# Patient Record
Sex: FEMALE | Race: WHITE | NOT HISPANIC OR LATINO | Employment: STUDENT | URBAN - METROPOLITAN AREA
[De-identification: names, ages, dates, MRNs, and addresses within clinical notes are randomized per-mention and may not be internally consistent; named-entity substitution may affect disease eponyms.]

---

## 2018-01-15 ENCOUNTER — OFFICE VISIT CONVERTED (OUTPATIENT)
Dept: INTERNAL MEDICINE | Facility: CLINIC | Age: 43
End: 2018-01-15
Attending: INTERNAL MEDICINE

## 2018-01-19 ENCOUNTER — OFFICE VISIT CONVERTED (OUTPATIENT)
Dept: ORTHOPEDIC SURGERY | Facility: CLINIC | Age: 43
End: 2018-01-19
Attending: ORTHOPAEDIC SURGERY

## 2018-01-25 ENCOUNTER — OFFICE VISIT CONVERTED (OUTPATIENT)
Dept: INTERNAL MEDICINE | Facility: CLINIC | Age: 43
End: 2018-01-25
Attending: NURSE PRACTITIONER

## 2018-03-13 ENCOUNTER — OFFICE VISIT CONVERTED (OUTPATIENT)
Dept: INTERNAL MEDICINE | Facility: CLINIC | Age: 43
End: 2018-03-13
Attending: INTERNAL MEDICINE

## 2018-04-25 ENCOUNTER — OFFICE VISIT CONVERTED (OUTPATIENT)
Dept: INTERNAL MEDICINE | Facility: CLINIC | Age: 43
End: 2018-04-25
Attending: PHYSICIAN ASSISTANT

## 2018-06-19 ENCOUNTER — OFFICE VISIT CONVERTED (OUTPATIENT)
Dept: INTERNAL MEDICINE | Facility: CLINIC | Age: 43
End: 2018-06-19
Attending: PHYSICIAN ASSISTANT

## 2018-07-31 ENCOUNTER — OFFICE VISIT CONVERTED (OUTPATIENT)
Dept: INTERNAL MEDICINE | Facility: CLINIC | Age: 43
End: 2018-07-31
Attending: INTERNAL MEDICINE

## 2019-11-06 ENCOUNTER — OFFICE VISIT CONVERTED (OUTPATIENT)
Dept: INTERNAL MEDICINE | Facility: CLINIC | Age: 44
End: 2019-11-06
Attending: INTERNAL MEDICINE

## 2019-11-06 ENCOUNTER — HOSPITAL ENCOUNTER (OUTPATIENT)
Dept: OTHER | Facility: HOSPITAL | Age: 44
Discharge: HOME | End: 2019-11-06
Attending: INTERNAL MEDICINE

## 2019-11-06 ENCOUNTER — CONVERSION ENCOUNTER (OUTPATIENT)
Dept: INTERNAL MEDICINE | Facility: CLINIC | Age: 44
End: 2019-11-06

## 2019-11-08 ENCOUNTER — HOSPITAL ENCOUNTER (OUTPATIENT)
Dept: ULTRASOUND IMAGING | Facility: HOSPITAL | Age: 44
Discharge: HOME | End: 2019-11-08
Attending: INTERNAL MEDICINE

## 2019-11-08 LAB — BACTERIA UR CULT: NORMAL

## 2019-12-06 ENCOUNTER — CONVERSION ENCOUNTER (OUTPATIENT)
Dept: INTERNAL MEDICINE | Facility: CLINIC | Age: 44
End: 2019-12-06

## 2019-12-06 ENCOUNTER — OFFICE VISIT CONVERTED (OUTPATIENT)
Dept: INTERNAL MEDICINE | Facility: CLINIC | Age: 44
End: 2019-12-06
Attending: INTERNAL MEDICINE

## 2019-12-19 ENCOUNTER — HOSPITAL ENCOUNTER (OUTPATIENT)
Dept: URGENT CARE | Facility: CLINIC | Age: 44
Discharge: HOME | End: 2019-12-19

## 2020-01-07 ENCOUNTER — OFFICE VISIT CONVERTED (OUTPATIENT)
Dept: INTERNAL MEDICINE | Facility: CLINIC | Age: 45
End: 2020-01-07
Attending: INTERNAL MEDICINE

## 2020-01-07 ENCOUNTER — HOSPITAL ENCOUNTER (OUTPATIENT)
Dept: OTHER | Facility: HOSPITAL | Age: 45
Discharge: HOME | End: 2020-01-07
Attending: INTERNAL MEDICINE

## 2020-01-07 LAB
EST. AVERAGE GLUCOSE BLD GHB EST-MCNC: 114 MG/DL
HBA1C MFR BLD: 5.6 % (ref 3.5–5.7)

## 2020-01-08 LAB
ALBUMIN SERPL-MCNC: 3.9 G/DL (ref 3.5–5)
ALBUMIN/GLOB SERPL: 1.4 {RATIO} (ref 1.4–2.6)
ALP SERPL-CCNC: 87 U/L (ref 42–98)
ALT SERPL-CCNC: 10 U/L (ref 10–40)
ANION GAP SERPL CALC-SCNC: 16 MMOL/L (ref 8–19)
AST SERPL-CCNC: 18 U/L (ref 15–50)
BASOPHILS # BLD AUTO: 0.05 10*3/UL (ref 0–0.2)
BASOPHILS NFR BLD AUTO: 0.6 % (ref 0–3)
BILIRUB SERPL-MCNC: 0.3 MG/DL (ref 0.2–1.3)
BUN SERPL-MCNC: 14 MG/DL (ref 5–25)
BUN/CREAT SERPL: 17 {RATIO} (ref 6–20)
CALCIUM SERPL-MCNC: 9.1 MG/DL (ref 8.7–10.4)
CHLORIDE SERPL-SCNC: 99 MMOL/L (ref 99–111)
CHOLEST SERPL-MCNC: 147 MG/DL (ref 107–200)
CHOLEST/HDLC SERPL: 3.7 {RATIO} (ref 3–6)
CONV ABS IMM GRAN: 0.03 10*3/UL (ref 0–0.2)
CONV CO2: 24 MMOL/L (ref 22–32)
CONV IMMATURE GRAN: 0.3 % (ref 0–1.8)
CONV TOTAL PROTEIN: 6.7 G/DL (ref 6.3–8.2)
CREAT UR-MCNC: 0.83 MG/DL (ref 0.5–0.9)
DEPRECATED RDW RBC AUTO: 42.1 FL (ref 36.4–46.3)
EOSINOPHIL # BLD AUTO: 0.13 10*3/UL (ref 0–0.7)
EOSINOPHIL # BLD AUTO: 1.5 % (ref 0–7)
ERYTHROCYTE [DISTWIDTH] IN BLOOD BY AUTOMATED COUNT: 13 % (ref 11.7–14.4)
GFR SERPLBLD BASED ON 1.73 SQ M-ARVRAT: >60 ML/MIN/{1.73_M2}
GLOBULIN UR ELPH-MCNC: 2.8 G/DL (ref 2–3.5)
GLUCOSE SERPL-MCNC: 103 MG/DL (ref 65–99)
HCT VFR BLD AUTO: 41.9 % (ref 37–47)
HDLC SERPL-MCNC: 40 MG/DL (ref 40–60)
HGB BLD-MCNC: 13.9 G/DL (ref 12–16)
LDLC SERPL CALC-MCNC: 76 MG/DL (ref 70–100)
LYMPHOCYTES # BLD AUTO: 2.94 10*3/UL (ref 1–5)
LYMPHOCYTES NFR BLD AUTO: 32.9 % (ref 20–45)
MCH RBC QN AUTO: 29.3 PG (ref 27–31)
MCHC RBC AUTO-ENTMCNC: 33.2 G/DL (ref 33–37)
MCV RBC AUTO: 88.4 FL (ref 81–99)
MONOCYTES # BLD AUTO: 0.4 10*3/UL (ref 0.2–1.2)
MONOCYTES NFR BLD AUTO: 4.5 % (ref 3–10)
NEUTROPHILS # BLD AUTO: 5.39 10*3/UL (ref 2–8)
NEUTROPHILS NFR BLD AUTO: 60.2 % (ref 30–85)
NRBC CBCN: 0 % (ref 0–0.7)
OSMOLALITY SERPL CALC.SUM OF ELEC: 279 MOSM/KG (ref 273–304)
PLATELET # BLD AUTO: 269 10*3/UL (ref 130–400)
PMV BLD AUTO: 10.5 FL (ref 9.4–12.3)
POTASSIUM SERPL-SCNC: 4.6 MMOL/L (ref 3.5–5.3)
RBC # BLD AUTO: 4.74 10*6/UL (ref 4.2–5.4)
SODIUM SERPL-SCNC: 134 MMOL/L (ref 135–147)
TRIGL SERPL-MCNC: 156 MG/DL (ref 40–150)
TSH SERPL-ACNC: 3.08 M[IU]/L (ref 0.27–4.2)
VLDLC SERPL-MCNC: 31 MG/DL (ref 5–37)
WBC # BLD AUTO: 8.94 10*3/UL (ref 4.8–10.8)

## 2020-03-06 ENCOUNTER — OFFICE VISIT CONVERTED (OUTPATIENT)
Dept: INTERNAL MEDICINE | Facility: CLINIC | Age: 45
End: 2020-03-06
Attending: INTERNAL MEDICINE

## 2020-04-09 ENCOUNTER — OFFICE VISIT CONVERTED (OUTPATIENT)
Dept: ORTHOPEDIC SURGERY | Facility: CLINIC | Age: 45
End: 2020-04-09
Attending: ORTHOPAEDIC SURGERY

## 2020-05-07 ENCOUNTER — TELEMEDICINE CONVERTED (OUTPATIENT)
Dept: NEUROSURGERY | Facility: CLINIC | Age: 45
End: 2020-05-07
Attending: PHYSICIAN ASSISTANT

## 2020-05-28 LAB — SARS-COV-2 RNA SPEC QL NAA+PROBE: NOT DETECTED

## 2020-05-29 ENCOUNTER — HOSPITAL ENCOUNTER (OUTPATIENT)
Dept: PERIOP | Facility: HOSPITAL | Age: 45
Setting detail: HOSPITAL OUTPATIENT SURGERY
Discharge: HOME | End: 2020-05-29
Attending: OBSTETRICS & GYNECOLOGY

## 2020-05-29 LAB — HCG UR QL: NEGATIVE

## 2020-06-01 ENCOUNTER — HOSPITAL ENCOUNTER (OUTPATIENT)
Dept: GENERAL RADIOLOGY | Facility: HOSPITAL | Age: 45
Discharge: HOME | End: 2020-06-01
Attending: PHYSICIAN ASSISTANT

## 2020-06-08 ENCOUNTER — OFFICE VISIT CONVERTED (OUTPATIENT)
Dept: NEUROSURGERY | Facility: CLINIC | Age: 45
End: 2020-06-08
Attending: PHYSICIAN ASSISTANT

## 2020-08-05 ENCOUNTER — OFFICE VISIT CONVERTED (OUTPATIENT)
Dept: NEUROLOGY | Facility: CLINIC | Age: 45
End: 2020-08-05
Attending: PSYCHIATRY & NEUROLOGY

## 2020-08-17 ENCOUNTER — HOSPITAL ENCOUNTER (OUTPATIENT)
Dept: MAMMOGRAPHY | Facility: HOSPITAL | Age: 45
Discharge: HOME | End: 2020-08-17
Attending: INTERNAL MEDICINE

## 2020-08-18 ENCOUNTER — CONVERSION ENCOUNTER (OUTPATIENT)
Dept: OTHER | Facility: HOSPITAL | Age: 45
End: 2020-08-18

## 2020-08-18 ENCOUNTER — OFFICE VISIT CONVERTED (OUTPATIENT)
Dept: NEUROSURGERY | Facility: CLINIC | Age: 45
End: 2020-08-18
Attending: PHYSICIAN ASSISTANT

## 2020-08-25 ENCOUNTER — OFFICE VISIT CONVERTED (OUTPATIENT)
Dept: ORTHOPEDIC SURGERY | Facility: CLINIC | Age: 45
End: 2020-08-25
Attending: PHYSICIAN ASSISTANT

## 2020-08-28 ENCOUNTER — HOSPITAL ENCOUNTER (OUTPATIENT)
Dept: MAMMOGRAPHY | Facility: HOSPITAL | Age: 45
Discharge: HOME | End: 2020-08-28
Attending: INTERNAL MEDICINE

## 2020-09-03 ENCOUNTER — HOSPITAL ENCOUNTER (OUTPATIENT)
Dept: MRI IMAGING | Facility: HOSPITAL | Age: 45
Discharge: HOME | End: 2020-09-03
Attending: PHYSICIAN ASSISTANT

## 2020-09-11 ENCOUNTER — OFFICE VISIT CONVERTED (OUTPATIENT)
Dept: ORTHOPEDIC SURGERY | Facility: CLINIC | Age: 45
End: 2020-09-11
Attending: PHYSICIAN ASSISTANT

## 2020-09-14 ENCOUNTER — HOSPITAL ENCOUNTER (OUTPATIENT)
Dept: OTHER | Facility: HOSPITAL | Age: 45
Discharge: HOME | End: 2020-09-14
Attending: INTERNAL MEDICINE

## 2020-09-14 ENCOUNTER — CONVERSION ENCOUNTER (OUTPATIENT)
Dept: INTERNAL MEDICINE | Facility: CLINIC | Age: 45
End: 2020-09-14

## 2020-09-14 ENCOUNTER — OFFICE VISIT CONVERTED (OUTPATIENT)
Dept: INTERNAL MEDICINE | Facility: CLINIC | Age: 45
End: 2020-09-14
Attending: INTERNAL MEDICINE

## 2020-09-14 LAB
ALBUMIN SERPL-MCNC: 3.8 G/DL (ref 3.5–5)
ALBUMIN/GLOB SERPL: 1.3 {RATIO} (ref 1.4–2.6)
ALP SERPL-CCNC: 87 U/L (ref 42–98)
ALT SERPL-CCNC: 12 U/L (ref 10–40)
ANION GAP SERPL CALC-SCNC: 18 MMOL/L (ref 8–19)
AST SERPL-CCNC: 18 U/L (ref 15–50)
BASOPHILS # BLD AUTO: 0.04 10*3/UL (ref 0–0.2)
BASOPHILS NFR BLD AUTO: 0.4 % (ref 0–3)
BILIRUB SERPL-MCNC: 0.39 MG/DL (ref 0.2–1.3)
BUN SERPL-MCNC: 20 MG/DL (ref 5–25)
BUN/CREAT SERPL: 24 {RATIO} (ref 6–20)
CALCIUM SERPL-MCNC: 9.4 MG/DL (ref 8.7–10.4)
CHLORIDE SERPL-SCNC: 100 MMOL/L (ref 99–111)
CHOLEST SERPL-MCNC: 178 MG/DL (ref 107–200)
CHOLEST/HDLC SERPL: 2.9 {RATIO} (ref 3–6)
CONV ABS IMM GRAN: 0.01 10*3/UL (ref 0–0.2)
CONV CO2: 24 MMOL/L (ref 22–32)
CONV IMMATURE GRAN: 0.1 % (ref 0–1.8)
CONV TOTAL PROTEIN: 6.7 G/DL (ref 6.3–8.2)
CREAT UR-MCNC: 0.84 MG/DL (ref 0.5–0.9)
DEPRECATED RDW RBC AUTO: 46.6 FL (ref 36.4–46.3)
EOSINOPHIL # BLD AUTO: 0.13 10*3/UL (ref 0–0.7)
EOSINOPHIL # BLD AUTO: 1.4 % (ref 0–7)
ERYTHROCYTE [DISTWIDTH] IN BLOOD BY AUTOMATED COUNT: 13.5 % (ref 11.7–14.4)
EST. AVERAGE GLUCOSE BLD GHB EST-MCNC: 114 MG/DL
GFR SERPLBLD BASED ON 1.73 SQ M-ARVRAT: >60 ML/MIN/{1.73_M2}
GLOBULIN UR ELPH-MCNC: 2.9 G/DL (ref 2–3.5)
GLUCOSE SERPL-MCNC: 95 MG/DL (ref 65–99)
HBA1C MFR BLD: 5.6 % (ref 3.5–5.7)
HCT VFR BLD AUTO: 43 % (ref 37–47)
HDLC SERPL-MCNC: 62 MG/DL (ref 40–60)
HGB BLD-MCNC: 13.6 G/DL (ref 12–16)
LDLC SERPL CALC-MCNC: 86 MG/DL (ref 70–100)
LYMPHOCYTES # BLD AUTO: 3 10*3/UL (ref 1–5)
LYMPHOCYTES NFR BLD AUTO: 31.9 % (ref 20–45)
MCH RBC QN AUTO: 29.8 PG (ref 27–31)
MCHC RBC AUTO-ENTMCNC: 31.6 G/DL (ref 33–37)
MCV RBC AUTO: 94.3 FL (ref 81–99)
MONOCYTES # BLD AUTO: 0.44 10*3/UL (ref 0.2–1.2)
MONOCYTES NFR BLD AUTO: 4.7 % (ref 3–10)
NEUTROPHILS # BLD AUTO: 5.77 10*3/UL (ref 2–8)
NEUTROPHILS NFR BLD AUTO: 61.5 % (ref 30–85)
NRBC CBCN: 0 % (ref 0–0.7)
OSMOLALITY SERPL CALC.SUM OF ELEC: 286 MOSM/KG (ref 273–304)
PLATELET # BLD AUTO: 295 10*3/UL (ref 130–400)
PMV BLD AUTO: 11 FL (ref 9.4–12.3)
POTASSIUM SERPL-SCNC: 4.5 MMOL/L (ref 3.5–5.3)
RBC # BLD AUTO: 4.56 10*6/UL (ref 4.2–5.4)
SODIUM SERPL-SCNC: 137 MMOL/L (ref 135–147)
TRIGL SERPL-MCNC: 150 MG/DL (ref 40–150)
TSH SERPL-ACNC: 2.76 M[IU]/L (ref 0.27–4.2)
VLDLC SERPL-MCNC: 30 MG/DL (ref 5–37)
WBC # BLD AUTO: 9.39 10*3/UL (ref 4.8–10.8)

## 2020-09-25 ENCOUNTER — HOSPITAL ENCOUNTER (OUTPATIENT)
Dept: PREADMISSION TESTING | Facility: HOSPITAL | Age: 45
Discharge: HOME | End: 2020-09-25
Attending: ORTHOPAEDIC SURGERY

## 2020-09-27 LAB — SARS-COV-2 RNA SPEC QL NAA+PROBE: NOT DETECTED

## 2020-09-29 ENCOUNTER — OFFICE VISIT CONVERTED (OUTPATIENT)
Dept: INTERNAL MEDICINE | Facility: CLINIC | Age: 45
End: 2020-09-29
Attending: INTERNAL MEDICINE

## 2020-09-30 ENCOUNTER — HOSPITAL ENCOUNTER (OUTPATIENT)
Dept: PERIOP | Facility: HOSPITAL | Age: 45
Setting detail: HOSPITAL OUTPATIENT SURGERY
Discharge: HOME | End: 2020-09-30
Attending: ORTHOPAEDIC SURGERY

## 2020-09-30 LAB — HCG UR QL: NEGATIVE

## 2020-10-06 ENCOUNTER — HOSPITAL ENCOUNTER (OUTPATIENT)
Dept: PHYSICAL THERAPY | Facility: CLINIC | Age: 45
Setting detail: RECURRING SERIES
Discharge: HOME | End: 2020-11-30
Attending: ORTHOPAEDIC SURGERY

## 2020-10-15 ENCOUNTER — OFFICE VISIT CONVERTED (OUTPATIENT)
Dept: ORTHOPEDIC SURGERY | Facility: CLINIC | Age: 45
End: 2020-10-15
Attending: PHYSICIAN ASSISTANT

## 2020-10-20 ENCOUNTER — OFFICE VISIT CONVERTED (OUTPATIENT)
Dept: NEUROSURGERY | Facility: CLINIC | Age: 45
End: 2020-10-20
Attending: PHYSICIAN ASSISTANT

## 2020-11-17 ENCOUNTER — OFFICE VISIT CONVERTED (OUTPATIENT)
Dept: ORTHOPEDIC SURGERY | Facility: CLINIC | Age: 45
End: 2020-11-17
Attending: PHYSICIAN ASSISTANT

## 2020-12-17 ENCOUNTER — OFFICE VISIT CONVERTED (OUTPATIENT)
Dept: ORTHOPEDIC SURGERY | Facility: CLINIC | Age: 45
End: 2020-12-17
Attending: PHYSICIAN ASSISTANT

## 2020-12-28 ENCOUNTER — HOSPITAL ENCOUNTER (OUTPATIENT)
Dept: CARDIOLOGY | Facility: HOSPITAL | Age: 45
Discharge: HOME | End: 2020-12-28
Attending: NURSE PRACTITIONER

## 2021-01-05 ENCOUNTER — OFFICE VISIT CONVERTED (OUTPATIENT)
Dept: ORTHOPEDIC SURGERY | Facility: CLINIC | Age: 46
End: 2021-01-05
Attending: PHYSICIAN ASSISTANT

## 2021-01-05 ENCOUNTER — CONVERSION ENCOUNTER (OUTPATIENT)
Dept: ORTHOPEDIC SURGERY | Facility: CLINIC | Age: 46
End: 2021-01-05

## 2021-01-21 ENCOUNTER — OFFICE VISIT CONVERTED (OUTPATIENT)
Dept: ORTHOPEDIC SURGERY | Facility: CLINIC | Age: 46
End: 2021-01-21
Attending: PHYSICIAN ASSISTANT

## 2021-01-28 ENCOUNTER — CONVERSION ENCOUNTER (OUTPATIENT)
Dept: INTERNAL MEDICINE | Facility: CLINIC | Age: 46
End: 2021-01-28

## 2021-01-28 ENCOUNTER — OFFICE VISIT CONVERTED (OUTPATIENT)
Dept: INTERNAL MEDICINE | Facility: CLINIC | Age: 46
End: 2021-01-28
Attending: STUDENT IN AN ORGANIZED HEALTH CARE EDUCATION/TRAINING PROGRAM

## 2021-01-28 ENCOUNTER — HOSPITAL ENCOUNTER (OUTPATIENT)
Dept: OTHER | Facility: HOSPITAL | Age: 46
Discharge: HOME | End: 2021-01-28
Attending: STUDENT IN AN ORGANIZED HEALTH CARE EDUCATION/TRAINING PROGRAM

## 2021-01-31 LAB — SARS-COV-2 RNA SPEC QL NAA+PROBE: NOT DETECTED

## 2021-03-01 ENCOUNTER — HOSPITAL ENCOUNTER (OUTPATIENT)
Dept: MAMMOGRAPHY | Facility: HOSPITAL | Age: 46
Discharge: HOME | End: 2021-03-01
Attending: INTERNAL MEDICINE

## 2021-03-04 ENCOUNTER — OFFICE VISIT CONVERTED (OUTPATIENT)
Dept: ORTHOPEDIC SURGERY | Facility: CLINIC | Age: 46
End: 2021-03-04
Attending: PHYSICIAN ASSISTANT

## 2021-03-09 ENCOUNTER — OFFICE VISIT CONVERTED (OUTPATIENT)
Dept: INTERNAL MEDICINE | Facility: CLINIC | Age: 46
End: 2021-03-09
Attending: INTERNAL MEDICINE

## 2021-03-09 ENCOUNTER — HOSPITAL ENCOUNTER (OUTPATIENT)
Dept: INTERNAL MEDICINE | Facility: CLINIC | Age: 46
Discharge: HOME | End: 2021-03-09
Attending: INTERNAL MEDICINE

## 2021-03-09 LAB
25(OH)D3 SERPL-MCNC: 18.3 NG/ML (ref 30–100)
ALBUMIN SERPL-MCNC: 3.8 G/DL (ref 3.5–5)
ALBUMIN/GLOB SERPL: 1.4 {RATIO} (ref 1.4–2.6)
ALP SERPL-CCNC: 86 U/L (ref 42–98)
ALT SERPL-CCNC: 14 U/L (ref 10–40)
ANION GAP SERPL CALC-SCNC: 15 MMOL/L (ref 8–19)
AST SERPL-CCNC: 17 U/L (ref 15–50)
BASOPHILS # BLD AUTO: 0.05 10*3/UL (ref 0–0.2)
BASOPHILS NFR BLD AUTO: 0.6 % (ref 0–3)
BILIRUB SERPL-MCNC: 0.24 MG/DL (ref 0.2–1.3)
BUN SERPL-MCNC: 14 MG/DL (ref 5–25)
BUN/CREAT SERPL: 18 {RATIO} (ref 6–20)
CALCIUM SERPL-MCNC: 9.3 MG/DL (ref 8.7–10.4)
CHLORIDE SERPL-SCNC: 102 MMOL/L (ref 99–111)
CONV ABS IMM GRAN: 0.02 10*3/UL (ref 0–0.2)
CONV CO2: 26 MMOL/L (ref 22–32)
CONV IMMATURE GRAN: 0.2 % (ref 0–1.8)
CONV TOTAL PROTEIN: 6.6 G/DL (ref 6.3–8.2)
CREAT UR-MCNC: 0.77 MG/DL (ref 0.5–0.9)
DEPRECATED RDW RBC AUTO: 43.9 FL (ref 36.4–46.3)
EOSINOPHIL # BLD AUTO: 0.13 10*3/UL (ref 0–0.7)
EOSINOPHIL # BLD AUTO: 1.5 % (ref 0–7)
ERYTHROCYTE [DISTWIDTH] IN BLOOD BY AUTOMATED COUNT: 13.4 % (ref 11.7–14.4)
FERRITIN SERPL-MCNC: 53 NG/ML (ref 10–200)
FOLATE SERPL-MCNC: 5.6 NG/ML (ref 4.8–20)
GFR SERPLBLD BASED ON 1.73 SQ M-ARVRAT: >60 ML/MIN/{1.73_M2}
GLOBULIN UR ELPH-MCNC: 2.8 G/DL (ref 2–3.5)
GLUCOSE SERPL-MCNC: 106 MG/DL (ref 65–99)
HCT VFR BLD AUTO: 44.8 % (ref 37–47)
HGB BLD-MCNC: 14.3 G/DL (ref 12–16)
IRON SATN MFR SERPL: 18 % (ref 20–55)
IRON SERPL-MCNC: 63 UG/DL (ref 60–170)
LYMPHOCYTES # BLD AUTO: 2.59 10*3/UL (ref 1–5)
LYMPHOCYTES NFR BLD AUTO: 29.1 % (ref 20–45)
MCH RBC QN AUTO: 28.8 PG (ref 27–31)
MCHC RBC AUTO-ENTMCNC: 31.9 G/DL (ref 33–37)
MCV RBC AUTO: 90.3 FL (ref 81–99)
MONOCYTES # BLD AUTO: 0.45 10*3/UL (ref 0.2–1.2)
MONOCYTES NFR BLD AUTO: 5.1 % (ref 3–10)
NEUTROPHILS # BLD AUTO: 5.66 10*3/UL (ref 2–8)
NEUTROPHILS NFR BLD AUTO: 63.5 % (ref 30–85)
NRBC CBCN: 0 % (ref 0–0.7)
OSMOLALITY SERPL CALC.SUM OF ELEC: 289 MOSM/KG (ref 273–304)
PLATELET # BLD AUTO: 279 10*3/UL (ref 130–400)
PMV BLD AUTO: 10.7 FL (ref 9.4–12.3)
POTASSIUM SERPL-SCNC: 4.3 MMOL/L (ref 3.5–5.3)
RBC # BLD AUTO: 4.96 10*6/UL (ref 4.2–5.4)
SODIUM SERPL-SCNC: 139 MMOL/L (ref 135–147)
T4 FREE SERPL-MCNC: 1 NG/DL (ref 0.9–1.8)
TIBC SERPL-MCNC: 343 UG/DL (ref 245–450)
TRANSFERRIN SERPL-MCNC: 240 MG/DL (ref 250–380)
TSH SERPL-ACNC: 4.66 M[IU]/L (ref 0.27–4.2)
VIT B12 SERPL-MCNC: 552 PG/ML (ref 211–911)
WBC # BLD AUTO: 8.9 10*3/UL (ref 4.8–10.8)

## 2021-03-12 LAB
CMV IGG CSF IA-ACNC: >10 U/ML
CMV IGM SERPL IA-ACNC: <8 AU/ML
CONV EBV EARLY ANTIGEN: 131 U/ML (ref 0–10.9)
CONV EBV NUCLEAR ANTIGEN: >600 U/ML (ref 0–21.9)
CONV EPSTEIN BARR VIRAL CAPSID IGM: <10 U/ML (ref 0–43.9)
CONV EPSTEIN BARR VIRUS ANTIBODY TO VIRAL CAPSID IGG: 448 U/ML (ref 0–21.9)

## 2021-03-18 ENCOUNTER — HOSPITAL ENCOUNTER (OUTPATIENT)
Dept: MRI IMAGING | Facility: HOSPITAL | Age: 46
Discharge: HOME | End: 2021-03-18
Attending: PHYSICIAN ASSISTANT

## 2021-03-23 ENCOUNTER — OFFICE VISIT CONVERTED (OUTPATIENT)
Dept: ORTHOPEDIC SURGERY | Facility: CLINIC | Age: 46
End: 2021-03-23
Attending: ORTHOPAEDIC SURGERY

## 2021-03-30 ENCOUNTER — HOSPITAL ENCOUNTER (OUTPATIENT)
Dept: PREADMISSION TESTING | Facility: HOSPITAL | Age: 46
Discharge: HOME | End: 2021-03-30
Attending: ORTHOPAEDIC SURGERY

## 2021-03-30 LAB
ALBUMIN SERPL-MCNC: 3.6 G/DL (ref 3.5–5)
ALBUMIN/GLOB SERPL: 1.2 {RATIO} (ref 1.4–2.6)
ALP SERPL-CCNC: 86 U/L (ref 42–98)
ALT SERPL-CCNC: 14 U/L (ref 10–40)
ANION GAP SERPL CALC-SCNC: 12 MMOL/L (ref 8–19)
APTT BLD: 23.8 S (ref 22.2–34.2)
AST SERPL-CCNC: 18 U/L (ref 15–50)
BASOPHILS # BLD AUTO: 0.05 10*3/UL (ref 0–0.2)
BASOPHILS NFR BLD AUTO: 0.5 % (ref 0–3)
BILIRUB SERPL-MCNC: 0.2 MG/DL (ref 0.2–1.3)
BUN SERPL-MCNC: 15 MG/DL (ref 5–25)
BUN/CREAT SERPL: 17 {RATIO} (ref 6–20)
CALCIUM SERPL-MCNC: 9.7 MG/DL (ref 8.7–10.4)
CHLORIDE SERPL-SCNC: 102 MMOL/L (ref 99–111)
CONV ABS IMM GRAN: 0.02 10*3/UL (ref 0–0.2)
CONV CO2: 27 MMOL/L (ref 22–32)
CONV IMMATURE GRAN: 0.2 % (ref 0–1.8)
CONV TOTAL PROTEIN: 6.7 G/DL (ref 6.3–8.2)
CREAT UR-MCNC: 0.86 MG/DL (ref 0.5–0.9)
DEPRECATED RDW RBC AUTO: 44.3 FL (ref 36.4–46.3)
EOSINOPHIL # BLD AUTO: 0.21 10*3/UL (ref 0–0.7)
EOSINOPHIL # BLD AUTO: 2.3 % (ref 0–7)
ERYTHROCYTE [DISTWIDTH] IN BLOOD BY AUTOMATED COUNT: 13.5 % (ref 11.7–14.4)
EST. AVERAGE GLUCOSE BLD GHB EST-MCNC: 111 MG/DL
GFR SERPLBLD BASED ON 1.73 SQ M-ARVRAT: >60 ML/MIN/{1.73_M2}
GLOBULIN UR ELPH-MCNC: 3.1 G/DL (ref 2–3.5)
GLUCOSE SERPL-MCNC: 108 MG/DL (ref 65–99)
HBA1C MFR BLD: 5.5 % (ref 3.5–5.7)
HCT VFR BLD AUTO: 40.9 % (ref 37–47)
HGB BLD-MCNC: 13.5 G/DL (ref 12–16)
INR PPP: 0.86 (ref 2–3)
LYMPHOCYTES # BLD AUTO: 2.78 10*3/UL (ref 1–5)
LYMPHOCYTES NFR BLD AUTO: 30.1 % (ref 20–45)
MCH RBC QN AUTO: 29.7 PG (ref 27–31)
MCHC RBC AUTO-ENTMCNC: 33 G/DL (ref 33–37)
MCV RBC AUTO: 89.9 FL (ref 81–99)
MONOCYTES # BLD AUTO: 0.42 10*3/UL (ref 0.2–1.2)
MONOCYTES NFR BLD AUTO: 4.6 % (ref 3–10)
NEUTROPHILS # BLD AUTO: 5.75 10*3/UL (ref 2–8)
NEUTROPHILS NFR BLD AUTO: 62.3 % (ref 30–85)
NRBC CBCN: 0 % (ref 0–0.7)
OSMOLALITY SERPL CALC.SUM OF ELEC: 285 MOSM/KG (ref 273–304)
PLATELET # BLD AUTO: 257 10*3/UL (ref 130–400)
PMV BLD AUTO: 9.9 FL (ref 9.4–12.3)
POTASSIUM SERPL-SCNC: 4.1 MMOL/L (ref 3.5–5.3)
PROTHROMBIN TIME: 9.7 S (ref 9.4–12)
RBC # BLD AUTO: 4.55 10*6/UL (ref 4.2–5.4)
SODIUM SERPL-SCNC: 137 MMOL/L (ref 135–147)
WBC # BLD AUTO: 9.23 10*3/UL (ref 4.8–10.8)

## 2021-03-31 ENCOUNTER — HOSPITAL ENCOUNTER (OUTPATIENT)
Dept: PREADMISSION TESTING | Facility: HOSPITAL | Age: 46
Discharge: HOME | End: 2021-03-31
Attending: ORTHOPAEDIC SURGERY

## 2021-04-01 LAB — SARS-COV-2 RNA SPEC QL NAA+PROBE: NOT DETECTED

## 2021-04-05 ENCOUNTER — HOSPITAL ENCOUNTER (OUTPATIENT)
Dept: PERIOP | Facility: HOSPITAL | Age: 46
Setting detail: HOSPITAL OUTPATIENT SURGERY
Discharge: HOME | End: 2021-04-06
Attending: INTERNAL MEDICINE

## 2021-04-05 LAB
GLUCOSE BLD-MCNC: 113 MG/DL (ref 65–99)
GLUCOSE BLD-MCNC: 131 MG/DL (ref 65–99)
GLUCOSE BLD-MCNC: 184 MG/DL (ref 65–99)

## 2021-04-06 LAB
ANION GAP SERPL CALC-SCNC: 14 MMOL/L (ref 8–19)
BUN SERPL-MCNC: 16 MG/DL (ref 5–25)
BUN/CREAT SERPL: 20 {RATIO} (ref 6–20)
CALCIUM SERPL-MCNC: 8.6 MG/DL (ref 8.7–10.4)
CHLORIDE SERPL-SCNC: 102 MMOL/L (ref 99–111)
CONV CO2: 25 MMOL/L (ref 22–32)
CREAT UR-MCNC: 0.82 MG/DL (ref 0.5–0.9)
GFR SERPLBLD BASED ON 1.73 SQ M-ARVRAT: >60 ML/MIN/{1.73_M2}
GLUCOSE BLD-MCNC: 127 MG/DL (ref 65–99)
GLUCOSE BLD-MCNC: 94 MG/DL (ref 65–99)
GLUCOSE SERPL-MCNC: 118 MG/DL (ref 65–99)
HCT VFR BLD AUTO: 32.4 % (ref 37–47)
HGB BLD-MCNC: 10.7 G/DL (ref 12–16)
OSMOLALITY SERPL CALC.SUM OF ELEC: 286 MOSM/KG (ref 273–304)
POTASSIUM SERPL-SCNC: 4.2 MMOL/L (ref 3.5–5.3)
SODIUM SERPL-SCNC: 137 MMOL/L (ref 135–147)

## 2021-04-09 ENCOUNTER — OFFICE VISIT CONVERTED (OUTPATIENT)
Dept: INTERNAL MEDICINE | Facility: CLINIC | Age: 46
End: 2021-04-09
Attending: INTERNAL MEDICINE

## 2021-04-09 ENCOUNTER — CONVERSION ENCOUNTER (OUTPATIENT)
Dept: INTERNAL MEDICINE | Facility: CLINIC | Age: 46
End: 2021-04-09

## 2021-04-20 ENCOUNTER — OFFICE VISIT CONVERTED (OUTPATIENT)
Dept: ORTHOPEDIC SURGERY | Facility: CLINIC | Age: 46
End: 2021-04-20
Attending: PHYSICIAN ASSISTANT

## 2021-04-20 ENCOUNTER — CONVERSION ENCOUNTER (OUTPATIENT)
Dept: ORTHOPEDIC SURGERY | Facility: CLINIC | Age: 46
End: 2021-04-20

## 2021-05-10 NOTE — H&P
History and Physical      Patient Name: Em Montanez   Patient ID: [id number]   Sex: Female   Birthdate: Hermelinda 3, 1975    Primary Care Provider: Ricky Velasquez MD   Referring Provider: Ricky Velasquez MD    Visit Date: April 9, 2020    Provider: Kat Collins MD   Location: [facility]   Location Address: [address] [place] [address]  [id number]   Location Phone: [phone]          Chief Complaint  · Left Knee Pain      History Of Present Illness  Em Montanez is a 44 year old /White female who presents today to [facility].      Patient is here for left knee pain. Patient states increase of stiffness when sitting for prolonged period of times. Patient states limited range of motion. Patient denies recent injury or trauma to left knee. Patient states history of left knee arthroscopic surgery in [place] 5+ years ago.   Patient has a history of a right total knee replacement by a physician in [place] in February 2016.       Past Medical History  Allergic rhinitis; Anxiety; Arthritis; Asthma; Back pain; Deafness; Depression; Fatigue; Gall Stones; History of knee replacement, total, right; Insomnia, unspecified; Knee pain; Polycystic ovarian syndrome; Primary osteoarthritis of left knee; Vitamin D deficiency         Past Surgical History  Artificial Joints/Limbs; Excision of gall bladder; Gallbladder; Joint Surgery; Knee Replacement; Metal implants         Medication List  cyanocobalamin (vitamin B-12) 1,000 mcg/mL injection solution; cyclobenzaprine 10 mg oral tablet; metformin 500 mg oral tablet extended release 24 hr; Voltaren 1 % topical gel         Allergy List  tramadol         Family Medical History  Cancer, Unspecified; Family history of certain chronic disabling diseases; arthritis         Social History  .; Recreational Drug Use (Never); Tobacco (Current every day); Working         Review of Systems  · Constitutional  o Denies  o : fever, chills, weight  "loss  · Cardiovascular  o Denies  o : chest pain, shortness of breath  · Gastrointestinal  o Denies  o : liver disease, heartburn, nausea, blood in stools  · Genitourinary  o Denies  o : painful urination, blood in urine  · Integument  o Denies  o : rash, itching  · Neurologic  o Denies  o : headache, weakness, loss of consciousness  · Musculoskeletal  o Denies  o : painful, swollen joints  · Psychiatric  o Denies  o : drug/alcohol addiction, anxiety, depression      Vitals  Date Time BP Position Site L\R Cuff Size HR RR TEMP (F) WT  HT  BMI kg/m2 BSA m2 O2 Sat        04/09/2020 08:22 AM      86 - R   302lbs 0oz 5'  10\" 43.33 2.6 98 %          Physical Examination  · Constitutional  o Appearance  o : well developed, well-nourished, no obvious deformities present  · Head and Face  o Head  o :   § Inspection  § : normocephalic  o Face  o :   § Inspection  § : no facial lesions  · Eyes  o Conjunctivae  o : conjunctivae normal  o Sclerae  o : sclerae white  · Ears, Nose, Mouth and Throat  o Ears  o :   § External Ears  § : appearance within normal limits  § Hearing  § : intact  o Nose  o :   § External Nose  § : appearance normal  · Neck  o Inspection/Palpation  o : normal appearance  o Range of Motion  o : full range of motion  · Respiratory  o Respiratory Effort  o : breathing unlabored  o Inspection of Chest  o : normal appearance  o Auscultation of Lungs  o : no audible wheezing or rales  · Cardiovascular  o Heart  o : regular rate  · Gastrointestinal  o Abdominal Examination  o : soft and non-tender  · Skin and Subcutaneous Tissue  o General Inspection  o : intact, no rashes  · Psychiatric  o General  o : Alert and oriented x3  o Judgement and Insight  o : judgment and insight intact  o Mood and Affect  o : mood normal, affect appropriate  · In Office Procedures  o View  o : LAT/SUNRISE/STANDING   o Site  o : left, knee  o Indication  o : Left Knee Pain  o Study  o : X-rays ordered, taken in the office, and " reviewed today.  o Xray  o : mild to moderate osteoarthritis  o Comparative Data  o : No comparative data found  · Left Knee-Street  o Inspection  o : no limping gait, weight bearing, no swelling, no ecchymosis, no atrophy, neutral alignment  o Palpation  o : tenderness at medial joint line, tenderness at lateral joint line, no patellar tendon tenderness, no pain of MCL, no pain at LCL  o ROM  o : -10 extension, full flexion positive crepitus  o Strength  o : full extension, full flexion  o Special Tests  o : negative varus stress, negaitve valgus stress  o Neurovascular  o : Full sensation, Dorsal Pedal Pulse 2+, posteriror tibialis pulse 2+          Assessment  · Primary osteoarthritis of left knee     715.16/M17.12  · Left knee pain, unspecified chronicity     719.46/M25.562      Plan  · Orders  o Knee (Left) [facility] Preferred View (45152-YS) - 719.46/M25.562 - 04/09/2020  o Tobacco cessation counseling completed (1484F) - - 04/09/2020  · Medications  o Medications have been Reconciled  o Transition of Care or Provider Policy  · Instructions  o Reviewed the patient's Past Medical, Social, and Family history as well as the ROS at today's visit, no changes.  o Call or return if worsening symptoms.  o Exercise handout given.  o X-ray ordered, taken and reviewed at this visit.  o Follow Up PRN.  o This note was transcribed by Melissa Gilmore.   o Electronically Identified Patient Education Materials Provided Electronically     Prescribed Voltaren 75mg one po BID #60 with food  Discussed conservative treatment such as steroid injections, gel injections.             Electronically Signed by: BANG Siu-JAMES -Author on April 9, 2020 08:43:01 AM  Electronically Co-signed by: Kat Collins MD -Reviewer on April 9, 2020 01:09:53 PM

## 2021-05-10 NOTE — H&P
History and Physical      Patient Name: Em Montanez   Patient ID: [id number]   Sex: Female   Birthdate: Hermelinda 3, 1975    Primary Care Provider: Ricky Velasquez MD   Referring Provider: Ricky Velasquez MD    Visit Date: May 7, 2020    Provider: Vanessa Roque PA-C   Location: [facility]   Location Address: [address] [place] [address]  [id number]   Location Phone: [phone]          Chief Complaint  · Back pain      History Of Present Illness  Video Conferencing Visit  Em Montanez is a 44 year old /White female who is presenting for evaluation via video conferencing. Verbal consent obtained before beginning visit.   The following staff were present during this visit: Sarah Tijerina MA, Amy CUENCA   The patient is a 44 year old /White female, who presents on referral from Ricky Velasquez MD, for a neurosurgical evaluation of low back pain and paresthesias in both legs.   The pain developed gradually several months ago. It is 5/10 in severity has an aching and a dull quality and does not radiate. The pain has been waxing and waning in severity. The pain tends to be maximal at no specific time, but waxes and wanes in severity throughout the day. The patient states the pain is aggravated by prolonged sitting. It is alleviated by changing positions. The right lower extremity paresthesias are localized to the foot in a nonspecific and distribution. In the left leg the sensory symptoms involve the foot in a nonspecific distribution.   She denies bowel or bladder dysfunction. The patient has no prior history of neck or back surgery.   RECENT INTERVENTIONS:  She has not had any recent treatment for this problem, except as above.   INFORMATION REVIEWED:  The following information was reviewed: no studies available for review.      This was a zoom apt.       Past Medical History  Allergic rhinitis; Anxiety; Arthritis; Asthma; Back pain; Deafness; Depression; Fatigue;  Gall Stones; History of knee replacement, total, right; Insomnia, unspecified; Knee pain; Polycystic ovarian syndrome; Primary osteoarthritis of left knee; Vitamin D deficiency         Past Surgical History  Artificial Joints/Limbs; Excision of gall bladder; Gallbladder; Joint Surgery; Knee Replacement; Metal implants         Medication List  cyanocobalamin (vitamin B-12) 1,000 mcg/mL injection solution; cyclobenzaprine 10 mg oral tablet; diclofenac sodium 75 mg oral tablet,delayed release (DR/EC); metformin 500 mg oral tablet extended release 24 hr; Voltaren 1 % topical gel         Allergy List  tramadol         Family Medical History  Heart Disease; Cancer, Unspecified; Family history of certain chronic disabling diseases; arthritis; Family history of Arthritis         Social History  Alcohol Use (Current some day); lives with children; lives with spouse; .; Recreational Drug Use (Never); Tobacco (Current every day); Working         Immunizations  Name Date Admin   Hepatitis A    Influenza    Influenza          Review of Systems  · Constitutional  o Denies  o : chills, excessive sweating, fatigue, fever, sycope/passing out, weight gain, weight loss  · Eyes  o Denies  o : changes in vision, blurry vision, double vision  · HENT  o Denies  o : loss of hearing, ringing in the ears, ear aches, sore throat, nasal congestion, sinus pain, nose bleeds, seasonal allergies  · Cardiovascular  o Denies  o : blood clots, swollen legs, anemia, easy burising or bleeding, transfusions  · Respiratory  o Denies  o : shortness of breath, dry cough, productive cough, pneumonia, COPD  · Gastrointestinal  o Denies  o : difficulty swallowing, reflux  · Genitourinary  o Denies  o : incontinence  · Neurologic  o Denies  o : headache, seizure, stroke, tremor, loss of balance, falls, dizziness/vertigo, difficulty with sleep, numbness/tingling/paresthesia , difficulty with coordination, difficulty with dexterity,  weakness  · Musculoskeletal  o Admits  o : low back pain  o Denies  o : neck stiffness/pain, swollen lymph nodes, muscle aches, joint pain, weakness, spasms, sciatica, pain radiating in arm, pain radiating in leg  · Endocrine  o Denies  o : diabetes, thyroid disorder  · Psychiatric  o Denies  o : anxiety, depression      Physical Examination  · Constitutional  o Appearance  o : well-nourished, well developed, alert, in no acute distress  · Respiratory  o Respiratory Effort  o : breathing unlabored  · Cardiovascular  o Peripheral Vascular System  o :   § Extremities  § : no edema or cyanosis  · Musculoskeletal  o Spine  o :   § Inspection/Palpation  § : Pt points to her low left back as pain generator.   · Skin and Subcutaneous Tissue  o Extremities  o :   § Right Lower Extremity  § : no lesions or areas of discoloration  § Left Lower Extremity  § : no lesions or areas of discoloration  o Back  o : no lesions or areas of discoloration  · Neurologic  o Mental Status Examination  o :   § Orientation  § : alert and oriented to time, person, place and events  o Gait and Station  o :   § Gait Screening  § : normal gait, able to stand without difficulty  · Psychiatric  o Mood and Affect  o : mood normal, affect appropriate     Able to flex and extend lower back.           Assessment  · Lumbago/low back pain     724.3/M54.40  · Paresthesia     782.0/R20.2    Problems Reconciled  Plan  · Orders  o ACO-17: Screened for tobacco use AND received tobacco cessation intervention (4004F) - - 05/07/2020  o MRI lumbar spine w/o contrast (88728) - 782.0/R20.2, 724.3/M54.40 - 05/07/2020  · Medications  o cyclobenzaprine 10 mg oral tablet   SIG: take 1 tablet by oral route 2 times a day prn pain for 30 days   DISP: (60) tablets with 0 refills  Refilled on 05/07/2020     o Medications have been Reconciled  o Transition of Care or Provider Policy  · Instructions  o Tobacco Cessation Counseling: Encouraged to stop smoking.   o Encouraged  to follow-up with Primary Care Provider for preventative care.  o The ROS and the PFSH were reviewed at today's visit.  o I have discussed the risks and benefits of surgery versus physical therapy, epidural steroids, and other conservative forms of treatment.  o Given these options, the patient wishes to exhaust all forms of non-operative management.  o Handouts provided: Non-Surgical Treatments for Back Pain/Degenerative Disc Disease.  o We have discussed the benefits of core strengthening. Patient will work on improving the core muscle strength with low impact activities such as walking, swimming, Pilates, etc.   o Call or return to office if symptoms worsen or persist.  o Will order MRI of Lspine and return afterwards. Will refill muscle relaxer. Will also send lumbar exercises in the mail for patient to do.   o Electronically Identified Patient Education Materials Provided Electronically  · Disposition  o Call or Return if symptoms worsen or persist.            Electronically Signed by: Vanessa Roque PA-C -Author on May 7, 2020 08:51:50 AM

## 2021-05-10 NOTE — H&P
History and Physical      Patient Name: Em Montanez   Patient ID: [id number]   Sex: Female   Birthdate: Hermelinda 3, 1975    Primary Care Provider: Ricky Velasquez MD   Referring Provider: Vanessa Roque PA-C    Visit Date: August 5, 2020    Provider: Jaswinder Cutler MD   Location: [facility]   Location Address: [address] [place] [address]  [id number]   Location Phone: [phone]          Chief Complaint     BLE pain, numbness, and tingling       History Of Present Illness  Em Montanez is a 45 year old /White female who presents today to [facility] today referred from Vanessa Roque PA-C.      45-year-old woman evaluated for numbness in her soles of her feet for the last 6 months.  It is numbness and tingling mainly that big toe, second toe and middle toe as well as the soles of her feet.  It is on the end of her toes as well but not the top of her foot.  She has back pain but it is a separate problem.  It is on both sides but greater on the right side.  She is here today for nerve study.       Past Medical History  Allergic rhinitis; Anxiety; Arthritis; Asthma; Back pain; Deafness; Depression; Fatigue; Gall Stones; History of knee replacement, total, right; Insomnia, unspecified; Knee pain; Polycystic ovarian syndrome; Primary osteoarthritis of left knee; Vitamin D deficiency         Past Surgical History  Artificial Joints/Limbs; Excision of gall bladder; Gallbladder; Joint Surgery; Knee Replacement; Metal implants         Medication List  cyanocobalamin (vitamin B-12) 1,000 mcg/mL injection solution; cyclobenzaprine 10 mg oral tablet; diclofenac sodium 75 mg oral tablet,delayed release (DR/EC); metformin 500 mg oral tablet extended release 24 hr; Voltaren 1 % topical gel         Allergy List  tramadol       Allergies Reconciled  Family Medical History  Heart Disease; Cancer, Unspecified; Family history of certain chronic disabling diseases; arthritis; Family  "history of Arthritis         Social History  Alcohol Use (Current some day); lives with children; lives with spouse; .; Recreational Drug Use (Never); Tobacco (Current every day); Working         Immunizations  Name Date Admin   Hepatitis A    Influenza    Influenza          Review of Systems  · Constitutional  o Denies  o : chills, excessive sweating, fatigue, fever, sycope/passing out, weight gain, weight loss  · Eyes  o Denies  o : changes in vision, blurry vision, double vision  · HENT  o Denies  o : loss of hearing, ringing in the ears, ear aches, sore throat, nasal congestion, sinus pain, nose bleeds, seasonal allergies  · Cardiovascular  o Denies  o : blood clots, swollen legs, anemia, easy burising or bleeding, transfusions  · Respiratory  o Denies  o : shortness of breath, dry cough, productive cough, pneumonia, COPD  · Gastrointestinal  o Denies  o : difficulty swallowing, reflux  · Genitourinary  o Denies  o : incontinence  · Neurologic  o Admits  o : numbness/tingling/paresthesia   o Denies  o : headache, seizure, stroke, tremor, loss of balance, falls, dizziness/vertigo, difficulty with sleep, difficulty with coordination, difficulty with dexterity, weakness  · Musculoskeletal  o Admits  o : sciatica, low back pain  o Denies  o : neck stiffness/pain, swollen lymph nodes, muscle aches, joint pain, weakness, spasms, pain radiating in arm, pain radiating in leg  · Endocrine  o Denies  o : diabetes, thyroid disorder  · Psychiatric  o Denies  o : anxiety, depression      Vitals  Date Time BP Position Site L\R Cuff Size HR RR TEMP (F) WT  HT  BMI kg/m2 BSA m2 O2 Sat        08/05/2020 02:41 /72 Sitting    87 - R 18 98 291lbs 0oz 5'  10\" 41.75 2.55           Physical Examination     There is no weakness of her lower extremities individual muscle testing.  There is no atrophy of intrinsic foot muscles.  Sensory is decreased in distribution predominately in the medial plantar nerves bilaterally.  " Reflexes are decreased in the patellar's and absent in the ankles.  Station gait she is able to tiptoe, heel walk, álvaro without difficulty.  Tinel sign is positive in both tarsal tunnel.           Assessment  · Numbness and tingling       Anesthesia of skin     782.0/R20.0  Paresthesia of skin     782.0/R20.2  This study is abnormal and shows electrophysiologic evidence of findings suggestive of bilateral tarsal tunnel syndrome. She has clinical evidence of tarsal tunnel syndrome. I would recommend for her to see a podiatrist.    10 minutes was spent for the straightforward complexity encounter more than half the time was spent face-to-face with the patient for examination, counseling, recommendations    Problems Reconciled  Plan  · Orders  o Nerve conduction studies; 5-6 studies (72446) - 782.0/R20.0, 782.0/R20.2 - 08/05/2020  · Medications  o Medications have been Reconciled  o Transition of Care or Provider Policy  · Instructions  o Encouraged to follow-up with Primary Care Provider for preventative care.            Electronically Signed by: Jaswinder Cutler MD -Author on August 5, 2020 04:11:38 PM

## 2021-05-13 NOTE — PROGRESS NOTES
"   Progress Note      Patient Name: Em Montanez   Patient ID: [id number]   Sex: Female   Birthdate: Hermelinda 3, 1975    Primary Care Provider: Ricky Velasquez MD   Referring Provider: Vanessa Roque PA-C    Visit Date: September 29, 2020    Provider: Ricky Velasquez MD   Location: [facility]   Location Address: [address] [place] [address], [address] [place] [address]  [id number]   Location Phone: [phone]          Chief Complaint  · \"im following up from the ER for dizziness\"      History Of Present Illness  Em Montanez is a 45 year old /White female who presents for evaluation and treatment of:      pt went to ER for episode of dizziness. pt reports improved today compared to yesterday. pt reports no new medications, vitamins, supplements. pt reports taking unisom some nights. pt reports meclizine helped put her to sleep, but unsure if helped dizziness. pt reports occurred no matter what she was doing. pt did not notice worsening with movement. pt denies tinnitus. pt did feel ill last week. pt has been covid negative.       Past Medical History  Disease Name Date Onset Notes   Allergic rhinitis --  --    Anxiety --  --    Arthritis --  --    Asthma --  --    Back pain 11/21/2017 lumbar spine x-ray largely unremarkable. will Rx PT and monitor for improvement. if pain continues, will order MRI.   Deafness --  --    Depression --  pt doing well on Effexor. will continue. pt has been on Zoloft in the past and reports ineffectiveness. pt will f/u in approx. 2-3 months. pt has been on Zoloft in the past and reports ineffectiveness. will Rx Effexor at this time. pt aware of risks and benefits including black box warning including inc suicidality. pt to f/u in 2 weeks to discuss further.    Fatigue 11/21/2017 possibly related to depression. will check CBC, CMP, TSH, and vit D today   Gall Stones --  --    History of knee replacement, total, right 01/22/2018 --    Insomnia, unspecified 12/11/2017 " discussed risks and benefits of medications. also discussed sleep hygiene methods including avoiding sources of blue light, developing routine, sleeping in a dark room, and using bed for sleep only. pt has tried melatonin with intermittent effectiveness. pt without reported symptoms of sleep apnea at this time.    Knee pain 11/21/2017 previous R knee replacement 2/2 arthritis. pt would like to discuss options with orthopedics again.    Polycystic ovarian syndrome 11/21/2017 pt unable to tolerate metformin 2/2 GI side effects. will check HbA1c today. pt has difficulty with variable sugars including hypoglycemia with symptoms. low threshold to consult endocrine for optimal control.    Primary osteoarthritis of left knee 01/22/2018 --    Vitamin D deficiency 12/11/2017 currently on vit D supplements. will recheck vit D level in 3 months.          Past Surgical History  Procedure Name Date Notes   Artificial Joints/Limbs --  --    Excision of gall bladder --  --    Gallbladder --  --    Joint Surgery --  --    Knee Replacement --  --    Metal implants --  --          Medication List  Name Date Started Instructions   meclizine 25 mg oral tablet  take 1 tablet (25 mg) by oral route once daily   metformin 500 mg oral tablet extended release 24 hr 08/18/2020 TAKE ONE TABLET BY MOUTH DAILY WITH EVENING MEAL         Allergy List  Allergen Name Date Reaction Notes   tramadol --  --  --        Allergies Reconciled  Family Medical History  Disease Name Relative/Age Notes   Heart Disease Mother/   Mother   Cancer, Unspecified  --    Family history of certain chronic disabling diseases; arthritis Mother/   Mother   Family history of Arthritis Mother/   Mother         Social History  Finding Status Start/Stop Quantity Notes   Alcohol Use Current some day --/-- --  occasionally drinks, less than 1 drink per day, has been drinking for 21-30 years   lives with children --  --/-- --  --    lives with spouse --  --/-- --  --    .  "--  --/-- --  --    Recreational Drug Use Never --/-- --  no   Tobacco Current every day --/-- 1 PPD current every day smoker, 1 packs per day, smoked 21-30 years  current every day smoker, 1 packs per day, smoked 11-20 years   Working --  --/-- --  --          Immunizations  NameDate Admin Mfg Trade Name Lot Number Route Inj VIS Given VIS Publication   Hepatitis A05/07/2018 SKB HAVRIX-ADULT  IM LD 05/07/2018 07/20/2016   Comments: tolerated well   Iayjerdlb30/14/2020 PMC Fluzone Quadrivalent [id number] IM LD 09/14/2020    Comments: pt tolerated well   Iwokktyhb51/06/2019 PMC Fluzone Quadrivalent [id number] IM RD 11/06/2019    Comments: pt tolerated well   Twlkmuwlc93/21/2017 SKB Fluzone Quadrivalent [id number] IM LD 11/21/2017 08/19/2014   Comments: patient tolerated well, left office in stable condition   Tdap08/06/2020 SKB BOOSTRIX 374lb IM LD 08/06/2020    Comments: Patient tolerated well left office in stable condition. CH RN         Review of Systems  · Constitutional  o Denies  o : fever, fatigue, weight loss, weight gain  · HENT  o Admits  o : vertigo  o Denies  o : headaches  · Cardiovascular  o Denies  o : lower extremity edema, chest pressure, palpitations  · Respiratory  o Denies  o : shortness of breath, wheezing, cough, dyspnea on exertion  · Gastrointestinal  o Denies  o : nausea, vomiting, diarrhea, constipation, abdominal pain      Vitals  Date Time BP Position Site L\R Cuff Size HR RR TEMP (F) WT  HT  BMI kg/m2 BSA m2 O2 Sat HC       09/11/2020 09:36 AM      83 - R   298lbs 0oz 5'  10\" 42.76 2.58 98 %    09/14/2020 08:04 /80 Sitting    80 - R  97 292lbs 16oz 5'  10\" 42.04 2.56 96 %    09/29/2020 01:30 /74 Sitting    87 - R  98.2 290lbs 0oz 5'  10\" 41.61 2.55 99 %          Physical Examination  · Constitutional  o Appearance  o : no acute distress, well-nourished  · Head and Face  o Head  o :   § Inspection  § : atraumatic, normocephalic  · Eyes  o Eyes  o : extraocular movements intact, " no scleral icterus, no conjunctival injection  · Ears, Nose, Mouth and Throat  o Ears  o :   § External Ears  § : normal  § Otoscopic Examination  § : Bilateral TM effusion  o Nose  o :   § Intranasal Exam  § : nares patent  o Oral Cavity  o :   § Oral Mucosa  § : moist mucous membranes  · Respiratory  o Respiratory Effort  o : breathing comfortably, symmetric chest rise  o Auscultation of Lungs  o : clear to asculatation bilaterally, no wheezes, rales, or rhonchii  · Cardiovascular  o Heart  o :   § Auscultation of Heart  § : regular rate and rhythm, no murmurs, rubs, or gallops  o Peripheral Vascular System  o :   § Extremities  § : no edema  · Lymphatic  o Neck  o : no lymphadenopathy present  o Supraclavicular Nodes  o : no supraclavicular nodes  · Neurologic  o Mental Status Examination  o :   § Orientation  § : grossly oriented to person, place and time  o Gait and Station  o :   § Gait Screening  § : normal gait  · Psychiatric  o General  o : normal mood and affect          Assessment  · Dizziness     780.4/R42  ER labs reviewed and reassuring. improving at this time. exam reassuring. possible 2/2 labrynthitis given recent illness? call or RTC if returns or worsens. can use meclizine as needed.   · OME (otitis media with effusion)     381.4/H65.90  will Rx Zyrtec to use.    Problems Reconciled  Plan  · Orders  o ACO-39: Current medications updated and reviewed (1159F, ) - - 09/29/2020  o ACO-14: Influenza immunization administered or previously received () - - 09/29/2020  · Medications  o cetirizine 10 mg oral tablet   SIG: take 1 tablet (10 mg) by oral route once daily for 90 days   DISP: (90) tablets with 1 refills  Prescribed on 09/29/2020     o Medications have been Reconciled  o Transition of Care or Provider Policy  · Instructions  o Patient was educated/instructed on their diagnosis, treatment and medications prior to discharge from the clinic today.  · Disposition  o Call or Return if  symptoms worsen or persist.            Electronically Signed by: Ricky Velasquez MD -Author on September 29, 2020 02:06:16 PM

## 2021-05-13 NOTE — PROGRESS NOTES
Progress Note      Patient Name: Em Montanez   Patient ID: [id number]   Sex: Female   Birthdate: Hermelinda 3, 1975    Primary Care Provider: Ricky Velasquez MD   Referring Provider: Vanessa Roque PA-C    Visit Date: November 17, 2020    Provider: Rianna Chow PA-C   Location: [facility]   Location Address: [address] [place] [address]  [id number]   Location Phone: [phone]          Chief Complaint  · Follow up left knee arthroscopy      History Of Present Illness  Em Montanez is a 45 year old /White female who presents today to [facility].      \She is s/p left knee arthroscopic pMMT, chondroplasty of medial and lateral femoral condyles and patellofemoral compartment 9/30/20 by Dr. Collins. She states she continues to have dull ache. She had to d/c PT due to watching her granddaughter. She is concerned she may require left TKA , as she had right TKA in [place] 4 years ago.       Past Medical History  Allergic rhinitis; Anxiety; Arthritis; Asthma; Back pain; Deafness; Depression; Fatigue; Gall Stones; History of knee replacement, total, right; Insomnia, unspecified; Knee pain; Polycystic ovarian syndrome; Primary osteoarthritis of left knee; Vitamin D deficiency         Past Surgical History  Artificial Joints/Limbs; Excision of gall bladder; Gallbladder; Joint Surgery; Knee Replacement; Metal implants         Medication List  cetirizine 10 mg oral tablet; ibuprofen 600 mg oral tablet; meclizine 25 mg oral tablet; metformin 500 mg oral tablet extended release 24 hr         Allergy List  tramadol       Allergies Reconciled  Family Medical History  Heart Disease; Cancer, Unspecified; Family history of certain chronic disabling diseases; arthritis; Family history of Arthritis         Social History  Alcohol Use (Current some day); lives with children; lives with spouse; .; Recreational Drug Use (Never); Tobacco (Current every day); Working         Review of  "Systems  · Constitutional  o Denies  o : fever, chills, weight loss  · Cardiovascular  o Denies  o : chest pain, shortness of breath  · Gastrointestinal  o Denies  o : liver disease, heartburn, nausea, blood in stools  · Genitourinary  o Denies  o : painful urination, blood in urine  · Integument  o Denies  o : rash, itching  · Neurologic  o Denies  o : headache, weakness, loss of consciousness  · Musculoskeletal  o Admits  o : painful, swollen joints  · Psychiatric  o Denies  o : drug/alcohol addiction, anxiety, depression      Vitals  Date Time BP Position Site L\R Cuff Size HR RR TEMP (F) WT  HT  BMI kg/m2 BSA m2 O2 Sat FR L/min FiO2        11/17/2020 09:56 AM      85 - R   285lbs 0oz 5'  10\" 40.89 2.53 97 %            Physical Examination  · Constitutional  o Appearance  o : well developed, well-nourished, no obvious deformities present  · Head and Face  o Head  o :   § Inspection  § : normocephalic  o Face  o :   § Inspection  § : no facial lesions  · Eyes  o Conjunctivae  o : conjunctivae normal  o Sclerae  o : sclerae white  · Ears, Nose, Mouth and Throat  o Ears  o :   § External Ears  § : appearance within normal limits  § Hearing  § : intact  o Nose  o :   § External Nose  § : appearance normal  · Neck  o Inspection/Palpation  o : normal appearance  o Range of Motion  o : full range of motion  · Respiratory  o Respiratory Effort  o : breathing unlabored  o Inspection of Chest  o : normal appearance  o Auscultation of Lungs  o : no audible wheezing or rales  · Cardiovascular  o Heart  o : regular rate  · Gastrointestinal  o Abdominal Examination  o : soft and non-tender  · Skin and Subcutaneous Tissue  o General Inspection  o : intact, no rashes  · Psychiatric  o General  o : Alert and oriented x3  o Judgement and Insight  o : judgment and insight intact  o Mood and Affect  o : mood normal, affect appropriate  · Left Knee  o Inspection  o : Well approximated incisions. No signs of infection. Extension 0 " degrees. Flexion 120 degrees. Patella well tracking. Calf supple/nontender. Negative Pinky's.               Assessment  · Left Aftercare following surgery of the muskuloskeletal system     V54.81      Plan  · Medications  o Medications have been Reconciled  o Transition of Care or Provider Policy  · Instructions  o Reviewed the patient's Past Medical, Social, and Family history as well as the ROS at today's visit, no changes.  o Call or return if worsening symptoms.  o She declines anti-inflammatory rx or steroid injection. Follow up 4 weeks, obtain standing x-ray left knee.  o Electronically Identified Patient Education Materials Provided Electronically            Electronically Signed by: BANG Marley-C -Author on November 17, 2020 10:53:43 AM  Electronically Co-signed by: Kat Collins MD -Reviewer on November 17, 2020 03:05:33 PM

## 2021-05-13 NOTE — PROGRESS NOTES
Progress Note      Patient Name: Em Montanez   Patient ID: [id number]   Sex: Female   Birthdate: Hermelinda 3, 1975    Primary Care Provider: Ricky Velasquez MD   Referring Provider: Vanessa Roque PA-C    Visit Date: September 11, 2020    Provider: Melissa Gilmore PA-C   Location: [facility]   Location Address: [address] [place] [address]  [id number]   Location Phone: [phone]          Chief Complaint  · left knee pain      History Of Present Illness  Em Montanez is a 45 year old /White female who presents today to [facility].      Patient is following up for left knee pain. Patient states increase of stiffness when sitting for prolonged period of times. Patient states limited range of motion. Patient denies recent injury or trauma to left knee. Patient is taking Voltaren 75mg that is providing minimal relief. Patient states history of left knee arthroscopic surgery in [place] 5+ years ago.   Patient has a history of a right total knee replacement by a physician in [place] in February 2016.                 Past Medical History  Allergic rhinitis; Anxiety; Arthritis; Asthma; Back pain; Deafness; Depression; Fatigue; Gall Stones; History of knee replacement, total, right; Insomnia, unspecified; Knee pain; Polycystic ovarian syndrome; Primary osteoarthritis of left knee; Vitamin D deficiency         Past Surgical History  Artificial Joints/Limbs; Excision of gall bladder; Gallbladder; Joint Surgery; Knee Replacement; Metal implants         Medication List  cyanocobalamin (vitamin B-12) 1,000 mcg/mL injection solution; cyclobenzaprine 10 mg oral tablet; diclofenac sodium 75 mg oral tablet,delayed release (DR/EC); metformin 500 mg oral tablet extended release 24 hr; Voltaren 1 % topical gel         Allergy List  tramadol         Family Medical History  Heart Disease; Cancer, Unspecified; Family history of certain chronic disabling diseases; arthritis; Family history of Arthritis  "        Social History  Alcohol Use (Current some day); lives with children; lives with spouse; .; Recreational Drug Use (Never); Tobacco (Current every day); Working         Review of Systems  · Constitutional  o Denies  o : fever, chills, weight loss  · Cardiovascular  o Denies  o : chest pain, shortness of breath  · Gastrointestinal  o Denies  o : liver disease, heartburn, nausea, blood in stools  · Genitourinary  o Denies  o : painful urination, blood in urine  · Integument  o Denies  o : rash, itching  · Neurologic  o Denies  o : headache, weakness, loss of consciousness  · Musculoskeletal  o Denies  o : painful, swollen joints  · Psychiatric  o Denies  o : drug/alcohol addiction, anxiety, depression      Vitals  Date Time BP Position Site L\R Cuff Size HR RR TEMP (F) WT  HT  BMI kg/m2 BSA m2 O2 Sat        09/11/2020 09:36 AM      83 - R   298lbs 0oz 5'  10\" 42.76 2.58 98 %          Physical Examination  · Constitutional  o Appearance  o : well developed, well-nourished, no obvious deformities present  · Head and Face  o Head  o :   § Inspection  § : normocephalic  o Face  o :   § Inspection  § : no facial lesions  · Eyes  o Conjunctivae  o : conjunctivae normal  o Sclerae  o : sclerae white  · Ears, Nose, Mouth and Throat  o Ears  o :   § External Ears  § : appearance within normal limits  § Hearing  § : intact  o Nose  o :   § External Nose  § : appearance normal  · Neck  o Inspection/Palpation  o : normal appearance  o Range of Motion  o : full range of motion  · Respiratory  o Respiratory Effort  o : breathing unlabored  o Inspection of Chest  o : normal appearance  o Auscultation of Lungs  o : no audible wheezing or rales  · Cardiovascular  o Heart  o : regular rate  · Gastrointestinal  o Abdominal Examination  o : soft and non-tender  · Skin and Subcutaneous Tissue  o General Inspection  o : intact, no rashes  · Psychiatric  o General  o : Alert and oriented x3  o Judgement and Insight  o : " judgment and insight intact  o Mood and Affect  o : mood normal, affect appropriate  · Imaging  o Imaging  o : Left Knee MRI 9/3/20: Tear of lateral meniscus. Moderate severe chondromalacia   · Left Knee-Street  o Inspection  o : no limping gait, weight bearing, no swelling, no ecchymosis, no atrophy, neutral alignment  o Palpation  o : tenderness at medial joint line, tenderness at lateral joint line, no patellar tendon tenderness, no pain of MCL, no pain at LCL  o ROM  o : full extension, full flexion  o Strength  o : full extension, full flexion  o Special Tests  o : negative ballotable effusion , negtive fluid wave, negative patellar compression, negative patellar apprehenison, positive Queta's test, positive Apley's test, negative anterior drawer, negative posterior drawer, negative lachman's drawer , negative varus stress, negaitve valgus stress  o Neurovascular  o : Full sensation, Dorsal Pedal Pulse 2+, posteriror tibialis pulse 2+          Assessment  · Primary osteoarthritis of left knee     715.16/M17.12  · Lateral meniscus tear     836.1/S83.289A  · MMT (medial meniscus tear)     836.0/S83.249A  · Left knee pain, unspecified chronicity     719.46/M25.562      Plan  · Medications  o Medications have been Reconciled  o Transition of Care or Provider Policy  · Instructions  o Dr. Collins saw and examined the patient and agrees with plan.   o Reviewed the patient's Past Medical, Social, and Family history as well as the ROS at today's visit, no changes.  o Call or return if worsening symptoms.  o Discussed surgery.  o Risks/benefits discussed with patient including, but not limited to: infection, bleeding, neurovascular damage, re-rupture, aesthetic deformity, need for further surgery, and death.  o Surgery pamphlet given.  o Electronically Identified Patient Education Materials Provided Electronically            Electronically Signed by: Melissa Gilmore PA-C -Author on September 11, 2020 09:56:55  AM  Electronically Co-signed by: Kat Collins MD -Reviewer on September 11, 2020 05:22:23 PM

## 2021-05-13 NOTE — PROGRESS NOTES
Progress Note      Patient Name: Em Montanez   Patient ID: [id number]   Sex: Female   Birthdate: Hermelinda 3, 1975    Primary Care Provider: Ricky Velasquez MD   Referring Provider: Ricky Velasquez MD    Visit Date: June 8, 2020    Provider: Vanessa Roque PA-C   Location: [facility]   Location Address: [address] [place] [address]  [id number]   Location Phone: [phone]          Chief Complaint     Patient is here to discuss MRI results       History Of Present Illness  The patient is a 45 year old /White female who is in the office for followup appointment. MRI of Lspine showed DDD at several levels and facet arthritis at several levels. There is right foraminal narrowing at L3/4 and mild bilateral foraminal narrowing at L4/5. Pt continues to have numbness in toes and pad of foot. She has not had an EMG. She notes that the at-home exercises did not help.       Past Medical History  Allergic rhinitis; Anxiety; Arthritis; Asthma; Back pain; Deafness; Depression; Fatigue; Gall Stones; History of knee replacement, total, right; Insomnia, unspecified; Knee pain; Polycystic ovarian syndrome; Primary osteoarthritis of left knee; Vitamin D deficiency         Past Surgical History  Artificial Joints/Limbs; Excision of gall bladder; Gallbladder; Joint Surgery; Knee Replacement; Metal implants         Medication List  cyanocobalamin (vitamin B-12) 1,000 mcg/mL injection solution; cyclobenzaprine 10 mg oral tablet; diclofenac sodium 75 mg oral tablet,delayed release (DR/EC); metformin 500 mg oral tablet extended release 24 hr; Voltaren 1 % topical gel         Allergy List  tramadol         Family Medical History  Heart Disease; Cancer, Unspecified; Family history of certain chronic disabling diseases; arthritis; Family history of Arthritis         Social History  Alcohol Use (Current some day); lives with children; lives with spouse; .; Recreational Drug Use (Never); Tobacco  "(Current every day); Working         Immunizations  Name Date Admin   Hepatitis A    Influenza    Influenza          Review of Systems  · Constitutional  o Denies  o : chills, excessive sweating, fatigue, fever, sycope/passing out, weight gain, weight loss  · Eyes  o Denies  o : changes in vision, blurry vision, double vision  · HENT  o Denies  o : loss of hearing, ringing in the ears, ear aches, sore throat, nasal congestion, sinus pain, nose bleeds, seasonal allergies  · Cardiovascular  o Denies  o : blood clots, swollen legs, anemia, easy burising or bleeding, transfusions  · Respiratory  o Denies  o : shortness of breath, dry cough, productive cough, pneumonia, COPD  · Gastrointestinal  o Denies  o : difficulty swallowing, reflux  · Genitourinary  o Denies  o : incontinence  · Neurologic  o Denies  o : headache, seizure, stroke, tremor, loss of balance, falls, dizziness/vertigo, difficulty with sleep, numbness/tingling/paresthesia , difficulty with coordination, difficulty with dexterity, weakness  · Musculoskeletal  o Denies  o : neck stiffness/pain, swollen lymph nodes, muscle aches, joint pain, weakness, spasms, sciatica, pain radiating in arm, pain radiating in leg, low back pain  · Endocrine  o Denies  o : diabetes, thyroid disorder  · Psychiatric  o Denies  o : anxiety, depression      Vitals  Date Time BP Position Site L\R Cuff Size HR RR TEMP (F) WT  HT  BMI kg/m2 BSA m2 O2 Sat        06/08/2020 08:16 AM        98.3 295lbs 16oz 5'  10\" 42.47 2.58           Physical Examination  · Constitutional  o Appearance  o : well-nourished, well developed, alert, in no acute distress  · Respiratory  o Respiratory Effort  o : breathing unlabored  · Cardiovascular  o Peripheral Vascular System  o :   § Extremities  § : no cyanosis, clubbing or edema  · Neurologic  o Mental Status Examination  o :   § Orientation  § : grossly oriented to person, place and time  o Motor Examination  o :   § RLE Strength  § : strength " normal  § RLE Motor Function  § : tone normal, no atrophy, no abnormal movements noted  § LLE Strength  § : strength normal  § LLE Motor Function  § : tone normal, no atrophy, no abnormal movements noted  o Reflexes  o :   § RLE  § : 2/4 knee and ankle reflex  § LLE  § : 2/4 knee and ankle reflex  o Sensation  o :   § Light Touch  § : sensation intact to light touch in extremities  o Gait and Station  o :   § Gait Screening  § : gait within normal limits  · Psychiatric  o Mood and Affect  o : mood normal, affect appropriate          Assessment  · Paresthesia     782.0/R20.2  · Lumbago     724.2/M54.5  · Sciatica     724.3/M54.30      Plan  · Orders  o EMG/NCV of Lower Extremities Bilaterally (40420) - 782.0/R20.2, 724.3/M54.30 - 06/08/2020  o PHYSICAL THERAPY CONSULTATION (PHYTH) - 782.0/R20.2, 724.2/M54.5, 724.3/M54.30 - 06/08/2020  · Medications  o Medications have been Reconciled  o Transition of Care or Provider Policy  · Instructions  o The ROS and the PFSH were reviewed at today's visit.  o Call or return to office if symptoms worsen or persist.   o Will order EMG of legs due to numbness in feet. Will also order [id number] and return afterwards.   o Electronically Identified Patient Education Materials Provided Electronically  · Disposition  o Call or Return if symptoms worsen or persist.            Electronically Signed by: Vanessa Roque PA-C -Author on June 8, 2020 08:46:59 AM

## 2021-05-13 NOTE — PROGRESS NOTES
Progress Note      Patient Name: Em Montanez   Patient ID: [id number]   Sex: Female   Birthdate: Hermelinda 3, 1975    Primary Care Provider: Ricky Velasquez MD   Referring Provider: Vanessa Roque PA-C    Visit Date: October 20, 2020    Provider: Vanessa Roque PA-C   Location: [facility]   Location Address: [address] [place] [address]  [id number]   Location Phone: [phone]          Chief Complaint     patient here for follow up after LESI. patient states she is doing well, injection seems to be helping       History Of Present Illness     Pt notes that the LESIs have been helping her pain. She notes that she only got one injection.  She plans to get a second injection next week.       Past Medical History  Allergic rhinitis; Anxiety; Arthritis; Asthma; Back pain; Deafness; Depression; Fatigue; Gall Stones; History of knee replacement, total, right; Insomnia, unspecified; Knee pain; Polycystic ovarian syndrome; Primary osteoarthritis of left knee; Vitamin D deficiency         Past Surgical History  Artificial Joints/Limbs; Excision of gall bladder; Gallbladder; Joint Surgery; Knee Replacement; Metal implants         Medication List  cetirizine 10 mg oral tablet; ibuprofen 600 mg oral tablet; meclizine 25 mg oral tablet; metformin 500 mg oral tablet extended release 24 hr         Allergy List  tramadol         Family Medical History  Heart Disease; Cancer, Unspecified; Family history of certain chronic disabling diseases; arthritis; Family history of Arthritis         Social History  Alcohol Use (Current some day); lives with children; lives with spouse; .; Recreational Drug Use (Never); Tobacco (Current every day); Working         Immunizations  Name Date Admin   Hepatitis A 05/07/2018   Influenza 09/14/2020   Influenza 11/06/2019   Influenza 11/21/2017   Tdap 08/06/2020         Review of Systems  · Constitutional  o Denies  o : fever, headache,  "chills  · Eyes  o Denies  o : eye pain, double vision, blurred vision  · HENT  o Denies  o : sinus problems, sore throat, ear infection  · Cardiovascular  o Denies  o : chest pain, high blood pressure, varicosities  · Respiratory  o Denies  o : shortness of breath, wheezing, frequent cough  · Gastrointestinal  o Denies  o : nausea, vomiting, heartburn, indigestion, abdominal pain  · Genitourinary  o Denies  o : urgency, frequency, urinary retention, painful urination  · Integument  o Denies  o : rash, itching, boils  · Neurologic  o Denies  o : tingling or numbness, tremors, dizzy spells  · Musculoskeletal  o Admits  o : back pain  o Denies  o : joint pain, neck pain  · Endocrine  o Denies  o : cold intolerance, heat intolerance, tired, excessive thirst, sluggish  · Psychiatric  o Admits  o : feels satisfied with life  o Denies  o : severe depression, concerns with hurting themselves  · Heme-Lymph  o Denies  o : swollen glands, blood clotting problems  · Allergic-Immunologic  o Denies  o : sinus allergy symptoms, hay fever      Vitals  Date Time BP Position Site L\R Cuff Size HR RR TEMP (F) WT  HT  BMI kg/m2 BSA m2 O2 Sat FR L/min FiO2 HC       10/20/2020 10:31 AM        96.6 288lbs 7oz 5'  10\" 41.39 2.54             Physical Examination     Tenderness of Lspine in midline. Normal strength and reflexes in legs. Normal sensation in legs.           Assessment  · Paresthesia     782.0/R20.2  · Lumbago     724.2/M54.5  · Sciatica     724.3/M54.30      Plan  · Medications  o Medications have been Reconciled  o Transition of Care or Provider Policy  · Instructions  o Encouraged to follow-up with Primary Care Provider for preventative care.  o Pt notes that she will call us as needed. The LESIs are helping with her pain.   o Electronically Identified Patient Education Materials Provided Electronically  · Disposition  o Call or Return if symptoms worsen or persist.            Electronically Signed by: Vanessa Roque PA-C " -Author on October 20, 2020 11:32:23 AM

## 2021-05-13 NOTE — PROGRESS NOTES
Progress Note      Patient Name: Em Montanez   Patient ID: [id number]   Sex: Female   Birthdate: Hermelinda 3, 1975    Primary Care Provider: Ricky Velasquez MD   Referring Provider: Vanessa Roque PA-C    Visit Date: August 18, 2020    Provider: Vanessa Roque PA-C   Location: [facility]   Location Address: [address] [place] [address]  [id number]   Location Phone: [phone]          Chief Complaint     Patient is here to discuss EMG/NCV results       History Of Present Illness     EMG showed tarsal tunnel syndrome. Neurology suggests podiatry followup. Pt notes that she is not working and that her back pain and foot symptoms have improved some. She does continue to have low back pain.       Past Medical History  Allergic rhinitis; Anxiety; Arthritis; Asthma; Back pain; Deafness; Depression; Fatigue; Gall Stones; History of knee replacement, total, right; Insomnia, unspecified; Knee pain; Polycystic ovarian syndrome; Primary osteoarthritis of left knee; Vitamin D deficiency         Past Surgical History  Artificial Joints/Limbs; Excision of gall bladder; Gallbladder; Joint Surgery; Knee Replacement; Metal implants         Medication List  cyanocobalamin (vitamin B-12) 1,000 mcg/mL injection solution; cyclobenzaprine 10 mg oral tablet; diclofenac sodium 75 mg oral tablet,delayed release (DR/EC); metformin 500 mg oral tablet extended release 24 hr; Voltaren 1 % topical gel         Allergy List  tramadol       Allergies Reconciled  Family Medical History  Heart Disease; Cancer, Unspecified; Family history of certain chronic disabling diseases; arthritis; Family history of Arthritis         Social History  Alcohol Use (Current some day); lives with children; lives with spouse; .; Recreational Drug Use (Never); Tobacco (Current every day); Working         Immunizations  Name Date Admin   Hepatitis A    Influenza    Influenza    Tdap          Review of  "Systems  · Constitutional  o Denies  o : chills, excessive sweating, fatigue, fever, sycope/passing out, weight gain, weight loss  · Eyes  o Denies  o : changes in vision, blurry vision, double vision  · HENT  o Denies  o : loss of hearing, ringing in the ears, ear aches, sore throat, nasal congestion, sinus pain, nose bleeds, seasonal allergies  · Cardiovascular  o Denies  o : blood clots, swollen legs, anemia, easy burising or bleeding, transfusions  · Respiratory  o Denies  o : shortness of breath, dry cough, productive cough, pneumonia, COPD  · Gastrointestinal  o Denies  o : difficulty swallowing, reflux  · Genitourinary  o Denies  o : incontinence  · Neurologic  o Denies  o : headache, seizure, stroke, tremor, loss of balance, falls, dizziness/vertigo, difficulty with sleep, numbness/tingling/paresthesia , difficulty with coordination, difficulty with dexterity, weakness  · Musculoskeletal  o Denies  o : neck stiffness/pain, swollen lymph nodes, muscle aches, joint pain, weakness, spasms, sciatica, pain radiating in arm, pain radiating in leg, low back pain  · Endocrine  o Denies  o : diabetes, thyroid disorder  · Psychiatric  o Denies  o : anxiety, depression      Vitals  Date Time BP Position Site L\R Cuff Size HR RR TEMP (F) WT  HT  BMI kg/m2 BSA m2 O2 Sat        08/18/2020 08:59 AM        97.1 290lbs 0oz 5'  10\" 41.61 2.55           Physical Examination  · Constitutional  o Appearance  o : well-nourished, well developed, alert, in no acute distress  · Respiratory  o Respiratory Effort  o : breathing unlabored  · Cardiovascular  o Peripheral Vascular System  o :   § Extremities  § : no cyanosis, clubbing or edema  · Neurologic  o Mental Status Examination  o :   § Orientation  § : grossly oriented to person, place and time  o Motor Examination  o :   § RLE Strength  § : strength normal  § RLE Motor Function  § : tone normal, no atrophy, no abnormal movements noted  § LLE Strength  § : strength " normal  § LLE Motor Function  § : tone normal, no atrophy, no abnormal movements noted  o Reflexes  o :   § RLE  § : 2/4 knee and ankle reflex  § LLE  § : 2/4 knee and ankle reflex  o Sensation  o :   § Light Touch  § : sensation intact to light touch in extremities  o Gait and Station  o :   § Gait Screening  § : gait within normal limits  · Psychiatric  o Mood and Affect  o : mood normal, affect appropriate     Tenderness at L2 level.           Assessment  · Paresthesia     782.0/R20.2  · Lumbago     724.2/M54.5  · Sciatica     724.3/M54.30      Plan  · Medications  o Medications have been Reconciled  o Transition of Care or Provider Policy  · Instructions  o Will order at-home exercises for patient. Will also order LESI at L2 level and will return in 10 weeks. Will send for podiatry referral.   o Electronically Identified Patient Education Materials Provided Electronically  · Disposition  o Call or Return if symptoms worsen or persist.            Electronically Signed by: Vanessa Roque PA-C -Author on August 18, 2020 09:43:13 AM

## 2021-05-13 NOTE — PROGRESS NOTES
"   Progress Note      Patient Name: Em Montanez   Patient ID: [id number]   Sex: Female   Birthdate: Hermelinda 3, 1975    Primary Care Provider: Ricky Velasquez MD   Referring Provider: Vanessa Roque PA-C    Visit Date: September 14, 2020    Provider: Ricky Velasquez MD   Location: [facility]   Location Address: [address] [place] [address], [address]  [place], [place]  [id number]   Location Phone: [phone]          Chief Complaint  · \" I need Vit b-12 shot today \"  · annual exam      History Of Present Illness  Em Montanez is a 45 year old /White female who presents for evaluation and treatment of:      pt has tarsal tunnel syndrome. pt has had steroid injections to feet with some relief.   OA- pt has arthroscopic surgery in the next few weeks  lumbago - pt has epidural injections planned within 1 month.   impaired fasting glucose- pt had lost some weight since last appointment. pt has been eating more vegetables and drinking more water. pt has been unable to exercise. pt has been taking metformin.   colon Ca- no FamHx of colon Ca and no melena and hematochezia  pap- done 5/2020  flu - pt wants       Past Medical History  Disease Name Date Onset Notes   Allergic rhinitis --  --    Anxiety --  --    Arthritis --  --    Asthma --  --    Back pain 11/21/2017 lumbar spine x-ray largely unremarkable. will Rx PT and monitor for improvement. if pain continues, will order MRI.   Deafness --  --    Depression --  pt doing well on Effexor. will continue. pt has been on Zoloft in the past and reports ineffectiveness. pt will f/u in approx. 2-3 months. pt has been on Zoloft in the past and reports ineffectiveness. will Rx Effexor at this time. pt aware of risks and benefits including black box warning including inc suicidality. pt to f/u in 2 weeks to discuss further.    Fatigue 11/21/2017 possibly related to depression. will check CBC, CMP, TSH, and vit D today   Gall Stones --  --    History of knee " replacement, total, right 01/22/2018 --    Insomnia, unspecified 12/11/2017 discussed risks and benefits of medications. also discussed sleep hygiene methods including avoiding sources of blue light, developing routine, sleeping in a dark room, and using bed for sleep only. pt has tried melatonin with intermittent effectiveness. pt without reported symptoms of sleep apnea at this time.    Knee pain 11/21/2017 previous R knee replacement 2/2 arthritis. pt would like to discuss options with orthopedics again.    Polycystic ovarian syndrome 11/21/2017 pt unable to tolerate metformin 2/2 GI side effects. will check HbA1c today. pt has difficulty with variable sugars including hypoglycemia with symptoms. low threshold to consult endocrine for optimal control.    Primary osteoarthritis of left knee 01/22/2018 --    Vitamin D deficiency 12/11/2017 currently on vit D supplements. will recheck vit D level in 3 months.          Past Surgical History  Procedure Name Date Notes   Artificial Joints/Limbs --  --    Excision of gall bladder --  --    Gallbladder --  --    Joint Surgery --  --    Knee Replacement --  --    Metal implants --  --          Medication List  Name Date Started Instructions   cyanocobalamin (vitamin B-12) 1,000 mcg/mL injection solution  inject 1 milliliter (1,000 mcg) by subcutaneous route twice a month   diclofenac sodium 75 mg oral tablet,delayed release (DR/EC) 04/09/2020 take 1 tablet (75 mg) by oral route 2 times per day   metformin 500 mg oral tablet extended release 24 hr 08/18/2020 TAKE ONE TABLET BY MOUTH DAILY WITH EVENING MEAL   Voltaren 1 % topical gel 03/06/2020 apply 2 grams to the affected area(s) by topical route 4 times per day         Allergy List  Allergen Name Date Reaction Notes   tramadol --  --  --        Allergies Reconciled  Family Medical History  Disease Name Relative/Age Notes   Heart Disease Mother/   Mother   Cancer, Unspecified  --    Family history of certain chronic  "disabling diseases; arthritis Mother/   Mother   Family history of Arthritis Mother/   Mother         Social History  Finding Status Start/Stop Quantity Notes   Alcohol Use Current some day --/-- --  occasionally drinks, less than 1 drink per day, has been drinking for 21-30 years   lives with children --  --/-- --  --    lives with spouse --  --/-- --  --    . --  --/-- --  --    Recreational Drug Use Never --/-- --  no   Tobacco Current every day --/-- 1 PPD current every day smoker, 1 packs per day, smoked 21-30 years  current every day smoker, 1 packs per day, smoked 11-20 years   Working --  --/-- --  --          Immunizations  NameDate Admin Mfg Trade Name Lot Number Route Inj VIS Given VIS Publication   Hepatitis A05/07/2018 [facility] HAVRIX-ADULT  IM LD 05/07/2018 07/20/2016   Comments: tolerated well   Flkgmgrdh46/14/2020 [facility] Fluzone Quadrivalent [id number] IM LD 09/14/2020    Comments: pt tolerated well   Beodofnqe19/06/2019 [facility] Fluzone Quadrivalent [id number] IM RD 11/06/2019    Comments: pt tolerated well   Dhuskuhaa47/21/2017 [facility] Fluzone Quadrivalent [id number] IM LD 11/21/2017 08/19/2014   Comments: patient tolerated well, left office in stable condition   Tdap08/06/2020 [facility] BOOSTRIX 374lb IM LD 08/06/2020    Comments: Patient tolerated well left office in stable condition. CH RN         Review of Systems  · Constitutional  o Denies  o : fever, fatigue, weight loss, weight gain  · Cardiovascular  o Denies  o : lower extremity edema, chest pressure, palpitations  · Respiratory  o Denies  o : shortness of breath, wheezing, frequent cough, dyspnea on exertion  · Gastrointestinal  o Denies  o : nausea, vomiting, diarrhea, constipation, abdominal pain      Vitals  Date Time BP Position Site L\R Cuff Size HR RR TEMP (F) WT  HT  BMI kg/m2 BSA m2 O2 Sat        08/18/2020 08:59 AM        97.1 290lbs 0oz 5'  10\" 41.61 2.55     08/25/2020 09:09 AM         290lbs 0oz 5'  10\" 41.61 2.55     09/11/2020 09:36 AM    " "  83 - R   298lbs 0oz 5'  10\" 42.76 2.58 98 %    09/14/2020 08:04 /80 Sitting    80 - R  97 292lbs 16oz 5'  10\" 42.04 2.56 96 %          Physical Examination  · Constitutional  o Appearance  o : no acute distress, well-nourished  · Head and Face  o Head  o :   § Inspection  § : atraumatic, normocephalic  · Eyes  o Eyes  o : extraocular movements intact, no scleral icterus, no conjunctival injection  · Ears, Nose, Mouth and Throat  o Ears  o :   § External Ears  § : normal  o Nose  o :   § Intranasal Exam  § : nares patent  o Oral Cavity  o :   § Oral Mucosa  § : moist mucous membranes  · Respiratory  o Respiratory Effort  o : breathing comfortably, symmetric chest rise  o Auscultation of Lungs  o : clear to asculatation bilaterally, no wheezes, rales, or rhonchii  · Cardiovascular  o Heart  o :   § Auscultation of Heart  § : regular rate and rhythm, no murmurs, rubs, or gallops  o Peripheral Vascular System  o :   § Extremities  § : no edema  · Neurologic  o Mental Status Examination  o :   § Orientation  § : grossly oriented to person, place and time  o Gait and Station  o :   § Gait Screening  § : normal gait  · Psychiatric  o General  o : normal mood and affect              Assessment  · Primary osteoarthritis of left knee     715.16/M17.12  f/u with ortho for scope.   · Polycystic ovarian syndrome     256.4/E28.2  cont metformin.   · Need for influenza vaccination     V04.81/Z23  · Screening for colon cancer     V76.51/Z12.11  · Annual physical exam     V70.0/Z00.00  · Impaired fasting glucose     790.21/R73.01  check HgbA1c.   · Lumbago     724.2/M54.5  cont f/u with pain management for injections  · Tarsal tunnel syndrome     355.5/G57.50  f/u with podiatry for steroid injections  · B12 deficiency     266.2/E53.8    Problems Reconciled  Plan  · Orders  o Fluzone Quadrivalent Vaccine, age 6 months + (19065) - V04.81/Z23 - 09/14/2020   Vaccine - Influenza; Dose: 0.5; Site: Left Deltoid; Route: " Intramuscular; Date: 09/14/2020 08:54:00; Exp: 06/30/2021; Lot: [id number]; Mfg: sanofi pasteur; TradeName: Fluzone Quadrivalent; Administered By: Sarah Krishnan MA; Comment: pt tolerated well  o Cologuard (29434) - V76.51/Z12.11 - 09/14/2020  o Hgb A1c [facility] ([id number]) - 790.21/R73.01 - 09/14/2020  o Physical, Primary Care Panel (CBC, CMP, Lipid, TSH) [facility] ([id number], 78397, 14364, 28979) - 256.4/E28.2, 790.21/R73.01, V70.0/Z00.00 - 09/14/2020  o IM/SQ - Injection Fee [facility] ([id number]) - - 09/14/2020  o ACO-39: Current medications updated and reviewed () - - 09/14/2020  o ACO-14: Influenza immunization administered or previously received () - - 09/14/2020  o ACO-20: Screening Mammography documented and reviewed () - - 09/14/2020  o Vitamin B12 Injection () - 266.2/E53.8 - 09/14/2020   Injection - Vitamin B12; Dose: 1000 mcg; Site: Right Deltoid; Route: intramuscular; Date: 09/14/2020 08:53:47; Exp: 10/01/2021; Lot: [id number]; Mfg: NIKKIE PHARMACEUTI; TradeName: cyanocobalamin (vitamin B-12); Location: [facility]; Administered By: Sarah Krishnan MA; Comment: pt tolerated well  · Medications  o Medications have been Reconciled  o Transition of Care or Provider Policy  · Instructions  o Reviewed health maintenance flowsheet and updated information. Orders were placed and/or patient's response was documented.  o Patient was educated/instructed on their diagnosis, treatment and medications prior to discharge from the clinic today.  · Disposition  o f/u in 1 year  o labs done in clinic            Electronically Signed by: Ricky Velasquez MD -Author on September 14, 2020 09:06:25 AM

## 2021-05-13 NOTE — PROGRESS NOTES
Progress Note      Patient Name: Em Montanez   Patient ID: [id number]   Sex: Female   Birthdate: Hermelinda 3, 1975    Primary Care Provider: Ricky Velasquez MD   Referring Provider: Vanessa Roque PA-C    Visit Date: October 15, 2020    Provider: Rianna Chow PA-C   Location: [facility]   Location Address: [address] [place] [address]  [id number]   Location Phone: [phone]          Chief Complaint  · left knee pain      History Of Present Illness  Em Montanez is a 45 year old /White female who presents today to [facility].      She is s/p left knee arthroscopic pMMT, chondroplasty of medial and lateral femoral condyles and patellofemoral compartment 9/30/20 by Dr. Collins. Pain is well controlled. She denies numbness/tingling/fever/chills.       Past Medical History  Allergic rhinitis; Anxiety; Arthritis; Asthma; Back pain; Deafness; Depression; Fatigue; Gall Stones; History of knee replacement, total, right; Insomnia, unspecified; Knee pain; Polycystic ovarian syndrome; Primary osteoarthritis of left knee; Vitamin D deficiency         Past Surgical History  Artificial Joints/Limbs; Excision of gall bladder; Gallbladder; Joint Surgery; Knee Replacement; Metal implants         Medication List  cetirizine 10 mg oral tablet; ibuprofen 600 mg oral tablet; meclizine 25 mg oral tablet; metformin 500 mg oral tablet extended release 24 hr         Allergy List  tramadol       Allergies Reconciled  Family Medical History  Heart Disease; Cancer, Unspecified; Family history of certain chronic disabling diseases; arthritis; Family history of Arthritis         Social History  Alcohol Use (Current some day); lives with children; lives with spouse; .; Recreational Drug Use (Never); Tobacco (Current every day); Working         Review of Systems  · Constitutional  o Denies  o : fever, chills, weight loss  · Cardiovascular  o Denies  o : chest pain, shortness of  "breath  · Gastrointestinal  o Denies  o : liver disease, heartburn, nausea, blood in stools  · Genitourinary  o Denies  o : painful urination, blood in urine  · Integument  o Denies  o : rash, itching  · Neurologic  o Denies  o : headache, weakness, loss of consciousness  · Musculoskeletal  o Admits  o : painful, swollen joints  · Psychiatric  o Denies  o : drug/alcohol addiction, anxiety, depression      Vitals  Date Time BP Position Site L\R Cuff Size HR RR TEMP (F) WT  HT  BMI kg/m2 BSA m2 O2 Sat FR L/min FiO2 HC       10/15/2020 10:56 AM      76 - R   284lbs 0oz 5'  10\" 40.75 2.52 99 %            Physical Examination  · Constitutional  o Appearance  o : well developed, well-nourished, no obvious deformities present  · Head and Face  o Head  o :   § Inspection  § : normocephalic  o Face  o :   § Inspection  § : no facial lesions  · Eyes  o Conjunctivae  o : conjunctivae normal  o Sclerae  o : sclerae white  · Ears, Nose, Mouth and Throat  o Ears  o :   § External Ears  § : appearance within normal limits  § Hearing  § : intact  o Nose  o :   § External Nose  § : appearance normal  · Neck  o Inspection/Palpation  o : normal appearance  o Range of Motion  o : full range of motion  · Respiratory  o Respiratory Effort  o : breathing unlabored  o Inspection of Chest  o : normal appearance  o Auscultation of Lungs  o : no audible wheezing or rales  · Cardiovascular  o Heart  o : regular rate  · Gastrointestinal  o Abdominal Examination  o : soft and non-tender  · Skin and Subcutaneous Tissue  o General Inspection  o : intact, no rashes  · Psychiatric  o General  o : Alert and oriented x3  o Judgement and Insight  o : judgment and insight intact  o Mood and Affect  o : mood normal, affect appropriate  · Left Knee  o Inspection  o : Well approximated incisions. No signs of infection. Extension 0 degrees. Flexion 120degrees. Patella well tracking. Calf supple/nontender. Negative Pinky's.           Assessment  · Aftercare " following surgery of the muskuloskeletal system     V54.81  · Left knee pain, unspecified chronicity     719.46/M25.562      Plan  · Medications  o Medications have been Reconciled  o Transition of Care or Provider Policy  · Instructions  o Reviewed the patient's Past Medical, Social, and Family history as well as the ROS at today's visit, no changes.  o Call or return if worsening symptoms.  o Continue PT. Follow Up in 4 weeks.   o Electronically Identified Patient Education Materials Provided Electronically            Electronically Signed by: BANG Marley-JAMES -Author on October 15, 2020 11:11:35 AM  Electronically Co-signed by: Kat Collins MD -Reviewer on October 16, 2020 08:04:31 AM

## 2021-05-13 NOTE — PROGRESS NOTES
Progress Note      Patient Name: Em Montanez   Patient ID: [id number]   Sex: Female   Birthdate: Hermelinda 3, 1975    Primary Care Provider: Ricky Velasquez MD   Referring Provider: Vanessa Roque PA-C    Visit Date: August 25, 2020    Provider: Melissa Gilmore PA-C   Location: [facility]   Location Address: [address] [place] [address]  [id number]   Location Phone: [phone]          Chief Complaint  · Left Knee Pain      History Of Present Illness  Em Montanez is a 45 year old /White female who presents today to [facility].      Patient is following up for left knee pain. Patient states increase of stiffness when sitting for prolonged period of times. Patient states limited range of motion. Patient denies recent injury or trauma to left knee. Patient is taking Voltaren 75mg that is providing minimal relief. Patient states history of left knee arthroscopic surgery in [place] 5+ years ago.   Patient has a history of a right total knee replacement by a physician in [place] in February 2016.            Past Medical History  Allergic rhinitis; Anxiety; Arthritis; Asthma; Back pain; Deafness; Depression; Fatigue; Gall Stones; History of knee replacement, total, right; Insomnia, unspecified; Knee pain; Polycystic ovarian syndrome; Primary osteoarthritis of left knee; Vitamin D deficiency         Past Surgical History  Artificial Joints/Limbs; Excision of gall bladder; Gallbladder; Joint Surgery; Knee Replacement; Metal implants         Medication List  cyanocobalamin (vitamin B-12) 1,000 mcg/mL injection solution; cyclobenzaprine 10 mg oral tablet; diclofenac sodium 75 mg oral tablet,delayed release (DR/EC); metformin 500 mg oral tablet extended release 24 hr; Voltaren 1 % topical gel         Allergy List  tramadol         Family Medical History  Heart Disease; Cancer, Unspecified; Family history of certain chronic disabling diseases; arthritis; Family history of Arthritis  "        Social History  Alcohol Use (Current some day); lives with children; lives with spouse; .; Recreational Drug Use (Never); Tobacco (Current every day); Working         Review of Systems  · Constitutional  o Denies  o : fever, chills, weight loss  · Cardiovascular  o Denies  o : chest pain, shortness of breath  · Gastrointestinal  o Denies  o : liver disease, heartburn, nausea, blood in stools  · Genitourinary  o Denies  o : painful urination, blood in urine  · Integument  o Denies  o : rash, itching  · Neurologic  o Denies  o : headache, weakness, loss of consciousness  · Musculoskeletal  o Denies  o : painful, swollen joints  · Psychiatric  o Denies  o : drug/alcohol addiction, anxiety, depression      Vitals  Date Time BP Position Site L\R Cuff Size HR RR TEMP (F) WT  HT  BMI kg/m2 BSA m2 O2 Sat        08/25/2020 09:09 AM         290lbs 0oz 5'  10\" 41.61 2.55           Physical Examination  · Constitutional  o Appearance  o : well developed, well-nourished, no obvious deformities present  · Head and Face  o Head  o :   § Inspection  § : normocephalic  o Face  o :   § Inspection  § : no facial lesions  · Eyes  o Conjunctivae  o : conjunctivae normal  o Sclerae  o : sclerae white  · Ears, Nose, Mouth and Throat  o Ears  o :   § External Ears  § : appearance within normal limits  § Hearing  § : intact  o Nose  o :   § External Nose  § : appearance normal  · Neck  o Inspection/Palpation  o : normal appearance  o Range of Motion  o : full range of motion  · Respiratory  o Respiratory Effort  o : breathing unlabored  o Inspection of Chest  o : normal appearance  o Auscultation of Lungs  o : no audible wheezing or rales  · Cardiovascular  o Heart  o : regular rate  · Gastrointestinal  o Abdominal Examination  o : soft and non-tender  · Skin and Subcutaneous Tissue  o General Inspection  o : intact, no rashes  · Psychiatric  o General  o : Alert and oriented x3  o Judgement and Insight  o : judgment and " insight intact  o Mood and Affect  o : mood normal, affect appropriate  · Left Knee-Street  o Inspection  o : limping gait, weight bearing, no swelling, no ecchymosis, no atrophy, neutral alignment  o Palpation  o : tenderness at medial joint line, tenderness at lateral joint line, no patellar tendon tenderness, no pain of MCL, no pain at LCL  o ROM  o : full extension, full flexion  o Strength  o : full extension, full flexion  o Special Tests  o : negative ballotable effusion , negtive fluid wave, negative patellar compression, negative patellar apprehenison, positive Queta's test, positive Apley's test, negative anterior drawer, negative posterior drawer, negative lachman's drawer , negative varus stress, negaitve valgus stress  o Neurovascular  o : Full sensation, Dorsal Pedal Pulse 2+, posteriror tibialis pulse 2+          Assessment  · MMT (medial meniscus tear)     836.0/S83.249A  · Left knee pain, unspecified chronicity     719.46/M25.562      Plan  · Medications  o Medications have been Reconciled  o Transition of Care or Provider Policy  · Instructions  o Reviewed the patient's Past Medical, Social, and Family history as well as the ROS at today's visit, no changes.  o Call or return if worsening symptoms.  o Exercise handout given.  o Follow up after MRI.  o Electronically Identified Patient Education Materials Provided Electronically            Electronically Signed by: Melissa Gilmore PA-C -Author on August 25, 2020 09:17:39 AM

## 2021-05-14 VITALS — OXYGEN SATURATION: 96 % | WEIGHT: 293 LBS | BODY MASS INDEX: 41.95 KG/M2 | HEIGHT: 70 IN | HEART RATE: 90 BPM

## 2021-05-14 VITALS — TEMPERATURE: 97.1 F | BODY MASS INDEX: 41.52 KG/M2 | HEIGHT: 70 IN | WEIGHT: 290 LBS

## 2021-05-14 VITALS — HEIGHT: 70 IN | TEMPERATURE: 96.6 F | WEIGHT: 288.43 LBS | BODY MASS INDEX: 41.29 KG/M2

## 2021-05-14 VITALS — BODY MASS INDEX: 41.52 KG/M2 | WEIGHT: 290 LBS | HEIGHT: 70 IN

## 2021-05-14 VITALS
WEIGHT: 290 LBS | TEMPERATURE: 98.2 F | SYSTOLIC BLOOD PRESSURE: 118 MMHG | OXYGEN SATURATION: 99 % | HEART RATE: 87 BPM | HEIGHT: 70 IN | DIASTOLIC BLOOD PRESSURE: 74 MMHG | BODY MASS INDEX: 41.52 KG/M2

## 2021-05-14 VITALS
SYSTOLIC BLOOD PRESSURE: 126 MMHG | WEIGHT: 293 LBS | OXYGEN SATURATION: 98 % | HEART RATE: 83 BPM | HEIGHT: 70 IN | BODY MASS INDEX: 41.95 KG/M2 | TEMPERATURE: 97.6 F | DIASTOLIC BLOOD PRESSURE: 80 MMHG

## 2021-05-14 VITALS
SYSTOLIC BLOOD PRESSURE: 132 MMHG | DIASTOLIC BLOOD PRESSURE: 80 MMHG | WEIGHT: 293 LBS | BODY MASS INDEX: 41.95 KG/M2 | HEIGHT: 70 IN | OXYGEN SATURATION: 96 % | HEART RATE: 80 BPM | TEMPERATURE: 97 F

## 2021-05-14 VITALS — HEIGHT: 70 IN | BODY MASS INDEX: 41.95 KG/M2 | OXYGEN SATURATION: 98 % | WEIGHT: 293 LBS | HEART RATE: 70 BPM

## 2021-05-14 VITALS — HEART RATE: 85 BPM | OXYGEN SATURATION: 97 % | BODY MASS INDEX: 40.8 KG/M2 | HEIGHT: 70 IN | WEIGHT: 285 LBS

## 2021-05-14 VITALS
WEIGHT: 293 LBS | RESPIRATION RATE: 12 BRPM | DIASTOLIC BLOOD PRESSURE: 85 MMHG | TEMPERATURE: 98 F | HEART RATE: 72 BPM | OXYGEN SATURATION: 96 % | BODY MASS INDEX: 41.95 KG/M2 | HEIGHT: 70 IN | SYSTOLIC BLOOD PRESSURE: 127 MMHG

## 2021-05-14 VITALS — WEIGHT: 293 LBS | BODY MASS INDEX: 41.95 KG/M2 | HEART RATE: 77 BPM | HEIGHT: 70 IN | OXYGEN SATURATION: 96 %

## 2021-05-14 VITALS
HEART RATE: 107 BPM | WEIGHT: 204 LBS | SYSTOLIC BLOOD PRESSURE: 134 MMHG | DIASTOLIC BLOOD PRESSURE: 82 MMHG | TEMPERATURE: 97.2 F | OXYGEN SATURATION: 94 % | HEIGHT: 70 IN | BODY MASS INDEX: 29.2 KG/M2

## 2021-05-14 VITALS — WEIGHT: 284 LBS | HEART RATE: 76 BPM | HEIGHT: 70 IN | BODY MASS INDEX: 40.66 KG/M2 | OXYGEN SATURATION: 99 %

## 2021-05-14 VITALS — HEART RATE: 83 BPM | BODY MASS INDEX: 41.95 KG/M2 | HEIGHT: 70 IN | OXYGEN SATURATION: 98 % | WEIGHT: 293 LBS

## 2021-05-14 VITALS — OXYGEN SATURATION: 98 % | BODY MASS INDEX: 41.95 KG/M2 | HEART RATE: 80 BPM | HEIGHT: 70 IN | WEIGHT: 293 LBS

## 2021-05-14 VITALS — BODY MASS INDEX: 41.95 KG/M2 | WEIGHT: 293 LBS | HEIGHT: 70 IN | OXYGEN SATURATION: 99 % | HEART RATE: 83 BPM

## 2021-05-14 VITALS — BODY MASS INDEX: 40.94 KG/M2 | HEART RATE: 75 BPM | OXYGEN SATURATION: 99 % | HEIGHT: 70 IN | WEIGHT: 286 LBS

## 2021-05-14 NOTE — PROGRESS NOTES
"   Progress Note      Patient Name: Em Montanze   Patient ID: [id number]   Sex: Female   Birthdate: Hermelinda 3, 1975    Primary Care Provider: Ricky Velasquez MD   Referring Provider: Ricky Velasquez MD    Visit Date: March 9, 2021    Provider: Ricky Velasquez MD   Location: [facility]   Location Address: [address] [place] [address]; [address] [place] [address]  [id number]   Location Phone: [phone]          Chief Complaint  · ER follow up - pelvic pain      History Of Present Illness  Em Montanez is a 45 year old /White female who presents for evaluation and treatment of:      pt reports having episode of pelvic pain. this is 2nd episode of stabing pain. unknown etiology. thought to be kidney stone vs. ovarian cyst.   knee pain- pt cont to f/u with orthopedics. pt has MRI of knee pending. pt reports voltaren gel does not help much.   pt reports feeling more fatigued recently. pt reports sleeping a lot more recently. pt reports having difficulty sleeping despite use of melatonin. pt reports waking every couple hours at nighttime.   pt had f/u with podiatry who recommended gabapentin and lyrica. pt reports having side effects with gabapentin.   lumbago - pt is getting injections in her back without much relief. pt has not yet had RFA. pt cont to f/u with [facility] intermittently.   PCOS- pt no longer on metformin due to recall.  colon Ca screening due  pt would like vitamin b12 injection       Vitals  Date Time BP Position Site L\R Cuff Size HR RR TEMP (F) WT  HT  BMI kg/m2 BSA m2 O2 Sat FR L/min FiO2 HC       01/28/2021 10:02 /80 Sitting    83 - R  97.6 295lbs 16oz 5'  10.5\" 41.87 2.58 98 %  21%    03/04/2021 11:05 AM      77 - R   304lbs 2oz 5'  10\" 43.64 2.61 96 %      03/09/2021 08:33 /85 Sitting    72 - R 12 98 299lbs 0oz 5'  10\" 42.9 2.59 96 %  21%          Physical Examination  · Constitutional  o Appearance  o : no acute distress, well-nourished  · Head " and Face  o Head  o :   § Inspection  § : atraumatic, normocephalic  · Eyes  o Eyes  o : extraocular movements intact, no scleral icterus, no conjunctival injection  · Ears, Nose, Mouth and Throat  o Ears  o :   § External Ears  § : normal  o Nose  o :   § Intranasal Exam  § : nares patent  o Oral Cavity  o :   § Oral Mucosa  § : moist mucous membranes  · Respiratory  o Respiratory Effort  o : breathing comfortably, symmetric chest rise  o Auscultation of Lungs  o : clear to asculatation bilaterally, no wheezes, rales, or rhonchii  · Cardiovascular  o Heart  o :   § Auscultation of Heart  § : regular rate and rhythm, no murmurs, rubs, or gallops  o Peripheral Vascular System  o :   § Extremities  § : no edema  · Neurologic  o Mental Status Examination  o :   § Orientation  § : grossly oriented to person, place and time  o Gait and Station  o :   § Gait Screening  § : normal gait  · Psychiatric  o General  o : normal mood and affect          Assessment  · Arthritis     V13.4/V13.4  cont tylneol and NSAIDs as needed. knee MRI pending  · Insomnia, unspecified     780.52/G47.00  cont melatonin as needed. will refer for sleep study.   · Polycystic ovarian syndrome     256.4/E28.2  restart metformin ER.   · Vitamin D deficiency     268.9/E55.9  check level.   · Screening for colon cancer     V76.51/Z12.11  · Fatigue     780.79/R53.83  labs as ordered. may be related to sleep disorder as well.   · Lumbago     724.2/M54.5  cont to f/u with pain mgmt. pt also considering surgical correction. will start Cymbalta 30mg to help with pain control. f/u in 1 month for repeat eval.   · Vitamin B12 deficiency     266.2/E53.8  IM injection given while in clinic.     Problems Reconciled  Plan  · Orders  o Sleep Study (Basic Sleep Apnea) [facility] (23330) - 780.52/G47.00 - 03/09/2021  o Vitamin D Level (38939) - 268.9/E55.9 - 03/09/2021  o COLONOSCOPY REFERRAL (COLON) - V76.51/Z12.11 - 03/09/2021  o CBC with Auto Diff [facility] (20648) -  780.79/R53.83 - 03/09/2021  o CMP [facility] (09595) - 780.79/R53.83 - 03/09/2021  o Iron panel (iron, TIBC, transferrin saturation) (50993, 26963, 42199) - 780.79/R53.83 - 03/09/2021  o EBV antibody panel (54419, 44139, 99881) - 780.79/R53.83 - 03/09/2021  o CMV ab panel (IgG, IgM) (08239) - 780.79/R53.83 - 03/09/2021  o Thyroid Profile (11806, 07733, THYII) - 780.79/R53.83 - 03/09/2021  o B12 Folate levels (B12FO) - 780.79/R53.83 - 03/09/2021  o ACO-14: Influenza immunization administered or previously received [facility] () - - 03/09/2021  o ACO-20: Screening Mammography documented and reviewed [facility] () - - 03/09/2021  o ACO-39: Current medications updated and reviewed (1159F, ) - - 03/09/2021  o Ferritin ser/plas (06886) - 780.79/R53.83 - 03/09/2021  · Medications  o duloxetine 30 mg oral capsule,delayed release(DR/EC)   SIG: take 1 capsule (30 mg) by oral route once daily for 60 days   DISP: (60) Capsule with 1 refills  Prescribed on 03/09/2021     o cyanocobalamin (vitamin B-12) 1,000 mcg/mL injection solution   SIG: inject 0.1 milliliter (100 mcg) by subcutaneous route once a month for 90 days   DISP: (1) Milliliter with 3 refills  Prescribed on 03/09/2021     o metformin 500 mg oral tablet extended release 24 hr   SIG: TAKE ONE TABLET BY MOUTH DAILY WITH EVENING MEAL for 90 days   DISP: (90) Tablet with 3 refills  Refilled on 03/09/2021     o Medications have been Reconciled  o Transition of Care or Provider Policy  · Instructions  o Patient was educated/instructed on their diagnosis, treatment and medications prior to discharge from the clinic today.  · Disposition  o f/u in 1 month  o labs done in clinic  o Care Transition  o MARITA Sent            Electronically Signed by: Ricky Velasquez MD -Author on March 12, 2021 07:56:27 AM

## 2021-05-14 NOTE — PROGRESS NOTES
Progress Note      Patient Name: Em Montanez   Patient ID: [id number]   Sex: Female   Birthdate: Hermelinda 3, 1975    Primary Care Provider: Ricky Velasquez MD   Referring Provider: Vanessa Roque PA-C    Visit Date: March 23, 2021    Provider: Kat Collins MD   Location: [facility]   Location Address: [address] [place] [address]  [id number]   Location Phone: [phone]          Chief Complaint  · Left Knee Pain - MRI Results      History Of Present Illness  Em Montanez is a 45 year old /White female who presents today to [facility].      Patient presents to\[facility]\"" for an evaluation of left knee pain. Patient presents today with MRI results of her left knee. Patient had a left knee arthroscopy done about 6 months ago and has failed to improve from that. She was given a steroid injection and Synvisc injection that gave her no relief of pain. She does have advanced left knee arthritis but the pains feel nothing like arthritis. She states sharp shooting pains especially with twisting and turning. She states pain on the medial aspect of her knee. She denies any trauma or injury to her left knee. She states she likes to be at work but she is unable to be there due to knee pain. She states simple tasks as a short grocery shopping trip causes her significant pain to the point she is unable to walk. She states pain with prolonged standing, walking and walking up and down steps. Patient has a history of a right total knee replacement done in [place].       Past Medical History  Allergic rhinitis; Anxiety; Arthritis; Asthma; Back pain; Deafness; Depression; Fatigue; Gall Stones; History of knee replacement, total, right; Insomnia, unspecified; Knee pain; Polycystic ovarian syndrome; Primary osteoarthritis of left knee; Vitamin D deficiency         Past Surgical History  Artificial Joints/Limbs; Excision of gall bladder; Gallbladder; Joint Surgery; Knee Replacement; Metal implants  "        Medication List  cyanocobalamin (vitamin B-12) 1,000 mcg/mL injection solution; duloxetine 30 mg oral capsule,delayed release(DR/EC); metformin 500 mg oral tablet extended release 24 hr; Syringe 3cc/25Gx1\" 3 mL 25 gauge x 1\" miscellaneous syringe; trazodone 50 mg oral tablet; Vitamin D2 1,250 mcg (50,000 unit) oral capsule         Allergy List  tramadol       Allergies Reconciled  Family Medical History  Heart Disease; Cancer, Unspecified; Family history of certain chronic disabling diseases; arthritis; Family history of Arthritis         Social History  Alcohol Use (Current some day); lives with children; lives with spouse; .; Recreational Drug Use (Never); Tobacco (Current every day); Working         Immunizations  Name Date Admin   Hepatitis A 05/07/2018   Influenza 09/14/2020   Influenza 11/06/2019   Influenza 11/21/2017   Tdap 08/06/2020         Review of Systems  · Constitutional  o Denies  o : fever, chills, weight loss  · Cardiovascular  o Denies  o : chest pain, shortness of breath  · Gastrointestinal  o Denies  o : liver disease, heartburn, nausea, blood in stools  · Genitourinary  o Denies  o : painful urination, blood in urine  · Integument  o Denies  o : rash, itching  · Neurologic  o Denies  o : headache, weakness, loss of consciousness  · Musculoskeletal  o Denies  o : painful, swollen joints  · Psychiatric  o Denies  o : drug/alcohol addiction, anxiety, depression      Vitals  Date Time BP Position Site L\R Cuff Size HR RR TEMP (F) WT  HT  BMI kg/m2 BSA m2 O2 Sat FR L/min FiO2        03/23/2021 09:48 AM      80 - R   305lbs 0oz 5'  10\" 43.76 2.61 98 %            Physical Examination  · Constitutional  o Appearance  o : well developed, well-nourished, no obvious deformities present  · Head and Face  o Head  o :   § Inspection  § : normocephalic  o Face  o :   § Inspection  § : no facial lesions  · Eyes  o Conjunctivae  o : conjunctivae normal  o Sclerae  o : sclerae white  · Ears, " Nose, Mouth and Throat  o Ears  o :   § External Ears  § : appearance within normal limits  § Hearing  § : intact  o Nose  o :   § External Nose  § : appearance normal  · Neck  o Inspection/Palpation  o : normal appearance  o Range of Motion  o : full range of motion  · Respiratory  o Respiratory Effort  o : breathing unlabored  o Inspection of Chest  o : normal appearance  o Auscultation of Lungs  o : no audible wheezing or rales  · Cardiovascular  o Heart  o : regular rate  · Gastrointestinal  o Abdominal Examination  o : soft and non-tender  · Skin and Subcutaneous Tissue  o General Inspection  o : intact, no rashes  · Psychiatric  o General  o : Alert and oriented x3  o Judgement and Insight  o : judgment and insight intact  o Mood and Affect  o : mood normal, affect appropriate  · Left Knee  o Inspection  o : Well healed portal incisions. Tender joint lines. Extension 0 degrees. Flexion 120 degrees. Patella well tracking. Calf supple/nontender. Negative Pinky's. No swelling, skin discoloration or atrophy. Calf supple, non-tender. Positive pulses. Sensation grossly intact. Neurovascular intact. Stable to valgus/varus stress. Good strength in quadriceps, hamstrings, dorsiflexors, and plantar flexors. Full weight bearing. Limping gait.   · In Office Procedures  o View  o : LAT/SUNRISE/STANDING   o Site  o : left, knee  o Indication  o : Left knee pain   o Study  o : X-rays ordered, taken in the office, and reviewed today.  o Xray  o : No evidence of fracture or dislocation. Advanced left knee osteoarthritis.   · Imaging  o Imaging  o : 3/18/21 LEFT KNEE MRI: 1. Suspected postoperative changes from partial lateral meniscectomy with severe complex degenerative type tear of the meniscal body and anterior horn, 6 mm lateral extrusion of the meniscal body, and a 4 mm anterior body meniscal flap flipped superiorly and laterally into the lateral femoral gutter. 2. 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 3. Moderate to advanced chondromalacia in the patellofemoral compartment with full-thickness chondral defects and chondral fissuring at the trochlea. 4. Attenuation and heterogeneous signal of the medial meniscus posterior horn inner free margin, which could reflect degenerative fraying or postoperative changes from partial meniscectomy. 5. Moderate to large joint effusion with diffuse synovial thickening and/or intra-articular debris.           Assessment  · Primary osteoarthritis of left knee     715.16/M17.12  · Left knee pain, unspecified chronicity     719.46/M25.562      Plan  · Orders  o Knee (Left) [facility] ([id number]) - 719.46/M25.562 - 03/23/2021  · Medications  o Medications have been Reconciled  o Transition of Care or Provider Policy  · Instructions  o Dr. Collins saw and examined the patient and agrees with plan.   o X-rays reviewed by Dr. Collins.  o Reviewed the patient's Past Medical, Social, and Family history as well as the ROS at today's visit, no changes.  o Call or return if worsening symptoms.  o Discussed surgery.  o Risks/benefits discussed with patient including, but not limited to: infection, bleeding, neurovascular damage, malunion, nonunion, aesthetic deformity, need for further surgery, and death.  o Discussed with patient the implant type being used during surgery and patient understands and desires to proceed.  o Surgery pamphlet given.  o Follow Up Post-Op.  o The above service was scribed by Alexa Day on my behalf and I attest to the accuracy of the note. candice   o Discussed treatment plans with the patient. She states she has a history of a right total knee replacement done in [place] about 5 years ago. She states she had advanced arthritis in her right knee back then. She states injections, medication and therapy gave her  no relief which is why she proceeded with a right total knee. We discussed operative vs non-operative measures of the left knee with the patient. We discussed another scope vs a knee replacement. She wishes to proceed a left total knee arthroplasty.   o Electronically Identified Patient Education Materials Provided Electronically            Electronically Signed by: Alexa Day-, Other -Author on March 23, 2021 10:19:18 AM  Electronically Co-signed by: Kat Collins MD -Reviewer on March 23, 2021 05:06:32 PM

## 2021-05-14 NOTE — PROGRESS NOTES
Progress Note      Patient Name: Em Montanez   Patient ID: [id number]   Sex: Female   Birthdate: Hermelinda 3, 1975    Primary Care Provider: Ricky Velasquez MD   Referring Provider: Vanessa Roque PA-C    Visit Date: January 28, 2021    Provider: Gail Lewis MD   Location: [facility]   Location Address: [address] [place] [address], [address]  [place], [place]  [id number]   Location Phone: [phone]          Chief Complaint  · diarrhea  · headache  · fatigue      History Of Present Illness  Em Montanez is a 45 year old /White female who presents for evaluation and treatment of:      Yesterday, felt really down and had diarrhea most of the day. Also endorses cold chills and HA which is intermittent. Shares that HA are atypical for her. She is also feeling exhausted, was ready for a nap after getting up and taking a shower today. No cough. She has not checked her temperature at home and has not felt feverish. Denies sore throat, nasal congestion or rhinorrhea. Denies any sick contact.     5-6 episode of Diarrhea yesterday with some blood but she does have a hx of hemorrhoid. She's had breakfast already today and has not yet had any diarrhea. Reports some co-workers with Covid earlier last month, but she was not around them. Shares that a friend in [place] was feeling the same way she is feeling and had negative covid test via nasal swab x4, but blood test return positive.              Past Medical History  Disease Name Date Onset Notes   Allergic rhinitis --  --    Anxiety --  --    Arthritis --  --    Asthma --  --    Back pain 11/21/2017 lumbar spine x-ray largely unremarkable. will Rx PT and monitor for improvement. if pain continues, will order MRI.   Deafness --  --    Depression --  pt doing well on Effexor. will continue. pt has been on Zoloft in the past and reports ineffectiveness. pt will f/u in approx. 2-3 months. pt has been on Zoloft in the past and reports ineffectiveness. will Rx  Effexor at this time. pt aware of risks and benefits including black box warning including inc suicidality. pt to f/u in 2 weeks to discuss further.    Fatigue 11/21/2017 possibly related to depression. will check CBC, CMP, TSH, and vit D today   Gall Stones --  --    History of knee replacement, total, right 01/22/2018 --    Insomnia, unspecified 12/11/2017 discussed risks and benefits of medications. also discussed sleep hygiene methods including avoiding sources of blue light, developing routine, sleeping in a dark room, and using bed for sleep only. pt has tried melatonin with intermittent effectiveness. pt without reported symptoms of sleep apnea at this time.    Knee pain 11/21/2017 previous R knee replacement 2/2 arthritis. pt would like to discuss options with orthopedics again.    Polycystic ovarian syndrome 11/21/2017 pt unable to tolerate metformin 2/2 GI side effects. will check HbA1c today. pt has difficulty with variable sugars including hypoglycemia with symptoms. low threshold to consult endocrine for optimal control.    Primary osteoarthritis of left knee 01/22/2018 --    Vitamin D deficiency 12/11/2017 currently on vit D supplements. will recheck vit D level in 3 months.          Past Surgical History  Procedure Name Date Notes   Artificial Joints/Limbs --  --    Excision of gall bladder --  --    Gallbladder --  --    Joint Surgery --  --    Knee Replacement --  --    Metal implants --  --          Medication List  Name Date Started Instructions   cetirizine 10 mg oral tablet 09/29/2020 take 1 tablet (10 mg) by oral route once daily for 90 days   gabapentin 300 mg oral capsule  take 1 capsule (300 mg) by oral route 3 times per day   ibuprofen 600 mg oral tablet 10/08/2020 take 1 tablet by oral route 3 times a day   meclizine 25 mg oral tablet  take 1 tablet (25 mg) by oral route once daily   metformin 500 mg oral tablet extended release 24 hr 08/18/2020 TAKE ONE TABLET BY MOUTH DAILY WITH EVENING  MEAL         Allergy List  Allergen Name Date Reaction Notes   tramadol --  --  --        Allergies Reconciled  Family Medical History  Disease Name Relative/Age Notes   Heart Disease Mother/   Mother   Cancer, Unspecified  --    Family history of certain chronic disabling diseases; arthritis Mother/   Mother   Family history of Arthritis Mother/   Mother         Social History  Finding Status Start/Stop Quantity Notes   Alcohol Use Current some day --/-- --  occasionally drinks, less than 1 drink per day, has been drinking for 21-30 years   lives with children --  --/-- --  --    lives with spouse --  --/-- --  --    . --  --/-- --  --    Recreational Drug Use Never --/-- --  no   Tobacco Current every day --/-- 1 PPD current every day smoker, 1 packs per day, smoked 21-30 years  current every day smoker, 1 packs per day, smoked 11-20 years   Working --  --/-- --  --          Immunizations  NameDate Admin Mfg Trade Name Lot Number Route Inj VIS Given VIS Publication   Hepatitis A05/07/2018 SKB HAVRIX-ADULT  IM LD 05/07/2018 07/20/2016   Comments: tolerated well   Blxujrrnh31/14/2020 PMC Fluzone Quadrivalent [id number] IM LD 09/14/2020    Comments: pt tolerated well   Tdap08/06/2020 SKB BOOSTRIX 374lb IM LD 08/06/2020    Comments: Patient tolerated well left office in stable condition. CH RN         Review of Systems  · Constitutional  o Admits  o : fatigue   o Denies  o : fever weight loss, weight gain  · Cardiovascular  o Denies  o : lower extremity edema, claudication, chest pressure, palpitations  · Respiratory  o Admits  o : SOA after her shower today.   o Denies  o : wheezing, frequent cough, hemoptysis, dyspnea on exertion  · Gastrointestinal  o Admits  o : diarrhea and nausea. LLQ abd pain improving   o Denies  o : vomiting, constipation,  · Integument  o Denies  o : rash, skin dryness  · Neurologic  o Denies  o : muscular weakness, incoordination  · Musculoskeletal  o Denies  o : joint pain,  "joint swelling  · Psychiatric  o Denies  o : anxiety, depression      Vitals  Date Time BP Position Site L\R Cuff Size HR RR TEMP (F) WT  HT  BMI kg/m2 BSA m2 O2 Sat FR L/min FiO2 HC       01/05/2021 01:06 PM      90 - R   295lbs 16oz 5'  10.5\" 41.87 2.58 96 %      01/21/2021 10:27 AM      70 - R   295lbs 16oz 5'  10.5\" 41.87 2.58 98 %      01/28/2021 10:02 /80 Sitting    83 - R  97.6 295lbs 16oz 5'  10.5\" 41.87 2.58 98 %  21%          Physical Examination  · Constitutional  o Appearance  o : no acute distress, well-nourished  · Head and Face  o Head  o :   § Inspection  § : atraumatic, normocephalic  · Ears, Nose, Mouth and Throat  o Ears  o :   § External Ears  § : normal  o Nose  o :   § Intranasal Exam  § : nares patent  o Oral Cavity  o :   § Oral Mucosa  § : moist mucous membranes  o Throat  o :   § Oropharynx  § : Limited view with pt with mallempati score of 3, no lesion or signs of inflammation noted.   · Respiratory  o Respiratory Effort  o : breathing comfortably, symmetric chest rise  o Auscultation of Lungs  o : clear to asculatation bilaterally, no wheezes, rales, or rhonchii  · Cardiovascular  o Heart  o :   § Auscultation of Heart  § : regular rate and rhythm, no murmurs, rubs, or gallops  o Peripheral Vascular System  o :   § Extremities  § : no edema  · Lymphatic  o Neck  o : no lymphadenopathy present  · Skin and Subcutaneous Tissue  o General Inspection  o : no rash present over exposed skin   · Neurologic  o Mental Status Examination  o :   § Orientation  § : grossly oriented to person, place and time  o Cranial Nerves  o : crainial nerves 2-12 grossly intact          Assessment  · Diarrhea     787.91/R19.7  Acute as of the past 24 hours however already improving without any intervention. Continue with conservative care. Encourage increasing intake of fluid and for patient to slowly restart her normal diet.  · Malaise and fatigue       Other malaise     780.79/R53.81  Other " fatigue     780.79/R53.83  Acute onset of general malaise, fatigue and diarrhea. Symptoms are improving however will evaluate for Covid. Patient encouraged to isolate from family as she awaits the results of her Covid test.  · Medication management     V58.69/Z79.899  Patient with question regarding recent recall out for Metformin. Reviewed current updates on Metformin recall with findings that Metformin 750 mg extended release tablets remain on recall as of 1/25/2021. Patient is currently on the 500mg tablet which ideally should be safe however no new updates were found on the consulted website (FDA) regarding any change in the recall of that dose that was made in November 2020. I did share the phone number from the website and encourage patient to call to request update regarding safety for continued use of the medication.     Problems Reconciled  Plan  · Orders  o ACO-39: Current medications updated and reviewed (, 1159F) - 780.79/R53.81, 780.79/R53.83, 787.91/R19.7 - 01/28/2021  o [facility] NCOV2 (send-out) (17781) - 780.79/R53.81, 780.79/R53.83, 787.91/R19.7 - 01/28/2021  · Medications  o Medications have been Reconciled  o Transition of Care or Provider Policy  · Instructions  o BRAT diet, Avoid dairy products.  o Patient was educated/instructed on their diagnosis, treatment and medications prior to discharge from the clinic today.  o Call the office with any concerns or questions.            Electronically Signed by: Gail Lewis MD -Author on January 28, 2021 03:34:45 PM

## 2021-05-14 NOTE — PROGRESS NOTES
Progress Note      Patient Name: Em Montanez   Patient ID: [id number]   Sex: Female   Birthdate: Hermelinda 3, 1975    Primary Care Provider: Ricky Velasquez MD   Referring Provider: Vanessa Roque PA-C    Visit Date: January 5, 2021    Provider: Rianna Chow PA-C   Location: [facility]   Location Address: [address] [place] [address]  [id number]   Location Phone: [phone]          Chief Complaint  · Follow up left knee pain      History Of Present Illness  Em Montanez is a 45 year old /White female who presents today to [facility].      She is here for follow up for left knee pain. She is s/p left knee arthroscopy in 2015 in [place] and then most recently 9/30/20 by Dr. Collins. She states persistent pain. Most recent x-rays, weight bearing demonstrate joint space narrowing. She has had injection in her left knee without relief. She is s/p right TKA 2015.       Past Medical History  Allergic rhinitis; Anxiety; Arthritis; Asthma; Back pain; Deafness; Depression; Fatigue; Gall Stones; History of knee replacement, total, right; Insomnia, unspecified; Knee pain; Polycystic ovarian syndrome; Primary osteoarthritis of left knee; Vitamin D deficiency         Past Surgical History  Artificial Joints/Limbs; Excision of gall bladder; Gallbladder; Joint Surgery; Knee Replacement; Metal implants         Medication List  cetirizine 10 mg oral tablet; ibuprofen 600 mg oral tablet; meclizine 25 mg oral tablet; metformin 500 mg oral tablet extended release 24 hr         Allergy List  tramadol         Family Medical History  Heart Disease; Cancer, Unspecified; Family history of certain chronic disabling diseases; arthritis; Family history of Arthritis         Social History  Alcohol Use (Current some day); lives with children; lives with spouse; .; Recreational Drug Use (Never); Tobacco (Current every day); Working         Review of Systems  · Constitutional  o Denies  o : fever,  "chills, weight loss  · Cardiovascular  o Denies  o : chest pain, shortness of breath  · Gastrointestinal  o Denies  o : liver disease, heartburn, nausea, blood in stools  · Genitourinary  o Denies  o : painful urination, blood in urine  · Integument  o Denies  o : rash, itching  · Neurologic  o Denies  o : headache, weakness, loss of consciousness  · Musculoskeletal  o Admits  o : painful, swollen joints  · Psychiatric  o Denies  o : drug/alcohol addiction, anxiety, depression      Vitals  Date Time BP Position Site L\R Cuff Size HR RR TEMP (F) WT  HT  BMI kg/m2 BSA m2 O2 Sat FR L/min FiO2 HC       01/05/2021 01:06 PM      90 - R   295lbs 16oz 5'  10.5\" 41.87 2.58 96 %            Physical Examination  · Constitutional  o Appearance  o : well developed, well-nourished, no obvious deformities present  · Head and Face  o Head  o :   § Inspection  § : normocephalic  o Face  o :   § Inspection  § : no facial lesions  · Eyes  o Conjunctivae  o : conjunctivae normal  o Sclerae  o : sclerae white  · Ears, Nose, Mouth and Throat  o Ears  o :   § External Ears  § : appearance within normal limits  § Hearing  § : intact  o Nose  o :   § External Nose  § : appearance normal  · Neck  o Inspection/Palpation  o : normal appearance  o Range of Motion  o : full range of motion  · Respiratory  o Respiratory Effort  o : breathing unlabored  o Inspection of Chest  o : normal appearance  o Auscultation of Lungs  o : no audible wheezing or rales  · Cardiovascular  o Heart  o : regular rate  · Gastrointestinal  o Abdominal Examination  o : soft and non-tender  · Skin and Subcutaneous Tissue  o General Inspection  o : intact, no rashes  · Psychiatric  o General  o : Alert and oriented x3  o Judgement and Insight  o : judgment and insight intact  o Mood and Affect  o : mood normal, affect appropriate  · Left Knee  o Inspection  o : Well approximated incisions. No signs of infection. Tender joint lines. Extension 0 degrees. Flexion 120 " degrees. Patella well tracking. Calf supple/nontender. Negative Pinky's.               Assessment  · Primary osteoarthritis of left knee     715.16/M17.12  · Pain: Knee     719.46/M25.569      Plan  · Instructions  o Dr. Collins saw and examined the patient and agrees with plan.   o X-rays reviewed by Dr. Collins.  o Reviewed the patient's Past Medical, Social, and Family history as well as the ROS at today's visit, no changes.  o Call or return if worsening symptoms.  o Patient will try viscosupplementation. Follow up for injection.             Electronically Signed by: Rianna Chow PA-C -Author on January 5, 2021 01:59:41 PM

## 2021-05-14 NOTE — PROGRESS NOTES
Progress Note      Patient Name: Em Montanez   Patient ID: [id number]   Sex: Female   Birthdate: Hermelinda 3, 1975    Primary Care Provider: Ricky Velasquez MD   Referring Provider: Vanessa Roque PA-C    Visit Date: January 21, 2021    Provider: Rianna Chow PA-C   Location: [facility]   Location Address: [address] [place] [address]  [id number]   Location Phone: [phone]          Chief Complaint  · Follow up left knee pain      History Of Present Illness  Em Montanez is a 45 year old /White female who presents today to [facility].      She is here for left knee synvisc injection.       Past Medical History  Allergic rhinitis; Anxiety; Arthritis; Asthma; Back pain; Deafness; Depression; Fatigue; Gall Stones; History of knee replacement, total, right; Insomnia, unspecified; Knee pain; Polycystic ovarian syndrome; Primary osteoarthritis of left knee; Vitamin D deficiency         Past Surgical History  Artificial Joints/Limbs; Excision of gall bladder; Gallbladder; Joint Surgery; Knee Replacement; Metal implants         Medication List  cetirizine 10 mg oral tablet; ibuprofen 600 mg oral tablet; meclizine 25 mg oral tablet; metformin 500 mg oral tablet extended release 24 hr         Allergy List  tramadol       Allergies Reconciled  Family Medical History  Heart Disease; Cancer, Unspecified; Family history of certain chronic disabling diseases; arthritis; Family history of Arthritis         Social History  Alcohol Use (Current some day); lives with children; lives with spouse; .; Recreational Drug Use (Never); Tobacco (Current every day); Working         Review of Systems  · Constitutional  o Denies  o : fever, chills, weight loss  · Cardiovascular  o Denies  o : chest pain, shortness of breath  · Gastrointestinal  o Denies  o : liver disease, heartburn, nausea, blood in stools  · Genitourinary  o Denies  o : painful urination, blood in  "urine  · Integument  o Denies  o : rash, itching  · Neurologic  o Denies  o : headache, weakness, loss of consciousness  · Musculoskeletal  o Admits  o : painful, swollen joints  · Psychiatric  o Denies  o : drug/alcohol addiction, anxiety, depression      Vitals  Date Time BP Position Site L\R Cuff Size HR RR TEMP (F) WT  HT  BMI kg/m2 BSA m2 O2 Sat FR L/min FiO2        01/21/2021 10:27 AM      70 - R   295lbs 16oz 5'  10.5\" 41.87 2.58 98 %            Physical Examination  · Constitutional  o Appearance  o : well developed, well-nourished, no obvious deformities present  · Head and Face  o Head  o :   § Inspection  § : normocephalic  o Face  o :   § Inspection  § : no facial lesions  · Eyes  o Conjunctivae  o : conjunctivae normal  o Sclerae  o : sclerae white  · Ears, Nose, Mouth and Throat  o Ears  o :   § External Ears  § : appearance within normal limits  § Hearing  § : intact  o Nose  o :   § External Nose  § : appearance normal  · Neck  o Inspection/Palpation  o : normal appearance  o Range of Motion  o : full range of motion  · Respiratory  o Respiratory Effort  o : breathing unlabored  o Inspection of Chest  o : normal appearance  o Auscultation of Lungs  o : no audible wheezing or rales  · Cardiovascular  o Heart  o : regular rate  · Gastrointestinal  o Abdominal Examination  o : soft and non-tender  · Skin and Subcutaneous Tissue  o General Inspection  o : intact, no rashes  · Psychiatric  o General  o : Alert and oriented x3  o Judgement and Insight  o : judgment and insight intact  o Mood and Affect  o : mood normal, affect appropriate  · Left Knee  o Inspection  o : Well approximated incisions. No signs of infection. Tender joint lines. Extension 0 degrees. Flexion 120 degrees. Patella well tracking. Calf supple/nontender. Negative Pinky's.   · Injection Note/Aspiration Note  o Site  o : left knee   o Procedure  o : Procedure: After educating the patient, patient gave consent for procedure. After " using Chloraprep, the joint space was injected. The patient tolerated the procedure well.   o Medication  o : Synvisc One 48 mg           Assessment  · Primary osteoarthritis of left knee     715.16/M17.12      Plan  · Orders  o Hyaluronan or derivative, Synvisc or Synvisc-One, for intra-stanislav () - 715.16/M17.12 - 01/21/2021   Lot [id number] exp 03 31 2023 [employer] Rianna CUENCA  o Knee Intra-articular Injection without US Guidance [facility] ([id number]) - 715.16/M17.12 - 01/21/2021   Rianna CUENCA  · Medications  o Medications have been Reconciled  o Transition of Care or Provider Policy  · Instructions  o Reviewed the patient's Past Medical, Social, and Family history as well as the ROS at today's visit, no changes.  o Call or return if worsening symptoms.  o Follow up in 6 weeks.  o Electronically Identified Patient Education Materials Provided Electronically            Electronically Signed by: BANG Marley-C -Author on January 21, 2021 12:03:01 PM  Electronically Co-signed by: Kat Collins MD -Reviewer on January 21, 2021 11:09:38 PM

## 2021-05-14 NOTE — PROGRESS NOTES
"   Progress Note      Patient Name: Em Montanez   Patient ID: [id number]   Sex: Female   Birthdate: Hermelinda 3, 1975    Primary Care Provider: Ricky Velasquez MD   Referring Provider: Vanessa Roque PA-C    Visit Date: April 20, 2021    Provider: Elizabeth Singh PA-C   Location: [facility]   Location Address: [address] [place] [address]  [id number]   Location Phone: [phone]          Chief Complaint  · left knee pain      History Of Present Illness  Em Montanez is a 45 year old /White female who presents today to [facility]. Patient presents today for her 2 week follow up of left TKA performed on 04-05-21 by Dr. Collins. She reports that her discomfort is easing up. She reports that PT is going well. She is pleased with her progress. Staples were removed today.       Past Medical History  Allergic rhinitis; Anxiety; Arthritis; Asthma; Back pain; Deafness; Depression; Fatigue; Gall Stones; History of knee replacement, total, right; Insomnia, unspecified; Knee pain; Polycystic ovarian syndrome; Primary osteoarthritis of left knee; Vitamin D deficiency         Past Surgical History  Artificial Joints/Limbs; Excision of gall bladder; Gallbladder; Joint Surgery; Knee Replacement; Metal implants         Medication List  cyanocobalamin (vitamin B-12) 1,000 mcg/mL injection solution; duloxetine 60 mg oral capsule,delayed release(DR/EC); Eliquis 5 mg oral tablet; metformin 500 mg oral tablet extended release 24 hr; Norco 7.5-325 mg oral tablet; Senna-S 8.6-50 mg oral tablet; Syringe 3cc/25Gx1\" 3 mL 25 gauge x 1\" miscellaneous syringe; trazodone 50 mg oral tablet; Vitamin D2 1,250 mcg (50,000 unit) oral capsule; Zofran 4 mg oral tablet         Allergy List  tramadol       Allergies Reconciled  Family Medical History  Heart Disease; Cancer, Unspecified; Family history of certain chronic disabling diseases; arthritis; Family history of Arthritis         Social History  Alcohol Use " "(Current some day); lives with children; lives with spouse; .; Recreational Drug Use (Never); Tobacco (Current every day); Working         Immunizations  Name Date Admin   Hepatitis A 05/07/2018   Influenza 09/14/2020   Influenza 11/06/2019   Influenza 11/21/2017   Tdap 08/06/2020         Review of Systems  · Constitutional  o Denies  o : fever, chills, weight loss  · Cardiovascular  o Denies  o : chest pain, shortness of breath  · Gastrointestinal  o Denies  o : liver disease, heartburn, nausea, blood in stools  · Genitourinary  o Denies  o : painful urination, blood in urine  · Integument  o Denies  o : rash, itching  · Neurologic  o Denies  o : headache, weakness, loss of consciousness  · Musculoskeletal  o Denies  o : painful, swollen joints  · Psychiatric  o Denies  o : drug/alcohol addiction, anxiety, depression      Vitals  Date Time BP Position Site L\R Cuff Size HR RR TEMP (F) WT  HT  BMI kg/m2 BSA m2 O2 Sat FR L/min FiO2 HC       04/20/2021 11:00 AM      83 - R   294lbs 16oz 5'  10\" 42.33 2.57 99 %            Physical Examination  · Constitutional  o Appearance  o : well developed, well-nourished, no obvious deformities present  · Head and Face  o Head  o :   § Inspection  § : normocephalic  o Face  o :   § Inspection  § : no facial lesions  · Eyes  o Conjunctivae  o : conjunctivae normal  o Sclerae  o : sclerae white  · Ears, Nose, Mouth and Throat  o Ears  o :   § External Ears  § : appearance within normal limits  § Hearing  § : intact  o Nose  o :   § External Nose  § : appearance normal  · Neck  o Inspection/Palpation  o : normal appearance  o Range of Motion  o : full range of motion  · Respiratory  o Respiratory Effort  o : breathing unlabored  o Inspection of Chest  o : normal appearance  o Auscultation of Lungs  o : no audible wheezing or rales  · Cardiovascular  o Heart  o : regular rate  · Gastrointestinal  o Abdominal Examination  o : soft and non-tender  · Skin and Subcutaneous " Tissue  o General Inspection  o : intact, no rashes  · Psychiatric  o General  o : Alert and oriented x3  o Judgement and Insight  o : judgment and insight intact  o Mood and Affect  o : mood normal, affect appropriate  · Left Knee  o Inspection  o : Well healed incision. Normal gait. No swelling, atrophy, deformity, heat or redness over the joint or bony prominences. Stables to varus/valgus stress.Full extension, full flexion. N/v intact. Sensation is intact. Muscle strength in the quadriceps and hamstrings is 5/5. Posterior tibialis pulse is 2+.   · In Office Procedures  o View  o : AP/LATERAL/SUNRISE   o Site  o : left, knee  o Indication  o : Left knee pain   o Study  o : X-rays ordered, taken in the office, and reviewed today.  o Xray  o : well aligned left total knee replacement with good healing, no signs of loosening          Assessment  · Aftercare;following joint replacement left total knee replacement     V54.81/Z47.1  · Left knee pain, unspecified chronicity     719.46/M25.562      Plan  · Orders  o Knee (Left) [facility] ([id number]) - 719.46/M25.562 - 04/20/2021  · Medications  o Medications have been Reconciled  o Transition of Care or Provider Policy  · Instructions  o Reviewed the patient's Past Medical, Social, and Family history as well as the ROS at today's visit, no changes.  o Call or return if worsening symptoms.  o X-ray ordered, taken and reviewed at this visit.  o Follow Up in 4 weeks.             Electronically Signed by: BANG Hays-JAMES -Author on April 20, 2021 12:57:48 PM  Electronically Co-signed by: Kat Collins MD -Reviewer on April 20, 2021 05:54:10 PM

## 2021-05-14 NOTE — PROGRESS NOTES
"   Progress Note      Patient Name: Em Montanez   Patient ID: [id number]   Sex: Female   Birthdate: Hermelinda 3, 1975    Primary Care Provider: Ricky Velasquez MD   Referring Provider: Vanessa Roque PA-C    Visit Date: April 9, 2021    Provider: Ricky Velasquez MD   Location: [facility]   Location Address: [address] [place] [address]; [address]  [id number]   Location Phone: [phone]          Chief Complaint  · Follow up office visit within 7 calendar days of discharge from inpatient status (high complexity).  · \"F/U left knee replacement\"      History Of Present Illness  FOLLOW UP OFFICE VISIT WITHIN 7 CALENDAR DAYS OF INPATIENT STATUS (SEVERE COMPLEXITY)  Em Montanez presents to office for follow up post discharge from inpatient status within 7 calendar days. Patient was contacted within 2 business days via phone conversation. Documentation of that phone contact is present in the patient's electronic chart. Patient was admitted to an inpatient faciliity on 04/05/2021 and discharged on 04/06/2021 due to: knee replacement   Admitting MD: Dr. Collins   Her discharge summary has been reviewed and placed in the patient's electronic chart.   Patient's problem list is: Anxiety, Arthritis, Deafness, Depression, History of knee replacement, total, right, Insomnia, unspecified, Polycystic ovarian syndrome, Primary osteoarthritis of left knee, and Vitamin D deficiency   Patient's outpatient medication list has been reconciled with the medication list from the discharge summary and has been reviewed with the patient. Current medication list is: cyanocobalamin (vitamin B-12) 1,000 mcg/mL injection solution, duloxetine 30 mg oral capsule,delayed release(DR/EC), Eliquis 5 mg oral tablet, metformin 500 mg oral tablet extended release 24 hr, Norco 7.5-325 mg oral tablet, Syringe 3cc/25Gx1\" 3 mL 25 gauge x 1\" miscellaneous syringe, trazodone 50 mg oral tablet, and Vitamin D2 " 1,250 mcg (50,000 unit) oral capsule   Em Montanez is a 45 year old /White female who presents for evaluation and treatment of:      knee- pt is enrolled in PT. pt does report inc pain since being home. pt has been doing stretches. pt is using Norco as needed for pain. pt is on Eliquis for 10 days and then ASA for 3 weeks. pt would like updated antiemetic.   constipation- pt has been using Colace, but not senna that she got while inpatient. pt reports no BM for 4 days  neuropathy- pt Rx'd elavail prior to surgical correction.       Past Medical History  Disease Name Date Onset Notes   Allergic rhinitis --  --    Anxiety --  --    Arthritis --  --    Asthma --  --    Back pain 11/21/2017 lumbar spine x-ray largely unremarkable. will Rx PT and monitor for improvement. if pain continues, will order MRI.   Deafness --  --    Depression --  pt doing well on Effexor. will continue. pt has been on Zoloft in the past and reports ineffectiveness. pt will f/u in approx. 2-3 months. pt has been on Zoloft in the past and reports ineffectiveness. will Rx Effexor at this time. pt aware of risks and benefits including black box warning including inc suicidality. pt to f/u in 2 weeks to discuss further.    Fatigue 11/21/2017 possibly related to depression. will check CBC, CMP, TSH, and vit D today   Gall Stones --  --    History of knee replacement, total, right 01/22/2018 --    Insomnia, unspecified 12/11/2017 discussed risks and benefits of medications. also discussed sleep hygiene methods including avoiding sources of blue light, developing routine, sleeping in a dark room, and using bed for sleep only. pt has tried melatonin with intermittent effectiveness. pt without reported symptoms of sleep apnea at this time.    Knee pain 11/21/2017 previous R knee replacement 2/2 arthritis. pt would like to discuss options with orthopedics again.    Polycystic ovarian syndrome 11/21/2017 pt unable to tolerate metformin 2/2 GI  "side effects. will check HbA1c today. pt has difficulty with variable sugars including hypoglycemia with symptoms. low threshold to consult endocrine for optimal control.    Primary osteoarthritis of left knee 01/22/2018 --    Vitamin D deficiency 12/11/2017 currently on vit D supplements. will recheck vit D level in 3 months.          Past Surgical History  Procedure Name Date Notes   Artificial Joints/Limbs --  --    Excision of gall bladder --  --    Gallbladder --  --    Joint Surgery --  --    Knee Replacement --  --    Metal implants --  --          Medication List  Name Date Started Instructions   cyanocobalamin (vitamin B-12) 1,000 mcg/mL injection solution 03/09/2021 inject 0.1 milliliter (100 mcg) by subcutaneous route once a month for 90 days   duloxetine 60 mg oral capsule,delayed release(/EC) 04/09/2021 take 1 capsule (60 mg) by oral route once daily for 90 days   Eliquis 5 mg oral tablet  take 1 tablet (5 mg) by oral route 2 times per day   metformin 500 mg oral tablet extended release 24 hr 03/09/2021 TAKE ONE TABLET BY MOUTH DAILY WITH EVENING MEAL for 90 days   Norco 7.5-325 mg oral tablet 04/08/2021 take 1-2 tablets by oral route every 4 to 6 hours   Syringe 3cc/25Gx1\" 3 mL 25 gauge x 1\" miscellaneous syringe 03/11/2021 use as directed once a month to administer b12 injection   trazodone 50 mg oral tablet 03/12/2021 take 1 tablet (50 mg) by oral route once daily at bedtime for 30 days   Vitamin D2 1,250 mcg (50,000 unit) oral capsule 03/09/2021 take 1 capsule by oral route once weekly         Allergy List  Allergen Name Date Reaction Notes   tramadol --  --  --        Allergies Reconciled  Family Medical History  Disease Name Relative/Age Notes   Heart Disease Mother/   Mother   Cancer, Unspecified  --    Family history of certain chronic disabling diseases; arthritis Mother/   Mother   Family history of Arthritis Mother/   Mother         Social History  Finding Status Start/Stop Quantity Notes " "  Alcohol Use Current some day --/-- --  occasionally drinks, less than 1 drink per day, has been drinking for 21-30 years   lives with children --  --/-- --  --    lives with spouse --  --/-- --  --    . --  --/-- --  --    Recreational Drug Use Never --/-- --  no   Tobacco Current every day --/-- 1 PPD current every day smoker, 1 packs per day, smoked 21-30 years  current every day smoker, 1 packs per day, smoked 11-20 years   Working --  --/-- --  --          Immunizations  NameDate Admin Mfg Trade Name Lot Number Route Inj VIS Given VIS Publication   Hepatitis A05/07/2018 [employer] HAVRIX-ADULT  IM LD 05/07/2018 07/20/2016   Comments: tolerated well   Jzsrznddz88/14/2020 [facility] [employer] [id number] IM LD 09/14/2020    Comments: pt tolerated well   Tdap08/06/2020 [employer] BOOSTRIX 374lb IM LD 08/06/2020    Comments: Patient tolerated well left office in stable condition. CH RN         Review of Systems  · Constitutional  o Denies  o : fever, weight gain, weight loss, fatigue  · Cardiovascular  o Denies  o : palpitation, chest pain, lower extremity edema  · Respiratory  o Denies  o : shortness of breath, wheezing, dry cough, productive cough  · Gastrointestinal  o Admits  o : nausea  o Denies  o : vomiting, diarrhea, constipation, abdominal pain  · Neurologic  o Denies  o : weakness, dizziness, H/A      Vitals  Date Time BP Position Site L\R Cuff Size HR RR TEMP (F) WT  HT  BMI kg/m2 BSA m2 O2 Sat FR L/min FiO2 HC       03/09/2021 08:33 /85 Sitting    72 - R 12 98 299lbs 0oz 5'  10\" 42.9 2.59 96 %  21%    03/23/2021 09:48 AM      80 - R   305lbs 0oz 5'  10\" 43.76 2.61 98 %      04/09/2021 09:17 /82 Sitting    107 - R  97.2 203lbs 16oz 5'  10\" 29.27 2.14 94 %  21%          Physical Examination  · Constitutional  o Appearance  o : no acute distress, well-nourished  · Head and Face  o Head  o :   § Inspection  § : atraumatic, normocephalic  · Eyes  o Eyes  o : extraocular movements intact, no " scleral icterus, no conjunctival injection  · Ears, Nose, Mouth and Throat  o Ears  o :   § External Ears  § : normal  o Nose  o :   § Intranasal Exam  § : nares patent  o Oral Cavity  o :   § Oral Mucosa  § : moist mucous membranes  · Respiratory  o Respiratory Effort  o : breathing comfortably, symmetric chest rise  o Auscultation of Lungs  o : clear to asculatation bilaterally, no wheezes, rales, or rhonchii  · Cardiovascular  o Heart  o :   § Auscultation of Heart  § : regular rate and rhythm, no murmurs, rubs, or gallops  o Peripheral Vascular System  o :   § Extremities  § : no edema  · Neurologic  o Mental Status Examination  o :   § Orientation  § : grossly oriented to person, place and time  · Psychiatric  o General  o : normal mood and affect          Assessment  · Primary osteoarthritis of left knee     715.16/M17.12  s/p surgical correction. pt with dressing changed in clinic today. cont PT as well as [place] as needed for pain.   · Neuropathy     355.9/G62.9  inc Cymbalta to 60mg to help with pain. hold off on Elavil as Rx'd by podiatry  · Nausea     787.02/R11.0  Rx Zofran to use as needed.   · Constipation     564.00/K59.00  will Rx senna to combat constipation from opioid use.     Problems Reconciled  Plan  · Orders  o Discharge medications reconciled with the current medication list (1111F) - - 04/09/2021  o ACO-39: Current medications updated and reviewed (1159F, ) - - 04/09/2021  · Medications  o Senna-S 8.6-50 mg oral tablet   SIG: take 2 tablets by oral route 2 times a day prn constipation   DISP: (60) Tablet with 1 refills  Prescribed on 04/09/2021     o Zofran 4 mg oral tablet   SIG: take 1 tablet by oral route every 6 hours prn nausea   DISP: (30) Tablet with 0 refills  Prescribed on 04/09/2021     o duloxetine 60 mg oral capsule,delayed release(DR/EC)   SIG: take 1 capsule (60 mg) by oral route once daily for 90 days   DISP: (90) Capsule with 1 refills  Adjusted on 04/09/2021      o Medications have been Reconciled  o Transition of Care or Provider Policy  · Instructions  o Patient discharge summary has been reviewed and placed in patient's electronic medical record.  o Patient received a phone call from my office within 2 business days of discharge from inpatient status, and documented within the patient's chart.  o Also patient was seen (face to face) for follow up evaluation within 7 calendar days of discharge from inpatient status for a high complexity issue.  o Patient was educated on their diagnosis, treatment and any medication changes while being evaluated today.  o Patient was educated/instructed on their diagnosis, treatment and medications prior to discharge from the clinic today.  · Disposition  o Call or Return if symptoms worsen or persist.  o f/u in 3 months            Electronically Signed by: Ricky Velasquez MD -Author on April 9, 2021 03:39:41 PM

## 2021-05-14 NOTE — PROGRESS NOTES
Progress Note      Patient Name: Em Montanez   Patient ID: [id number]   Sex: Female   Birthdate: Hermelinda 3, 1975    Primary Care Provider: Ricky Velasquez MD   Referring Provider: Vanessa Roque PA-C    Visit Date: December 17, 2020    Provider: Rianna Chow PA-C   Location: [facility]   Location Address: [address] [place] [address]  [id number]   Location Phone: [phone]          Chief Complaint  · Follow up left knee arthroscopy      History Of Present Illness  Em Montanez is a 45 year old /White female who presents today to [facility].      She is here for left knee follow up. She states some improvement. She is s/p left knee arthroscopy 9/30/20 by Dr. Collins.       Past Medical History  Allergic rhinitis; Anxiety; Arthritis; Asthma; Back pain; Deafness; Depression; Fatigue; Gall Stones; History of knee replacement, total, right; Insomnia, unspecified; Knee pain; Polycystic ovarian syndrome; Primary osteoarthritis of left knee; Vitamin D deficiency         Past Surgical History  Artificial Joints/Limbs; Excision of gall bladder; Gallbladder; Joint Surgery; Knee Replacement; Metal implants         Medication List  cetirizine 10 mg oral tablet; ibuprofen 600 mg oral tablet; meclizine 25 mg oral tablet; metformin 500 mg oral tablet extended release 24 hr         Allergy List  tramadol       Allergies Reconciled  Family Medical History  Heart Disease; Cancer, Unspecified; Family history of certain chronic disabling diseases; arthritis; Family history of Arthritis         Social History  Alcohol Use (Current some day); lives with children; lives with spouse; .; Recreational Drug Use (Never); Tobacco (Current every day); Working         Review of Systems  · Constitutional  o Denies  o : fever, chills, weight loss  · Cardiovascular  o Denies  o : chest pain, shortness of breath  · Gastrointestinal  o Denies  o : liver disease, heartburn, nausea, blood in  "stools  · Genitourinary  o Denies  o : painful urination, blood in urine  · Integument  o Denies  o : rash, itching  · Neurologic  o Denies  o : headache, weakness, loss of consciousness  · Musculoskeletal  o Admits  o : painful, swollen joints  · Psychiatric  o Denies  o : drug/alcohol addiction, anxiety, depression      Vitals  Date Time BP Position Site L\R Cuff Size HR RR TEMP (F) WT  HT  BMI kg/m2 BSA m2 O2 Sat FR L/min FiO2        12/17/2020 09:28 AM      75 - R   286lbs 0oz 5'  10\" 41.04 2.53 99 %            Physical Examination  · Constitutional  o Appearance  o : well developed, well-nourished, no obvious deformities present  · Head and Face  o Head  o :   § Inspection  § : normocephalic  o Face  o :   § Inspection  § : no facial lesions  · Eyes  o Conjunctivae  o : conjunctivae normal  o Sclerae  o : sclerae white  · Ears, Nose, Mouth and Throat  o Ears  o :   § External Ears  § : appearance within normal limits  § Hearing  § : intact  o Nose  o :   § External Nose  § : appearance normal  · Neck  o Inspection/Palpation  o : normal appearance  o Range of Motion  o : full range of motion  · Respiratory  o Respiratory Effort  o : breathing unlabored  o Inspection of Chest  o : normal appearance  o Auscultation of Lungs  o : no audible wheezing or rales  · Cardiovascular  o Heart  o : regular rate  · Gastrointestinal  o Abdominal Examination  o : soft and non-tender  · Skin and Subcutaneous Tissue  o General Inspection  o : intact, no rashes  · Psychiatric  o General  o : Alert and oriented x3  o Judgement and Insight  o : judgment and insight intact  o Mood and Affect  o : mood normal, affect appropriate  · Left Knee  o Inspection  o : Well approximated incisions. No signs of infection. Extension 0 degrees. Flexion 120 degrees. Patella well tracking. Calf supple/nontender. Negative Pinky's.   · In Office Procedures  o View  o : LAT/SUNRISE/STANDING   o Site  o : left, knee  o Indication  o : Left knee pain "   o Study  o : X-rays ordered, taken in the office, and reviewed today.  o Xray  o : Joint space narrowing and subchondral sclerosis present  o Comparative Data  o : Comparative Data found and reviewed today           Assessment  · Left Aftercare following surgery of the muskuloskeletal system     V54.81  · Pain: Knee     719.46/M25.569      Plan  · Orders  o Knee (Left) [facility] Preferred View ([id number]) - 719.46/M25.569 - 12/17/2020  · Medications  o Medications have been Reconciled  o Transition of Care or Provider Policy  · Instructions  o Reviewed the patient's Past Medical, Social, and Family history as well as the ROS at today's visit, no changes.  o Call or return if worsening symptoms.  o Follow up PRN. We discussed treatment options with injections and PT.  o Electronically Identified Patient Education Materials Provided Electronically            Electronically Signed by: BANG Marley-JAMES -Author on December 17, 2020 11:30:36 AM  Electronically Co-signed by: Kat Collins MD -Reviewer on December 18, 2020 03:15:01 PM

## 2021-05-14 NOTE — PROGRESS NOTES
Progress Note      Patient Name: Em Montanez   Patient ID: [id number]   Sex: Female   Birthdate: Hermelinda 3, 1975    Primary Care Provider: Ricky Velasquez MD   Referring Provider: Vanessa Roque PA-C    Visit Date: March 4, 2021    Provider: Rianna Chow PA-C   Location: [facility]   Location Address: [address] [place] [address]  [id number]   Location Phone: [phone]          Chief Complaint  · left knee pain      History Of Present Illness  Em Montanez is a 45 year old /White female who presents today to [facility].      She is following up for left knee pain. She failed to improved after knee arthroscopy. She was then given steroid injection and synvisc injection, with no improvement in her pain. She denies recurrent injury.       Past Medical History  Allergic rhinitis; Anxiety; Arthritis; Asthma; Back pain; Deafness; Depression; Fatigue; Gall Stones; History of knee replacement, total, right; Insomnia, unspecified; Knee pain; Polycystic ovarian syndrome; Primary osteoarthritis of left knee; Vitamin D deficiency         Past Surgical History  Artificial Joints/Limbs; Excision of gall bladder; Gallbladder; Joint Surgery; Knee Replacement; Metal implants         Medication List  cetirizine 10 mg oral tablet; gabapentin 300 mg oral capsule; ibuprofen 600 mg oral tablet; meclizine 25 mg oral tablet; metformin 500 mg oral tablet extended release 24 hr         Allergy List  tramadol       Allergies Reconciled  Family Medical History  Heart Disease; Cancer, Unspecified; Family history of certain chronic disabling diseases; arthritis; Family history of Arthritis         Social History  Alcohol Use (Current some day); lives with children; lives with spouse; .; Recreational Drug Use (Never); Tobacco (Current every day); Working         Review of Systems  · Constitutional  o Denies  o : fever, chills, weight loss  · Cardiovascular  o Denies  o : chest pain, shortness of  "breath  · Gastrointestinal  o Denies  o : liver disease, heartburn, nausea, blood in stools  · Genitourinary  o Denies  o : painful urination, blood in urine  · Integument  o Denies  o : rash, itching  · Neurologic  o Denies  o : headache, weakness, loss of consciousness  · Musculoskeletal  o Admits  o : painful, swollen joints  · Psychiatric  o Denies  o : drug/alcohol addiction, anxiety, depression      Vitals  Date Time BP Position Site L\R Cuff Size HR RR TEMP (F) WT  HT  BMI kg/m2 BSA m2 O2 Sat FR L/min FiO2 HC       03/04/2021 11:05 AM      77 - R   304lbs 2oz 5'  10\" 43.64 2.61 96 %            Physical Examination  · Constitutional  o Appearance  o : well developed, well-nourished, no obvious deformities present  · Head and Face  o Head  o :   § Inspection  § : normocephalic  o Face  o :   § Inspection  § : no facial lesions  · Eyes  o Conjunctivae  o : conjunctivae normal  o Sclerae  o : sclerae white  · Ears, Nose, Mouth and Throat  o Ears  o :   § External Ears  § : appearance within normal limits  § Hearing  § : intact  o Nose  o :   § External Nose  § : appearance normal  · Neck  o Inspection/Palpation  o : normal appearance  o Range of Motion  o : full range of motion  · Respiratory  o Respiratory Effort  o : breathing unlabored  o Inspection of Chest  o : normal appearance  o Auscultation of Lungs  o : no audible wheezing or rales  · Cardiovascular  o Heart  o : regular rate  · Gastrointestinal  o Abdominal Examination  o : soft and non-tender  · Skin and Subcutaneous Tissue  o General Inspection  o : intact, no rashes  · Psychiatric  o General  o : Alert and oriented x3  o Judgement and Insight  o : judgment and insight intact  o Mood and Affect  o : mood normal, affect appropriate  · Left Knee  o Inspection  o : Well healed portal incisions.Tender joint lines. Extension 0 degrees. Flexion 120 degrees. Patella well tracking. Calf supple/nontender. Negative Pinky's.           Assessment  · Primary " osteoarthritis of left knee     715.16/M17.12  · Pain: Knee     719.46/M25.569      Plan  · Instructions  o X-rays reviewed by Dr. Collins.  o Reviewed the patient's Past Medical, Social, and Family history as well as the ROS at today's visit, no changes.  o Call or return if worsening symptoms.  o Follow up after MRI.            Electronically Signed by: BANG Marley-JAMES -Author on March 4, 2021 11:38:56 AM  Electronically Co-signed by: Kat Collins MD -Reviewer on March 4, 2021 09:02:00 PM

## 2021-05-15 VITALS
TEMPERATURE: 97 F | WEIGHT: 293 LBS | BODY MASS INDEX: 41.95 KG/M2 | SYSTOLIC BLOOD PRESSURE: 126 MMHG | HEART RATE: 80 BPM | OXYGEN SATURATION: 97 % | DIASTOLIC BLOOD PRESSURE: 80 MMHG | HEIGHT: 70 IN

## 2021-05-15 VITALS
WEIGHT: 293 LBS | HEART RATE: 81 BPM | TEMPERATURE: 97.2 F | SYSTOLIC BLOOD PRESSURE: 122 MMHG | BODY MASS INDEX: 41.95 KG/M2 | RESPIRATION RATE: 16 BRPM | DIASTOLIC BLOOD PRESSURE: 90 MMHG | HEIGHT: 70 IN | OXYGEN SATURATION: 97 %

## 2021-05-15 VITALS — HEART RATE: 86 BPM | BODY MASS INDEX: 41.95 KG/M2 | OXYGEN SATURATION: 98 % | WEIGHT: 293 LBS | HEIGHT: 70 IN

## 2021-05-15 VITALS
WEIGHT: 291 LBS | HEIGHT: 70 IN | BODY MASS INDEX: 41.66 KG/M2 | SYSTOLIC BLOOD PRESSURE: 122 MMHG | DIASTOLIC BLOOD PRESSURE: 72 MMHG | RESPIRATION RATE: 18 BRPM | TEMPERATURE: 98 F | HEART RATE: 87 BPM

## 2021-05-15 VITALS
DIASTOLIC BLOOD PRESSURE: 76 MMHG | BODY MASS INDEX: 41.95 KG/M2 | SYSTOLIC BLOOD PRESSURE: 118 MMHG | HEIGHT: 70 IN | WEIGHT: 293 LBS | OXYGEN SATURATION: 99 % | TEMPERATURE: 97.6 F | HEART RATE: 84 BPM

## 2021-05-15 VITALS
BODY MASS INDEX: 41.95 KG/M2 | DIASTOLIC BLOOD PRESSURE: 80 MMHG | TEMPERATURE: 97.6 F | HEART RATE: 80 BPM | OXYGEN SATURATION: 97 % | HEIGHT: 70 IN | WEIGHT: 293 LBS | SYSTOLIC BLOOD PRESSURE: 128 MMHG

## 2021-05-15 VITALS — WEIGHT: 293 LBS | HEIGHT: 70 IN | TEMPERATURE: 98.3 F | BODY MASS INDEX: 41.95 KG/M2

## 2021-05-16 VITALS
OXYGEN SATURATION: 96 % | HEART RATE: 78 BPM | BODY MASS INDEX: 41.95 KG/M2 | WEIGHT: 293 LBS | DIASTOLIC BLOOD PRESSURE: 78 MMHG | TEMPERATURE: 96.2 F | HEIGHT: 70 IN | SYSTOLIC BLOOD PRESSURE: 122 MMHG

## 2021-05-16 VITALS
WEIGHT: 293 LBS | OXYGEN SATURATION: 96 % | DIASTOLIC BLOOD PRESSURE: 84 MMHG | HEIGHT: 70 IN | BODY MASS INDEX: 41.95 KG/M2 | SYSTOLIC BLOOD PRESSURE: 132 MMHG | HEART RATE: 82 BPM | TEMPERATURE: 97.3 F

## 2021-05-16 VITALS
HEART RATE: 84 BPM | DIASTOLIC BLOOD PRESSURE: 81 MMHG | OXYGEN SATURATION: 98 % | TEMPERATURE: 98 F | RESPIRATION RATE: 18 BRPM | HEIGHT: 70 IN | WEIGHT: 293 LBS | BODY MASS INDEX: 41.95 KG/M2 | SYSTOLIC BLOOD PRESSURE: 126 MMHG

## 2021-05-16 VITALS
WEIGHT: 293 LBS | BODY MASS INDEX: 41.95 KG/M2 | HEART RATE: 84 BPM | DIASTOLIC BLOOD PRESSURE: 80 MMHG | HEIGHT: 70 IN | OXYGEN SATURATION: 99 % | RESPIRATION RATE: 14 BRPM | SYSTOLIC BLOOD PRESSURE: 130 MMHG | TEMPERATURE: 97.3 F

## 2021-05-16 VITALS — OXYGEN SATURATION: 98 % | BODY MASS INDEX: 41.95 KG/M2 | WEIGHT: 293 LBS | HEART RATE: 75 BPM | HEIGHT: 70 IN

## 2021-05-16 VITALS
RESPIRATION RATE: 12 BRPM | DIASTOLIC BLOOD PRESSURE: 76 MMHG | SYSTOLIC BLOOD PRESSURE: 118 MMHG | HEIGHT: 70 IN | HEART RATE: 83 BPM | BODY MASS INDEX: 41.95 KG/M2 | TEMPERATURE: 98.2 F | OXYGEN SATURATION: 97 % | WEIGHT: 293 LBS

## 2021-05-16 VITALS
SYSTOLIC BLOOD PRESSURE: 118 MMHG | RESPIRATION RATE: 12 BRPM | HEART RATE: 80 BPM | TEMPERATURE: 98 F | OXYGEN SATURATION: 96 % | DIASTOLIC BLOOD PRESSURE: 78 MMHG

## 2021-05-21 ENCOUNTER — OFFICE VISIT CONVERTED (OUTPATIENT)
Dept: ORTHOPEDIC SURGERY | Facility: CLINIC | Age: 46
End: 2021-05-21
Attending: PHYSICIAN ASSISTANT

## 2021-05-21 ENCOUNTER — CONVERSION ENCOUNTER (OUTPATIENT)
Dept: ORTHOPEDIC SURGERY | Facility: CLINIC | Age: 46
End: 2021-05-21

## 2021-05-22 ENCOUNTER — TRANSCRIBE ORDERS (OUTPATIENT)
Dept: PHYSICAL THERAPY | Facility: CLINIC | Age: 46
End: 2021-05-22

## 2021-05-22 DIAGNOSIS — Z96.652 STATUS POST TOTAL LEFT KNEE REPLACEMENT: Primary | ICD-10-CM

## 2021-05-28 ENCOUNTER — OFFICE VISIT CONVERTED (OUTPATIENT)
Dept: INTERNAL MEDICINE | Facility: CLINIC | Age: 46
End: 2021-05-28
Attending: INTERNAL MEDICINE

## 2021-06-03 ENCOUNTER — HOSPITAL ENCOUNTER (OUTPATIENT)
Dept: PHYSICAL THERAPY | Facility: CLINIC | Age: 46
Setting detail: RECURRING SERIES
Discharge: HOME | End: 2021-06-04
Attending: ORTHOPAEDIC SURGERY

## 2021-06-05 NOTE — PROGRESS NOTES
"   Progress Note      Patient Name: Em Montanez   Patient ID: [id number]   Sex: Female   Birthdate: Hermelinda 3, 1975    Primary Care Provider: Ricky Velasquez MD   Referring Provider: Vanessa Roque PA-C    Visit Date: May 21, 2021    Provider: Elizabeth Singh PA-C   Location: [facility]   Location Address: [address] [place] [address]  [id number]   Location Phone: [phone]          Chief Complaint  · Follow up left knee pain      History Of Present Illness  Em Montanez is a 45 year old /White female who presents today to [facility]. Patient presents today s/p left total knee arthroplasty on 04/05/21 by Dr. Collins. Patient states she is doing well and making progress in PT. She is pleased with her outcome.       Past Medical History  Allergic rhinitis; Anxiety; Arthritis; Asthma; Back pain; Deafness; Depression; Fatigue; Gall Stones; History of knee replacement, total, right; Insomnia, unspecified; Knee pain; Polycystic ovarian syndrome; Primary osteoarthritis of left knee; Vitamin D deficiency         Past Surgical History  Artificial Joints/Limbs; Excision of gall bladder; Gallbladder; Joint Surgery; Knee Replacement; Metal implants         Medication List  cyanocobalamin (vitamin B-12) 1,000 mcg/mL injection solution; duloxetine 60 mg oral capsule,delayed release(DR/EC); Eliquis 5 mg oral tablet; metformin 500 mg oral tablet extended release 24 hr; Norco 7.5-325 mg oral tablet; Senna-S 8.6-50 mg oral tablet; Syringe 3cc/25Gx1\" 3 mL 25 gauge x 1\" miscellaneous syringe; trazodone 50 mg oral tablet; Vitamin D2 1,250 mcg (50,000 unit) oral capsule; Zofran 4 mg oral tablet         Allergy List  tramadol       Allergies Reconciled  Family Medical History  Heart Disease; Cancer, Unspecified; Family history of certain chronic disabling diseases; arthritis; Family history of Arthritis         Social History  Alcohol Use (Current some day); lives with children; lives with spouse; " ".; Recreational Drug Use (Never); Tobacco (Current every day); Working         Immunizations  Name Date Admin   Hepatitis A 05/07/2018   Influenza 09/14/2020   Influenza 11/06/2019   Influenza 11/21/2017   Tdap 08/06/2020         Review of Systems  · Constitutional  o Denies  o : fever, chills, weight loss  · Cardiovascular  o Denies  o : chest pain, shortness of breath  · Gastrointestinal  o Denies  o : liver disease, heartburn, nausea, blood in stools  · Genitourinary  o Denies  o : painful urination, blood in urine  · Integument  o Denies  o : rash, itching  · Neurologic  o Denies  o : headache, weakness, loss of consciousness  · Musculoskeletal  o Denies  o : painful, swollen joints  · Psychiatric  o Denies  o : drug/alcohol addiction, anxiety, depression      Vitals  Date Time BP Position Site L\R Cuff Size HR RR TEMP (F) WT  HT  BMI kg/m2 BSA m2 O2 Sat FR L/min FiO2        05/21/2021 11:07 AM      78 - R   293lbs 16oz 5'  10\" 42.18 2.57 98 %            Physical Examination  · Constitutional  o Appearance  o : well developed, well-nourished, no obvious deformities present  · Head and Face  o Head  o :   § Inspection  § : normocephalic  o Face  o :   § Inspection  § : no facial lesions  · Eyes  o Conjunctivae  o : conjunctivae normal  o Sclerae  o : sclerae white  · Ears, Nose, Mouth and Throat  o Ears  o :   § External Ears  § : appearance within normal limits  § Hearing  § : intact  o Nose  o :   § External Nose  § : appearance normal  · Neck  o Inspection/Palpation  o : normal appearance  o Range of Motion  o : full range of motion  · Respiratory  o Respiratory Effort  o : breathing unlabored  o Inspection of Chest  o : normal appearance  o Auscultation of Lungs  o : no audible wheezing or rales  · Cardiovascular  o Heart  o : regular rate  · Skin and Subcutaneous Tissue  o General Inspection  o : intact, no rashes  · Psychiatric  o General  o : Alert and oriented x3  o Judgement and Insight  o : " judgment and insight intact  o Mood and Affect  o : mood normal, affect appropriate  · Left Knee  o Inspection  o : Well healed scar. No atrophy, swelling or discoloration. Stable to varus/valgus strength. AROM is +1 to 107. Normal gait. Good strength of quadriceps and hamstrings. Neurovascular intact. Sensation is intact. Calf supple, nontender.           Assessment  · Aftercare;following left total knee arthroplasty     V54.81/Z47.1  · Pain: Knee     719.46/M25.569      Plan  · Instructions  o Reviewed the patient's Past Medical, Social, and Family history as well as the ROS at today's visit, no changes.  o Call or return if worsening symptoms.  o Follow up in 6 weeks.  o X-rays to be obtained at next visit.             Electronically Signed by: BANG Hays-JAMES -Author on May 21, 2021 12:01:26 PM  Electronically Co-signed by: Kat Collins MD -Reviewer on May 26, 2021 08:04:26 PM

## 2021-06-05 NOTE — PROGRESS NOTES
"   Progress Note      Patient Name: Em Montanez   Patient ID: [id number]   Sex: Female   Birthdate: Hermelinda 3, 1975    Primary Care Provider: Ricky Velasquez MD   Referring Provider: Vanessa Roque PA-C    Visit Date: May 28, 2021    Provider: Ricky Velasquez MD   Location: [facility]   Location Address: [address] [place] [address]; [address] [place] [address]  [id number]   Location Phone: [phone]          Chief Complaint  · lower right side pain      History Of Present Illness  Em Montanez is a 45 year old /White female who presents for evaluation and treatment of:      pt went to Er last night for abdominal pain. pt had labs with abdominal CT and pelvic US. labs only showed elevated alk phos and ovarian cyst. pt reports improved pain this AM. pt reports pain is intermittent, but last night was quite worse compared to others. pt also considered constipation after coming off post-op opioid pain medication. pt does not thinks ab pain is related to medication side effect.   insomnia- pt reports sleeping much better on trazadone.       Allergy List    Allergies Reconciled  Vitals  Date Time BP Position Site L\R Cuff Size HR RR TEMP (F) WT  HT  BMI kg/m2 BSA m2 O2 Sat FR L/min FiO2 HC       03/23/2021 09:48 AM      80 - R   305lbs 0oz 5'  10\" 43.76 2.61 98 %      04/09/2021 09:17 /82 Sitting    107 - R  97.2 203lbs 16oz 5'  10\" 29.27 2.14 94 %  21%    04/20/2021 11:00 AM      83 - R   294lbs 16oz 5'  10\" 42.33 2.57 99 %      05/21/2021 11:07 AM      78 - R   293lbs 16oz 5'  10\" 42.18 2.57 98 %      05/28/2021 10:43 AM      85 - R  96.2 294lbs 6oz 5'  10\" 42.24 2.57 95 %  21%          Physical Examination  · Constitutional  o Appearance  o : no acute distress, well-nourished  · Head and Face  o Head  o :   § Inspection  § : atraumatic, normocephalic  · Eyes  o Eyes  o : extraocular movements intact, no scleral icterus, no conjunctival injection  · Ears, Nose, Mouth " and Throat  o Ears  o :   § External Ears  § : normal  o Nose  o :   § Intranasal Exam  § : nares patent  o Oral Cavity  o :   § Oral Mucosa  § : moist mucous membranes  · Respiratory  o Respiratory Effort  o : breathing comfortably, symmetric chest rise  · Cardiovascular  o Heart  o :   § Auscultation of Heart  § : regular rate   o Peripheral Vascular System  o :   § Extremities  § : no edema  · Neurologic  o Mental Status Examination  o :   § Orientation  § : grossly oriented to person, place and time  o Gait and Station  o :   § Gait Screening  § : normal gait  · Psychiatric  o General  o : normal mood and affect          Assessment  · Screening for colon cancer     V76.51/Z12.11  · Abdominal pain     789.00/R10.9  labs and CT Ab/pelvis reviewed. pain possibly related to ovarian cyst? pt to f/u with GI for Cscope as well as gyn. call or RTC with any concerns.     Problems Reconciled  Plan  · Orders  o COLONOSCOPY REFERRAL (COLON) - V76.51/Z12.11 - 05/28/2021  o ACO-39: Current medications updated and reviewed (, 1159F) - - 05/28/2021  · Medications  o Medications have been Reconciled  o Transition of Care or Provider Policy  · Instructions  o Instructed to seek medical attention urgently for new or worsening symptoms.  o Patient was educated/instructed on their diagnosis, treatment and medications prior to discharge from the clinic today.  · Disposition  o Care Transition  o MARITA Sent            Electronically Signed by: Ricky Velasquez MD -Author on May 28, 2021 04:42:08 PM

## 2021-06-08 DIAGNOSIS — F32.A DEPRESSION, UNSPECIFIED DEPRESSION TYPE: ICD-10-CM

## 2021-06-08 DIAGNOSIS — F41.9 ANXIETY: Primary | ICD-10-CM

## 2021-06-08 RX ORDER — TRAZODONE HYDROCHLORIDE 50 MG/1
TABLET ORAL
COMMUNITY
Start: 2021-05-10 | End: 2021-06-08 | Stop reason: SDUPTHER

## 2021-06-08 RX ORDER — TRAZODONE HYDROCHLORIDE 50 MG/1
50 TABLET ORAL NIGHTLY
Qty: 30 TABLET | Refills: 0 | Status: SHIPPED | OUTPATIENT
Start: 2021-06-08 | End: 2021-07-22 | Stop reason: SDUPTHER

## 2021-06-17 DIAGNOSIS — Z12.11 SPECIAL SCREENING FOR MALIGNANT NEOPLASMS, COLON: Primary | ICD-10-CM

## 2021-07-02 ENCOUNTER — OFFICE VISIT (OUTPATIENT)
Dept: ORTHOPEDIC SURGERY | Facility: CLINIC | Age: 46
End: 2021-07-02

## 2021-07-02 VITALS — HEIGHT: 70 IN | BODY MASS INDEX: 41.95 KG/M2 | OXYGEN SATURATION: 96 % | WEIGHT: 293 LBS | HEART RATE: 88 BPM

## 2021-07-02 DIAGNOSIS — M25.562 LEFT KNEE PAIN, UNSPECIFIED CHRONICITY: Primary | ICD-10-CM

## 2021-07-02 DIAGNOSIS — Z96.652 AFTERCARE FOLLOWING LEFT KNEE JOINT REPLACEMENT SURGERY: ICD-10-CM

## 2021-07-02 DIAGNOSIS — Z47.1 AFTERCARE FOLLOWING LEFT KNEE JOINT REPLACEMENT SURGERY: ICD-10-CM

## 2021-07-02 PROCEDURE — 99024 POSTOP FOLLOW-UP VISIT: CPT | Performed by: PHYSICIAN ASSISTANT

## 2021-07-02 RX ORDER — DULOXETIN HYDROCHLORIDE 60 MG/1
CAPSULE, DELAYED RELEASE ORAL
COMMUNITY
Start: 2021-04-09 | End: 2021-08-12

## 2021-07-02 RX ORDER — ERGOCALCIFEROL 1.25 MG/1
CAPSULE ORAL
COMMUNITY
Start: 2021-03-09 | End: 2021-08-12 | Stop reason: SDUPTHER

## 2021-07-02 RX ORDER — CYANOCOBALAMIN 1000 UG/ML
INJECTION, SOLUTION INTRAMUSCULAR; SUBCUTANEOUS
COMMUNITY
End: 2021-08-12 | Stop reason: SDUPTHER

## 2021-07-02 RX ORDER — METFORMIN HYDROCHLORIDE 500 MG/1
500 TABLET, EXTENDED RELEASE ORAL
COMMUNITY
End: 2022-05-16

## 2021-07-02 NOTE — PROGRESS NOTES
"Chief Complaint  Pain of the Left Knee    Subjective          Em Montanez presents to [facility] for s/p left total knee arthroplasty by Dr. Collins on 04-05-21. Patient states she is doing well and was released by PT due to her progress. She states she recently had a fall several weeks ago prior to ending therapy when she was mowing her lawn. She states she fell onto her knees, but felt she was fine. She states therapy went well after the injury. She denies pain, swelling, fever or chills. 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     Objective   Vital Signs:   Pulse 88   Ht 177.8 cm (70\")   Wt 135 kg (298 lb)   SpO2 96%   BMI 42.76 kg/m²       Physical Exam  Constitutional:       Appearance: Normal appearance. He is well-developed and normal weight.   HENT:      Head: Normocephalic.      Right Ear: Hearing and external ear normal.      Left Ear: Hearing and external ear normal.      Nose: Nose normal.   Eyes:      Conjunctiva/sclera: Conjunctivae normal.   Cardiovascular:      Rate and Rhythm: Normal rate.   Pulmonary:      Effort: Pulmonary effort is normal.      Breath sounds: No wheezing or rales.   Abdominal:      Palpations: Abdomen is soft.      Tenderness: There is no abdominal tenderness.   Musculoskeletal:      Cervical back: Normal range of motion.   Skin:     Findings: No rash.   Neurological:      Mental Status: He is alert and oriented to person, place, and time.   Psychiatric:         Mood and Affect: Mood and affect normal.         Judgment: Judgment normal.     Ortho Exam  Left knee: Well-healed scar.  No tenderness, atrophy or swelling.  No skin discoloration.  Full active range of motion of the knee.  Stable varus valgus stress.  Gait is normal.  Good muscle tone of the quadriceps and hamstrings muscles.  Normal plantar and dorsiflexion.  Calf is soft and nontender.  Sensation is intact.  Neurovascular intact.    Result Review :        "     Imaging Results (Most Recent)     Procedure Component Value Units Date/Time    XR Knee 3 View Left [[id number]] Resulted: 07/02/21 1136     Updated: 07/02/21 1137    Narrative:      X-Ray Report:  Study: X-rays ordered, taken in the office, and reviewed today  Site: left knee Xray  Indication: left knee pain   View: AP, Lateral and Sunrise view(s)  Findings: Good alignment of left total knee arthroplasty. No signs of   hardware failure.   Prior studies available for comparison: yes                   Assessment and Plan    Problem List Items Addressed This Visit        Musculoskeletal and Injuries    Left knee pain - Primary    Relevant Orders    XR Knee 3 View Left (Completed)    Aftercare following left knee joint replacement surgery          Follow Up   Return in about 3 months (around 10/2/2021).  Patient Instructions   · Discussed with patient care of scar, use of Vit. E and cocoa-butter.   · Stretching and strengthening exercises at home   · Falls precautions  · Follow up in 3 month(s)      Patient was given instructions and counseling regarding her condition or for health maintenance advice. Please see specific information pulled into the AVS if appropriate.

## 2021-07-02 NOTE — PATIENT INSTRUCTIONS
· Discussed with patient care of scar, use of Vit. E and cocoa-butter.   · Stretching and strengthening exercises at home   · Falls precautions  · Follow up in 3 month(s)

## 2021-07-15 VITALS
WEIGHT: 293 LBS | BODY MASS INDEX: 41.95 KG/M2 | OXYGEN SATURATION: 95 % | HEART RATE: 85 BPM | HEIGHT: 70 IN | TEMPERATURE: 96.2 F

## 2021-07-15 VITALS — WEIGHT: 293 LBS | BODY MASS INDEX: 41.95 KG/M2 | HEIGHT: 70 IN | HEART RATE: 78 BPM | OXYGEN SATURATION: 98 %

## 2021-07-22 DIAGNOSIS — F32.A DEPRESSION, UNSPECIFIED DEPRESSION TYPE: ICD-10-CM

## 2021-07-22 DIAGNOSIS — F41.9 ANXIETY: ICD-10-CM

## 2021-07-22 RX ORDER — TRAZODONE HYDROCHLORIDE 50 MG/1
50 TABLET ORAL NIGHTLY
Qty: 90 TABLET | Refills: 1 | Status: SHIPPED | OUTPATIENT
Start: 2021-07-22 | End: 2022-02-14

## 2021-07-22 NOTE — TELEPHONE ENCOUNTER
Caller: Em Montanez    Relationship: Self    Best call back number: [phone]  Medication needed:   Requested Prescriptions     Pending Prescriptions Disp Refills   • traZODone (DESYREL) 50 MG tablet 30 tablet 0     Sig: Take 1 tablet by mouth Every Night.       When do you need the refill by: ASAP    Does the patient have less than a 3 day supply:  [x] Yes  [] No    What is the patient's preferred pharmacy: [facility] [facility] [address] - [phone] [facility] [phone] FX

## 2021-07-29 RX ORDER — VALACYCLOVIR HYDROCHLORIDE 1 G/1
1000 TABLET, FILM COATED ORAL DAILY
Qty: 10 TABLET | Refills: 2 | Status: SHIPPED | OUTPATIENT
Start: 2021-07-29 | End: 2022-10-03 | Stop reason: SDUPTHER

## 2021-08-12 ENCOUNTER — OFFICE VISIT (OUTPATIENT)
Dept: INTERNAL MEDICINE | Facility: CLINIC | Age: 46
End: 2021-08-12

## 2021-08-12 VITALS
BODY MASS INDEX: 40.38 KG/M2 | TEMPERATURE: 96.3 F | WEIGHT: 288.4 LBS | DIASTOLIC BLOOD PRESSURE: 89 MMHG | HEART RATE: 84 BPM | OXYGEN SATURATION: 98 % | SYSTOLIC BLOOD PRESSURE: 138 MMHG | HEIGHT: 71 IN

## 2021-08-12 DIAGNOSIS — E55.9 VITAMIN D DEFICIENCY: ICD-10-CM

## 2021-08-12 DIAGNOSIS — R73.09 ELEVATED GLUCOSE: ICD-10-CM

## 2021-08-12 DIAGNOSIS — E28.2 POLYCYSTIC OVARIES: ICD-10-CM

## 2021-08-12 DIAGNOSIS — J30.9 ALLERGIC RHINITIS, UNSPECIFIED SEASONALITY, UNSPECIFIED TRIGGER: Primary | ICD-10-CM

## 2021-08-12 PROBLEM — M19.90 ARTHRITIS: Status: ACTIVE | Noted: 2021-08-12

## 2021-08-12 PROBLEM — J45.909 ASTHMA: Status: ACTIVE | Noted: 2021-08-12

## 2021-08-12 PROBLEM — G47.00 INSOMNIA, UNSPECIFIED: Status: ACTIVE | Noted: 2017-12-11

## 2021-08-12 LAB
ALBUMIN SERPL-MCNC: 4.3 G/DL (ref 3.5–5.2)
ALBUMIN/GLOB SERPL: 1.3 G/DL
ALP SERPL-CCNC: 92 U/L (ref 39–117)
ALT SERPL W P-5'-P-CCNC: 20 U/L (ref 1–33)
ANION GAP SERPL CALCULATED.3IONS-SCNC: 9.4 MMOL/L (ref 5–15)
AST SERPL-CCNC: 26 U/L (ref 1–32)
BASOPHILS # BLD AUTO: 0.07 10*3/MM3 (ref 0–0.2)
BASOPHILS NFR BLD AUTO: 0.7 % (ref 0–1.5)
BILIRUB SERPL-MCNC: 0.2 MG/DL (ref 0–1.2)
BUN SERPL-MCNC: 11 MG/DL (ref 6–20)
BUN/CREAT SERPL: 12 (ref 7–25)
CALCIUM SPEC-SCNC: 9.5 MG/DL (ref 8.6–10.5)
CHLORIDE SERPL-SCNC: 101 MMOL/L (ref 98–107)
CHOLEST SERPL-MCNC: 194 MG/DL (ref 0–200)
CO2 SERPL-SCNC: 27.6 MMOL/L (ref 22–29)
CREAT SERPL-MCNC: 0.92 MG/DL (ref 0.57–1)
DEPRECATED RDW RBC AUTO: 44.2 FL (ref 37–54)
EOSINOPHIL # BLD AUTO: 0.13 10*3/MM3 (ref 0–0.4)
EOSINOPHIL NFR BLD AUTO: 1.3 % (ref 0.3–6.2)
ERYTHROCYTE [DISTWIDTH] IN BLOOD BY AUTOMATED COUNT: 13.9 % (ref 12.3–15.4)
GFR SERPL CREATININE-BSD FRML MDRD: 65 ML/MIN/1.73
GLOBULIN UR ELPH-MCNC: 3.2 GM/DL
GLUCOSE SERPL-MCNC: 91 MG/DL (ref 65–99)
HCT VFR BLD AUTO: 45.5 % (ref 34–46.6)
HDLC SERPL-MCNC: 56 MG/DL (ref 40–60)
HGB BLD-MCNC: 15.4 G/DL (ref 12–15.9)
IMM GRANULOCYTES # BLD AUTO: 0.03 10*3/MM3 (ref 0–0.05)
IMM GRANULOCYTES NFR BLD AUTO: 0.3 % (ref 0–0.5)
LDLC SERPL CALC-MCNC: 118 MG/DL (ref 0–100)
LDLC/HDLC SERPL: 2.07 {RATIO}
LYMPHOCYTES # BLD AUTO: 3.02 10*3/MM3 (ref 0.7–3.1)
LYMPHOCYTES NFR BLD AUTO: 29.6 % (ref 19.6–45.3)
MCH RBC QN AUTO: 29.7 PG (ref 26.6–33)
MCHC RBC AUTO-ENTMCNC: 33.8 G/DL (ref 31.5–35.7)
MCV RBC AUTO: 87.8 FL (ref 79–97)
MONOCYTES # BLD AUTO: 0.41 10*3/MM3 (ref 0.1–0.9)
MONOCYTES NFR BLD AUTO: 4 % (ref 5–12)
NEUTROPHILS NFR BLD AUTO: 6.54 10*3/MM3 (ref 1.7–7)
NEUTROPHILS NFR BLD AUTO: 64.1 % (ref 42.7–76)
NRBC BLD AUTO-RTO: 0 /100 WBC (ref 0–0.2)
PLATELET # BLD AUTO: 298 10*3/MM3 (ref 140–450)
PMV BLD AUTO: 10.8 FL (ref 6–12)
POTASSIUM SERPL-SCNC: 4.7 MMOL/L (ref 3.5–5.2)
PROT SERPL-MCNC: 7.5 G/DL (ref 6–8.5)
RBC # BLD AUTO: 5.18 10*6/MM3 (ref 3.77–5.28)
SODIUM SERPL-SCNC: 138 MMOL/L (ref 136–145)
TRIGL SERPL-MCNC: 110 MG/DL (ref 0–150)
VLDLC SERPL-MCNC: 20 MG/DL (ref 5–40)
WBC # BLD AUTO: 10.2 10*3/MM3 (ref 3.4–10.8)

## 2021-08-12 PROCEDURE — 85025 COMPLETE CBC W/AUTO DIFF WBC: CPT | Performed by: INTERNAL MEDICINE

## 2021-08-12 PROCEDURE — 83036 HEMOGLOBIN GLYCOSYLATED A1C: CPT | Performed by: INTERNAL MEDICINE

## 2021-08-12 PROCEDURE — 82652 VIT D 1 25-DIHYDROXY: CPT | Performed by: INTERNAL MEDICINE

## 2021-08-12 PROCEDURE — 99214 OFFICE O/P EST MOD 30 MIN: CPT | Performed by: INTERNAL MEDICINE

## 2021-08-12 PROCEDURE — 84443 ASSAY THYROID STIM HORMONE: CPT | Performed by: INTERNAL MEDICINE

## 2021-08-12 PROCEDURE — 80061 LIPID PANEL: CPT | Performed by: INTERNAL MEDICINE

## 2021-08-12 PROCEDURE — 80053 COMPREHEN METABOLIC PANEL: CPT | Performed by: INTERNAL MEDICINE

## 2021-08-12 RX ORDER — ERGOCALCIFEROL 1.25 MG/1
50000 CAPSULE ORAL WEEKLY
Qty: 13 CAPSULE | Refills: 3 | Status: SHIPPED | OUTPATIENT
Start: 2021-08-12 | End: 2021-12-07

## 2021-08-12 RX ORDER — FLUTICASONE PROPIONATE 50 MCG
2 SPRAY, SUSPENSION (ML) NASAL DAILY
Qty: 16 G | Refills: 3 | Status: SHIPPED | OUTPATIENT
Start: 2021-08-12 | End: 2021-12-07

## 2021-08-12 RX ORDER — CYANOCOBALAMIN 1000 UG/ML
1000 INJECTION, SOLUTION INTRAMUSCULAR; SUBCUTANEOUS
Qty: 3 ML | Refills: 3 | Status: SHIPPED | OUTPATIENT
Start: 2021-08-12 | End: 2021-12-07

## 2021-08-12 RX ORDER — CETIRIZINE HYDROCHLORIDE 10 MG/1
10 TABLET ORAL DAILY
Qty: 90 TABLET | Refills: 3 | Status: SHIPPED | OUTPATIENT
Start: 2021-08-12 | End: 2023-04-04

## 2021-08-12 NOTE — PROGRESS NOTES
"Chief Complaint  Hypoglycemia (will go from low sugar levels and spike/ dizziness)    Subjective          Em Montanez presents to [facility]  History of Present Illness  Pt reports feeling lightheaded at times. Pt thinks may be related to inner ear issue.   Pt has been checking BG with labile glucose. Pt report range from . Pt has noticed these BG swings over the last 2 months. Pt has lost 10 lbs. Pt reports eating lighter meals and drinking water.      Objective   Vital Signs:   /89 (BP Location: Right arm, Patient Position: Sitting, Cuff Size: Large Adult)   Pulse 84   Temp 96.3 °F (35.7 °C) (Temporal)   Ht 179.1 cm (70.5\")   Wt 131 kg (288 lb 6.4 oz)   SpO2 98%   BMI 40.80 kg/m²     Physical Exam   Appearance: No acute distress, well-nourished  Head: normocephalic, atraumatic  Eyes: extraocular movements intact, no scleral icterus, no conjunctival injection  Ears, Nose, and Throat: external ears normal, TMs with effusion aaron. nares patent, moist mucous membranes  Cardiovascular: regular rate and rhythm. no murmurs, rales, or rhonchi. no edema  Respiratory: breathing comfortably, symmetric chest rise, clear to auscultation bilaterally. No wheezes, rales, or rhonchi.  Neuro: alert and oriented to time, place, and person. Normal gait  Psych: normal mood and affect     Result Review :   The following data was reviewed by: Ricky Velasquez Jr, MD on 08/12/2021:  Common labs    Common Labsle 4/6/21 4/6/21 5/27/21 5/27/21 8/12/21 8/12/21 8/12/21 8/12/21    0410 0410 1442 1442 1414 1414 1414 1414   Glucose       91    Glucose  118 (A)  103 (A)       BUN  16  12   11    Creatinine  0.82  0.75   0.92    eGFR Non African Am       65    Sodium  137  136   138    Potassium  4.2  4.1   4.7    Chloride  102  100   101    Calcium  8.6 (A)  9.6   9.5    Albumin    3.9   4.30    Total Bilirubin    0.30   0.2    Alkaline Phosphatase    108 (A)   92  "   AST (SGOT)    21   26    ALT (SGPT)    13   20    WBC   10.11  10.20      Hemoglobin 10.7 (A)  14.2  15.4      Hematocrit 32.4 (A)  41.4  45.5      Platelets   327  298      Total Cholesterol      194     Triglycerides      110     HDL Cholesterol      56     LDL Cholesterol       118 (A)     Hemoglobin A1C        5.40   (A) Abnormal value       Comments are available for some flowsheets but are not being displayed.           Assessment and Plan    Diagnoses and all orders for this visit:    1. Allergic rhinitis, unspecified seasonality, unspecified trigger (Primary)  Comments:  possibly contributing to lightheadedness with inner ear effusion.   Orders:  -     cetirizine (zyrTEC) 10 MG tablet; Take 1 tablet by mouth Daily.  Dispense: 90 tablet; Refill: 3  -     fluticasone (Flonase) 50 MCG/ACT nasal spray; 2 sprays into the nostril(s) as directed by provider Daily.  Dispense: 16 g; Refill: 3    2. Polycystic ovaries  -     Lipid Panel  -     Comprehensive Metabolic Panel  -     CBC & Differential  -     TSH    3. Vitamin D deficiency  -     Vitamin D 1,25 Dihydroxy  -     ergocalciferol (ERGOCALCIFEROL) 1.25 MG (46236 UT) capsule; Take 1 capsule by mouth 1 (One) Time Per Week.  Dispense: 13 capsule; Refill: 3    4. Elevated glucose  -     Hemoglobin A1c  -     TSH    Other orders  -     cyanocobalamin 1000 MCG/ML injection; Inject 1 mL into the appropriate muscle as directed by prescriber Every 30 (Thirty) Days.  Dispense: 3 mL; Refill: 3        Follow Up   No follow-ups on file.  Patient was given instructions and counseling regarding her condition or for health maintenance advice. Please see specific information pulled into the AVS if appropriate.

## 2021-08-13 ENCOUNTER — TELEPHONE (OUTPATIENT)
Dept: INTERNAL MEDICINE | Facility: CLINIC | Age: 46
End: 2021-08-13

## 2021-08-13 LAB
HBA1C MFR BLD: 5.4 % (ref 4.8–5.6)
TSH SERPL DL<=0.05 MIU/L-ACNC: 1.53 UIU/ML (ref 0.27–4.2)

## 2021-08-13 NOTE — TELEPHONE ENCOUNTER
----- Message from Ricky Velasquez Jr., MD sent at 8/13/2021  9:56 AM EDT -----  LDL is elevated - this has been associated with increased risk of cardiovascular disease including heart attacks and strokes. Would recommend low cholesterol diet to reduce.     Labs otherwise reassuring

## 2021-08-16 LAB — 1,25(OH)2D SERPL-MCNC: 22.9 PG/ML (ref 19.9–79.3)

## 2021-08-25 RX ORDER — CYCLOBENZAPRINE HCL 10 MG
10 TABLET ORAL 3 TIMES DAILY PRN
Qty: 90 TABLET | Refills: 0 | Status: SHIPPED | OUTPATIENT
Start: 2021-08-25 | End: 2022-08-03

## 2021-09-07 ENCOUNTER — PATIENT MESSAGE (OUTPATIENT)
Dept: INTERNAL MEDICINE | Facility: CLINIC | Age: 46
End: 2021-09-07

## 2021-09-07 DIAGNOSIS — Z12.31 ENCOUNTER FOR SCREENING MAMMOGRAM FOR MALIGNANT NEOPLASM OF BREAST: Primary | ICD-10-CM

## 2021-09-07 DIAGNOSIS — Z12.11 SCREENING FOR COLON CANCER: ICD-10-CM

## 2021-09-08 NOTE — TELEPHONE ENCOUNTER
From: Em Montanez  To: Ricky Velasquez Jr, MD  Sent: 9/7/2021 10:59 AM EDT  Subject: Referral Request    I need a referral for my 6mth mammogram also could he please refer me to a other dr for my colonoscopy     Thank you

## 2021-09-09 ENCOUNTER — TRANSCRIBE ORDERS (OUTPATIENT)
Dept: PHYSICAL THERAPY | Facility: CLINIC | Age: 46
End: 2021-09-09

## 2021-09-09 DIAGNOSIS — M51.36 DEGENERATION OF LUMBAR INTERVERTEBRAL DISC: Primary | ICD-10-CM

## 2021-09-28 ENCOUNTER — TELEPHONE (OUTPATIENT)
Dept: INTERNAL MEDICINE | Facility: CLINIC | Age: 46
End: 2021-09-28

## 2021-09-28 NOTE — TELEPHONE ENCOUNTER
PT VERIFIED      CONTACTED PT PER PROVIDER'S INSTRUCTIONS     COLOGUARD HELP LINE   [facility] at [phone]    PT STATES COLOGUARD IS NOT COVERED BY HER INSURANCE    Referral to Teodora Cantu MD for Screening for colon cancer (2021)    PT STATES SHE WAS REFERRED ELSEWHERE FOR A COLONOSCOPY    PT STATES SHE REQUESTED A DIFFERENT COLOGUARD DOCTOR AFTER HEARING BAD THINGS ABOUT DR CANTU FROM A FRIEND

## 2021-09-29 ENCOUNTER — OFFICE VISIT (OUTPATIENT)
Dept: OBSTETRICS AND GYNECOLOGY | Facility: CLINIC | Age: 46
End: 2021-09-29

## 2021-09-29 VITALS
SYSTOLIC BLOOD PRESSURE: 138 MMHG | HEIGHT: 70 IN | DIASTOLIC BLOOD PRESSURE: 82 MMHG | BODY MASS INDEX: 41.23 KG/M2 | HEART RATE: 73 BPM | WEIGHT: 288 LBS

## 2021-09-29 DIAGNOSIS — Z00.00 ENCOUNTER FOR ANNUAL WELLNESS VISIT (AWV) IN MEDICARE PATIENT: Primary | ICD-10-CM

## 2021-09-29 PROCEDURE — 99386 PREV VISIT NEW AGE 40-64: CPT | Performed by: STUDENT IN AN ORGANIZED HEALTH CARE EDUCATION/TRAINING PROGRAM

## 2021-09-29 PROCEDURE — 3008F BODY MASS INDEX DOCD: CPT | Performed by: STUDENT IN AN ORGANIZED HEALTH CARE EDUCATION/TRAINING PROGRAM

## 2021-09-29 PROCEDURE — 88175 CYTOPATH C/V AUTO FLUID REDO: CPT | Performed by: STUDENT IN AN ORGANIZED HEALTH CARE EDUCATION/TRAINING PROGRAM

## 2021-09-29 PROCEDURE — 87491 CHLMYD TRACH DNA AMP PROBE: CPT | Performed by: STUDENT IN AN ORGANIZED HEALTH CARE EDUCATION/TRAINING PROGRAM

## 2021-09-29 PROCEDURE — 87591 N.GONORRHOEAE DNA AMP PROB: CPT | Performed by: STUDENT IN AN ORGANIZED HEALTH CARE EDUCATION/TRAINING PROGRAM

## 2021-09-29 PROCEDURE — 2014F MENTAL STATUS ASSESS: CPT | Performed by: STUDENT IN AN ORGANIZED HEALTH CARE EDUCATION/TRAINING PROGRAM

## 2021-09-29 NOTE — PROGRESS NOTES
Well Woman Visit    Chief Complaint   Patient presents with   • annual           HPI  Em Montanez is a 46 y.o. female, who presents for annual well woman exam.  Patient without any current complaints today.  She does report occasionally having right-sided abdominal tenderness.  This pain she describes is sharp in nature and typically only last 1 day.  She reports that she does get relief with pain medication.  She reports it is not a narcotic pain medication but does not recall the name of it.  It is not Tylenol or ibuprofen.  She is amenorrheic after having ablation a year ago.  She is sexually active but denies any concerns for vaginal bleeding or vaginal discomfort.  She does have a history of an abnormal mammogram, and does have follow-up scheduled for November to follow an indeterminate lesion on the left.  She does not desire to have sexually transmitted disease testing today.  She continues to smoke, and reports that life stressors are currently keeping her from being able to quit.  She has been exercising and reports her weight down from 344 pounds as a high.  She does take Metformin for PCOS and reports labile blood sugars which are managed by Dr. Velasquez.  She does have a history of abnormal Pap smears, her last one being in 2019 which did show ASCUS HPV positive.  As her biggest concern today is to have follow-up testing for this.  She does report breast tenderness around the times of when her expected menses would be.  No postmenopausal symptoms.      Additional OB/GYN History   Current contraception: contraceptive methods: Tubal ligation  Last Pap :   Last Completed Pap Smear     This patient has no relevant Health Maintenance data.        Result: Abnormal  History of abnormal Pap smear: yes - 2019 ASCUS HPV positive  Last MMG :   Last Completed Mammogram     This patient has no relevant Health Maintenance data.         Last Colonoscopy :   Last Completed Colonoscopy          COLORECTAL CANCER  "SCREENING  Next due on 8/12/2024 08/12/2021  SmartData: WORKFLOW - QUALITY MEASUREMENT - EXCLUSION REASONS - COLORECTAL CANCER SCREENING NOT PERFORMED FOR MEDICAL REASONS                AllergiesPrednisone and Tramadol   Family history of uterine, colon, breast, or ovarian cancer: no  Tobacco Usage?: Yes Em Montanez  reports that she has been smoking. She has a 30.00 pack-year smoking history. She has never used smokeless tobacco.. I have educated her on the risk of diseases from using tobacco products such as cancer, COPD and heart disease.     I advised her to quit and she is not willing to quit.    I spent 4 minutes counseling the patient.        OB History    No obstetric history on file.         The additional following portions of the patient's history were reviewed and updated as appropriate: allergies, current medications, past family history, past medical history, past social history, past surgical history and problem list.    Review of Systems   Constitutional: Negative for activity change, appetite change, fatigue and fever.   Respiratory: Negative for cough and shortness of breath.    Cardiovascular: Negative for chest pain.   Gastrointestinal: Negative for abdominal distention and abdominal pain.   Genitourinary: Positive for urinary incontinence. Negative for difficulty urinating, dysuria, pelvic pain and pelvic pressure.        Loss of urine with laugh cough or sneeze nonbothersome to patient       I have reviewed and agree with the HPI, ROS, and historical information as entered above. Chito Coko MD    Objective   /82   Pulse 73   Ht 177.8 cm (70\")   Wt 131 kg (288 lb)   BMI 41.32 kg/m²     Physical Exam  Vitals and nursing note reviewed. Exam conducted with a chaperone present.   Constitutional:       General: She is not in acute distress.     Appearance: Normal appearance. She is obese. She is not toxic-appearing.   HENT:      Head: Normocephalic and atraumatic.   Eyes:     "  Extraocular Movements: Extraocular movements intact.      Conjunctiva/sclera: Conjunctivae normal.   Cardiovascular:      Rate and Rhythm: Normal rate and regular rhythm.      Pulses: Normal pulses.   Pulmonary:      Effort: Pulmonary effort is normal.      Breath sounds: Normal breath sounds. No wheezing.   Abdominal:      General: There is no distension.      Palpations: Abdomen is soft.      Tenderness: There is no abdominal tenderness.   Genitourinary:     General: Normal vulva.      Exam position: Lithotomy position.      Pubic Area: No rash.       Labia:         Right: No rash or tenderness.         Left: No rash or tenderness.       Urethra: No urethral pain or urethral lesion.      Vagina: Normal. No tenderness or lesions.      Cervix: No cervical motion tenderness, discharge, friability or lesion.      Uterus: Normal. Not tender.       Adnexa: Right adnexa normal and left adnexa normal.        Right: No mass or tenderness.          Left: No mass or tenderness.     Musculoskeletal:      Cervical back: Normal range of motion and neck supple.   Skin:     General: Skin is warm and dry.   Neurological:      Mental Status: She is alert and oriented to person, place, and time.   Psychiatric:         Mood and Affect: Mood normal.         Behavior: Behavior normal.         Thought Content: Thought content normal.            Assessment and Plan  Ms. Em Montanez is a 46-year-old presenting for annual evaluation.  No changes in medical history over the past year.  Socially she is going through a separation, and this is keeping her anxious and she reports is her need for smoking.  She does have a history of abnormal mammogram and she is following up with her primary care physician with this.  Pap smear abnormalities in the past, most recently in 2019 ASCUS HPV positive.  Cervical examination appears normal today and Pap smear was collected.  She is not due for any other preventative measures besides already discussed  above.    WWE    Diagnoses and all orders for this visit:    1. Encounter for annual wellness visit (AWV) in Medicare patient (Primary)  -     Pap IG, Ct-Ng, Rfx HPV ASCU        Counseling:  • Weight loss/Nutrition/Wt bearing exercise/Kegels/Core    Preventative:  • MMG  • Follow up PCP/Specialist PMHx and Labs   •   She understands the importance of having the above performed in a timely fashion.  The risks of not performing them include, but are not limited to, advanced cancer stages, bone loss from osteoporosis and/or subsequent increase in morbidity and/or mortality.  She is encouraged to review her results online and/or contact or office if she has questions.     Follow Up:  1 year or as needed      Chito Cook MD  09/29/2021

## 2021-10-04 ENCOUNTER — TELEPHONE (OUTPATIENT)
Dept: PHYSICAL THERAPY | Facility: CLINIC | Age: 46
End: 2021-10-04

## 2021-10-04 LAB
C TRACH RRNA CVX QL NAA+PROBE: NEGATIVE
CONV .: NORMAL
CYTOLOGIST CVX/VAG CYTO: NORMAL
CYTOLOGY CVX/VAG DOC CYTO: NORMAL
CYTOLOGY CVX/VAG DOC THIN PREP: NORMAL
DX ICD CODE: NORMAL
HIV 1 & 2 AB SER-IMP: NORMAL
N GONORRHOEA RRNA CVX QL NAA+PROBE: NEGATIVE
OTHER STN SPEC: NORMAL
STAT OF ADQ CVX/VAG CYTO-IMP: NORMAL

## 2021-10-05 ENCOUNTER — OFFICE VISIT (OUTPATIENT)
Dept: ORTHOPEDIC SURGERY | Facility: CLINIC | Age: 46
End: 2021-10-05

## 2021-10-05 VITALS — BODY MASS INDEX: 41.8 KG/M2 | HEIGHT: 70 IN | OXYGEN SATURATION: 97 % | HEART RATE: 87 BPM | WEIGHT: 292 LBS

## 2021-10-05 DIAGNOSIS — Z96.652 AFTERCARE FOLLOWING LEFT KNEE JOINT REPLACEMENT SURGERY: ICD-10-CM

## 2021-10-05 DIAGNOSIS — M25.562 LEFT KNEE PAIN, UNSPECIFIED CHRONICITY: Primary | ICD-10-CM

## 2021-10-05 DIAGNOSIS — Z47.1 AFTERCARE FOLLOWING LEFT KNEE JOINT REPLACEMENT SURGERY: ICD-10-CM

## 2021-10-05 PROCEDURE — 99212 OFFICE O/P EST SF 10 MIN: CPT | Performed by: PHYSICIAN ASSISTANT

## 2021-10-05 NOTE — PATIENT INSTRUCTIONS
Advised on falls precautions  Continue with lifelong antibiotic prophylaxis with dental procedures following total joint replacement.  Follow up in 6 months

## 2021-10-05 NOTE — PROGRESS NOTES
"Chief Complaint  Pain of the Left Knee    Subjective          Em Montanez presents to [facility] for s/p left TKA performed by Dr. Collins on 04/05/21. Patient states her knee is doing really well. She is currently doing PT for lumbar spine. She states she has had no issues from her knee. She is please with her outcome following left total knee arthroplasty.     Objective   Allergies   Allergen Reactions   • Prednisone Mental Status Change   • Tramadol GI Intolerance       Vital Signs:   Pulse 87   Ht 177.8 cm (70\")   Wt 132 kg (292 lb)   SpO2 97%   BMI 41.90 kg/m²       Physical Exam  Constitutional:       Appearance: Normal appearance. Patient is well-developed and normal weight.   HENT:      Head: Normocephalic.      Right Ear: Hearing and external ear normal.      Left Ear: Hearing and external ear normal.      Nose: Nose normal.   Eyes:      Conjunctiva/sclera: Conjunctivae normal.   Cardiovascular:      Rate and Rhythm: Normal rate.   Pulmonary:      Effort: Pulmonary effort is normal.      Breath sounds: No wheezing or rales.   Abdominal:      Palpations: Abdomen is soft.      Tenderness: There is no abdominal tenderness.   Musculoskeletal:      Cervical back: Normal range of motion.   Skin:     Findings: No rash.   Neurological:      Mental Status: Patient is alert and oriented to person, place, and time.   Psychiatric:         Mood and Affect: Mood and affect normal.         Judgment: Judgment normal.     Ortho Exam  Left knee: Well-healed scar.  No tenderness, swelling, atrophy or discoloration.  Full weightbearing, normal gait, good heel strike.  Sensation intact, neurovascular intact, posterior tibialis pulse 2+, dorsalis pedis pulse 2+.  AROM is 0 to 130 degrees of flexion.  Stable to varus and valgus stress.  Good strength of the quadriceps and hamstrings muscles.  Patella well tracking.  Good muscle tone of the ankle flexors.  Full plantar/dorsiflexion.  Result " Review :   The following data was reviewed by: BANG Hays on 10/05/2021:         Imaging Results (Most Recent)     Procedure Component Value Units Date/Time    XR Knee 3 View Left [[id number]] Resulted: 10/05/21 1103     Updated: 10/05/21 1104    Narrative:      X-Ray Report:  Study: X-rays ordered, taken in the office, and reviewed today  Site: left knee  Xray  Indication: left knee pain   View: AP, Lateral and Sunrise view(s)  Findings: Good alignment of left total knee arthroplasty. No signs of   hardware failure.   Prior studies available for comparison: yes                  Assessment and Plan    Problem List Items Addressed This Visit        Musculoskeletal and Injuries    Left knee pain - Primary    Relevant Orders    XR Knee 3 View Left (Completed)    Aftercare following left knee joint replacement surgery          Follow Up   Return in about 6 months (around 4/5/2022).  Patient Instructions   Advised on falls precautions  Continue with lifelong antibiotic prophylaxis with dental procedures following total joint replacement.  Follow up in 6 months    Patient was given instructions and counseling regarding her condition or for health maintenance advice. Please see specific information pulled into the AVS if appropriate.

## 2021-10-06 PROBLEM — Z12.4 ENCOUNTER FOR PAPANICOLAOU SMEAR OF CERVIX: Status: ACTIVE | Noted: 2021-10-06

## 2021-10-27 ENCOUNTER — TREATMENT (OUTPATIENT)
Dept: PHYSICAL THERAPY | Facility: CLINIC | Age: 46
End: 2021-10-27

## 2021-10-27 DIAGNOSIS — M54.50 LOW BACK PAIN, UNSPECIFIED BACK PAIN LATERALITY, UNSPECIFIED CHRONICITY, UNSPECIFIED WHETHER SCIATICA PRESENT: Primary | ICD-10-CM

## 2021-10-27 PROCEDURE — 97162 PT EVAL MOD COMPLEX 30 MIN: CPT | Performed by: PHYSICAL THERAPIST

## 2021-10-27 NOTE — PROGRESS NOTES
Physical Therapy Initial Evaluation and Plan of Care    Patient: Em Montanez   : 1975  Diagnosis/ICD-10 Code:  Low back pain, unspecified back pain laterality, unspecified chronicity, unspecified whether sciatica present [M54.50]  Referring practitioner: MIKE Vaz  Date of Initial Visit: 10/27/2021  Today's Date: 10/27/2021  Patient seen for 1 sessions           Subjective Questionnaire: Oswestry: = 8%      Subjective Evaluation    History of Present Illness  Mechanism of injury: Pt presents with low back pain that has been ongoing for years, she states initially was supposed to have therapy for the back this summer. She now has received injections in her back and this has helped so far. Pain is left sided lower back, denies numbness/tingling in her legs. 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     Pain  Current pain ratin  At best pain ratin  At worst pain rating: 10  Quality: needle-like, tight, discomfort and dull ache  Relieving factors: change in position  Aggravating factors: lifting, standing and squatting    Social Support  Lives in: multiple-level home    Diagnostic Tests  X-ray: abnormal    Treatments  Previous treatment: injection treatment  Patient Goals  Patient goals for therapy: decreased pain and increased strength           PMH: anxiety, depression, OA, asthma, Allergic rhinitis; Back pain; Deafness; Fatigue; Gall stones; Insomnia; History of knee replacement, total, right; Knee pain; Polycystic ovarian syndrome; Vitamin D deficiency        Objective          Palpation   Left   Muscle spasm in the erector spinae.   Tenderness of the erector spinae.     Active Range of Motion     Lumbar   Left lateral flexion: WFL  Right lateral flexion: WFL  Left rotation: WFL  Right rotation: WFL    Additional Active Range of Motion Details  Flexion to mid shin  Extension limited  25%        Strength/Myotome Testing     Left Hip   Planes of Motion   Flexion: 4  Extension: 4  Abduction: 4-  Adduction: 4+  External rotation: 4-    Right Hip   Planes of Motion   Flexion: 4  Extension: 4  Abduction: 4-  Adduction: 4+  External rotation: 4-    Tests     Lumbar   Negative repeated extension and repeated flexion.       See Exercise, Manual, and Modality Logs for complete treatment.     Assessment & Plan     Assessment  Impairments: abnormal muscle firing, abnormal or restricted ROM, activity intolerance, impaired physical strength and pain with function  Assessment details: The patient presents to physical therapy with complaints of low back pain. The patient presents with associated left lower extremity weakness, lumbar stiffness, and functional deficits (OSWESTRY). The patient would benefit from skilled PT intervention to address the above mentioned functional limitations.     Prognosis: good  Functional Limitations: carrying objects, lifting, walking, uncomfortable because of pain, sitting and standing  Goals  Plan Goals: LOW BACK PROBLEMS:    1. The patient complains of low back pain.  LTG 1: 12 weeks:  The patient will report a pain rating of 3/10 or better at worst in order to improve  tolerance to activities of daily living and improve sleep quality.  STATUS:  New  STG 1a: 6 weeks:  The patient will report a pain rating of 5/10 or better at its worst.  STATUS:  New  TREATMENT:  Therapeutic exercises, manual therapy, aquatic therapy, home exercise   instruction, and modalities as needed for pain to include:  electrical stimulation, moist heat, ice,   ultrasound, and diathermy.      2. The patient demonstrates weakness of the B hip.  LTG 2: 12 weeks:  The patient will demonstrate 5 /5 strength for B hip flexion, abduction,  and extension in order to improve hip stability.  STATUS:  New  STG 2a: 6 weeks:  The patient will demonstrate 4+ /5 strength for B hip flexion, abduction,  and  extension.  STATUS:  New  TREATMENT: Therapeutic exercises, manual therapy, aquatic therapy, home exercise instruction,  and modalities as needed for pain to include:  electrical stimulation, moist heat, ice, ultrasound, and   diathermy.        4. Mobility: Walking/Moving Around Functional Limitation    LTG 4: 12 weeks:  The patient will demonstrate <8 % limitation by achieving a score of <4 on the YAIR.  STATUS:  New  TREATMENT:  Manual therapy, therapeutic exercise, home exercise instruction, and modalities as needed to include: moist heat, electrical stimulation, and ultrasound.    Plan  Therapy options: will be seen for skilled physical therapy services  Planned modality interventions: TENS, cryotherapy, thermotherapy (hydrocollator packs), traction and dry needling  Planned therapy interventions: manual therapy, stretching, strengthening, therapeutic activities, neuromuscular re-education, home exercise program, joint mobilization, functional ROM exercises, soft tissue mobilization, spinal/joint mobilization, flexibility and gait training  Frequency: 3x week  Duration in weeks: 12  Treatment plan discussed with: patient        Visit Diagnoses:    ICD-10-CM ICD-9-CM   1. Low back pain, unspecified back pain laterality, unspecified chronicity, unspecified whether sciatica present  M54.50 724.2       History # of Personal Factors and/or Comorbidities: HIGH (3+)  Examination of Body System(s): # of elements: MODERATE (3)  Clinical Presentation: STABLE   Clinical Decision Making: MODERATE      Timed:         Manual Therapy:    0     mins  77398;     Therapeutic Exercise:    5     mins  99914;     Neuromuscular Yanna:    0    mins  10532;    Therapeutic Activity:     0     mins  16682;     Gait Trainin     mins  01212;     Ultrasound:     0     mins  58903;    Ionto                               0    mins   82337  Self Care                       0     mins   47772  Canalith Repos    0     mins  51782      Un-Timed:  Electrical Stimulation:    0     mins  03101 ( );  Dry Needling     0     mins self-pay  Traction     0     mins 59779  Low Eval     0     Mins  76255  Mod Eval     30     Mins  64220  High Eval                       0     Mins  31334  Re-Eval                           0    mins  04477    Timed Treatment:   5   mins   Total Treatment:     30   mins    PT SIGNATURE: Isaias Pacheco PT     Electronically signed 10/27/2021    [place] License: PT - [id number]     Initial Certification  Certification Period: 10/27/2021 thru 1/24/2022  I certify that the therapy services are furnished while this patient is under my care.  The services outlined above are required by this patient, and will be reviewed every 90 days.     PHYSICIAN: Vanessa Roque PA-C      DATE:     Please sign and return via fax to [phone]. Thank you, [facility].

## 2021-11-03 ENCOUNTER — TREATMENT (OUTPATIENT)
Dept: PHYSICAL THERAPY | Facility: CLINIC | Age: 46
End: 2021-11-03

## 2021-11-03 DIAGNOSIS — M54.50 LOW BACK PAIN, UNSPECIFIED BACK PAIN LATERALITY, UNSPECIFIED CHRONICITY, UNSPECIFIED WHETHER SCIATICA PRESENT: Primary | ICD-10-CM

## 2021-11-03 PROCEDURE — 97110 THERAPEUTIC EXERCISES: CPT | Performed by: PHYSICAL THERAPIST

## 2021-11-03 PROCEDURE — 97530 THERAPEUTIC ACTIVITIES: CPT | Performed by: PHYSICAL THERAPIST

## 2021-11-03 NOTE — PROGRESS NOTES
Physical Therapy Daily Progress Note    Patient: Em Montanez   : 1975  Diagnosis/ICD-10 Code:  Low back pain, unspecified back pain laterality, unspecified chronicity, unspecified whether sciatica present [M54.50]  Referring practitioner: MIKE Vaz  Date of Initial Visit: Type: THERAPY  Noted: 10/27/2021  Today's Date: 11/3/2021  Patient seen for 2 sessions           Subjective Evaluation    History of Present Illness    Subjective comment: Pt reporting she was sore from starting the exercises, but feels she is not any worse.Pain  Current pain ratin           Objective   See Exercise, Manual, and Modality Logs for complete treatment.       Assessment & Plan     Assessment  Assessment details: Pt tolerated today's session well progressed hip and core strengthening this visit. Pt would benefit from continued skilled therapy to address deficits. Progress per plan of care.                  Manual Therapy:    0     mins  81878;  Therapeutic Exercise:    18     mins  47148;     Neuromuscular Yanna:    0    mins  90885;    Therapeutic Activity:     8     mins  19791;     Gait Trainin     mins  22491;     Ultrasound:     0     mins  17806;    Electrical Stimulation:    0     mins  12646 ( );  Dry Needling     0     mins self-pay;  Aquatic Therapy    0     mins  02750;  Mechanical Traction    0     mins  84930  Moist Heat     0     mins  No charge    Timed Treatment:   26   mins   Total Treatment:     26   mins    Isaias Pacheco PT  Physical Therapist    Electronically signed 11/3/2021    [place] License: PT - [id number]

## 2021-11-16 ENCOUNTER — TREATMENT (OUTPATIENT)
Dept: PHYSICAL THERAPY | Facility: CLINIC | Age: 46
End: 2021-11-16

## 2021-11-16 DIAGNOSIS — M54.50 LOW BACK PAIN, UNSPECIFIED BACK PAIN LATERALITY, UNSPECIFIED CHRONICITY, UNSPECIFIED WHETHER SCIATICA PRESENT: Primary | ICD-10-CM

## 2021-11-16 PROCEDURE — 97140 MANUAL THERAPY 1/> REGIONS: CPT | Performed by: PHYSICAL THERAPIST

## 2021-11-16 PROCEDURE — 97110 THERAPEUTIC EXERCISES: CPT | Performed by: PHYSICAL THERAPIST

## 2021-11-16 NOTE — PROGRESS NOTES
Physical Therapy Daily Progress Note        Patient: Em Montanez   : 1975  Diagnosis/ICD-10 Code:  Low back pain, unspecified back pain laterality, unspecified chronicity, unspecified whether sciatica present [M54.50]  Referring practitioner: MIKE Vaz  Date of Initial Visit: Type: THERAPY  Noted: 10/27/2021  Today's Date: 2021  Patient seen for 3 sessions             Subjective   Em Montanez reports: still having increased pain in back, especially on the L side.     Objective   No complaint of increased pain or discomfort.     See Exercise, Manual, and Modality Logs for complete treatment.       Assessment/Plan  Em still experiencing increased low back  pain, especially on the L side. Pt tolerated exercise well, no complaints of increased pain and discomfort. Pt would benefit from skilled PT to address Range of Motion  and Strength deficits, pain management and any concerns with ADLs.     Progress per Plan of Care           Timed:  Manual Therapy:    10     mins  26757;  Therapeutic Exercise:    20     mins  30636;     Neuromuscular Yanna:        mins  26233;    Therapeutic Activity:          mins  34581;     Gait Training:           mins  04175;    Aquatic Therapy:          mins  24235;       Untimed:  Electrical Stimulation:         mins  66371 ( );  Mechanical Traction:         mins  21423;       Timed Treatment:   30   mins   Total Treatment:     30   mins      Electronically signed:   Laureen Pacheco PTA  Physical Therapist Assistant  [place] [facility] License #: [id number]

## 2021-11-18 ENCOUNTER — TELEPHONE (OUTPATIENT)
Dept: ORTHOPEDICS | Facility: OTHER | Age: 46
End: 2021-11-18

## 2021-11-22 DIAGNOSIS — R92.8 ABNORMAL MAMMOGRAM: Primary | ICD-10-CM

## 2021-11-23 ENCOUNTER — TREATMENT (OUTPATIENT)
Dept: PHYSICAL THERAPY | Facility: CLINIC | Age: 46
End: 2021-11-23

## 2021-11-23 ENCOUNTER — FLU SHOT (OUTPATIENT)
Dept: INTERNAL MEDICINE | Facility: CLINIC | Age: 46
End: 2021-11-23

## 2021-11-23 DIAGNOSIS — M54.50 LOW BACK PAIN, UNSPECIFIED BACK PAIN LATERALITY, UNSPECIFIED CHRONICITY, UNSPECIFIED WHETHER SCIATICA PRESENT: Primary | ICD-10-CM

## 2021-11-23 DIAGNOSIS — Z23 NEED FOR INFLUENZA VACCINATION: Primary | ICD-10-CM

## 2021-11-23 PROCEDURE — 90471 IMMUNIZATION ADMIN: CPT | Performed by: INTERNAL MEDICINE

## 2021-11-23 PROCEDURE — 97110 THERAPEUTIC EXERCISES: CPT | Performed by: PHYSICAL THERAPIST

## 2021-11-23 PROCEDURE — 97140 MANUAL THERAPY 1/> REGIONS: CPT | Performed by: PHYSICAL THERAPIST

## 2021-11-23 PROCEDURE — 90686 IIV4 VACC NO PRSV 0.5 ML IM: CPT | Performed by: INTERNAL MEDICINE

## 2021-11-23 NOTE — PROGRESS NOTES
Physical Therapy Daily Progress Note        Patient: Em Montanez   : 1975  Diagnosis/ICD-10 Code:  Low back pain, unspecified back pain laterality, unspecified chronicity, unspecified whether sciatica present [M54.50]  Referring practitioner: MIKE Vaz  Date of Initial Visit: Type: THERAPY  Noted: 10/27/2021  Today's Date: 2021  Patient seen for 4 sessions             Subjective   Em Montanez reports: always being a little more sore/painful following PT sessions.     Objective   No complaints of increased pain or discomfort.     See Exercise, Manual, and Modality Logs for complete treatment.       Assessment/Plan  Em still experiencing increased low back  pain, especially following PT sessions. Pt tolerated exercises well, no complaints of increased pain or discomfort. Pt would benefit from skilled PT to address Range of Motion  and Strength deficits, pain management and any concerns with ADLs.     Progress per Plan of Care           Timed:  Manual Therapy:    10     mins  08452;  Therapeutic Exercise:    20     mins  29435;     Neuromuscular Yanna:        mins  20836;    Therapeutic Activity:          mins  54656;     Gait Training:           mins  61496;    Aquatic Therapy:          mins  44957;       Untimed:  Electrical Stimulation:         mins  79289 ( );  Mechanical Traction:         mins  94674;       Timed Treatment:   30   mins   Total Treatment:     30   mins      Electronically signed:   Laureen Pacheco PTA  Physical Therapist Assistant  [facility] License #: [id number]

## 2021-12-01 DIAGNOSIS — Z12.31 ENCOUNTER FOR SCREENING MAMMOGRAM FOR MALIGNANT NEOPLASM OF BREAST: Primary | ICD-10-CM

## 2021-12-02 ENCOUNTER — TREATMENT (OUTPATIENT)
Dept: PHYSICAL THERAPY | Facility: CLINIC | Age: 46
End: 2021-12-02

## 2021-12-02 DIAGNOSIS — M54.50 LOW BACK PAIN, UNSPECIFIED BACK PAIN LATERALITY, UNSPECIFIED CHRONICITY, UNSPECIFIED WHETHER SCIATICA PRESENT: Primary | ICD-10-CM

## 2021-12-02 PROCEDURE — PTNOCHG PR CUSTOM PT NO CHARGE VISIT: Performed by: PHYSICAL THERAPIST

## 2021-12-02 NOTE — PROGRESS NOTES
Discharge Summary  Discharge Summary from Physical Therapy Report          Goals: Partially Met , HEP established      1. The patient complains of low back pain.  LTG 1: 12 weeks:  The patient will report a pain rating of 3/10 or better at worst in order to improve  tolerance to activities of daily living and improve sleep quality.  STATUS:  Not met  STG 1a: 6 weeks:  The patient will report a pain rating of 5/10 or better at its worst.  STATUS:  Not met  TREATMENT:  Therapeutic exercises, manual therapy, aquatic therapy, home exercise   instruction, and modalities as needed for pain to include:  electrical stimulation, moist heat, ice,   ultrasound, and diathermy.      2. The patient demonstrates weakness of the B hip.  LTG 2: 12 weeks:  The patient will demonstrate 5 /5 strength for B hip flexion, abduction,  and extension in order to improve hip stability.  STATUS:  Not met  STG 2a: 6 weeks:  The patient will demonstrate 4+ /5 strength for B hip flexion, abduction,  and extension.  STATUS: Not met  TREATMENT: Therapeutic exercises, manual therapy, aquatic therapy, home exercise instruction,  and modalities as needed for pain to include:  electrical stimulation, moist heat, ice, ultrasound, and   diathermy.        4. Mobility: Walking/Moving Around Functional Limitation                   LTG 4: 12 weeks:  The patient will demonstrate <8 % limitation by achieving a score of <4 on the YAIR.  STATUS:  Not met  TREATMENT:  Manual therapy, therapeutic exercise, home exercise instruction, and modalities as needed to include: moist heat, electrical stimulation, and ultrasound.    Discharge Plan: Continue with current home exercise program as instructed    Comments Pt does not feel like she is making progress in therapy. She is wanting to be discharged, she is planning to follow up with her physician for further medical care.    Date of Discharge 12/2/21        Isaias Pacheco PT  Physical Therapist    Electronically  signed 12/2/2021    [place] License: PT - [id number]

## 2021-12-07 ENCOUNTER — APPOINTMENT (OUTPATIENT)
Dept: CT IMAGING | Facility: HOSPITAL | Age: 46
End: 2021-12-07

## 2021-12-07 ENCOUNTER — HOSPITAL ENCOUNTER (EMERGENCY)
Facility: HOSPITAL | Age: 46
Discharge: HOME | End: 2021-12-07
Attending: EMERGENCY MEDICINE | Admitting: EMERGENCY MEDICINE

## 2021-12-07 VITALS
WEIGHT: 284.17 LBS | RESPIRATION RATE: 16 BRPM | HEART RATE: 85 BPM | SYSTOLIC BLOOD PRESSURE: 145 MMHG | OXYGEN SATURATION: 97 % | HEIGHT: 70 IN | TEMPERATURE: 98.3 F | BODY MASS INDEX: 40.68 KG/M2 | DIASTOLIC BLOOD PRESSURE: 105 MMHG

## 2021-12-07 DIAGNOSIS — N30.90 CYSTITIS: ICD-10-CM

## 2021-12-07 DIAGNOSIS — R10.32 LEFT LOWER QUADRANT ABDOMINAL PAIN: Primary | ICD-10-CM

## 2021-12-07 LAB
ALBUMIN SERPL-MCNC: 4 G/DL (ref 3.5–5.2)
ALBUMIN/GLOB SERPL: 1.4 G/DL
ALP SERPL-CCNC: 88 U/L (ref 39–117)
ALT SERPL W P-5'-P-CCNC: 12 U/L (ref 1–33)
ANION GAP SERPL CALCULATED.3IONS-SCNC: 8.4 MMOL/L (ref 5–15)
AST SERPL-CCNC: 16 U/L (ref 1–32)
BACTERIA UR QL AUTO: ABNORMAL /HPF
BASOPHILS # BLD AUTO: 0.06 10*3/MM3 (ref 0–0.2)
BASOPHILS NFR BLD AUTO: 0.5 % (ref 0–1.5)
BILIRUB SERPL-MCNC: 0.2 MG/DL (ref 0–1.2)
BILIRUB UR QL STRIP: NEGATIVE
BUN SERPL-MCNC: 11 MG/DL (ref 6–20)
BUN/CREAT SERPL: 12.4 (ref 7–25)
CALCIUM SPEC-SCNC: 9.5 MG/DL (ref 8.6–10.5)
CHLORIDE SERPL-SCNC: 104 MMOL/L (ref 98–107)
CLARITY UR: CLEAR
CO2 SERPL-SCNC: 26.6 MMOL/L (ref 22–29)
COLOR UR: YELLOW
CREAT SERPL-MCNC: 0.89 MG/DL (ref 0.57–1)
DEPRECATED RDW RBC AUTO: 43.1 FL (ref 37–54)
EOSINOPHIL # BLD AUTO: 0.13 10*3/MM3 (ref 0–0.4)
EOSINOPHIL NFR BLD AUTO: 1.2 % (ref 0.3–6.2)
ERYTHROCYTE [DISTWIDTH] IN BLOOD BY AUTOMATED COUNT: 13.3 % (ref 12.3–15.4)
GFR SERPL CREATININE-BSD FRML MDRD: 68 ML/MIN/1.73
GLOBULIN UR ELPH-MCNC: 2.8 GM/DL
GLUCOSE SERPL-MCNC: 103 MG/DL (ref 65–99)
GLUCOSE UR STRIP-MCNC: NEGATIVE MG/DL
HCG INTACT+B SERPL-ACNC: <0.5 MIU/ML
HCT VFR BLD AUTO: 41.8 % (ref 34–46.6)
HGB BLD-MCNC: 14.3 G/DL (ref 12–15.9)
HGB UR QL STRIP.AUTO: ABNORMAL
HOLD SPECIMEN: NORMAL
HOLD SPECIMEN: NORMAL
HYALINE CASTS UR QL AUTO: ABNORMAL /LPF
IMM GRANULOCYTES # BLD AUTO: 0.03 10*3/MM3 (ref 0–0.05)
IMM GRANULOCYTES NFR BLD AUTO: 0.3 % (ref 0–0.5)
KETONES UR QL STRIP: NEGATIVE
LEUKOCYTE ESTERASE UR QL STRIP.AUTO: NEGATIVE
LIPASE SERPL-CCNC: 21 U/L (ref 13–60)
LYMPHOCYTES # BLD AUTO: 2.97 10*3/MM3 (ref 0.7–3.1)
LYMPHOCYTES NFR BLD AUTO: 27 % (ref 19.6–45.3)
MCH RBC QN AUTO: 30.5 PG (ref 26.6–33)
MCHC RBC AUTO-ENTMCNC: 34.2 G/DL (ref 31.5–35.7)
MCV RBC AUTO: 89.1 FL (ref 79–97)
MONOCYTES # BLD AUTO: 0.47 10*3/MM3 (ref 0.1–0.9)
MONOCYTES NFR BLD AUTO: 4.3 % (ref 5–12)
NEUTROPHILS NFR BLD AUTO: 66.7 % (ref 42.7–76)
NEUTROPHILS NFR BLD AUTO: 7.35 10*3/MM3 (ref 1.7–7)
NITRITE UR QL STRIP: NEGATIVE
NRBC BLD AUTO-RTO: 0 /100 WBC (ref 0–0.2)
PH UR STRIP.AUTO: 5.5 [PH] (ref 5–8)
PLATELET # BLD AUTO: 259 10*3/MM3 (ref 140–450)
PMV BLD AUTO: 9.7 FL (ref 6–12)
POTASSIUM SERPL-SCNC: 3.9 MMOL/L (ref 3.5–5.2)
PROT SERPL-MCNC: 6.8 G/DL (ref 6–8.5)
PROT UR QL STRIP: NEGATIVE
RBC # BLD AUTO: 4.69 10*6/MM3 (ref 3.77–5.28)
RBC # UR STRIP: ABNORMAL /HPF
REF LAB TEST METHOD: ABNORMAL
SODIUM SERPL-SCNC: 139 MMOL/L (ref 136–145)
SP GR UR STRIP: 1.02 (ref 1–1.03)
SQUAMOUS #/AREA URNS HPF: ABNORMAL /HPF
UROBILINOGEN UR QL STRIP: ABNORMAL
WBC # UR STRIP: ABNORMAL /HPF
WBC NRBC COR # BLD: 11.01 10*3/MM3 (ref 3.4–10.8)
WHOLE BLOOD HOLD SPECIMEN: NORMAL
WHOLE BLOOD HOLD SPECIMEN: NORMAL

## 2021-12-07 PROCEDURE — 25010000002 KETOROLAC TROMETHAMINE PER 15 MG: Performed by: NURSE PRACTITIONER

## 2021-12-07 PROCEDURE — 83690 ASSAY OF LIPASE: CPT

## 2021-12-07 PROCEDURE — 36415 COLL VENOUS BLD VENIPUNCTURE: CPT

## 2021-12-07 PROCEDURE — 0 IOPAMIDOL PER 1 ML: Performed by: EMERGENCY MEDICINE

## 2021-12-07 PROCEDURE — 80053 COMPREHEN METABOLIC PANEL: CPT

## 2021-12-07 PROCEDURE — 81001 URINALYSIS AUTO W/SCOPE: CPT

## 2021-12-07 PROCEDURE — 84702 CHORIONIC GONADOTROPIN TEST: CPT

## 2021-12-07 PROCEDURE — 85025 COMPLETE CBC W/AUTO DIFF WBC: CPT

## 2021-12-07 PROCEDURE — 99283 EMERGENCY DEPT VISIT LOW MDM: CPT

## 2021-12-07 PROCEDURE — 25010000002 MORPHINE PER 10 MG: Performed by: EMERGENCY MEDICINE

## 2021-12-07 PROCEDURE — 74177 CT ABD & PELVIS W/CONTRAST: CPT

## 2021-12-07 PROCEDURE — 96375 TX/PRO/DX INJ NEW DRUG ADDON: CPT

## 2021-12-07 PROCEDURE — 96374 THER/PROPH/DIAG INJ IV PUSH: CPT

## 2021-12-07 RX ORDER — KETOROLAC TROMETHAMINE 15 MG/ML
15 INJECTION, SOLUTION INTRAMUSCULAR; INTRAVENOUS ONCE
Status: COMPLETED | OUTPATIENT
Start: 2021-12-07 | End: 2021-12-07

## 2021-12-07 RX ORDER — NITROFURANTOIN 25; 75 MG/1; MG/1
100 CAPSULE ORAL 2 TIMES DAILY
Qty: 14 CAPSULE | Refills: 0 | Status: SHIPPED | OUTPATIENT
Start: 2021-12-07 | End: 2022-01-12

## 2021-12-07 RX ORDER — SODIUM CHLORIDE 0.9 % (FLUSH) 0.9 %
10 SYRINGE (ML) INJECTION AS NEEDED
Status: DISCONTINUED | OUTPATIENT
Start: 2021-12-07 | End: 2021-12-08 | Stop reason: HOSPADM

## 2021-12-07 RX ORDER — DICYCLOMINE HCL 20 MG
20 TABLET ORAL EVERY 6 HOURS
Qty: 20 TABLET | Refills: 0 | Status: SHIPPED | OUTPATIENT
Start: 2021-12-07 | End: 2022-01-12

## 2021-12-07 RX ADMIN — MORPHINE SULFATE 4 MG: 4 INJECTION, SOLUTION INTRAMUSCULAR; INTRAVENOUS at 23:00

## 2021-12-07 RX ADMIN — KETOROLAC TROMETHAMINE 15 MG: 15 INJECTION, SOLUTION INTRAMUSCULAR; INTRAVENOUS at 21:35

## 2021-12-07 RX ADMIN — SODIUM CHLORIDE 1000 ML: 9 INJECTION, SOLUTION INTRAVENOUS at 21:36

## 2021-12-07 RX ADMIN — IOPAMIDOL 100 ML: 755 INJECTION, SOLUTION INTRAVENOUS at 22:15

## 2021-12-08 NOTE — ED PROVIDER NOTES
Subjective     History provided by:  Patient  Abdominal Pain  Pain location:  R flank and RLQ  Pain quality: pressure and sharp    Pain radiates to:  Does not radiate  Pain severity:  Severe  Onset quality:  Sudden  Duration:  1 day  Timing:  Constant  Progression:  Unchanged  Chronicity:  New  Context: not alcohol use, not awakening from sleep, not diet changes, not eating, not laxative use, not medication withdrawal, not previous surgeries, not recent illness, not recent sexual activity, not recent travel, not retching, not sick contacts, not suspicious food intake and not trauma    Relieved by:  Nothing  Worsened by:  Movement and palpation  Ineffective treatments:  None tried  Associated symptoms: no anorexia, no belching, no chest pain, no chills, no constipation, no cough, no diarrhea, no dysuria, no fatigue, no fever, no flatus, no hematemesis, no hematochezia, no hematuria, no melena, no nausea, no shortness of breath, no sore throat, no vaginal bleeding, no vaginal discharge and no vomiting        Review of Systems   Constitutional: Negative for chills, fatigue and fever.   HENT: Negative for congestion, ear pain and sore throat.    Eyes: Negative for pain.   Respiratory: Negative for cough, chest tightness and shortness of breath.    Cardiovascular: Negative for chest pain.   Gastrointestinal: Positive for abdominal pain. Negative for anorexia, constipation, diarrhea, flatus, hematemesis, hematochezia, melena, nausea and vomiting.   Genitourinary: Negative for dysuria, flank pain, hematuria, vaginal bleeding and vaginal discharge.   Musculoskeletal: Negative for joint swelling.   Skin: Negative for pallor.   Neurological: Negative for seizures and headaches.   All other systems reviewed and are negative.      Past Medical History:   Diagnosis Date   • Allergic rhinitis    • Anxiety    • Asthma    • Back pain 11/21/2017    LUMBAR SPINE X RAY LARGELY UNREMARKABLE. WILL RX PT AND MONITOR FOR IMPROVEMENT. IF  PAIN CONTINUES, WILL ORDER MRI   • Deafness    • Depression     PT DPING WELL ON EFFECOR. WILL CONTINUE. NM HAS BEEN ON XOLOFT IN THE PAST AND REPORTS INEFFECTIVENESS. PT WILL F/U IN APPROX 2-3 MONTHS. PT HAS BEEN ON ZOLOFT IN THE PAST AND REPORTS INEFFECTIVENESS. WILL RX EFFECOR AT THIS TIE. PT AWARE OF RISKS AND BENEFITS INCLUDING BLACK BOX WARNING INCLUDING INC SUICDALITY. PT TO F/U IN 2 WEEKS TO DISCUSS FURTHER   • Fatigue 11/21/2017    POSSIBLY RELATED TO DEPRESSION. WILL CHECK CBC, CMP, TSH, AND VIT D TODAY.   • Gall stones    • History of knee replacement, total, right 01/22/2018   • Insomnia 12/11/2017    DISCUSSED RISKS AND BENEFITS OF MEDICATIONS. ALSO DISCUSSED SLEEP HYGIENE METHODS INCLUDING AVOIDING SOURCES OF BLUE LIGHT, DEVELOPING ROUTINE, SLEEPING IN A DARK ROOM, AND USING BED FOR SLEEP ONLY. PT HAS TIRED MELATONIN WITH INTERMITTENT EFFECTIVENESS. PT WITHOUT REPORTED SYMPTOMS OF SLEEP APNEA AT THIS TIME.    • Knee pain 11/21/2017    PREVIOUS R KNEE REPLACEMENT 2/2 ARTHRITIS. PT WOULD LIKE TO DISCUSS OPTIONS WITH ORTHOPEDICS AGAIN   • Polycystic ovarian syndrome 11/21/2017    PT UNABLE TO TOLERATE METFORMIN 2/2 GI SIDE EFFECTS. WILL CHECK HBA1C TODAY. PT HAS DIFFICULTY WITH VARIABLE SUGARS INCLUDING HYPOGLYCEMIA WITH SYMPTOMS. LOW THRESHOLD TO CONSULT  ENDOCRINE FOR OPTIMAL CONTROL.    • Primary osteoarthritis of left knee 01/22/2018   • Vitamin D deficiency 12/11/2017    CURRENTLY ON VIT D SUPPLEMENTS. WILL RECHECK VIT D LEVEL IN 3 MONTHS.        Allergies   Allergen Reactions   • Prednisone Mental Status Change   • Tramadol GI Intolerance       Past Surgical History:   Procedure Laterality Date   • CHOLECYSTECTOMY     • GALLBLADDER SURGERY      EXCISION    • JOINT REPLACEMENT     • OTHER SURGICAL HISTORY      ARTIFICAL JOINTS/LIMBS    • OTHER SURGICAL HISTORY      JOINT SURGERY   • OTHER SURGICAL HISTORY      METAL IMPLANTS    • TOTAL KNEE ARTHROPLASTY         Family History   Problem Relation Age  of Onset   • Heart disease Mother    • Arthritis Mother    • Cancer Other        Social History     Socioeconomic History   • Marital status: Legally    Tobacco Use   • Smoking status: Current Every Day Smoker     Packs/day: 1.00     Years: 30.00     Pack years: 30.00   • Smokeless tobacco: Never Used   Vaping Use   • Vaping Use: Never used   Substance and Sexual Activity   • Alcohol use: Yes     Comment: CURRENT SOME DAY    • Drug use: Never           Objective   Physical Exam  Vitals and nursing note reviewed.   Constitutional:       General: She is not in acute distress.     Appearance: Normal appearance. She is not toxic-appearing.   HENT:      Head: Normocephalic and atraumatic.      Mouth/Throat:      Mouth: Mucous membranes are moist.   Eyes:      General: No scleral icterus.  Cardiovascular:      Rate and Rhythm: Normal rate and regular rhythm.      Pulses: Normal pulses.      Heart sounds: Normal heart sounds.   Pulmonary:      Effort: Pulmonary effort is normal. No respiratory distress.      Breath sounds: Normal breath sounds.   Abdominal:      General: Abdomen is flat.      Palpations: Abdomen is soft.      Tenderness: There is abdominal tenderness.   Musculoskeletal:         General: Normal range of motion.      Cervical back: Normal range of motion and neck supple.   Skin:     General: Skin is warm and dry.   Neurological:      Mental Status: She is alert and oriented to person, place, and time. Mental status is at baseline.         Procedures           ED Course                                                 MDM  Number of Diagnoses or Management Options  Cystitis: new and requires workup  Left lower quadrant abdominal pain: new and requires workup  Diagnosis management comments: The patient is resting comfortably and feels better, is alert and in no distress. Repeat examination is unremarkable and benign; in particular, there's no discomfort at McBurney's point and there is no pulsatile  mass. The history, exam, diagnostic testing, and current condition does not suggest acute appendicitis, bowel obstruction, acute cholecystitis, bowel perforation, major gastrointestinal bleeding, severe diverticulitis, abdominal aortic aneurysm, mesenteric ischemia, volvulus, sepsis, or other significant pathology that warrants further testing, continued ED treatment, admission, for surgical evaluation at this point. The vital signs have been stable. The patient does not have uncontrollable pain, intractable vomiting, or other significant symptoms. The patient's condition is stable and appropriate for discharge from the emergency department.       Amount and/or Complexity of Data Reviewed  Clinical lab tests: reviewed and ordered  Tests in the radiology section of CPT®: reviewed and ordered    Risk of Complications, Morbidity, and/or Mortality  Presenting problems: low  Diagnostic procedures: low  Management options: low    Patient Progress  Patient progress: stable      Final diagnoses:   Left lower quadrant abdominal pain   Cystitis       ED Disposition  ED Disposition     ED Disposition Condition Comment    Discharge Stable           Ricky Velasquez Jr., MD  [address] [facility]  [address]  [facility] [id number] [phone]    In 3 days  As needed         Medication List      New Prescriptions    dicyclomine 20 MG tablet  Commonly known as: BENTYL  Take 1 tablet by mouth Every 6 (Six) Hours.     nitrofurantoin (macrocrystal-monohydrate) 100 MG capsule  Commonly known as: MACROBID  Take 1 capsule by mouth 2 (Two) Times a Day.           Where to Get Your Medications      These medications were sent to [facility] [address] [place] [address] - [address] AT [facility] [address] - [phone] [facility] [phone] [place] [address]    Phone: [phone]   · dicyclomine 20 MG tablet  · nitrofurantoin (macrocrystal-monohydrate) 100 MG capsule          Javan Brady,  APRN  12/08/21 0026

## 2021-12-09 ENCOUNTER — OFFICE VISIT (OUTPATIENT)
Dept: INTERNAL MEDICINE | Facility: CLINIC | Age: 46
End: 2021-12-09

## 2021-12-09 VITALS
HEIGHT: 70 IN | TEMPERATURE: 97.8 F | DIASTOLIC BLOOD PRESSURE: 85 MMHG | WEIGHT: 289.25 LBS | HEART RATE: 81 BPM | SYSTOLIC BLOOD PRESSURE: 127 MMHG | BODY MASS INDEX: 41.41 KG/M2 | OXYGEN SATURATION: 98 %

## 2021-12-09 DIAGNOSIS — N30.01 ACUTE CYSTITIS WITH HEMATURIA: ICD-10-CM

## 2021-12-09 DIAGNOSIS — R10.2 SUPRAPUBIC PAIN, ACUTE: Primary | ICD-10-CM

## 2021-12-09 LAB
BILIRUB BLD-MCNC: NEGATIVE MG/DL
CLARITY, POC: CLEAR
COLOR UR: YELLOW
EXPIRATION DATE: ABNORMAL
GLUCOSE UR STRIP-MCNC: NEGATIVE MG/DL
KETONES UR QL: NEGATIVE
LEUKOCYTE EST, POC: NEGATIVE
Lab: ABNORMAL
NITRITE UR-MCNC: NEGATIVE MG/ML
PH UR: 6 [PH] (ref 5–8)
PROT UR STRIP-MCNC: NEGATIVE MG/DL
RBC # UR STRIP: ABNORMAL /UL
SP GR UR: 1.02 (ref 1–1.03)
UROBILINOGEN UR QL: NORMAL

## 2021-12-09 PROCEDURE — 87086 URINE CULTURE/COLONY COUNT: CPT | Performed by: NURSE PRACTITIONER

## 2021-12-09 PROCEDURE — 99214 OFFICE O/P EST MOD 30 MIN: CPT | Performed by: NURSE PRACTITIONER

## 2021-12-09 PROCEDURE — 81001 URINALYSIS AUTO W/SCOPE: CPT | Performed by: NURSE PRACTITIONER

## 2021-12-09 PROCEDURE — 96372 THER/PROPH/DIAG INJ SC/IM: CPT | Performed by: NURSE PRACTITIONER

## 2021-12-09 RX ORDER — KETOROLAC TROMETHAMINE 30 MG/ML
60 INJECTION, SOLUTION INTRAMUSCULAR; INTRAVENOUS ONCE
Status: COMPLETED | OUTPATIENT
Start: 2021-12-09 | End: 2021-12-09

## 2021-12-09 RX ADMIN — KETOROLAC TROMETHAMINE 60 MG: 30 INJECTION, SOLUTION INTRAMUSCULAR; INTRAVENOUS at 14:24

## 2021-12-09 NOTE — PROGRESS NOTES
"Chief Complaint  Cystitis (follow up, having bad abdominal cramps currently)    Subjective          Em Montanez presents to [facility]  Abdominal Cramping  This is a new problem. Episode onset: 3 days - was seen in ED and dx with cystitis; placed on bentyl and macrobid  The onset quality is gradual. The problem occurs intermittently. The most recent episode lasted 3 days. The problem has been unchanged. The pain is located in the suprapubic region. The abdominal pain radiates to the suprapubic region. Pertinent negatives include no anorexia, arthralgias, belching, constipation, diarrhea, dysuria, fever, flatus, headaches, hematochezia, hematuria, melena, myalgias, nausea, vomiting or weight loss. Nothing aggravates the pain. She has tried nothing for the symptoms.       Current Outpatient Medications   Medication Instructions   • cetirizine (ZYRTEC) 10 mg, Oral, Daily   • cyclobenzaprine (FLEXERIL) 10 mg, Oral, 3 Times Daily PRN   • diclofenac (VOLTAREN) 50 mg, Oral, 3 Times Daily   • dicyclomine (BENTYL) 20 mg, Oral, Every 6 Hours   • metFORMIN ER (GLUCOPHAGE-XR) 500 MG 24 hr tablet metformin  mg tablet,extended release 24 hr   • nitrofurantoin (macrocrystal-monohydrate) (MACROBID) 100 mg, Oral, 2 Times Daily   • traZODone (DESYREL) 50 mg, Oral, Nightly   • valACYclovir (VALTREX) 1,000 mg, Oral, Daily       The following portions of the patient's history were reviewed and updated as appropriate: allergies, current medications, past family history, past medical history, past social history, past surgical history, and problem list.    Objective   Vital Signs:   /85   Pulse 81   Temp 97.8 °F (36.6 °C) (Temporal)   Ht 177.8 cm (70\")   Wt 131 kg (289 lb 4 oz)   SpO2 98%   BMI 41.50 kg/m²     Wt Readings from Last 3 Encounters:   12/09/21 131 kg (289 lb 4 oz)   12/07/21 129 kg (284 lb 2.8 oz)   12/07/21 132 kg (292 lb)     BP Readings from Last 3 " Encounters:   12/09/21 127/85   12/07/21 (!) 145/105   12/07/21 (!) 158/106     Physical Exam  Vitals and nursing note reviewed.   Constitutional:       General: She is not in acute distress.     Appearance: Normal appearance. She is not ill-appearing.   Eyes:      Extraocular Movements: Extraocular movements intact.      Conjunctiva/sclera: Conjunctivae normal.   Cardiovascular:      Rate and Rhythm: Normal rate and regular rhythm.      Pulses: Normal pulses.      Heart sounds: Normal heart sounds.   Pulmonary:      Effort: Pulmonary effort is normal.      Breath sounds: Normal breath sounds.   Abdominal:      General: Bowel sounds are normal.      Palpations: Abdomen is soft.      Tenderness: There is abdominal tenderness (suprapubic pain).   Skin:     General: Skin is warm and dry.      Capillary Refill: Capillary refill takes less than 2 seconds.   Neurological:      General: No focal deficit present.      Mental Status: She is alert and oriented to person, place, and time. Mental status is at baseline.   Psychiatric:         Mood and Affect: Mood normal.         Behavior: Behavior normal.         Thought Content: Thought content normal.         Judgment: Judgment normal.            Result Review :   The following data was reviewed by: JOSEMANUEL Delgado on 12/09/2021:  Common labs    Common Labsle 5/27/21 5/27/21 8/12/21 8/12/21 8/12/21 8/12/21 12/7/21 12/7/21    1442 1442 1414 1414 1414 1414 1748 1748   Glucose  103 (A)   91   103 (A)   BUN  12   11   11   Creatinine  0.75   0.92   0.89   eGFR Non  Am     65   68   Sodium  136   138   139   Potassium  4.1   4.7   3.9   Chloride  100   101   104   Calcium  9.6   9.5   9.5   Albumin  3.9   4.30   4.00   Total Bilirubin  0.30   0.2   0.2   Alkaline Phosphatase  108 (A)   92   88   AST (SGOT)  21   26   16   ALT (SGPT)  13   20   12   WBC 10.11  10.20    11.01 (A)    Hemoglobin 14.2  15.4    14.3    Hematocrit 41.4  45.5    41.8    Platelets 327  298     259    Total Cholesterol    194       Triglycerides    110       HDL Cholesterol    56       LDL Cholesterol     118 (A)       Hemoglobin A1C      5.40     (A) Abnormal value       Comments are available for some flowsheets but are not being displayed.             Data reviewed: ED visit notes    CT-scan of the abdomen from ED     Lab Results   Component Value Date    COVID19 NOT DETECTED 03/31/2021    INR 0.86 (L) 03/30/2021    BILIRUBINUR Negative 12/09/2021     .    Procedures     Administrations This Visit     ketorolac (TORADOL) injection 60 mg     Admin Date  12/09/2021 Action  Given Dose  60 mg Route  Intramuscular Administered By  Cecelia Burk                   Assessment and Plan    Diagnoses and all orders for this visit:    1. Suprapubic pain, acute (Primary)  -     US Non-ob Transvaginal; Future  -     ketorolac (TORADOL) injection 60 mg  -     diclofenac (VOLTAREN) 50 MG EC tablet; Take 1 tablet by mouth 3 (Three) Times a Day.  Dispense: 40 tablet; Refill: 0    2. Acute cystitis with hematuria  -     POC Urinalysis Dipstick, Automated  -     Urine Culture - Urine, Urine, Clean Catch  -     diclofenac (VOLTAREN) 50 MG EC tablet; Take 1 tablet by mouth 3 (Three) Times a Day.  Dispense: 40 tablet; Refill: 0  -     Urinalysis With Microscopic - Urine, Clean Catch        There are no discontinued medications.   Follow Up   Return if symptoms worsen or fail to improve.  Patient was given instructions and counseling regarding her condition or for health maintenance advice. Please see specific information pulled into the AVS if appropriate.

## 2021-12-10 LAB
BACTERIA UR QL AUTO: ABNORMAL /HPF
BILIRUB UR QL STRIP: NEGATIVE
CLARITY UR: CLEAR
COLOR UR: YELLOW
GLUCOSE UR STRIP-MCNC: NEGATIVE MG/DL
HGB UR QL STRIP.AUTO: ABNORMAL
HYALINE CASTS UR QL AUTO: ABNORMAL /LPF
KETONES UR QL STRIP: NEGATIVE
LEUKOCYTE ESTERASE UR QL STRIP.AUTO: NEGATIVE
NITRITE UR QL STRIP: NEGATIVE
PH UR STRIP.AUTO: 6 [PH] (ref 5–8)
PROT UR QL STRIP: NEGATIVE
RBC # UR STRIP: ABNORMAL /HPF
REF LAB TEST METHOD: ABNORMAL
SP GR UR STRIP: 1.01 (ref 1–1.03)
SQUAMOUS #/AREA URNS HPF: ABNORMAL /HPF
UROBILINOGEN UR QL STRIP: ABNORMAL
WBC # UR STRIP: ABNORMAL /HPF

## 2021-12-12 LAB
BACTERIA UR CULT: NO GROWTH
BACTERIA UR CULT: NORMAL

## 2021-12-14 ENCOUNTER — TELEPHONE (OUTPATIENT)
Dept: INTERNAL MEDICINE | Facility: CLINIC | Age: 46
End: 2021-12-14

## 2021-12-14 NOTE — TELEPHONE ENCOUNTER
----- Message from JOSEMANUEL Delgado sent at 12/14/2021  7:46 AM EST -----  Urine culture negative.

## 2021-12-16 ENCOUNTER — CLINICAL SUPPORT (OUTPATIENT)
Dept: GASTROENTEROLOGY | Facility: CLINIC | Age: 46
End: 2021-12-16

## 2021-12-16 ENCOUNTER — PREP FOR SURGERY (OUTPATIENT)
Dept: OTHER | Facility: HOSPITAL | Age: 46
End: 2021-12-16

## 2021-12-16 DIAGNOSIS — Z12.11 COLON CANCER SCREENING: Primary | ICD-10-CM

## 2021-12-16 NOTE — PROGRESS NOTES
Em Leahy Lisseth     REASON FOR CALL-COLONOSCOPY, COLON SCREENING, FIRST TIME    SENT IN [facility]  DOS-02/16/2022  Past Medical History:   Diagnosis Date   • Allergic rhinitis    • Anxiety    • Asthma    • Back pain 11/21/2017    LUMBAR SPINE X RAY LARGELY UNREMARKABLE. WILL RX PT AND MONITOR FOR IMPROVEMENT. IF PAIN CONTINUES, WILL ORDER MRI   • Deafness    • Depression     PT DPING WELL ON EFFECOR. WILL CONTINUE. [place] HAS BEEN ON XOLOFT IN THE PAST AND REPORTS INEFFECTIVENESS. PT WILL F/U IN APPROX 2-3 MONTHS. PT HAS BEEN ON ZOLOFT IN THE PAST AND REPORTS INEFFECTIVENESS. WILL RX EFFECOR AT THIS TIE. PT AWARE OF RISKS AND BENEFITS INCLUDING BLACK BOX WARNING INCLUDING INC SUICDALITY. PT TO F/U IN 2 WEEKS TO DISCUSS FURTHER   • Fatigue 11/21/2017    POSSIBLY RELATED TO DEPRESSION. WILL CHECK CBC, CMP, TSH, AND VIT D TODAY.   • Gall stones    • History of knee replacement, total, right 01/22/2018   • Insomnia 12/11/2017    DISCUSSED RISKS AND BENEFITS OF MEDICATIONS. ALSO DISCUSSED SLEEP HYGIENE METHODS INCLUDING AVOIDING SOURCES OF BLUE LIGHT, DEVELOPING ROUTINE, SLEEPING IN A DARK ROOM, AND USING BED FOR SLEEP ONLY. PT HAS TIRED MELATONIN WITH INTERMITTENT EFFECTIVENESS. PT WITHOUT REPORTED SYMPTOMS OF SLEEP APNEA AT THIS TIME.    • Knee pain 11/21/2017    PREVIOUS R KNEE REPLACEMENT 2/2 ARTHRITIS. PT WOULD LIKE TO DISCUSS OPTIONS WITH ORTHOPEDICS AGAIN   • Polycystic ovarian syndrome 11/21/2017    PT UNABLE TO TOLERATE METFORMIN 2/2 GI SIDE EFFECTS. WILL CHECK HBA1C TODAY. PT HAS DIFFICULTY WITH VARIABLE SUGARS INCLUDING HYPOGLYCEMIA WITH SYMPTOMS. LOW THRESHOLD TO CONSULT  ENDOCRINE FOR OPTIMAL CONTROL.    • Primary osteoarthritis of left knee 01/22/2018   • Vitamin D deficiency 12/11/2017    CURRENTLY ON VIT D SUPPLEMENTS. WILL RECHECK VIT D LEVEL IN 3 MONTHS.      Allergies   Allergen Reactions   • Prednisone Mental Status Change   • Tramadol GI Intolerance     Past Surgical History:   Procedure  Laterality Date   • CHOLECYSTECTOMY     • GALLBLADDER SURGERY      EXCISION    • JOINT REPLACEMENT     • OTHER SURGICAL HISTORY      ARTIFICAL JOINTS/LIMBS    • OTHER SURGICAL HISTORY      JOINT SURGERY   • OTHER SURGICAL HISTORY      METAL IMPLANTS    • TOTAL KNEE ARTHROPLASTY       Social History     Socioeconomic History   • Marital status: Legally    Tobacco Use   • Smoking status: Current Every Day Smoker     Packs/day: 1.00     Years: 30.00     Pack years: 30.00   • Smokeless tobacco: Never Used   Vaping Use   • Vaping Use: Never used   Substance and Sexual Activity   • Alcohol use: Yes     Comment: CURRENT SOME DAY    • Drug use: Never   • Sexual activity: Defer     Family History   Problem Relation Age of Onset   • Heart disease Mother    • Arthritis Mother    • Cancer Other        Current Outpatient Medications:   •  cetirizine (zyrTEC) 10 MG tablet, Take 1 tablet by mouth Daily., Disp: 90 tablet, Rfl: 3  •  cyclobenzaprine (FLEXERIL) 10 MG tablet, Take 1 tablet by mouth 3 (Three) Times a Day As Needed for Muscle Spasms., Disp: 90 tablet, Rfl: 0  •  diclofenac (VOLTAREN) 50 MG EC tablet, Take 1 tablet by mouth 3 (Three) Times a Day., Disp: 40 tablet, Rfl: 0  •  metFORMIN ER (GLUCOPHAGE-XR) 500 MG 24 hr tablet, metformin  mg tablet,extended release 24 hr, Disp: , Rfl:   •  traZODone (DESYREL) 50 MG tablet, Take 1 tablet by mouth Every Night., Disp: 90 tablet, Rfl: 1  •  valACYclovir (Valtrex) 1000 MG tablet, Take 1 tablet by mouth Daily., Disp: 10 tablet, Rfl: 2  •  dicyclomine (BENTYL) 20 MG tablet, Take 1 tablet by mouth Every 6 (Six) Hours., Disp: 20 tablet, Rfl: 0  •  nitrofurantoin, macrocrystal-monohydrate, (MACROBID) 100 MG capsule, Take 1 capsule by mouth 2 (Two) Times a Day., Disp: 14 capsule, Rfl: 0

## 2021-12-17 ENCOUNTER — HOSPITAL ENCOUNTER (OUTPATIENT)
Dept: ULTRASOUND IMAGING | Facility: HOSPITAL | Age: 46
Discharge: HOME | End: 2021-12-17
Admitting: NURSE PRACTITIONER

## 2021-12-17 DIAGNOSIS — R10.2 SUPRAPUBIC PAIN, ACUTE: ICD-10-CM

## 2021-12-17 PROCEDURE — 76830 TRANSVAGINAL US NON-OB: CPT

## 2021-12-22 ENCOUNTER — TELEPHONE (OUTPATIENT)
Dept: INTERNAL MEDICINE | Facility: CLINIC | Age: 46
End: 2021-12-22

## 2021-12-22 NOTE — TELEPHONE ENCOUNTER
PT VERIFIED     CONTACTED PT PER PROVIDER'S INSTRUCTIONS    PT(PATIENT) STATES SHE HAS AN APPT IN  WITH [facility]

## 2021-12-22 NOTE — TELEPHONE ENCOUNTER
----- Message from JOSEMANUEL Delgado sent at 12/21/2021  4:46 PM EST -----  Cyst noted to left ovary. Since patient was having such significant pain it may be worth following up with GYN. Please let me know if patient needs referral.

## 2022-01-05 ENCOUNTER — HOSPITAL ENCOUNTER (OUTPATIENT)
Dept: MAMMOGRAPHY | Facility: HOSPITAL | Age: 47
Discharge: HOME | End: 2022-01-05

## 2022-01-05 ENCOUNTER — HOSPITAL ENCOUNTER (OUTPATIENT)
Dept: ULTRASOUND IMAGING | Facility: HOSPITAL | Age: 47
Discharge: HOME | End: 2022-01-05

## 2022-01-05 DIAGNOSIS — R92.8 ABNORMAL MAMMOGRAM: ICD-10-CM

## 2022-01-05 DIAGNOSIS — Z12.31 ENCOUNTER FOR SCREENING MAMMOGRAM FOR MALIGNANT NEOPLASM OF BREAST: ICD-10-CM

## 2022-01-05 PROCEDURE — G0279 TOMOSYNTHESIS, MAMMO: HCPCS

## 2022-01-05 PROCEDURE — 77066 DX MAMMO INCL CAD BI: CPT

## 2022-01-11 ENCOUNTER — OFFICE VISIT (OUTPATIENT)
Dept: ORTHOPEDIC SURGERY | Facility: CLINIC | Age: 47
End: 2022-01-11

## 2022-01-11 VITALS — BODY MASS INDEX: 41.37 KG/M2 | HEART RATE: 88 BPM | HEIGHT: 70 IN | WEIGHT: 289 LBS | OXYGEN SATURATION: 97 %

## 2022-01-11 DIAGNOSIS — M25.562 LEFT KNEE PAIN, UNSPECIFIED CHRONICITY: ICD-10-CM

## 2022-01-11 DIAGNOSIS — M79.671 RIGHT FOOT PAIN: Primary | ICD-10-CM

## 2022-01-11 DIAGNOSIS — M76.61 ACHILLES TENDINITIS OF RIGHT LOWER EXTREMITY: ICD-10-CM

## 2022-01-11 DIAGNOSIS — Z47.1 AFTERCARE FOLLOWING LEFT KNEE JOINT REPLACEMENT SURGERY: ICD-10-CM

## 2022-01-11 DIAGNOSIS — Z96.652 AFTERCARE FOLLOWING LEFT KNEE JOINT REPLACEMENT SURGERY: ICD-10-CM

## 2022-01-11 PROCEDURE — 99213 OFFICE O/P EST LOW 20 MIN: CPT | Performed by: ORTHOPAEDIC SURGERY

## 2022-01-11 RX ORDER — NABUMETONE 750 MG/1
750 TABLET, FILM COATED ORAL 2 TIMES DAILY
Qty: 60 TABLET | Refills: 1 | Status: SHIPPED | OUTPATIENT
Start: 2022-01-11 | End: 2022-01-12

## 2022-01-11 NOTE — PROGRESS NOTES
"Chief Complaint  Follow-up and Pain of the Left Knee and Initial Evaluation and Pain of the Right Foot     Subjective      Em Montanez presents to [facility] for a follow-up of left knee and right foot. Patient is s/p left TKA performed by Dr. Collins on 04/05/21. Patient complaints of increased numbness in the left knee. She reports numbness on the lateral knee. She also states her knee feels like it wants to lock up on her. She hasn't had any recent injury to the knee. She denies fevers and chills.   She also complains of right foot pain. She states pain about the heel and achilles. She noticed swelling about this area as well. She states pain with standing up and walking.     Allergies   Allergen Reactions   • Prednisone Mental Status Change   • Tramadol GI Intolerance        Social History     Socioeconomic History   • Marital status: Legally    Tobacco Use   • Smoking status: Current Every Day Smoker     Packs/day: 1.00     Years: 30.00     Pack years: 30.00   • Smokeless tobacco: Never Used   Vaping Use   • Vaping Use: Never used   Substance and Sexual Activity   • Alcohol use: Yes     Comment: CURRENT SOME DAY    • Drug use: Never   • Sexual activity: Defer        Review of Systems     Objective   Vital Signs:   Pulse 88   Ht 177.8 cm (70\")   Wt 131 kg (289 lb)   SpO2 97%   BMI 41.47 kg/m²       Physical Exam  Constitutional:       Appearance: Normal appearance. Patient is well-developed and normal weight.   HENT:      Head: Normocephalic.      Right Ear: Hearing and external ear normal.      Left Ear: Hearing and external ear normal.      Nose: Nose normal.   Eyes:      Conjunctiva/sclera: Conjunctivae normal.   Cardiovascular:      Rate and Rhythm: Normal rate.   Pulmonary:      Effort: Pulmonary effort is normal.      Breath sounds: No wheezing or rales.   Abdominal:      Palpations: Abdomen is soft.      Tenderness: There is no abdominal tenderness. "   Musculoskeletal:      Cervical back: Normal range of motion.   Skin:     Findings: No rash.   Neurological:      Mental Status: Patient is alert and oriented to person, place, and time.   Psychiatric:         Mood and Affect: Mood and affect normal.         Judgment: Judgment normal.       Ortho Exam      LEFT KNEE: Scars well healed. No swelling, skin discoloration or atrophy. Calf supple, non-tender, no signs of DVT. Dorsal Pedal Pulse 2+, posterior tibialis pulse 2+. Full extension. Flexion to 120 degrees. Limping gait. Stable to varus/valgus stress. Well tracking patella.     RIGHT FOOT: Tenderness about the heel and achilles. Achilles intact. Calf supple, non-tender, no signs of DVT. Dorsal Pedal Pulse 2+, posterior tibialis pulse 2+. Good strength to hamstrings, quadriceps, dorsiflexors and plantar flexors. Sensation grossly intact. Neurovascular intact.  Skin intact. No swelling, skin discoloration or atrophy. Pain with inversion and eversion.       Procedures      Imaging Results (Most Recent)     Procedure Component Value Units Date/Time    XR Foot 2 View Right [[id number]] Resulted: 01/11/22 1141     Updated: 01/11/22 1142    Narrative:      X-Ray Report:  Right foot X-Ray  Indication: Evaluation of right foot pain   AP and Lateral view(s)  Findings: There is no acute osseous abnormalities.   Prior studies available for comparison: no        XR Knee 3 View Left [[id number]] Resulted: 01/11/22 1141     Updated: 01/11/22 1141    Narrative:      X-Ray Report:  Left knee(s) X-Ray  Indication: Evaluation of left knee pain   AP, Lateral and Sunrise view(s)  Findings: Intact left total knee arthroplasty with no evidence of wearing   or loosening.   Prior studies available for comparison: yes                 Result Review :     X-Ray Report:  Left knee(s) X-Ray  Indication: Evaluation of left knee pain   AP, Lateral and Sunrise view(s)  Findings: Intact left total knee arthroplasty with no evidence of wearing or  loosening.   Prior studies available for comparison: yes     X-Ray Report:  Right foot X-Ray  Indication: Evaluation of right foot pain   AP and Lateral view(s)  Findings: There is no acute osseous abnormalities.   Prior studies available for comparison: no       Assessment and Plan     DX: Right Achilles tendinitis   Aftercare following left total knee arthroplasty     Discussed treatment plans such as therapy to strengthen quadriceps of left leg and stretching out the the ankle. She agrees with therapy, a prescription provided.     Call or return if worsening symptoms.    Follow Up     4-6 weeks.       Patient was given instructions and counseling regarding her condition or for health maintenance advice. Please see specific information pulled into the AVS if appropriate.     Scribed for Kat Collins MD by Alexa Day.  01/11/22   09:15 EST        I have personally performed the services described in this document as scribed by the above individual and it is both accurate and complete. Kat Collins MD 01/11/22

## 2022-01-12 ENCOUNTER — OFFICE VISIT (OUTPATIENT)
Dept: OBSTETRICS AND GYNECOLOGY | Facility: CLINIC | Age: 47
End: 2022-01-12

## 2022-01-12 VITALS
DIASTOLIC BLOOD PRESSURE: 80 MMHG | WEIGHT: 285 LBS | HEART RATE: 92 BPM | SYSTOLIC BLOOD PRESSURE: 117 MMHG | BODY MASS INDEX: 39.9 KG/M2 | HEIGHT: 71 IN

## 2022-01-12 DIAGNOSIS — R10.2 PELVIC PAIN: ICD-10-CM

## 2022-01-12 DIAGNOSIS — R31.21 ASYMPTOMATIC MICROSCOPIC HEMATURIA: Primary | ICD-10-CM

## 2022-01-12 PROCEDURE — 99213 OFFICE O/P EST LOW 20 MIN: CPT | Performed by: STUDENT IN AN ORGANIZED HEALTH CARE EDUCATION/TRAINING PROGRAM

## 2022-01-12 NOTE — PROGRESS NOTES
GYN Problem/Follow Up Visit    Chief Complaint   Patient presents with   • Follow-up     ovarian cysts on left side/ pain radiates to the right side           HPI  Em Montanez is a 46 y.o. female, , who presents for abdominal pain that has been going on for the last several months. She reports that it comes and goes. It lasts a couple of days and it is a sharp achy pain. She reports that it feels like period cramps x a million. She is not having any periods secondary to an ablation. She says it sometimes comes every 6-8 weeks apart.  She reports trying to laying down, heat and Tylenol. She denies any fevers, chills, nausea or vomiting.   She denies any constipation or diarrhea.  She is smoking approximately a pack per day. Essure coils in placed 14 years ago.  This is becoming very bothersome to her as she is found herself going to the ED multiple times for what turns out to be nonspecific pain.    Additional OB/GYN History   No LMP recorded (lmp unknown). Patient has had an ablation.  Current contraception: contraceptive methods: Essure coils  Desires to: continue contraception  Allergies : Prednisone and Tramadol     The additional following portions of the patient's history were reviewed and updated as appropriate: allergies, current medications, past family history, past medical history, past social history, past surgical history and problem list.    Review of Systems   Constitutional: Negative for fatigue and fever.   Respiratory: Negative for cough and shortness of breath.    Cardiovascular: Negative for chest pain.   Gastrointestinal: Positive for abdominal pain. Negative for abdominal distention.   Genitourinary: Positive for pelvic pain. Negative for difficulty urinating, dysuria, hematuria, urinary incontinence, vaginal bleeding, vaginal discharge and vaginal pain.       I have reviewed and agree with the HPI, ROS, and historical information as entered above. Chito Cook MD    Objective  "  /80   Pulse 92   Ht 179.1 cm (70.5\")   Wt 129 kg (285 lb)   LMP  (LMP Unknown)   Breastfeeding No   BMI 40.32 kg/m²     Physical Exam  Vitals and nursing note reviewed.   Constitutional:       General: She is not in acute distress.     Appearance: Normal appearance. She is not toxic-appearing.   HENT:      Head: Normocephalic and atraumatic.   Eyes:      Extraocular Movements: Extraocular movements intact.      Conjunctiva/sclera: Conjunctivae normal.   Cardiovascular:      Pulses: Normal pulses.   Pulmonary:      Effort: Pulmonary effort is normal.   Abdominal:      General: There is no distension.      Palpations: Abdomen is soft.      Tenderness: There is no abdominal tenderness.   Genitourinary:     General: Normal vulva.      Comments: Bimanual examination with no adnexal masses appreciated, uterus retroverted not enlarged.  Unable to elicit discomfort that she describes.  Musculoskeletal:      Cervical back: Normal range of motion.   Skin:     General: Skin is warm and dry.   Neurological:      Mental Status: She is alert and oriented to person, place, and time.   Psychiatric:         Mood and Affect: Mood normal.         Behavior: Behavior normal.         Thought Content: Thought content normal.            Assessment and Plan  Em Montanez is a 46 y.o.  who presents for evaluation of a cute onset of pelvic pain over the last several months.  Review of imaging findings as well as transvaginal ultrasound with small left ovarian cyst 2.4 centimeters in size with no signs of abnormalities.  Review of patient's labs with blood in her urine.  Discussed with patient the potential differential diagnosis including pelvic origin including PAT syndrome, pain related to Essure coils, possible rupture of cyst.  Also discussed nongonococcal origins including potential of kidney stone, given that there is blood in her urine as well as potential for bladder cancer given her history of smoking.  " Discussed with patient surgical options versus conservative options of heat, Tylenol, and ibuprofen.  We will have patient perform a urinalysis to see if there is still blood in her urine.  If this is the case we will refer to urology as this may be urological concern more so than pelvic.  Patient to return to office in 6 to 8 weeks and if pain is not resolving we will reconvene about possible hysterectomy for chronic pelvic pain.  She understands that surgery may not help her discomfort may make it worse, as well as the risk of bleeding, infection, risk of injury surrounding structures, blood clots, and potential death.     Diagnoses and all orders for this visit:    1. Asymptomatic microscopic hematuria (Primary)  -     Urinalysis With Microscopic - Urine, Clean Catch; Future    2. Pelvic pain        Counseling:  • Weight loss/Nutrition/Wt bearing exercise/Kegels/Core  • Smoking cessation encouraged      She understands the importance of having the above orders performed in a timely fashion.  The risks of not performing them include, but are not limited to, cancer and/or subsequent increase in morbidity and/or mortality.  She is encouraged to review her results online and/or contact or office if she has questions.     Follow Up:  Return in about 6 weeks (around 2/23/2022) for Next scheduled follow up.      Chito Cook MD  01/12/2022

## 2022-01-14 ENCOUNTER — TREATMENT (OUTPATIENT)
Dept: PHYSICAL THERAPY | Facility: CLINIC | Age: 47
End: 2022-01-14

## 2022-01-14 DIAGNOSIS — R29.898 WEAKNESS OF BOTH HIPS: ICD-10-CM

## 2022-01-14 DIAGNOSIS — R29.898 ANKLE WEAKNESS: ICD-10-CM

## 2022-01-14 DIAGNOSIS — R26.89 IMBALANCE: ICD-10-CM

## 2022-01-14 DIAGNOSIS — R26.9 GAIT DISTURBANCE: ICD-10-CM

## 2022-01-14 DIAGNOSIS — M76.60 ACHILLES TENDON PAIN: Primary | ICD-10-CM

## 2022-01-14 PROCEDURE — 97161 PT EVAL LOW COMPLEX 20 MIN: CPT | Performed by: PHYSICAL THERAPIST

## 2022-01-14 NOTE — PROGRESS NOTES
"Physical Therapy Initial Evaluation and Plan of Care    Patient: Em Montanez   : 1975  Diagnosis/ICD-10 Code:  Achilles tendon pain [M76.60]  Referring practitioner: Kat Collins MD  Date of Initial Visit: 2022  Today's Date: 2022  Patient seen for 1 sessions           Subjective Questionnaire: LEFS: 58/80      Subjective Evaluation    History of Present Illness  Mechanism of injury: Pt had a L TKA in April of last year and started having increased L knee pain about two weeks ago.  She states walking and stairs cause an uncomfortable sensation.  She denies pain in her knee.  Pt had x-rays of knee at ortho which looked good and he wanted to her to come to PT to strengthen her leg a little more.      Pt reports her R achilles started bothering her over the last couple of weeks.  She reports increased pain with walking and standing.  She got new tennis shoes but that hasn't changed her pain.  She reports it is tender to the touch.      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    Medical history: L TKA (2021),    Pain  Current pain ratin  At best pain rating: 3  At worst pain rating: 10  Location: R achilles; pt denies knee pain, just \"uncomfortable\"  Quality: sharp and dull ache  Aggravating factors: movement, stairs, standing, ambulation and squatting    Diagnostic Tests  X-ray: normal (L knee)    Treatments  Previous treatment: physical therapy  Patient Goals  Patient goals for therapy: decreased pain, improved balance, increased strength and independence with ADLs/IADLs             Objective          Static Posture     Ankle/Foot   Ankle/Foot (Right): Calcaneovarus.     Palpation     Additional Palpation Details  Tender at L distal medial hamstrings and distal IT band    Tenderness     Right Ankle/Foot   Tenderness in the Achilles insertion, plantar fascia and proximal Achilles.     Active Range of Motion     Right Knee   Flexion: 115 degrees   Extension: 0 " degrees     Additional Active Range of Motion Details  R ankle ROM is equal to L ankle ROM and both are WNL    Passive Range of Motion   Left Ankle/Foot  Normal passive range of motion    Right Ankle/Foot  Normal passive range of motion    Additional Passive Range of Motion Details  Pain with passive ankle dorsiflexion on R    Patellar Mobility     Right Knee Patellar tendons within functional limits include the medial, lateral, superior and inferior.     Joint Play     Right Ankle/Foot  Joints within functional limits are the talocrural joint.     Strength/Myotome Testing     Left Hip   Planes of Motion   Flexion: 4+  Extension: 4  Abduction: 4+    Right Hip   Planes of Motion   Flexion: 4+  Extension: 4  Abduction: 4+    Left Knee   Flexion: 5  Extension: 5    Right Knee   Flexion: 5  Extension: 5    Left Ankle/Foot   Dorsiflexion: 5  Plantar flexion: 5  Inversion: 5  Eversion: 5    Right Ankle/Foot   Dorsiflexion: 5  Plantar flexion: 3- (unable to perform single leg heel raise)  Inversion: 5  Eversion: 5    Additional Strength Details  R single leg balance: 4 seconds  L single leg balance: 20 seconds    Tests     Right Ankle/Foot   Negative for calcaneal squeeze.     Ambulation     Observational Gait   Gait: antalgic   Walking speed and stride length within functional limits.      General Comments     Ankle/Foot Comments   Mild swelling surrounding R achilles       See Exercise, Manual, and Modality Logs for complete treatment.       Assessment & Plan     Assessment  Impairments: abnormal gait, activity intolerance, impaired balance, impaired physical strength, lacks appropriate home exercise program and pain with function  Functional Limitations: walking, uncomfortable because of pain, standing and unable to perform repetitive tasks  Assessment details: Pt presents with limitations, noted below, that impede her ability to walk and stand for long periods of time.  The patient presents with a diagnosis of L knee  pain and R achilles tendon pain and has B hip weakness, R calf weakness, imbalance, and R achilles pain and will benefit from therapeutic exercises, manual therapy, and modalities to improve tolerance to functional activities. The skills of a therapist will be required to safely and effectively implement the following treatment plan to restore maximal level of function.     Prognosis: good    Goals  Plan Goals:  1. Mobility: Walking/Moving Around Functional Limitation     LTG 1: 12 weeks:  The patient will demonstrate 1-19% limitation by achieving a score of 75 on the Lower Extremity Functional Scale.   STATUS:  New   STG 1a: 6 weeks:  The patient will demonstrate 1-19% limitation by achieving a score of 65 on the Lower Extremity Functional Scale.     STATUS:  New      2. The patient has limited strength of the R ankle.   LTG 2: 12 weeks:  In order to provide greater joint stability of the R ankle the patient will demonstrate improved strength of the R ankle as follows:  4/5 plantar flexion (tested by single leg heel raise, 10-24 reps).   STATUS:  New   STG 2a: 6 weeks:  In order to provide greater joint stability of the R ankle the patient will demonstrate improved strength of the R ankle as follows:  3/5 plantar flexion (tested by single leg heel raise, 1-9 reps).   STATUS:  New     3. The patient has imbalance.  LTG 3: 12 weeks: The patient will perform single leg balance on R LE for 20 seconds in order to help improve ankle stability and decrease pain.  STATUS: New  STG 3a: 6 weeks: The patient will perform single leg balance on R LE for 15 seconds in order to help improve ankle stability and decrease pain.  STATUS: New     4. The patient complains of R ankle pain.   LTG 4: 12 weeks:  The patient will report a pain rating of 2/10 or better in order to improve tolerance to activities of daily living and improve sleep quality.   STATUS:  New   STG 4a: 6 weeks:  The patient will report a pain rating of 3/10 or  better.   STATUS:  New         TREATMENT: Manual therapy, therapeutic exercise, home exercise instruction, and modalities as needed to include:  moist heat, electrical stimulation, ultrasound, and ice.     Plan  Therapy options: will be seen for skilled therapy services  Planned modality interventions: cryotherapy, dry needling, electrical stimulation/Bahamian stimulation, ultrasound and hydrotherapy  Planned therapy interventions: flexibility, functional ROM exercises, home exercise program, joint mobilization, manual therapy, neuromuscular re-education, soft tissue mobilization, spinal/joint mobilization, strengthening, stretching, balance/weight-bearing training, gait training and therapeutic activities  Frequency: 3x week  Duration in weeks: 12  Treatment plan discussed with: patient        History # of Personal Factors and/or Comorbidities: LOW (0)  Examination of Body System(s): # of elements: LOW (1-2)  Clinical Presentation: STABLE   Clinical Decision Making: LOW       Timed:         Manual Therapy:         mins  14502;     Therapeutic Exercise:         mins  69622;     Neuromuscular Yanna:        mins  90314;    Therapeutic Activity:          mins  54570;     Gait Training:           mins  62289;     Ultrasound:          mins  65768;    Ionto                                   mins   87146  Self Care                            mins   69934  Canalith Repos         mins 09100      Un-Timed:  Electrical Stimulation:         mins  87869 ( );  Dry Needling          mins self-pay  Traction          mins 48603  Low Eval     35     Mins  86437  Mod Eval          Mins  42487  High Eval                            Mins  36920  Re-Eval                               mins  21772        Timed Treatment:   0   mins   Total Treatment:     35    mins    PT SIGNATURE: Electronically signed by SHAILESH Rowe License: [id number]      Initial Certification  Certification Period: 1/14/2022 thru 4/13/2022  I certify that  the therapy services are furnished while this patient is under my care.  The services outlined above are required by this patient, and will be reviewed every 90 days.     PHYSICIAN: Kat Collins MD      DATE:     Please sign and return via fax to [phone].Thank you, [facility].

## 2022-01-25 ENCOUNTER — TELEPHONE (OUTPATIENT)
Dept: PHYSICAL THERAPY | Facility: CLINIC | Age: 47
End: 2022-01-25

## 2022-01-25 NOTE — TELEPHONE ENCOUNTER
Therapist called patient in regards to no show to appt.  Patient states forgot appt.  Therapist gave patient time/date of next scheduled appt. Patient verbalize that she would attend.

## 2022-01-31 ENCOUNTER — TELEPHONE (OUTPATIENT)
Dept: PHYSICAL THERAPY | Facility: CLINIC | Age: 47
End: 2022-01-31

## 2022-02-02 ENCOUNTER — TELEPHONE (OUTPATIENT)
Dept: PHYSICAL THERAPY | Facility: CLINIC | Age: 47
End: 2022-02-02

## 2022-02-11 ENCOUNTER — LAB (OUTPATIENT)
Dept: LAB | Facility: HOSPITAL | Age: 47
End: 2022-02-11

## 2022-02-11 DIAGNOSIS — R31.21 ASYMPTOMATIC MICROSCOPIC HEMATURIA: ICD-10-CM

## 2022-02-11 LAB
BACTERIA UR QL AUTO: NORMAL /HPF
BILIRUB UR QL STRIP: NEGATIVE
CLARITY UR: CLEAR
COLOR UR: YELLOW
GLUCOSE UR STRIP-MCNC: NEGATIVE MG/DL
HGB UR QL STRIP.AUTO: ABNORMAL
HYALINE CASTS UR QL AUTO: NORMAL /LPF
KETONES UR QL STRIP: NEGATIVE
LEUKOCYTE ESTERASE UR QL STRIP.AUTO: NEGATIVE
NITRITE UR QL STRIP: NEGATIVE
PH UR STRIP.AUTO: 6.5 [PH] (ref 5–8)
PROT UR QL STRIP: NEGATIVE
RBC # UR STRIP: NORMAL /HPF
REF LAB TEST METHOD: NORMAL
SP GR UR STRIP: <1.005 (ref 1–1.03)
SQUAMOUS #/AREA URNS HPF: NORMAL /HPF
UROBILINOGEN UR QL STRIP: ABNORMAL
WBC # UR STRIP: NORMAL /HPF

## 2022-02-11 PROCEDURE — 81001 URINALYSIS AUTO W/SCOPE: CPT

## 2022-02-14 ENCOUNTER — TREATMENT (OUTPATIENT)
Dept: PHYSICAL THERAPY | Facility: CLINIC | Age: 47
End: 2022-02-14

## 2022-02-14 DIAGNOSIS — R29.898 ANKLE WEAKNESS: ICD-10-CM

## 2022-02-14 DIAGNOSIS — F41.9 ANXIETY: ICD-10-CM

## 2022-02-14 DIAGNOSIS — F32.A DEPRESSION, UNSPECIFIED DEPRESSION TYPE: ICD-10-CM

## 2022-02-14 DIAGNOSIS — R26.9 GAIT DISTURBANCE: Primary | ICD-10-CM

## 2022-02-14 DIAGNOSIS — R29.898 WEAKNESS OF BOTH HIPS: ICD-10-CM

## 2022-02-14 DIAGNOSIS — M76.60 ACHILLES TENDON PAIN: ICD-10-CM

## 2022-02-14 DIAGNOSIS — R26.89 IMBALANCE: ICD-10-CM

## 2022-02-14 PROCEDURE — 97110 THERAPEUTIC EXERCISES: CPT | Performed by: PHYSICAL THERAPIST

## 2022-02-14 RX ORDER — TRAZODONE HYDROCHLORIDE 50 MG/1
TABLET ORAL
Qty: 90 TABLET | Refills: 1 | Status: SHIPPED | OUTPATIENT
Start: 2022-02-14 | End: 2022-09-29

## 2022-02-14 NOTE — PROGRESS NOTES
Discharge Summary      Patient: Em Montanez   : 1975  Diagnosis/ICD-10 Code:  Gait disturbance [R26.9]  Referring practitioner: Kat Collins MD  Date of Initial Visit: Type: THERAPY  Noted: 2022  Today's Date: 2022  Patient seen for 2 sessions      Subjective:   Em Montanez reports: the anti inflammatory that she is taking seems to be helping a lot.  She states she can pretty much walk without a limp.  She reports she hasn't been able to come to therapy because her son had Covid.  Pt reports L knee is not bothering her anymore.  Pt reports the R ankle is only bothering her a little bit.  She notices more pain when she first gets up and it usually gets better as she keeps going.   She states she is feeling better and would like to discharge from therapy.     Subjective Questionnaire: LEFS: 72/80  Clinical Progress: improved  Home Program Compliance: No  Treatment has included: none besides initial evaluation; son had COVID    Subjective     Objective   Strength/Myotome Testing      Left Hip   Planes of Motion   Flexion: 5  Extension: 5  Abduction: 4+     Right Hip   Planes of Motion   Flexion: 5  Extension: 5  Abduction: 4+     Left Knee   Flexion: 5  Extension: 5     Right Knee   Flexion: 5  Extension: 5     Left Ankle/Foot   Dorsiflexion: 5  Plantar flexion: 5  Inversion: 5  Eversion: 5     Right Ankle/Foot   Dorsiflexion: 5  Plantar flexion: 3- (unable to perform single leg heel raise)  Inversion: 5  Eversion: 5    Additional Strength Details  R single leg balance: 8 seconds  L single leg balance: 20 seconds    Assessment/Plan  Pt has not had any treatment sessions since her initial evaluation due to her son having Covid.  Pt has had an improvement in her achilles tendon pain since evaluation.  She continues to have weakness in her ankle and imbalance on the R side.  She would benefit from continuing with PT but would like to discharge.  She was provided with an updated HEP and will  discharge from PT.  Goals have not been met.    Goals:  1. Mobility: Walking/Moving Around Functional Limitation                      LTG 1: 12 weeks:  The patient will demonstrate 1-19% limitation by achieving a score of 75 on the Lower Extremity Functional Scale.   STATUS:  not met  STG 1a: 6 weeks:  The patient will demonstrate 1-19% limitation by achieving a score of 65 on the Lower Extremity Functional Scale.     STATUS:  met     2. The patient has limited strength of the R ankle.   LTG 2: 12 weeks:  In order to provide greater joint stability of the R ankle the patient will demonstrate improved strength of the R ankle as follows:  4/5 plantar flexion (tested by single leg heel raise, 10-24 reps).   STATUS:  not met  STG 2a: 6 weeks:  In order to provide greater joint stability of the R ankle the patient will demonstrate improved strength of the R ankle as follows:  3/5 plantar flexion (tested by single leg heel raise, 1-9 reps).   STATUS:  met    3. The patient has imbalance.  LTG 3: 12 weeks: The patient will perform single leg balance on R LE for 20 seconds in order to help improve ankle stability and decrease pain.  STATUS: New  STG 3a: 6 weeks: The patient will perform single leg balance on R LE for 15 seconds in order to help improve ankle stability and decrease pain.  STATUS: New     4. The patient complains of R ankle pain.   LTG 4: 12 weeks:  The patient will report a pain rating of 2/10 or better in order to improve tolerance to activities of daily living and improve sleep quality.   STATUS:  nto met  STG 4a: 6 weeks:  The patient will report a pain rating of 3/10 or better.   STATUS:  met      See Exercise, Manual, and Modality Logs for complete treatment.       PT Signature: Electronically signed by SHAILESH Rowe License: [id number]      Based upon review of the patient's progress and continued therapy plan, it is my medical opinion that Em Montanez should continue physical therapy treatment at  [facility]  [address] [place] [address] [phone].      Timed:         Manual Therapy:         mins  33278;     Therapeutic Exercise:    15     mins  38789;     Neuromuscular Yanna:        mins  84046;    Therapeutic Activity:          mins  20699;     Gait Training:           mins  23569;     Ultrasound:          mins  26798;    Ionto                                   mins   12108  Self Care                            mins   64472  Aquatic                               mins 37647      Un-Timed:  Electrical Stimulation:         mins  40788 ( );  Dry Needling          mins self-pay  Traction          mins 64314  Low Eval          Mins  87176  Mod Eval          Mins  39299  High Eval                            Mins  84539  Re-Eval                               mins  03367      Timed Treatment:   15   mins   Total Treatment:     15   mins      I certify that the therapy services are furnished while this patient is under my care.  The services outlined above are required by this patient, and will be reviewed every 90 days.

## 2022-02-15 NOTE — PRE-PROCEDURE INSTRUCTIONS
PT INSTRUCTED ON CLEAR LIQ DIET AND PO SPLIT PREP LAST BM SHOULD BE CLEAR  PT CAN TAKE  zyrtec  WITH A SIP OF WATER IN THE AM OF THE PROCEDURE ARRIVAL TIME IS 1130 am ON  Wed. 2/16/22

## 2022-02-16 ENCOUNTER — ANESTHESIA (OUTPATIENT)
Dept: GASTROENTEROLOGY | Facility: HOSPITAL | Age: 47
End: 2022-02-16

## 2022-02-16 ENCOUNTER — HOSPITAL ENCOUNTER (OUTPATIENT)
Facility: HOSPITAL | Age: 47
Setting detail: HOSPITAL OUTPATIENT SURGERY
Discharge: HOME | End: 2022-02-16
Attending: INTERNAL MEDICINE | Admitting: INTERNAL MEDICINE

## 2022-02-16 ENCOUNTER — ANESTHESIA EVENT (OUTPATIENT)
Dept: GASTROENTEROLOGY | Facility: HOSPITAL | Age: 47
End: 2022-02-16

## 2022-02-16 VITALS
SYSTOLIC BLOOD PRESSURE: 127 MMHG | OXYGEN SATURATION: 100 % | TEMPERATURE: 97.2 F | RESPIRATION RATE: 16 BRPM | HEIGHT: 70 IN | DIASTOLIC BLOOD PRESSURE: 76 MMHG | WEIGHT: 279.1 LBS | BODY MASS INDEX: 39.96 KG/M2 | HEART RATE: 71 BPM

## 2022-02-16 PROCEDURE — 25010000002 PROPOFOL 10 MG/ML EMULSION: Performed by: NURSE ANESTHETIST, CERTIFIED REGISTERED

## 2022-02-16 PROCEDURE — 45378 DIAGNOSTIC COLONOSCOPY: CPT | Performed by: INTERNAL MEDICINE

## 2022-02-16 RX ORDER — LIDOCAINE HYDROCHLORIDE 20 MG/ML
INJECTION, SOLUTION INFILTRATION; PERINEURAL AS NEEDED
Status: DISCONTINUED | OUTPATIENT
Start: 2022-02-16 | End: 2022-02-16 | Stop reason: SURG

## 2022-02-16 RX ORDER — SODIUM CHLORIDE, SODIUM LACTATE, POTASSIUM CHLORIDE, CALCIUM CHLORIDE 600; 310; 30; 20 MG/100ML; MG/100ML; MG/100ML; MG/100ML
30 INJECTION, SOLUTION INTRAVENOUS CONTINUOUS
Status: DISCONTINUED | OUTPATIENT
Start: 2022-02-16 | End: 2022-02-16 | Stop reason: HOSPADM

## 2022-02-16 RX ORDER — PROPOFOL 10 MG/ML
VIAL (ML) INTRAVENOUS AS NEEDED
Status: DISCONTINUED | OUTPATIENT
Start: 2022-02-16 | End: 2022-02-16 | Stop reason: SURG

## 2022-02-16 RX ADMIN — SODIUM CHLORIDE, POTASSIUM CHLORIDE, SODIUM LACTATE AND CALCIUM CHLORIDE: 600; 310; 30; 20 INJECTION, SOLUTION INTRAVENOUS at 13:06

## 2022-02-16 RX ADMIN — LIDOCAINE HYDROCHLORIDE 100 MG: 20 INJECTION, SOLUTION INFILTRATION; PERINEURAL at 13:21

## 2022-02-16 RX ADMIN — PROPOFOL 100 MG: 10 INJECTION, EMULSION INTRAVENOUS at 13:21

## 2022-02-16 RX ADMIN — PROPOFOL 150 MCG/KG/MIN: 10 INJECTION, EMULSION INTRAVENOUS at 13:23

## 2022-02-16 NOTE — ANESTHESIA PREPROCEDURE EVALUATION
Anesthesia Evaluation     Patient summary reviewed and Nursing notes reviewed   no history of anesthetic complications:  NPO Solid Status: > 8 hours  NPO Liquid Status: > 2 hours           Airway   Mallampati: III  TM distance: >3 FB  Neck ROM: full  No difficulty expected  Dental      Pulmonary - normal exam    breath sounds clear to auscultation  (+) asthma,  Cardiovascular - negative cardio ROS and normal exam  Exercise tolerance: good (4-7 METS)    Rhythm: regular  Rate: normal        Neuro/Psych  (+) psychiatric history,    GI/Hepatic/Renal/Endo - negative ROS     Musculoskeletal     (+) back pain,   Abdominal    Substance History - negative use     OB/GYN negative ob/gyn ROS         Other   arthritis,                    Anesthesia Plan    ASA 3     general   (Total IV Anesthesia    Patient understands anesthesia not responsible for dental damage.  )  intravenous induction     Anesthetic plan, all risks, benefits, and alternatives have been provided, discussed and informed consent has been obtained with: patient.    Plan discussed with CRNA.        CODE STATUS:

## 2022-02-16 NOTE — H&P
ScreeningPre Procedure History & Physical    Chief Complaint:   Screening     Subjective     HPI:   Screening     Past Medical History:   Past Medical History:   Diagnosis Date   • Allergic rhinitis    • Anxiety    • Asthma    • Back pain 11/21/2017    LUMBAR SPINE X RAY LARGELY UNREMARKABLE. WILL RX PT AND MONITOR FOR IMPROVEMENT. IF PAIN CONTINUES, WILL ORDER MRI   • Deafness    • Depression     PT DPING WELL ON EFFECOR. WILL CONTINUE. [place] HAS BEEN ON XOLOFT IN THE PAST AND REPORTS INEFFECTIVENESS. PT WILL F/U IN APPROX 2-3 MONTHS. PT HAS BEEN ON ZOLOFT IN THE PAST AND REPORTS INEFFECTIVENESS. WILL RX EFFECOR AT THIS TIE. PT AWARE OF RISKS AND BENEFITS INCLUDING BLACK BOX WARNING INCLUDING INC SUICDALITY. PT TO F/U IN 2 WEEKS TO DISCUSS FURTHER   • Fatigue 11/21/2017    POSSIBLY RELATED TO DEPRESSION. WILL CHECK CBC, CMP, TSH, AND VIT D TODAY.   • Gall stones    • History of knee replacement, total, right 01/22/2018   • Insomnia 12/11/2017    DISCUSSED RISKS AND BENEFITS OF MEDICATIONS. ALSO DISCUSSED SLEEP HYGIENE METHODS INCLUDING AVOIDING SOURCES OF BLUE LIGHT, DEVELOPING ROUTINE, SLEEPING IN A DARK ROOM, AND USING BED FOR SLEEP ONLY. PT HAS TIRED MELATONIN WITH INTERMITTENT EFFECTIVENESS. PT WITHOUT REPORTED SYMPTOMS OF SLEEP APNEA AT THIS TIME.    • Knee pain 11/21/2017    PREVIOUS R KNEE REPLACEMENT 2/2 ARTHRITIS. PT WOULD LIKE TO DISCUSS OPTIONS WITH ORTHOPEDICS AGAIN   • Polycystic ovarian syndrome 11/21/2017    PT UNABLE TO TOLERATE METFORMIN 2/2 GI SIDE EFFECTS. WILL CHECK HBA1C TODAY. PT HAS DIFFICULTY WITH VARIABLE SUGARS INCLUDING HYPOGLYCEMIA WITH SYMPTOMS. LOW THRESHOLD TO CONSULT  ENDOCRINE FOR OPTIMAL CONTROL.    • Primary osteoarthritis of left knee 01/22/2018   • Vitamin D deficiency 12/11/2017    CURRENTLY ON VIT D SUPPLEMENTS. WILL RECHECK VIT D LEVEL IN 3 MONTHS.        Past Surgical History:  Past Surgical History:   Procedure Laterality Date   • CHOLECYSTECTOMY     • ENDOMETRIAL ABLATION      • ESSURE TUBAL LIGATION     • GALLBLADDER SURGERY      EXCISION    • JOINT REPLACEMENT      bilateral knee replacement   • LEEP     • OTHER SURGICAL HISTORY      ARTIFICAL JOINTS/LIMBS    • OTHER SURGICAL HISTORY      JOINT SURGERY   • OTHER SURGICAL HISTORY      METAL IMPLANTS  knees   • TONSILLECTOMY     • TOTAL KNEE ARTHROPLASTY         Family History:  Family History   Problem Relation Age of Onset   • Heart disease Mother    • Arthritis Mother    • Cancer Other    • Malig Hyperthermia Neg Hx        Social History:   reports that she has been smoking. She has a 30.00 pack-year smoking history. She has never used smokeless tobacco. She reports current alcohol use. She reports that she does not use drugs.    Medications:   Medications Prior to Admission   Medication Sig Dispense Refill Last Dose   • cetirizine (zyrTEC) 10 MG tablet Take 1 tablet by mouth Daily. 90 tablet 3 2/15/2022   • cyclobenzaprine (FLEXERIL) 10 MG tablet Take 1 tablet by mouth 3 (Three) Times a Day As Needed for Muscle Spasms. (Patient taking differently: Take 10 mg by mouth Every Night.) 90 tablet 0 2/15/2022   • metFORMIN ER (GLUCOPHAGE-XR) 500 MG 24 hr tablet Take 500 mg by mouth Daily With Breakfast.   2/15/2022   • polyethylene glycol (GoLYTELY) 236 g solution Starting at noon on day prior to procedure, drink 8 ounces every 30 minutes until all gone or stools are clear. May add flavor packet. (Patient taking differently: Take 4 L by mouth Every 12 (Twelve) Hours.) 4000 mL 0 2/16/2022 at 0800   • traZODone (DESYREL) 50 MG tablet TAKE ONE TABLET BY MOUTH ONCE NIGHTLY (Patient taking differently: Take 50 mg by mouth Every Night.) 90 tablet 1 2/15/2022   • valACYclovir (Valtrex) 1000 MG tablet Take 1 tablet by mouth Daily. (Patient taking differently: Take 1,000 mg by mouth As Needed.) 10 tablet 2 2/15/2022       Allergies:  Prednisone and Tramadol        Objective     Blood pressure 132/83, pulse 76, temperature 96.6 °F (35.9 °C),  "temperature source Temporal, resp. rate 18, height 177.2 cm (69.76\"), weight 127 kg (279 lb 1.6 oz), SpO2 95 %, not currently breastfeeding.    Physical Exam   Constitutional: Pt is oriented to person, place, and time and well-developed, well-nourished, and in no distress.   Mouth/Throat: Oropharynx is clear and moist.   Neck: Normal range of motion.   Cardiovascular: Normal rate, regular rhythm and normal heart sounds.    Pulmonary/Chest: Effort normal and breath sounds normal.   Abdominal: Soft. Nontender  Skin: Skin is warm and dry.   Psychiatric: Mood, memory, affect and judgment normal.     Assessment/Plan     Diagnosis:  Screening colonoscopy       Anticipated Surgical Procedure:  colonoscopy    The risks, benefits, and alternatives of this procedure have been discussed with the patient or the responsible party- the patient understands and agrees to proceed.            "

## 2022-02-16 NOTE — ANESTHESIA POSTPROCEDURE EVALUATION
Patient: Em Montanez    Procedure Summary     Date: 02/16/22 Room / Location: [facility] ENDOSCOPY 2 / [facility] END[facility]    Anesthesia Start: 1321 Anesthesia Stop: 1345    Procedure: COLONOSCOPY (N/A ) Diagnosis:       Colon cancer screening      (Colon cancer screening [Z12.11])    Surgeons: Jb Gonzalez MD Provider: Domingo Milligan MD    Anesthesia Type: general ASA Status: 3          Anesthesia Type: general    Vitals  Vitals Value Taken Time   /76 02/16/22 1405   Temp 36.2 °C (97.2 °F) 02/16/22 1405   Pulse 71 02/16/22 1407   Resp 16 02/16/22 1405   SpO2 100 % 02/16/22 1407   Vitals shown include unvalidated device data.        Post Anesthesia Care and Evaluation    Patient location during evaluation: bedside  Patient participation: complete - patient participated  Level of consciousness: awake  Pain management: adequate  Airway patency: patent  Anesthetic complications: No anesthetic complications  PONV Status: none  Cardiovascular status: acceptable  Respiratory status: acceptable  Hydration status: acceptable    Comments: An Anesthesiologist personally participated in the most demanding procedures (including induction and emergence if applicable) in the anesthesia plan, monitored the course of anesthesia administration at frequent intervals and remained physically present and available for immediate diagnosis and treatment of emergencies.

## 2022-02-23 ENCOUNTER — OFFICE VISIT (OUTPATIENT)
Dept: OBSTETRICS AND GYNECOLOGY | Facility: CLINIC | Age: 47
End: 2022-02-23

## 2022-02-23 ENCOUNTER — PREP FOR SURGERY (OUTPATIENT)
Dept: OTHER | Facility: HOSPITAL | Age: 47
End: 2022-02-23

## 2022-02-23 VITALS
WEIGHT: 285 LBS | DIASTOLIC BLOOD PRESSURE: 76 MMHG | HEART RATE: 100 BPM | HEIGHT: 70 IN | BODY MASS INDEX: 40.8 KG/M2 | SYSTOLIC BLOOD PRESSURE: 107 MMHG

## 2022-02-23 DIAGNOSIS — R10.2 CHRONIC PELVIC PAIN IN FEMALE: ICD-10-CM

## 2022-02-23 DIAGNOSIS — R10.2 PELVIC PAIN: Primary | ICD-10-CM

## 2022-02-23 DIAGNOSIS — G89.29 CHRONIC PELVIC PAIN IN FEMALE: ICD-10-CM

## 2022-02-23 PROCEDURE — 99214 OFFICE O/P EST MOD 30 MIN: CPT | Performed by: STUDENT IN AN ORGANIZED HEALTH CARE EDUCATION/TRAINING PROGRAM

## 2022-02-23 RX ORDER — SODIUM CHLORIDE 0.9 % (FLUSH) 0.9 %
10 SYRINGE (ML) INJECTION AS NEEDED
Status: CANCELLED | OUTPATIENT
Start: 2022-02-23

## 2022-02-23 RX ORDER — CEFAZOLIN SODIUM 2 G/100ML
2 INJECTION, SOLUTION INTRAVENOUS ONCE
Status: CANCELLED | OUTPATIENT
Start: 2022-02-23 | End: 2022-02-23

## 2022-02-23 RX ORDER — SODIUM CHLORIDE 0.9 % (FLUSH) 0.9 %
3 SYRINGE (ML) INJECTION EVERY 12 HOURS SCHEDULED
Status: CANCELLED | OUTPATIENT
Start: 2022-02-23

## 2022-02-23 RX ORDER — CYANOCOBALAMIN 1000 UG/ML
1000 INJECTION, SOLUTION INTRAMUSCULAR; SUBCUTANEOUS
COMMUNITY
Start: 2022-01-30 | End: 2022-11-11 | Stop reason: SDUPTHER

## 2022-02-23 RX ORDER — LIDOCAINE 50 MG/G
1 PATCH TOPICAL DAILY PRN
COMMUNITY

## 2022-02-23 RX ORDER — NABUMETONE 750 MG/1
750 TABLET, FILM COATED ORAL DAILY
COMMUNITY
End: 2022-04-06 | Stop reason: HOSPADM

## 2022-03-10 ENCOUNTER — HOSPITAL ENCOUNTER (EMERGENCY)
Facility: HOSPITAL | Age: 47
Discharge: HOME | End: 2022-03-10
Attending: EMERGENCY MEDICINE | Admitting: EMERGENCY MEDICINE

## 2022-03-10 ENCOUNTER — APPOINTMENT (OUTPATIENT)
Dept: CT IMAGING | Facility: HOSPITAL | Age: 47
End: 2022-03-10

## 2022-03-10 VITALS
BODY MASS INDEX: 39.75 KG/M2 | HEART RATE: 68 BPM | OXYGEN SATURATION: 94 % | TEMPERATURE: 97.7 F | DIASTOLIC BLOOD PRESSURE: 65 MMHG | HEIGHT: 71 IN | RESPIRATION RATE: 20 BRPM | WEIGHT: 283.95 LBS | SYSTOLIC BLOOD PRESSURE: 136 MMHG

## 2022-03-10 DIAGNOSIS — R11.0 NAUSEA: ICD-10-CM

## 2022-03-10 DIAGNOSIS — R10.30 LOWER ABDOMINAL PAIN: Primary | ICD-10-CM

## 2022-03-10 LAB
ALBUMIN SERPL-MCNC: 4.3 G/DL (ref 3.5–5.2)
ALBUMIN/GLOB SERPL: 1.5 G/DL
ALP SERPL-CCNC: 89 U/L (ref 39–117)
ALT SERPL W P-5'-P-CCNC: 17 U/L (ref 1–33)
ANION GAP SERPL CALCULATED.3IONS-SCNC: 10.4 MMOL/L (ref 5–15)
AST SERPL-CCNC: 21 U/L (ref 1–32)
BACTERIA UR QL AUTO: ABNORMAL /HPF
BASOPHILS # BLD AUTO: 0.04 10*3/MM3 (ref 0–0.2)
BASOPHILS NFR BLD AUTO: 0.4 % (ref 0–1.5)
BILIRUB SERPL-MCNC: 0.3 MG/DL (ref 0–1.2)
BILIRUB UR QL STRIP: NEGATIVE
BUN SERPL-MCNC: 15 MG/DL (ref 6–20)
BUN/CREAT SERPL: 16.5 (ref 7–25)
CALCIUM SPEC-SCNC: 9.7 MG/DL (ref 8.6–10.5)
CHLORIDE SERPL-SCNC: 101 MMOL/L (ref 98–107)
CLARITY UR: CLEAR
CO2 SERPL-SCNC: 27.6 MMOL/L (ref 22–29)
COLOR UR: YELLOW
CREAT SERPL-MCNC: 0.91 MG/DL (ref 0.57–1)
DEPRECATED RDW RBC AUTO: 46.3 FL (ref 37–54)
EGFRCR SERPLBLD CKD-EPI 2021: 78 ML/MIN/1.73
EOSINOPHIL # BLD AUTO: 0.12 10*3/MM3 (ref 0–0.4)
EOSINOPHIL NFR BLD AUTO: 1.1 % (ref 0.3–6.2)
ERYTHROCYTE [DISTWIDTH] IN BLOOD BY AUTOMATED COUNT: 13.6 % (ref 12.3–15.4)
GLOBULIN UR ELPH-MCNC: 2.9 GM/DL
GLUCOSE SERPL-MCNC: 142 MG/DL (ref 65–99)
GLUCOSE UR STRIP-MCNC: NEGATIVE MG/DL
HCG INTACT+B SERPL-ACNC: <0.5 MIU/ML
HCT VFR BLD AUTO: 44.3 % (ref 34–46.6)
HGB BLD-MCNC: 15 G/DL (ref 12–15.9)
HGB UR QL STRIP.AUTO: ABNORMAL
HOLD SPECIMEN: NORMAL
HOLD SPECIMEN: NORMAL
HYALINE CASTS UR QL AUTO: ABNORMAL /LPF
IMM GRANULOCYTES # BLD AUTO: 0.03 10*3/MM3 (ref 0–0.05)
IMM GRANULOCYTES NFR BLD AUTO: 0.3 % (ref 0–0.5)
KETONES UR QL STRIP: NEGATIVE
LEUKOCYTE ESTERASE UR QL STRIP.AUTO: NEGATIVE
LIPASE SERPL-CCNC: 25 U/L (ref 13–60)
LYMPHOCYTES # BLD AUTO: 2.83 10*3/MM3 (ref 0.7–3.1)
LYMPHOCYTES NFR BLD AUTO: 27 % (ref 19.6–45.3)
MCH RBC QN AUTO: 31.2 PG (ref 26.6–33)
MCHC RBC AUTO-ENTMCNC: 33.9 G/DL (ref 31.5–35.7)
MCV RBC AUTO: 92.1 FL (ref 79–97)
MONOCYTES # BLD AUTO: 0.34 10*3/MM3 (ref 0.1–0.9)
MONOCYTES NFR BLD AUTO: 3.2 % (ref 5–12)
NEUTROPHILS NFR BLD AUTO: 68 % (ref 42.7–76)
NEUTROPHILS NFR BLD AUTO: 7.12 10*3/MM3 (ref 1.7–7)
NITRITE UR QL STRIP: NEGATIVE
NRBC BLD AUTO-RTO: 0 /100 WBC (ref 0–0.2)
PH UR STRIP.AUTO: <=5 [PH] (ref 5–8)
PLATELET # BLD AUTO: 248 10*3/MM3 (ref 140–450)
PMV BLD AUTO: 9.4 FL (ref 6–12)
POTASSIUM SERPL-SCNC: 3.9 MMOL/L (ref 3.5–5.2)
PROT SERPL-MCNC: 7.2 G/DL (ref 6–8.5)
PROT UR QL STRIP: NEGATIVE
RBC # BLD AUTO: 4.81 10*6/MM3 (ref 3.77–5.28)
RBC # UR STRIP: ABNORMAL /HPF
REF LAB TEST METHOD: ABNORMAL
SODIUM SERPL-SCNC: 139 MMOL/L (ref 136–145)
SP GR UR STRIP: 1.01 (ref 1–1.03)
SQUAMOUS #/AREA URNS HPF: ABNORMAL /HPF
UROBILINOGEN UR QL STRIP: ABNORMAL
WBC # UR STRIP: ABNORMAL /HPF
WBC NRBC COR # BLD: 10.48 10*3/MM3 (ref 3.4–10.8)
WHOLE BLOOD HOLD SPECIMEN: NORMAL
WHOLE BLOOD HOLD SPECIMEN: NORMAL

## 2022-03-10 PROCEDURE — 99283 EMERGENCY DEPT VISIT LOW MDM: CPT

## 2022-03-10 PROCEDURE — 83690 ASSAY OF LIPASE: CPT | Performed by: EMERGENCY MEDICINE

## 2022-03-10 PROCEDURE — 84702 CHORIONIC GONADOTROPIN TEST: CPT | Performed by: EMERGENCY MEDICINE

## 2022-03-10 PROCEDURE — 85025 COMPLETE CBC W/AUTO DIFF WBC: CPT | Performed by: EMERGENCY MEDICINE

## 2022-03-10 PROCEDURE — 96374 THER/PROPH/DIAG INJ IV PUSH: CPT

## 2022-03-10 PROCEDURE — 74177 CT ABD & PELVIS W/CONTRAST: CPT

## 2022-03-10 PROCEDURE — 96375 TX/PRO/DX INJ NEW DRUG ADDON: CPT

## 2022-03-10 PROCEDURE — 25010000002 HYDROMORPHONE 1 MG/ML SOLUTION: Performed by: EMERGENCY MEDICINE

## 2022-03-10 PROCEDURE — 80053 COMPREHEN METABOLIC PANEL: CPT | Performed by: EMERGENCY MEDICINE

## 2022-03-10 PROCEDURE — 25010000002 ONDANSETRON PER 1 MG: Performed by: NURSE PRACTITIONER

## 2022-03-10 PROCEDURE — 0 IOPAMIDOL PER 1 ML: Performed by: EMERGENCY MEDICINE

## 2022-03-10 PROCEDURE — 81001 URINALYSIS AUTO W/SCOPE: CPT | Performed by: EMERGENCY MEDICINE

## 2022-03-10 PROCEDURE — 25010000002 MORPHINE PER 10 MG: Performed by: EMERGENCY MEDICINE

## 2022-03-10 RX ORDER — MULTIPLE VITAMINS W/ MINERALS TAB 9MG-400MCG
1 TAB ORAL DAILY
COMMUNITY

## 2022-03-10 RX ORDER — ONDANSETRON 2 MG/ML
4 INJECTION INTRAMUSCULAR; INTRAVENOUS ONCE
Status: COMPLETED | OUTPATIENT
Start: 2022-03-10 | End: 2022-03-10

## 2022-03-10 RX ORDER — HYDROCODONE BITARTRATE AND ACETAMINOPHEN 7.5; 325 MG/1; MG/1
1 TABLET ORAL EVERY 6 HOURS PRN
Qty: 10 TABLET | Refills: 0 | Status: SHIPPED | OUTPATIENT
Start: 2022-03-10 | End: 2022-04-06 | Stop reason: HOSPADM

## 2022-03-10 RX ORDER — ONDANSETRON 4 MG/1
4 TABLET, ORALLY DISINTEGRATING ORAL EVERY 8 HOURS PRN
Qty: 15 TABLET | Refills: 0 | Status: SHIPPED | OUTPATIENT
Start: 2022-03-10 | End: 2022-08-03

## 2022-03-10 RX ORDER — SODIUM CHLORIDE 0.9 % (FLUSH) 0.9 %
10 SYRINGE (ML) INJECTION AS NEEDED
Status: DISCONTINUED | OUTPATIENT
Start: 2022-03-10 | End: 2022-03-10 | Stop reason: HOSPADM

## 2022-03-10 RX ADMIN — MORPHINE SULFATE 4 MG: 4 INJECTION, SOLUTION INTRAMUSCULAR; INTRAVENOUS at 12:37

## 2022-03-10 RX ADMIN — HYDROMORPHONE HYDROCHLORIDE 0.5 MG: 1 INJECTION, SOLUTION INTRAMUSCULAR; INTRAVENOUS; SUBCUTANEOUS at 13:50

## 2022-03-10 RX ADMIN — IOPAMIDOL 100 ML: 755 INJECTION, SOLUTION INTRAVENOUS at 13:12

## 2022-03-10 RX ADMIN — ONDANSETRON 4 MG: 2 INJECTION INTRAMUSCULAR; INTRAVENOUS at 12:37

## 2022-03-10 NOTE — DISCHARGE INSTRUCTIONS
Follow-up with your PCP, Dr. Velasquez for reevaluation recheck of blood pressure.    Follow-up with your OB/GYN as scheduled Dr. Ortiz for your hysterectomy.    Take Lortab as directed.  This is a narcotic pain reliever.  Do not share this medication with friends or family.    Narcotics can be addictive.  Do not take Tylenol while taking this medication.    Return the emergency department immediately for fever, vomiting, abdominal swelling, pain with urination, black or tarry stools, new change or worsening symptoms.

## 2022-03-10 NOTE — ED PROVIDER NOTES
Emergency Department Encounter    Room number: 39/39  Date seen: 3/10/2022  PCP: Ricky Velasquez Jr., MD      History provided by:  Patient   used: No          HPI:  Chief complaint: RLQ abdominal pain    Context: Em Montanez is a 46 y.o. female with a history of polycystic ovarian disease (left sided), asthma, cholecystectomy who presents to the ED with right lower quadrant abdominal pain today.  Patient states she has ongoing relationship with OB/GYN is positive weight hysterectomy on her March 30.  Patient has been having pain but this is more severe.  Patient admits to nausea.  Patient denies fever, cough, chest pain, shortness breath, dysuria, hematuria, constipation, diarrhea, abdominal swelling, vaginal discharge or odor, concerns for STDs, or other complaint.  Patient has nothing to eat or drink for the past hour.  Patient smokes pack a day.  Patient is occasional social drinker.      Location: RLQ abdominal pain  Quality: sharp  Severity: moderate  Radiation: denies  Duration: constant  Onset: worsening today  Modifying factors: Nausea        Old records reviewed:  Has several notes from her OB/GYN and February as well as gastroenterology Dr. Gonzalez time she has ongoing relationship    Triage Vitals:  ED Triage Vitals [03/10/22 1206]   Temp Heart Rate Resp BP SpO2   97.7 °F (36.5 °C) 86 22 146/91 100 %      Temp src Heart Rate Source Patient Position BP Location FiO2 (%)   Oral Monitor Sitting Right arm --         Review of Systems   Constitutional: Negative for chills and fever.   HENT: Negative for rhinorrhea and sore throat.    Respiratory: Negative for cough and shortness of breath.    Cardiovascular: Negative for chest pain and palpitations.   Gastrointestinal: Positive for nausea. Negative for abdominal distention, diarrhea and vomiting.   Genitourinary: Negative for dysuria and flank pain.   Musculoskeletal: Negative for back pain and myalgias.   Skin: Negative for  rash and wound.   Neurological: Negative for dizziness and headaches.   Psychiatric/Behavioral: Negative for sleep disturbance and suicidal ideas.         Physical Exam  Constitutional:       Appearance: Normal appearance. She is obese.      Comments: Appears uncomfortable   HENT:      Head: Normocephalic and atraumatic.      Nose: Nose normal.   Eyes:      Extraocular Movements: Extraocular movements intact.      Pupils: Pupils are equal, round, and reactive to light.   Cardiovascular:      Rate and Rhythm: Normal rate and regular rhythm.   Pulmonary:      Effort: Pulmonary effort is normal.      Breath sounds: Normal breath sounds.   Abdominal:      General: Bowel sounds are normal.      Palpations: Abdomen is soft.      Tenderness: There is abdominal tenderness (RLQ). There is no rebound.      Comments: Negative psoas, negative obturator   Musculoskeletal:         General: Normal range of motion.      Cervical back: Normal range of motion.   Skin:     General: Skin is warm.   Neurological:      General: No focal deficit present.      Mental Status: She is alert and oriented to person, place, and time.   Psychiatric:         Mood and Affect: Mood normal.         Behavior: Behavior normal.         Thought Content: Thought content normal.         Judgment: Judgment normal.             Allergies:  Prednisone and Tramadol  Patient's allergies reviewed    Past Medical History:  Past Medical History:   Diagnosis Date   • Allergic rhinitis    • Anxiety    • Asthma    • Back pain 11/21/2017    LUMBAR SPINE X RAY LARGELY UNREMARKABLE. WILL RX PT AND MONITOR FOR IMPROVEMENT. IF PAIN CONTINUES, WILL ORDER MRI   • Deafness    • Depression     PT DPING WELL ON EFFECOR. WILL CONTINUE. [place] HAS BEEN ON XOLOFT IN THE PAST AND REPORTS INEFFECTIVENESS. PT WILL F/U IN APPROX 2-3 MONTHS. PT HAS BEEN ON ZOLOFT IN THE PAST AND REPORTS INEFFECTIVENESS. WILL RX EFFECOR AT THIS TIE. PT AWARE OF RISKS AND BENEFITS INCLUDING BLACK BOX WARNING  INCLUDING INC SUICDALITY. PT TO F/U IN 2 WEEKS TO DISCUSS FURTHER   • Fatigue 11/21/2017    POSSIBLY RELATED TO DEPRESSION. WILL CHECK CBC, CMP, TSH, AND VIT D TODAY.   • Gall stones    • History of knee replacement, total, right 01/22/2018   • Insomnia 12/11/2017    DISCUSSED RISKS AND BENEFITS OF MEDICATIONS. ALSO DISCUSSED SLEEP HYGIENE METHODS INCLUDING AVOIDING SOURCES OF BLUE LIGHT, DEVELOPING ROUTINE, SLEEPING IN A DARK ROOM, AND USING BED FOR SLEEP ONLY. PT HAS TIRED MELATONIN WITH INTERMITTENT EFFECTIVENESS. PT WITHOUT REPORTED SYMPTOMS OF SLEEP APNEA AT THIS TIME.    • Knee pain 11/21/2017    PREVIOUS R KNEE REPLACEMENT 2/2 ARTHRITIS. PT WOULD LIKE TO DISCUSS OPTIONS WITH ORTHOPEDICS AGAIN   • Ovarian cyst    • Polycystic ovarian syndrome 11/21/2017    PT UNABLE TO TOLERATE METFORMIN 2/2 GI SIDE EFFECTS. WILL CHECK HBA1C TODAY. PT HAS DIFFICULTY WITH VARIABLE SUGARS INCLUDING HYPOGLYCEMIA WITH SYMPTOMS. LOW THRESHOLD TO CONSULT  ENDOCRINE FOR OPTIMAL CONTROL.    • Primary osteoarthritis of left knee 01/22/2018   • Vitamin D deficiency 12/11/2017    CURRENTLY ON VIT D SUPPLEMENTS. WILL RECHECK VIT D LEVEL IN 3 MONTHS.          Past Surgical History:  Past Surgical History:   Procedure Laterality Date   • CHOLECYSTECTOMY     • COLONOSCOPY N/A 2/16/2022    Procedure: COLONOSCOPY;  Surgeon: Jb Gonzalez MD;  Location: [facility] ENDOSCOPY;  Service: Gastroenterology;  Laterality: N/A;  hemmorroids   • ENDOMETRIAL ABLATION     • ESSURE TUBAL LIGATION     • GALLBLADDER SURGERY      EXCISION    • JOINT REPLACEMENT      bilateral knee replacement   • LEEP     • OTHER SURGICAL HISTORY      ARTIFICAL JOINTS/LIMBS    • OTHER SURGICAL HISTORY      JOINT SURGERY   • OTHER SURGICAL HISTORY      METAL IMPLANTS  knees   • TONSILLECTOMY     • TOTAL KNEE ARTHROPLASTY         Procedures    Labs Reviewed   COMPREHENSIVE METABOLIC PANEL - Abnormal; Notable for the following components:       Result Value    Glucose 142  (*)     All other components within normal limits    Narrative:     GFR Normal >60  Chronic Kidney Disease <60  Kidney Failure <15     URINALYSIS W/ MICROSCOPIC IF INDICATED (NO CULTURE) - Abnormal; Notable for the following components:    Blood, UA Small (1+) (*)     All other components within normal limits   CBC WITH AUTO DIFFERENTIAL - Abnormal; Notable for the following components:    Monocyte % 3.2 (*)     Neutrophils, Absolute 7.12 (*)     All other components within normal limits   URINALYSIS, MICROSCOPIC ONLY - Abnormal; Notable for the following components:    RBC, UA 3-5 (*)     WBC, UA 0-2 (*)     Bacteria, UA 1+ (*)     All other components within normal limits   LIPASE - Normal   RAINBOW DRAW    Narrative:     The following orders were created for panel order [place] Draw.  Procedure                               Abnormality         Status                     ---------                               -----------         ------                     Green Top (Gel)[[id number]]                                  Final result               Lavender Top[[id number]]                                     Final result               Gold Top - SST[[id number]]                                   Final result               Light Blue Top[[id number]]                                   Final result                 Please view results for these tests on the individual orders.   HCG, QUANTITATIVE, PREGNANCY    Narrative:     HCG Ranges by Gestational Age    Females - non-pregnant premenopausal   </= 1mIU/mL HCG  Females - postmenopausal               </= 7mIU/mL HCG    3 Weeks       5.4   -      72 mIU/mL  4 Weeks      10.2   -     708 mIU/mL  5 Weeks       217   -   8,245 mIU/mL  6 Weeks       152   -  32,177 mIU/mL  7 Weeks     4,059   - 153,767 mIU/mL  8 Weeks    31,366   - 149,094 mIU/mL  9 Weeks    59,109   - 135,901 mIU/mL  10 Weeks   44,186   - 170,409 mIU/mL  12 Weeks   27,107   - 201,615 mIU/mL  14 Weeks   24,302   -   93,646 mIU/mL  15 Weeks   12,540   -  69,747 mIU/mL  16 Weeks    8,904   -  55,332 mIU/mL  17 Weeks    8,240   -  51,793 mIU/mL  18 Weeks    9,649   -  55,271 mIU/mL    Results may be falsely decreased if patient taking Biotin.     CBC AND DIFFERENTIAL    Narrative:     The following orders were created for panel order CBC & Differential.  Procedure                               Abnormality         Status                     ---------                               -----------         ------                     CBC Auto Differential[[id number]]        Abnormal            Final result                 Please view results for these tests on the individual orders.   GREEN TOP   LAVENDER TOP   GOLD TOP - SST   LIGHT BLUE TOP       CT Abdomen Pelvis With Contrast    Result Date: 3/10/2022  Narrative: PROCEDURE: CT ABDOMEN PELVIS W CONTRAST  COMPARISON: [facility], CT, CT ABDOMEN PELVIS W CONTRAST, 12/07/2021, 22:03.  INDICATIONS: RLQ abdominal pain, appendicitis suspected (Age >= 14y)  TECHNIQUE: After obtaining the patient's consent, CT images were created with non-ionic intravenous contrast material.   PROTOCOL:   Standard imaging protocol performed    RADIATION:   DLP: 2318.7mGy*cm   Automated exposure control was utilized to minimize radiation dose. CONTRAST: 100cc Isovue 370 I.V.  FINDINGS:  The lung bases are clear bilaterally.  A cholecystectomy has been performed.  The liver, spleen, pancreas and adrenal glands appear unremarkable.  Both kidneys appear normal.  No adenopathy or free fluid is seen in the abdomen or pelvis.  Bilateral Essure tubal occlusion devices are noted.  The uterus and bilateral ovaries appear normal.  The urinary bladder appears normal.  No ureteral stone is seen.  Colonic diverticulosis is noted.  No acute bowel abnormality is identified.  The appendix appears normal.  No focal osseous lesion is seen.  No hernia is seen.      Impression:   1. Previous cholecystectomy     Christofer  MATIAS Green       Electronically Signed and Approved By: Christofer Green M.D. on 3/10/2022 at 13:50               Medications   sodium chloride 0.9 % flush 10 mL (has no administration in time range)   ondansetron (ZOFRAN) injection 4 mg (4 mg Intravenous Given 3/10/22 1237)   morphine injection 4 mg (4 mg Intravenous Given 3/10/22 1237)   iopamidol (ISOVUE-370) 76 % injection 100 mL (100 mL Intravenous Given 3/10/22 1312)   HYDROmorphone (DILAUDID) injection 0.5 mg (0.5 mg Intravenous Given 3/10/22 1350)         Progress Notes:  1438 Patient rechecked I have personally reviewed all radiology findings, incidental and otherwise, with the patient and made patient aware of what needs to be followed up with PCP.  He read the CT report at the bedside together.  Offered patient pelvic exam.  Patient states she has an ongoing relationship with her OB/GYN pending her hysterectomy.  Patient states her pain is improved.  We discussed reasons to return to the ER including worsening pain to the right lower quadrant, fever, vomiting, etc.  Patient verbalized understanding.    1438 I have discussed with the patient the emergency department work-up including all test results, the differential diagnosis, the diagnosis given in the emergency department, and the absolute need for follow-up with the primary care physician.  I have discussed all prescriptions and treatments.  I have discussed the possible worsening of their condition, and also discussed criteria for return to the emergency department which includes but is not limited to worsening condition or lack of improvement of the current condition.  I have informed them that a normal work-up evaluation in the emergency department and/or discharge from the emergency department, does not signify that no disease process is present, but simply that there is no emergent indication for admission.  All questions were answered at this time.  I informed them that significant pathology may  "still be present, but they may need further work-up, and that this further investigation should be undertaken by the primary care physician. She voiced his/her understanding.  Patient is agreeable to plan for discharge.    Discharge instructions include:  Follow-up with your PCP, Dr. Velasquez for reevaluation recheck of blood pressure.    Follow-up with your OB/GYN as scheduled Dr. Ortiz for your hysterectomy.    Take Lortab as directed.  This is a narcotic pain reliever.  Do not share this medication with friends or family.    Narcotics can be addictive.  Do not take Tylenol while taking this medication.    Return the emergency department immediately for fever, vomiting, abdominal swelling, pain with urination, black or tarry stools, new change or worsening symptoms.          Vitals:    03/10/22 1206 03/10/22 1300 03/10/22 1356   BP: 146/91 114/80    BP Location: Right arm     Patient Position: Sitting     Pulse: 86 84 70   Resp: 22 20    Temp: 97.7 °F (36.5 °C)     TempSrc: Oral     SpO2: 100% 96% 95%   Weight: 129 kg (283 lb 15.2 oz)     Height: 179.1 cm (70.5\")             Final diagnoses:   Lower abdominal pain (right lower quadrant)   Nausea       Prescriptions:        Medication List      START taking these medications    HYDROcodone-acetaminophen 7.5-325 MG per tablet  Commonly known as: NORCO  Take 1 tablet by mouth Every 6 (Six) Hours As Needed for Moderate Pain .     ondansetron ODT 4 MG disintegrating tablet  Commonly known as: ZOFRAN-ODT  Place 1 tablet on the tongue Every 8 (Eight) Hours As Needed for Nausea or Vomiting.        ASK your doctor about these medications    cetirizine 10 MG tablet  Commonly known as: zyrTEC  Take 1 tablet by mouth Daily.     cyanocobalamin 1000 MCG/ML injection     cyclobenzaprine 10 MG tablet  Commonly known as: FLEXERIL  Take 1 tablet by mouth 3 (Three) Times a Day As Needed for Muscle Spasms.     Lidoderm 5 %  Generic drug: lidocaine     metFORMIN  MG 24 hr " tablet  Commonly known as: GLUCOPHAGE-XR     multivitamin with minerals tablet tablet     nabumetone 750 MG tablet  Commonly known as: RELAFEN     traZODone 50 MG tablet  Commonly known as: DESYREL  TAKE ONE TABLET BY MOUTH ONCE NIGHTLY     valACYclovir 1000 MG tablet  Commonly known as: Valtrex  Take 1 tablet by mouth Daily.           Where to Get Your Medications      These [facility] to [facility] [address] [place], [place] [address] [place] [facility] [address] AT [facility] [address] - [phone]  - [phone]   [address] [facility] [address]    Phone: [phone]   · HYDROcodone-acetaminophen 7.5-325 MG per tablet  · ondansetron ODT 4 MG disintegrating tablet                 MDM  Number of Diagnoses or Management Options     Amount and/or Complexity of Data Reviewed  Clinical lab tests: ordered  Tests in the radiology section of CPT®: ordered  Review and summarize past medical records: yes  Independent visualization of images, tracings, or specimens: yes    Risk of Complications, Morbidity, and/or Mortality  Presenting problems: moderate  Diagnostic procedures: moderate  Management options: moderate    Patient Progress  Patient progress: improved      (R10.30) Lower abdominal pain (right lower quadrant), Active    (R11.0) Nausea      Reviewing your test results and medical records in your chart is not a substitution for discussing these with your health care provider.  Please contact your primary care provider to discuss any questions or concerns you may have regarding these test results.      Part of this note may be an electronic transcription/translation of spoken language to printed text using the Dragon Dictation System.  The electronic translation of spoken language may permit erroneous or at times nonsensical words or phrases to be inadvertently transcribed.  Although I have reviewed the note for such errors some may still exist.             Kaila Walker, APRN  03/10/22 3862

## 2022-03-16 ENCOUNTER — HOSPITAL ENCOUNTER (OUTPATIENT)
Dept: CARDIOLOGY | Facility: HOSPITAL | Age: 47
Discharge: HOME | End: 2022-03-16

## 2022-03-16 ENCOUNTER — LAB (OUTPATIENT)
Dept: LAB | Facility: HOSPITAL | Age: 47
End: 2022-03-16

## 2022-03-16 ENCOUNTER — OFFICE VISIT (OUTPATIENT)
Dept: INTERNAL MEDICINE | Facility: CLINIC | Age: 47
End: 2022-03-16

## 2022-03-16 VITALS
HEIGHT: 71 IN | OXYGEN SATURATION: 98 % | WEIGHT: 286 LBS | SYSTOLIC BLOOD PRESSURE: 128 MMHG | DIASTOLIC BLOOD PRESSURE: 83 MMHG | BODY MASS INDEX: 40.04 KG/M2 | TEMPERATURE: 97.4 F | HEART RATE: 73 BPM

## 2022-03-16 DIAGNOSIS — R10.2 PELVIC PAIN: ICD-10-CM

## 2022-03-16 DIAGNOSIS — R10.2 CHRONIC PELVIC PAIN IN FEMALE: Chronic | ICD-10-CM

## 2022-03-16 DIAGNOSIS — G89.29 CHRONIC PELVIC PAIN IN FEMALE: Chronic | ICD-10-CM

## 2022-03-16 DIAGNOSIS — E28.2 POLYCYSTIC OVARIES: Primary | Chronic | ICD-10-CM

## 2022-03-16 LAB
ALBUMIN SERPL-MCNC: 4.3 G/DL (ref 3.5–5.2)
ALBUMIN/GLOB SERPL: 1.7 G/DL
ALP SERPL-CCNC: 84 U/L (ref 39–117)
ALT SERPL W P-5'-P-CCNC: 15 U/L (ref 1–33)
ANION GAP SERPL CALCULATED.3IONS-SCNC: 9.3 MMOL/L (ref 5–15)
AST SERPL-CCNC: 21 U/L (ref 1–32)
BASOPHILS # BLD AUTO: 0.06 10*3/MM3 (ref 0–0.2)
BASOPHILS NFR BLD AUTO: 0.7 % (ref 0–1.5)
BILIRUB SERPL-MCNC: 0.3 MG/DL (ref 0–1.2)
BUN SERPL-MCNC: 14 MG/DL (ref 6–20)
BUN/CREAT SERPL: 14.6 (ref 7–25)
CALCIUM SPEC-SCNC: 9.5 MG/DL (ref 8.6–10.5)
CHLORIDE SERPL-SCNC: 101 MMOL/L (ref 98–107)
CO2 SERPL-SCNC: 27.7 MMOL/L (ref 22–29)
CREAT SERPL-MCNC: 0.96 MG/DL (ref 0.57–1)
DEPRECATED RDW RBC AUTO: 43.6 FL (ref 37–54)
EGFRCR SERPLBLD CKD-EPI 2021: 73.1 ML/MIN/1.73
EOSINOPHIL # BLD AUTO: 0.11 10*3/MM3 (ref 0–0.4)
EOSINOPHIL NFR BLD AUTO: 1.2 % (ref 0.3–6.2)
ERYTHROCYTE [DISTWIDTH] IN BLOOD BY AUTOMATED COUNT: 13 % (ref 12.3–15.4)
GLOBULIN UR ELPH-MCNC: 2.5 GM/DL
GLUCOSE SERPL-MCNC: 92 MG/DL (ref 65–99)
HBA1C MFR BLD: 5.6 % (ref 4.8–5.6)
HCG SERPL QL: NEGATIVE
HCT VFR BLD AUTO: 43.9 % (ref 34–46.6)
HGB BLD-MCNC: 14.8 G/DL (ref 12–15.9)
IMM GRANULOCYTES # BLD AUTO: 0.03 10*3/MM3 (ref 0–0.05)
IMM GRANULOCYTES NFR BLD AUTO: 0.3 % (ref 0–0.5)
LYMPHOCYTES # BLD AUTO: 2.54 10*3/MM3 (ref 0.7–3.1)
LYMPHOCYTES NFR BLD AUTO: 28.6 % (ref 19.6–45.3)
MCH RBC QN AUTO: 30.7 PG (ref 26.6–33)
MCHC RBC AUTO-ENTMCNC: 33.7 G/DL (ref 31.5–35.7)
MCV RBC AUTO: 91.1 FL (ref 79–97)
MONOCYTES # BLD AUTO: 0.44 10*3/MM3 (ref 0.1–0.9)
MONOCYTES NFR BLD AUTO: 5 % (ref 5–12)
NEUTROPHILS NFR BLD AUTO: 5.69 10*3/MM3 (ref 1.7–7)
NEUTROPHILS NFR BLD AUTO: 64.2 % (ref 42.7–76)
NRBC BLD AUTO-RTO: 0 /100 WBC (ref 0–0.2)
PLATELET # BLD AUTO: 265 10*3/MM3 (ref 140–450)
PMV BLD AUTO: 10.7 FL (ref 6–12)
POTASSIUM SERPL-SCNC: 4 MMOL/L (ref 3.5–5.2)
PROT SERPL-MCNC: 6.8 G/DL (ref 6–8.5)
QT INTERVAL: 424 MS
RBC # BLD AUTO: 4.82 10*6/MM3 (ref 3.77–5.28)
SODIUM SERPL-SCNC: 138 MMOL/L (ref 136–145)
WBC NRBC COR # BLD: 8.87 10*3/MM3 (ref 3.4–10.8)

## 2022-03-16 PROCEDURE — 84703 CHORIONIC GONADOTROPIN ASSAY: CPT

## 2022-03-16 PROCEDURE — 36415 COLL VENOUS BLD VENIPUNCTURE: CPT

## 2022-03-16 PROCEDURE — 99214 OFFICE O/P EST MOD 30 MIN: CPT | Performed by: NURSE PRACTITIONER

## 2022-03-16 PROCEDURE — 80053 COMPREHEN METABOLIC PANEL: CPT

## 2022-03-16 PROCEDURE — 93005 ELECTROCARDIOGRAM TRACING: CPT | Performed by: STUDENT IN AN ORGANIZED HEALTH CARE EDUCATION/TRAINING PROGRAM

## 2022-03-16 PROCEDURE — 85025 COMPLETE CBC W/AUTO DIFF WBC: CPT

## 2022-03-16 PROCEDURE — 83036 HEMOGLOBIN GLYCOSYLATED A1C: CPT

## 2022-03-16 NOTE — PROGRESS NOTES
Chief Complaint  Hospital Follow Up Visit (Seen in ED for RLQ pain, wanting to know what is causing pain, has partial hysterectomy scheduled)    Subjective          Em Montanez presents to [facility]  History of Present Illness       Intermitted sharp pains in RLQ, No fever, nausea,   Hydrocodone does ease it off   Has scheduled hysterectomy on 4/6.     Context: Em Montanez is a 46 y.o. female with polycystic ovarian disease (left sided), asthma, cholecystectomy who presented to the ED with right lower quadrant abdominal pain. Patient has been having pain but this episode was more severe than usual.  Patient admits to intermittent nausea.  Patient denies fever, cough, chest pain, shortness breath, dysuria, hematuria, constipation, diarrhea, abdominal swelling, vaginal discharge or odor, concerns for STDs, or other complaint. Patient smokes pack a day.  Patient is occasional social drinker.     When the pain occurs: (is not present at this time)   Location: RLQ abdominal pain  Quality: sharp  Severity: moderate  Radiation: denies  Modifying factors: Nausea      Current Outpatient Medications   Medication Instructions   • cetirizine (ZYRTEC) 10 mg, Oral, Daily   • cyanocobalamin 1000 MCG/ML injection No dose, route, or frequency recorded.   • cyclobenzaprine (FLEXERIL) 10 mg, Oral, 3 Times Daily PRN   • HYDROcodone-acetaminophen (NORCO) 7.5-325 MG per tablet 1 tablet, Oral, Every 6 Hours PRN   • lidocaine (LIDODERM) 5 % Lidoderm 5 % topical patch   • metFORMIN ER (GLUCOPHAGE-XR) 500 mg, Oral, Daily With Breakfast   • multivitamin with minerals tablet tablet 1 tablet, Oral, Daily   • nabumetone (RELAFEN) 750 MG tablet nabumetone 750 mg tablet   • ondansetron ODT (ZOFRAN-ODT) 4 mg, Translingual, Every 8 Hours PRN   • traZODone (DESYREL) 50 MG tablet TAKE ONE TABLET BY MOUTH ONCE NIGHTLY   • valACYclovir (VALTREX) 1,000 mg, Oral, Daily       The following  "portions of the patient's history were reviewed and updated as appropriate: allergies, current medications, past family history, past medical history, past social history, past surgical history, and problem list.    Objective   Vital Signs:   /83   Pulse 73   Temp 97.4 °F (36.3 °C) (Temporal)   Ht 179.1 cm (70.5\")   Wt 130 kg (286 lb)   SpO2 98%   BMI 40.46 kg/m²     Wt Readings from Last 3 Encounters:   03/16/22 130 kg (286 lb)   03/10/22 129 kg (283 lb 15.2 oz)   02/23/22 129 kg (285 lb)     BP Readings from Last 3 Encounters:   03/16/22 128/83   03/10/22 136/65   02/23/22 107/76     Physical Exam  Vitals and nursing note reviewed.   Constitutional:       General: She is not in acute distress.     Appearance: Normal appearance. She is not ill-appearing.   Eyes:      Extraocular Movements: Extraocular movements intact.      Conjunctiva/sclera: Conjunctivae normal.   Cardiovascular:      Rate and Rhythm: Normal rate.   Pulmonary:      Effort: Pulmonary effort is normal.      Breath sounds: Normal breath sounds.   Abdominal:      Tenderness: There is no abdominal tenderness.   Neurological:      General: No focal deficit present.      Mental Status: She is alert and oriented to person, place, and time. Mental status is at baseline.   Psychiatric:         Mood and Affect: Mood normal.         Behavior: Behavior normal.         Thought Content: Thought content normal.         Judgment: Judgment normal.            Result Review :   The following data was reviewed by: JOSEMANUEL Delgado on 03/16/2022:  Common labs    Common Labsle 12/7/21 12/7/21 3/10/22 3/10/22 3/16/22 3/16/22 3/16/22    1748 1748 1217 1217 1051 1051 1051   Glucose  103 (A)  142 (A)   92   BUN  11  15   14   Creatinine  0.89  0.91   0.96   eGFR Non African Am  68        Sodium  139  139   138   Potassium  3.9  3.9   4.0   Chloride  104  101   101   Calcium  9.5  9.7   9.5   Albumin  4.00  4.30   4.30   Total Bilirubin  0.2  0.3   0.3 "   Alkaline Phosphatase  88  89   84   AST (SGOT)  16  21   21   ALT (SGPT)  12  17   15   WBC 11.01 (A)  10.48  8.87     Hemoglobin 14.3  15.0  14.8     Hematocrit 41.8  44.3  43.9     Platelets 259  248  265     Hemoglobin A1C      5.60    (A) Abnormal value              Data reviewed: ER notes - including labs and CT of the abdomen      Lab Results   Component Value Date    COVID19 NOT DETECTED 03/31/2021    INR 0.86 (L) 03/30/2021    BILIRUBINUR Negative 03/10/2022       Procedures        Assessment and Plan    Diagnoses and all orders for this visit:    1. Polycystic ovaries (Primary)    2. Chronic pelvic pain in female  Comments:  hysterectomy scheduled           There are no discontinued medications.       Follow Up   Return if symptoms worsen or fail to improve, for keep appt with Dr Cook .  Patient was given instructions and counseling regarding her condition or for health maintenance advice. Please see specific information pulled into the AVS if appropriate.       JOSEMANUEL Delgado  03/25/22  08:09 EDT

## 2022-03-30 ENCOUNTER — OFFICE VISIT (OUTPATIENT)
Dept: OBSTETRICS AND GYNECOLOGY | Facility: CLINIC | Age: 47
End: 2022-03-30

## 2022-03-30 ENCOUNTER — PRE-ADMISSION TESTING (OUTPATIENT)
Dept: PREADMISSION TESTING | Facility: HOSPITAL | Age: 47
End: 2022-03-30

## 2022-03-30 VITALS
HEIGHT: 70 IN | BODY MASS INDEX: 40.8 KG/M2 | HEART RATE: 96 BPM | DIASTOLIC BLOOD PRESSURE: 89 MMHG | WEIGHT: 285 LBS | SYSTOLIC BLOOD PRESSURE: 145 MMHG

## 2022-03-30 VITALS
WEIGHT: 285.27 LBS | BODY MASS INDEX: 40.84 KG/M2 | DIASTOLIC BLOOD PRESSURE: 84 MMHG | HEART RATE: 73 BPM | RESPIRATION RATE: 16 BRPM | SYSTOLIC BLOOD PRESSURE: 132 MMHG | HEIGHT: 70 IN | TEMPERATURE: 97.6 F | OXYGEN SATURATION: 97 %

## 2022-03-30 DIAGNOSIS — G89.29 CHRONIC PELVIC PAIN IN FEMALE: Primary | ICD-10-CM

## 2022-03-30 DIAGNOSIS — R10.2 CHRONIC PELVIC PAIN IN FEMALE: Primary | ICD-10-CM

## 2022-03-30 DIAGNOSIS — Z01.818 PREOPERATIVE EXAM FOR GYNECOLOGIC SURGERY: ICD-10-CM

## 2022-03-30 PROCEDURE — 99213 OFFICE O/P EST LOW 20 MIN: CPT | Performed by: STUDENT IN AN ORGANIZED HEALTH CARE EDUCATION/TRAINING PROGRAM

## 2022-03-30 NOTE — PROGRESS NOTES
"GYN Problem/Follow Up Visit    Chief Complaint   Patient presents with   • Pre-op Exam           HPI  Em Montanez is a 46 y.o. female, , who presents for preoperative follow-up.   Was seen in the ED on 3/10/2022 for lower abdominal pain.  Did undergo a CT scan with no acute findings.  Undergo colonoscopy.  Got labs drawn as well as EKG.  Normal EKG.  Hemoglobin A1c normal.  Is still smoking a half a pack to pack per day.  No other changes to health status.    Additional OB/GYN History   No LMP recorded. Patient has had an ablation.  Allergies : Prednisone and Tramadol     The additional following portions of the patient's history were reviewed and updated as appropriate: allergies, current medications, past family history, past medical history, past social history, past surgical history and problem list.    Review of Systems   Constitutional: Negative for fatigue and fever.   Respiratory: Negative for cough and shortness of breath.    Cardiovascular: Negative for chest pain.   Gastrointestinal: Positive for abdominal pain. Negative for abdominal distention.   Genitourinary: Positive for pelvic pain. Negative for difficulty urinating and dysuria.       I have reviewed and agree with the HPI, ROS, and historical information as entered above. Chito Cook MD    Objective   /89   Pulse 96   Ht 177.8 cm (70\")   Wt 129 kg (285 lb)   BMI 40.89 kg/m²     Physical Exam  Vitals and nursing note reviewed.   Constitutional:       General: She is not in acute distress.     Appearance: Normal appearance. She is not toxic-appearing.   HENT:      Head: Normocephalic and atraumatic.   Eyes:      Extraocular Movements: Extraocular movements intact.      Conjunctiva/sclera: Conjunctivae normal.   Cardiovascular:      Pulses: Normal pulses.   Pulmonary:      Effort: Pulmonary effort is normal.   Abdominal:      General: There is no distension.      Palpations: Abdomen is soft.      Tenderness: There is no " abdominal tenderness.   Musculoskeletal:      Cervical back: Normal range of motion.   Skin:     General: Skin is warm and dry.   Neurological:      Mental Status: She is alert and oriented to person, place, and time.   Psychiatric:         Mood and Affect: Mood normal.         Behavior: Behavior normal.         Thought Content: Thought content normal.            Assessment and Plan  Em Montanez is a 46 y.o.  presenting for preoperative history and physical.  Patient is scheduled to go under a total laparoscopic hysterectomy with da Rosie robot with bilateral salpingectomy this following Wednesday.  Patient has had appropriate work-up and desires to move forward surgical intervention for chronic pelvic pain.  The procedure was discussed with patient with plans of performed laparoscopically with possibility of conversion to open procedure.  Risk of bleeding, infection, injury straining structures, DVT PE, death discussed with patient.  She desires to move forward with procedure    Diagnoses and all orders for this visit:    1. Chronic pelvic pain in female (Primary)    2. Preoperative exam for gynecologic surgery        She understands the importance of having the above orders performed in a timely fashion.  The risks of not performing them include, but are not limited to, cancer and/or subsequent increase in morbidity and/or mortality.  She is encouraged to review her results online and/or contact or office if she has questions.     Follow Up:  Return in about 2 weeks (around 2022) for s/p hysterectomy .        Chito Cook MD  2022

## 2022-04-01 ENCOUNTER — APPOINTMENT (OUTPATIENT)
Dept: PREADMISSION TESTING | Facility: HOSPITAL | Age: 47
End: 2022-04-01

## 2022-04-06 ENCOUNTER — HOSPITAL ENCOUNTER (OUTPATIENT)
Facility: HOSPITAL | Age: 47
Discharge: HOME | End: 2022-04-06
Attending: STUDENT IN AN ORGANIZED HEALTH CARE EDUCATION/TRAINING PROGRAM | Admitting: STUDENT IN AN ORGANIZED HEALTH CARE EDUCATION/TRAINING PROGRAM

## 2022-04-06 ENCOUNTER — ANESTHESIA (OUTPATIENT)
Dept: PERIOP | Facility: HOSPITAL | Age: 47
End: 2022-04-06

## 2022-04-06 ENCOUNTER — ANESTHESIA EVENT (OUTPATIENT)
Dept: PERIOP | Facility: HOSPITAL | Age: 47
End: 2022-04-06

## 2022-04-06 VITALS
HEIGHT: 70 IN | OXYGEN SATURATION: 90 % | BODY MASS INDEX: 41.25 KG/M2 | SYSTOLIC BLOOD PRESSURE: 115 MMHG | WEIGHT: 288.14 LBS | DIASTOLIC BLOOD PRESSURE: 82 MMHG | RESPIRATION RATE: 15 BRPM | HEART RATE: 54 BPM | TEMPERATURE: 97.6 F

## 2022-04-06 DIAGNOSIS — Z98.890 S/P LAPAROSCOPY: Primary | ICD-10-CM

## 2022-04-06 DIAGNOSIS — R10.2 PELVIC PAIN: ICD-10-CM

## 2022-04-06 LAB — B-HCG UR QL: NEGATIVE

## 2022-04-06 PROCEDURE — 25010000002 HYDROMORPHONE 1 MG/ML SOLUTION: Performed by: NURSE ANESTHETIST, CERTIFIED REGISTERED

## 2022-04-06 PROCEDURE — 25010000002 MIDAZOLAM PER 1 MG: Performed by: ANESTHESIOLOGY

## 2022-04-06 PROCEDURE — 25010000002 HYDROMORPHONE 1 MG/ML SOLUTION: Performed by: STUDENT IN AN ORGANIZED HEALTH CARE EDUCATION/TRAINING PROGRAM

## 2022-04-06 PROCEDURE — 58571 TLH W/T/O 250 G OR LESS: CPT | Performed by: STUDENT IN AN ORGANIZED HEALTH CARE EDUCATION/TRAINING PROGRAM

## 2022-04-06 PROCEDURE — 25010000002 ONDANSETRON PER 1 MG: Performed by: NURSE ANESTHETIST, CERTIFIED REGISTERED

## 2022-04-06 PROCEDURE — 88307 TISSUE EXAM BY PATHOLOGIST: CPT | Performed by: STUDENT IN AN ORGANIZED HEALTH CARE EDUCATION/TRAINING PROGRAM

## 2022-04-06 PROCEDURE — S0260 H&P FOR SURGERY: HCPCS | Performed by: STUDENT IN AN ORGANIZED HEALTH CARE EDUCATION/TRAINING PROGRAM

## 2022-04-06 PROCEDURE — 25010000002 HYDROMORPHONE PER 4 MG: Performed by: NURSE ANESTHETIST, CERTIFIED REGISTERED

## 2022-04-06 PROCEDURE — 81025 URINE PREGNANCY TEST: CPT | Performed by: STUDENT IN AN ORGANIZED HEALTH CARE EDUCATION/TRAINING PROGRAM

## 2022-04-06 PROCEDURE — 25010000002 DEXAMETHASONE PER 1 MG: Performed by: NURSE ANESTHETIST, CERTIFIED REGISTERED

## 2022-04-06 PROCEDURE — S2900 ROBOTIC SURGICAL SYSTEM: HCPCS | Performed by: STUDENT IN AN ORGANIZED HEALTH CARE EDUCATION/TRAINING PROGRAM

## 2022-04-06 PROCEDURE — 25010000002 FENTANYL CITRATE (PF) 50 MCG/ML SOLUTION: Performed by: NURSE ANESTHETIST, CERTIFIED REGISTERED

## 2022-04-06 PROCEDURE — 25010000002 KETOROLAC TROMETHAMINE PER 15 MG: Performed by: NURSE ANESTHETIST, CERTIFIED REGISTERED

## 2022-04-06 PROCEDURE — C1889 IMPLANT/INSERT DEVICE, NOC: HCPCS | Performed by: STUDENT IN AN ORGANIZED HEALTH CARE EDUCATION/TRAINING PROGRAM

## 2022-04-06 PROCEDURE — 58571 TLH W/T/O 250 G OR LESS: CPT

## 2022-04-06 PROCEDURE — 25010000002 PROPOFOL 10 MG/ML EMULSION: Performed by: NURSE ANESTHETIST, CERTIFIED REGISTERED

## 2022-04-06 DEVICE — ABSORBABLE WOUND CLOSURE DEVICE
Type: IMPLANTABLE DEVICE | Site: PELVIS | Status: FUNCTIONAL
Brand: V-LOC 180

## 2022-04-06 RX ORDER — FENTANYL CITRATE 50 UG/ML
INJECTION, SOLUTION INTRAMUSCULAR; INTRAVENOUS AS NEEDED
Status: DISCONTINUED | OUTPATIENT
Start: 2022-04-06 | End: 2022-04-06 | Stop reason: SURG

## 2022-04-06 RX ORDER — ACETAMINOPHEN 500 MG
1000 TABLET ORAL ONCE
Status: COMPLETED | OUTPATIENT
Start: 2022-04-06 | End: 2022-04-06

## 2022-04-06 RX ORDER — ONDANSETRON 2 MG/ML
INJECTION INTRAMUSCULAR; INTRAVENOUS AS NEEDED
Status: DISCONTINUED | OUTPATIENT
Start: 2022-04-06 | End: 2022-04-06 | Stop reason: SURG

## 2022-04-06 RX ORDER — KETOROLAC TROMETHAMINE 30 MG/ML
INJECTION, SOLUTION INTRAMUSCULAR; INTRAVENOUS AS NEEDED
Status: DISCONTINUED | OUTPATIENT
Start: 2022-04-06 | End: 2022-04-06 | Stop reason: SURG

## 2022-04-06 RX ORDER — IBUPROFEN 600 MG/1
600 TABLET ORAL EVERY 6 HOURS
Qty: 20 TABLET | Refills: 0 | Status: SHIPPED | OUTPATIENT
Start: 2022-04-06 | End: 2022-04-11

## 2022-04-06 RX ORDER — LIDOCAINE HYDROCHLORIDE AND EPINEPHRINE 10; 10 MG/ML; UG/ML
INJECTION, SOLUTION INFILTRATION; PERINEURAL AS NEEDED
Status: DISCONTINUED | OUTPATIENT
Start: 2022-04-06 | End: 2022-04-06 | Stop reason: HOSPADM

## 2022-04-06 RX ORDER — OXYCODONE HYDROCHLORIDE 5 MG/1
5 TABLET ORAL EVERY 6 HOURS PRN
Qty: 12 TABLET | Refills: 0 | Status: SHIPPED | OUTPATIENT
Start: 2022-04-06 | End: 2022-04-09

## 2022-04-06 RX ORDER — ACETAMINOPHEN 325 MG/1
650 TABLET ORAL ONCE
Status: DISCONTINUED | OUTPATIENT
Start: 2022-04-06 | End: 2022-04-06 | Stop reason: HOSPADM

## 2022-04-06 RX ORDER — PROPOFOL 10 MG/ML
VIAL (ML) INTRAVENOUS AS NEEDED
Status: DISCONTINUED | OUTPATIENT
Start: 2022-04-06 | End: 2022-04-06 | Stop reason: SURG

## 2022-04-06 RX ORDER — PROMETHAZINE HYDROCHLORIDE 25 MG/1
25 SUPPOSITORY RECTAL ONCE AS NEEDED
Status: DISCONTINUED | OUTPATIENT
Start: 2022-04-06 | End: 2022-04-06 | Stop reason: HOSPADM

## 2022-04-06 RX ORDER — HYDROCODONE BITARTRATE AND ACETAMINOPHEN 5; 325 MG/1; MG/1
2 TABLET ORAL ONCE AS NEEDED
Status: DISCONTINUED | OUTPATIENT
Start: 2022-04-06 | End: 2022-04-06 | Stop reason: HOSPADM

## 2022-04-06 RX ORDER — HYDROMORPHONE HCL 110MG/55ML
PATIENT CONTROLLED ANALGESIA SYRINGE INTRAVENOUS AS NEEDED
Status: DISCONTINUED | OUTPATIENT
Start: 2022-04-06 | End: 2022-04-06 | Stop reason: SURG

## 2022-04-06 RX ORDER — DEXAMETHASONE SODIUM PHOSPHATE 4 MG/ML
INJECTION, SOLUTION INTRA-ARTICULAR; INTRALESIONAL; INTRAMUSCULAR; INTRAVENOUS; SOFT TISSUE AS NEEDED
Status: DISCONTINUED | OUTPATIENT
Start: 2022-04-06 | End: 2022-04-06 | Stop reason: SURG

## 2022-04-06 RX ORDER — PROMETHAZINE HYDROCHLORIDE 12.5 MG/1
12.5 TABLET ORAL ONCE AS NEEDED
Status: DISCONTINUED | OUTPATIENT
Start: 2022-04-06 | End: 2022-04-06 | Stop reason: HOSPADM

## 2022-04-06 RX ORDER — MIDAZOLAM HYDROCHLORIDE 1 MG/ML
2 INJECTION INTRAMUSCULAR; INTRAVENOUS ONCE
Status: COMPLETED | OUTPATIENT
Start: 2022-04-06 | End: 2022-04-06

## 2022-04-06 RX ORDER — SODIUM CHLORIDE, SODIUM LACTATE, POTASSIUM CHLORIDE, CALCIUM CHLORIDE 600; 310; 30; 20 MG/100ML; MG/100ML; MG/100ML; MG/100ML
9 INJECTION, SOLUTION INTRAVENOUS CONTINUOUS PRN
Status: DISCONTINUED | OUTPATIENT
Start: 2022-04-06 | End: 2022-04-06 | Stop reason: HOSPADM

## 2022-04-06 RX ORDER — GLYCOPYRROLATE 0.2 MG/ML
0.2 INJECTION INTRAMUSCULAR; INTRAVENOUS
Status: COMPLETED | OUTPATIENT
Start: 2022-04-06 | End: 2022-04-06

## 2022-04-06 RX ORDER — PROMETHAZINE HYDROCHLORIDE 12.5 MG/1
25 TABLET ORAL ONCE AS NEEDED
Status: DISCONTINUED | OUTPATIENT
Start: 2022-04-06 | End: 2022-04-06 | Stop reason: HOSPADM

## 2022-04-06 RX ORDER — ONDANSETRON 2 MG/ML
4 INJECTION INTRAMUSCULAR; INTRAVENOUS ONCE AS NEEDED
Status: DISCONTINUED | OUTPATIENT
Start: 2022-04-06 | End: 2022-04-06 | Stop reason: HOSPADM

## 2022-04-06 RX ORDER — MEPERIDINE HYDROCHLORIDE 25 MG/ML
12.5 INJECTION INTRAMUSCULAR; INTRAVENOUS; SUBCUTANEOUS
Status: DISCONTINUED | OUTPATIENT
Start: 2022-04-06 | End: 2022-04-06 | Stop reason: HOSPADM

## 2022-04-06 RX ORDER — ACETAMINOPHEN 325 MG/1
650 TABLET ORAL ONCE
Status: DISCONTINUED | OUTPATIENT
Start: 2022-04-06 | End: 2022-04-06 | Stop reason: SDUPTHER

## 2022-04-06 RX ORDER — HYDROCODONE BITARTRATE AND ACETAMINOPHEN 5; 325 MG/1; MG/1
1 TABLET ORAL ONCE AS NEEDED
Status: DISCONTINUED | OUTPATIENT
Start: 2022-04-06 | End: 2022-04-06 | Stop reason: HOSPADM

## 2022-04-06 RX ORDER — ROCURONIUM BROMIDE 10 MG/ML
INJECTION, SOLUTION INTRAVENOUS AS NEEDED
Status: DISCONTINUED | OUTPATIENT
Start: 2022-04-06 | End: 2022-04-06 | Stop reason: SURG

## 2022-04-06 RX ORDER — MAGNESIUM HYDROXIDE 1200 MG/15ML
LIQUID ORAL AS NEEDED
Status: DISCONTINUED | OUTPATIENT
Start: 2022-04-06 | End: 2022-04-06 | Stop reason: HOSPADM

## 2022-04-06 RX ORDER — SODIUM CHLORIDE 0.9 % (FLUSH) 0.9 %
10 SYRINGE (ML) INJECTION AS NEEDED
Status: DISCONTINUED | OUTPATIENT
Start: 2022-04-06 | End: 2022-04-06 | Stop reason: HOSPADM

## 2022-04-06 RX ORDER — SCOLOPAMINE TRANSDERMAL SYSTEM 1 MG/1
1 PATCH, EXTENDED RELEASE TRANSDERMAL CONTINUOUS
Status: DISCONTINUED | OUTPATIENT
Start: 2022-04-06 | End: 2022-04-06 | Stop reason: HOSPADM

## 2022-04-06 RX ORDER — SODIUM CHLORIDE 0.9 % (FLUSH) 0.9 %
3 SYRINGE (ML) INJECTION EVERY 12 HOURS SCHEDULED
Status: DISCONTINUED | OUTPATIENT
Start: 2022-04-06 | End: 2022-04-06 | Stop reason: HOSPADM

## 2022-04-06 RX ORDER — LIDOCAINE HYDROCHLORIDE 20 MG/ML
INJECTION, SOLUTION INFILTRATION; PERINEURAL AS NEEDED
Status: DISCONTINUED | OUTPATIENT
Start: 2022-04-06 | End: 2022-04-06 | Stop reason: SURG

## 2022-04-06 RX ORDER — ACETAMINOPHEN 500 MG
1000 TABLET ORAL EVERY 6 HOURS
Qty: 40 TABLET | Refills: 0 | Status: SHIPPED | OUTPATIENT
Start: 2022-04-06 | End: 2022-04-16

## 2022-04-06 RX ORDER — PROMETHAZINE HYDROCHLORIDE 12.5 MG/1
12.5 TABLET ORAL ONCE AS NEEDED
Status: DISCONTINUED | OUTPATIENT
Start: 2022-04-06 | End: 2022-04-06 | Stop reason: SDUPTHER

## 2022-04-06 RX ORDER — OXYCODONE HYDROCHLORIDE 5 MG/1
5 TABLET ORAL
Status: DISCONTINUED | OUTPATIENT
Start: 2022-04-06 | End: 2022-04-06 | Stop reason: HOSPADM

## 2022-04-06 RX ORDER — CEFAZOLIN SODIUM 2 G/100ML
2 INJECTION, SOLUTION INTRAVENOUS ONCE
Status: DISCONTINUED | OUTPATIENT
Start: 2022-04-06 | End: 2022-04-06 | Stop reason: HOSPADM

## 2022-04-06 RX ADMIN — SCOPALAMINE 1 PATCH: 1 PATCH, EXTENDED RELEASE TRANSDERMAL at 06:56

## 2022-04-06 RX ADMIN — HYDROMORPHONE HYDROCHLORIDE 0.5 MG: 1 INJECTION, SOLUTION INTRAMUSCULAR; INTRAVENOUS; SUBCUTANEOUS at 10:13

## 2022-04-06 RX ADMIN — ROCURONIUM BROMIDE 20 MG: 10 INJECTION INTRAVENOUS at 08:20

## 2022-04-06 RX ADMIN — ROCURONIUM BROMIDE 50 MG: 10 INJECTION INTRAVENOUS at 07:35

## 2022-04-06 RX ADMIN — FENTANYL CITRATE 100 MCG: 50 INJECTION, SOLUTION INTRAMUSCULAR; INTRAVENOUS at 07:35

## 2022-04-06 RX ADMIN — PROPOFOL 200 MG: 10 INJECTION, EMULSION INTRAVENOUS at 07:35

## 2022-04-06 RX ADMIN — DEXAMETHASONE SODIUM PHOSPHATE 4 MG: 4 INJECTION INTRA-ARTICULAR; INTRALESIONAL; INTRAMUSCULAR; INTRAVENOUS; SOFT TISSUE at 08:02

## 2022-04-06 RX ADMIN — HYDROCODONE BITARTRATE AND ACETAMINOPHEN 1 TABLET: 5; 325 TABLET ORAL at 12:28

## 2022-04-06 RX ADMIN — GLYCOPYRROLATE 0.2 MG: 0.2 INJECTION INTRAMUSCULAR; INTRAVENOUS at 07:21

## 2022-04-06 RX ADMIN — ONDANSETRON 4 MG: 2 INJECTION INTRAMUSCULAR; INTRAVENOUS at 08:02

## 2022-04-06 RX ADMIN — HYDROMORPHONE HYDROCHLORIDE 2 MG: 2 INJECTION, SOLUTION INTRAMUSCULAR; INTRAVENOUS; SUBCUTANEOUS at 07:55

## 2022-04-06 RX ADMIN — CEFAZOLIN 3 G: 1 INJECTION, POWDER, FOR SOLUTION INTRAMUSCULAR; INTRAVENOUS; PARENTERAL at 07:37

## 2022-04-06 RX ADMIN — MIDAZOLAM HYDROCHLORIDE 2 MG: 1 INJECTION, SOLUTION INTRAMUSCULAR; INTRAVENOUS at 07:21

## 2022-04-06 RX ADMIN — ACETAMINOPHEN 1000 MG: 500 TABLET ORAL at 06:56

## 2022-04-06 RX ADMIN — ROCURONIUM BROMIDE 30 MG: 10 INJECTION INTRAVENOUS at 08:49

## 2022-04-06 RX ADMIN — HYDROMORPHONE HYDROCHLORIDE 0.5 MG: 1 INJECTION, SOLUTION INTRAMUSCULAR; INTRAVENOUS; SUBCUTANEOUS at 11:22

## 2022-04-06 RX ADMIN — SODIUM CHLORIDE, POTASSIUM CHLORIDE, SODIUM LACTATE AND CALCIUM CHLORIDE: 600; 310; 30; 20 INJECTION, SOLUTION INTRAVENOUS at 08:21

## 2022-04-06 RX ADMIN — HYDROMORPHONE HYDROCHLORIDE 0.5 MG: 1 INJECTION, SOLUTION INTRAMUSCULAR; INTRAVENOUS; SUBCUTANEOUS at 09:53

## 2022-04-06 RX ADMIN — SUGAMMADEX 200 MG: 100 INJECTION, SOLUTION INTRAVENOUS at 09:34

## 2022-04-06 RX ADMIN — LIDOCAINE HYDROCHLORIDE 100 MG: 20 INJECTION, SOLUTION INFILTRATION; PERINEURAL at 07:35

## 2022-04-06 RX ADMIN — SODIUM CHLORIDE, POTASSIUM CHLORIDE, SODIUM LACTATE AND CALCIUM CHLORIDE 9 ML/HR: 600; 310; 30; 20 INJECTION, SOLUTION INTRAVENOUS at 06:51

## 2022-04-06 RX ADMIN — KETOROLAC TROMETHAMINE 30 MG: 30 INJECTION, SOLUTION INTRAMUSCULAR; INTRAVENOUS at 09:29

## 2022-04-06 NOTE — BRIEF OP NOTE
TOTAL LAPAROSCOPIC HYSTERECTOMY WITH DAVINCI ROBOT  Progress Note    Em Montanez  4/6/2022    Pre-op Diagnosis:   Pelvic pain [R10.2]       Post-Op Diagnosis Codes:     * Pelvic pain [R10.2]    Procedure/CPT® Codes:        Procedure(s):  TOTAL LAPAROSCOPIC HYSTERECTOMY WITH DAVINCI ROBOT WITH CYSTOSCOPY    Surgeon(s):  Chito Cook MD    Anesthesia: General    Staff:   Circulator: Kalli Jensen RN  Scrub Person: Kendal Cramer  Assistant: Barbara Tate CSA  Other: Lizzy Law RN  Assistant: Barbara Tate CSA      Estimated Blood Loss: 5 mL    Urine Voided: 500 mL    Specimens:                Specimens     ID Source Type Tests Collected By Collected At Frozen?    A Uterus, Cervix, Bilateral Fallopian Tubes  Tissue · TISSUE PATHOLOGY EXAM   Chito Cook MD 4/6/22 0909     Description: VAGINAL REMOVAL                Drains:   [REMOVED] Urethral Catheter Silicone 16 Fr. (Removed)       Findings: Normal appearing cervix and vaginal vault; No intraabdominal adhesions; Left ovaria ovary with small physiological cyst; normal appearing right ovary; normal appearing fallopian tubes bilaterally; Normal appearing uterus, small peritoneal cyst on posterior aspect of uterus.     Complications: None    Full operative report to come    Assistant: Barbara Tate CSA  was responsible for performing the following activities: Suction, Irrigation, Closing and Held/Positioned Camera and their skilled assistance was necessary for the success of this case.    Chito Cook MD     Date: 4/6/2022  Time: 09:34 EDT

## 2022-04-06 NOTE — OP NOTE
TOTAL LAPAROSCOPIC HYSTERECTOMY WITH DAVINCI ROBOT  Operative Note     Em Montanez  4/6/2022     Pre-op Diagnosis:   Pelvic pain [R10.2]       Post-Op Diagnosis Codes:     * Pelvic pain [R10.2]     Procedure/CPT® Codes:           Procedure(s):  TOTAL LAPAROSCOPIC HYSTERECTOMY WITH DAVINCI ROBOT WITH CYSTOSCOPY     Surgeon(s):  Chito Cook MD     Anesthesia: General     Staff:   Circulator: Kalli Jensen RN  Scrub Person: Kendal Cramer  Assistant: Barbara Tate CSA  Other: Lizzy Law RN  Assistant: Barbara Tate CSA        Estimated Blood Loss: 5 mL     Urine Voided: 500 mL     Specimens:                           Specimens      ID Source Type Tests Collected By Collected At Frozen?     A Uterus, Cervix, Bilateral Fallopian Tubes  Tissue · TISSUE PATHOLOGY EXAM    Chito Cook MD 4/6/22 7809       Description: VAGINAL REMOVAL                    Drains:   [REMOVED] Urethral Catheter Silicone 16 Fr. (Removed)         Findings: Normal appearing cervix and vaginal vault; No intraabdominal adhesions; Left ovaria ovary with small physiological cyst; normal appearing right ovary; normal appearing fallopian tubes bilaterally; Normal appearing uterus, small peritoneal cyst on posterior aspect of uterus.  Normal appearing bladder, efflux of urine from both ureteral orifices      Complications: None     The patient was taken to the operating room where general anesthesia was placed without difficulty.  She was prepped and draped in a normal sterile fashion and placed in low lithotomy position and then I was called into the room.  A Acuna catheter was placed sterilely.  An exam under anesthesia was performed.  A speculum was placed in the vagina and a single-tooth tenaculum was used to grasp the anterior lip of the cervix.  The uterus sounded to [7]cm. A 6cm tip balloon was called for.  The cervix was measured and a size 3.5 KOH was ordered.  FATEMEH manipulator and KOH cup were easily  placed and balloons were inflated.  Attention was then turned to the abdomen.    Outer gloves were removed and new gloves were placed.  All incision sites were pre-injected with local anesthetic.  A 8 mm incision was made at Solano's point. The anterior abdominal wall was tented up.   An Opti-Mónica non-bladed trocar was placed intra-abdominally after visualizing all tissue planes.  Entry into the intra-abdominal cavity was confirmed.  Pneumoperitoneum was initiated with CO2 gas, with an opening pressure of 8 mmHg noted.  A full evaluation of the abdomen and pelvis was performed with findings as above.  There was no noted damage to underlying bowel, bladder, blood vessels, or organs.  An 8 mm midline incision 2 fingerbreadths above the umbilicus was made and the robotic trocar was placed under direct visualization.  Left and right side 8mm ports were also placed under direct visualization.  The robot was then docked, and the bipolar fenestrated graspers and monopolar scissors were placed under direct visualization.  I then broke scrub and went to the robotic console.    The bilateral ureters were noted to be vermiculating well below the level of the operating plane.  The left sided mesosalpinx was cauterized with the bipolar fenestrated graspers and cut with the monopolar scissors to the cornue.  The left sided utero-ovarian ligament was cauterized and cut. The round ligament was cauterized and cut.  The broad ligament was opened.  The bladder flap was created.  The uterine artery was skeletonized, cauterized, cut and lateralized.  In a similar fashion the right sided mesosalpinx, utero-ovarian ligament and round ligaments were cauterized and cut.  The broad ligament was opened.  The bladder flap was connected to the opposite side and dissected well below the level of the upper aspect of the KOH cup.  The uterine artery was skeletonized, cauterized, cut and lateralized.  The monopolar scissors was then used to incise  the cervicovaginal junction at the upper level of the KOH cup.  And the uterus was removed through the vagina.    Copious amounts of irrigation were performed.  The cuff was made hemostatic.  The vaginal cuff was reapproximated with 180 day V lock suture. All pedicles and cuff were reinspected and noted to be hemostatic.  CO2 pneumoperitoneum was deflated.  The robotic ports were removed from the abdominal cavity.    After rescrubbing, a speculum was placed in the vagina and the cuff was visualized and palpated.  It was noted to be intact and hemostatic.  Cystoscopy was performed with findings noted above.      Attention was turned back up to the abdomen.  The skin was closed with 4-0 Monocryl in a simple interrupted fashion.  Dermabond was placed over all skin incisions.      The patient tolerated the procedure well.  Instrument, lap, and needle counts were correct.  She received antibiotics prior to procedure.  She was taken to the recovery room in stable condition.       Assistant: Barbara Tate CSA  was responsible for performing the following activities: Suction, Irrigation, Closing and Held/Positioned Camera and their skilled assistance was necessary for the success of this case.     Chito Cook MD      Date: 4/6/2022  Time: 12:53 EDT

## 2022-04-06 NOTE — H&P
[facility]   PREOPERATIVE HISTORY AND PHYSICAL    Patient Name:Em Montanez  : 1975  MRN: [id number]  Primary Care Physician: Ricky Velasquez Jr., MD  Date of admission: 2022    Subjective   Subjective     Chief Complaint: preoperative evaluation    History of Present Illness  Em Montanez is a 46 y.o. female who presents for preoperative evaluation. She is scheduled for TOTAL LAPAROSCOPIC HYSTERECTOMY WITH DAVINCI ROBOT (N/A). Got labs drawn as well as EKG.  Normal EKG.  Hemoglobin A1c normal.  Is still smoking a half a pack to pack per day.  No other changes to health status.    Review of Systems   Constitutional: Negative.    Respiratory: Negative for chest tightness, shortness of breath and wheezing.    Cardiovascular: Negative for chest pain, palpitations and leg swelling.   Gastrointestinal: Negative for abdominal pain, nausea and vomiting.   Genitourinary: Positive for pelvic pain.   Musculoskeletal: Negative.    Skin: Negative.    Neurological: Negative.    Psychiatric/Behavioral: Negative.         Personal History     Past Medical History:   Diagnosis Date   • Allergic rhinitis    • Anxiety    • Arthritis    • Asthma     NO INHALER USE   • Back pain 2017    LUMBAR SPINE X RAY LARGELY UNREMARKABLE. WILL RX PT AND MONITOR FOR IMPROVEMENT. IF PAIN CONTINUES, WILL ORDER MRI   • Depression    • Fatigue 2017   • Insomnia 2017    DISCUSSED RISKS AND BENEFITS OF MEDICATIONS. ALSO DISCUSSED SLEEP HYGIENE METHODS INCLUDING AVOIDING SOURCES OF BLUE LIGHT, DEVELOPING ROUTINE, SLEEPING IN A DARK ROOM, AND USING BED FOR SLEEP ONLY. PT HAS TIRED MELATONIN WITH INTERMITTENT EFFECTIVENESS. PT WITHOUT REPORTED SYMPTOMS OF SLEEP APNEA AT THIS TIME.    • Knee pain 2017    PREVIOUS R KNEE REPLACEMENT 2/2 ARTHRITIS. PT WOULD LIKE TO DISCUSS OPTIONS WITH ORTHOPEDICS AGAIN   • Ovarian cyst    • Polycystic ovarian syndrome 2017   • Uterine pain    • Vitamin D  deficiency 12/11/2017    CURRENTLY ON VIT D SUPPLEMENTS. WILL RECHECK VIT D LEVEL IN 3 MONTHS.        Past Surgical History:   Procedure Laterality Date   • CHOLECYSTECTOMY     • COLONOSCOPY N/A 02/16/2022    Procedure: COLONOSCOPY;  Surgeon: Jb Gonzalez MD;  Location: [facility] ENDOSCOPY;  Service: Gastroenterology;  Laterality: N/A;  hemmorroids   • ENDOMETRIAL ABLATION     • ESSURE TUBAL LIGATION     • JOINT REPLACEMENT      bilateral knee replacement   • LEEP     • OTHER SURGICAL HISTORY      METAL IMPLANTS  knees   • TONSILLECTOMY         Family History: Her family history includes Arthritis in her mother; Cancer in an other family member; Heart disease in her mother.     Social History: She  reports that she has been smoking. She has a 15.00 pack-year smoking history. She has never used smokeless tobacco. She reports current alcohol use. She reports that she does not use drugs.    Home Medications:  HYDROcodone-acetaminophen, cetirizine, cyanocobalamin, cyclobenzaprine, lidocaine, metFORMIN ER, multivitamin with minerals, nabumetone, ondansetron ODT, traZODone, and valACYclovir    Allergies:  She is allergic to prednisone and tramadol.    Objective    Objective     Vitals:    Temp:  [99.4 °F (37.4 °C)] 99.4 °F (37.4 °C)  Heart Rate:  [92] 92  Resp:  [18] 18  BP: (147)/(84) 147/84    Physical Exam  Vitals and nursing note reviewed.   Constitutional:       General: She is not in acute distress.     Appearance: Normal appearance. She is not toxic-appearing.   HENT:      Head: Normocephalic and atraumatic.   Eyes:      Extraocular Movements: Extraocular movements intact.      Conjunctiva/sclera: Conjunctivae normal.   Cardiovascular:      Pulses: Normal pulses.   Pulmonary:      Effort: Pulmonary effort is normal.   Abdominal:      General: There is no distension.      Palpations: Abdomen is soft.      Tenderness: There is no abdominal tenderness.   Genitourinary:     Comments: deferred  Musculoskeletal:       Cervical back: Normal range of motion.   Skin:     General: Skin is warm and dry.   Neurological:      Mental Status: She is alert and oriented to person, place, and time.   Psychiatric:         Mood and Affect: Mood normal.         Behavior: Behavior normal.         Thought Content: Thought content normal.         Assessment/Plan   Assessment / Plan     Brief Patient Summary:  Em Montanez is a 46 y.o. female who presents for preoperative evaluation.  Patient has had appropriate work-up and desires to move forward surgical intervention for chronic pelvic pain.  The procedure was discussed with patient with plans of performed laparoscopically with possibility of conversion to open procedure.  Risk of bleeding, infection, injury straining structures, DVT PE, death discussed with patient.  She desires to move forward with procedure    Pre-Op Diagnosis Codes:     * Pelvic pain [R10.2]    Active Hospital Problems:  Active Hospital Problems    Diagnosis    • **Pelvic pain    • Chronic pelvic pain in female      Plan:   Procedure(s):  TOTAL LAPAROSCOPIC HYSTERECTOMY WITH DAVINCI ROBOT    The risks, benefits, and alternatives of the procedure including but not limited to bleeding, infection, injury to surrounding structures, DVT/PE, death and risks of the anesthesia were discussed in detail with the patient and questions were answered. No guarantees were made or implied. Informed consent was obtained.    Chito Cook MD

## 2022-04-06 NOTE — ANESTHESIA POSTPROCEDURE EVALUATION
Patient: Em Montanez    Procedure Summary     Date: 04/06/22 Room / Location: [facility] OR 08 / [facility] MAIN OR    Anesthesia Start: 0727 Anesthesia Stop: 0950    Procedure: TOTAL LAPAROSCOPIC HYSTERECTOMY WITH DAVINCI ROBOT (N/A Abdomen) Diagnosis:       Pelvic pain      (Pelvic pain [R10.2])    Surgeons: Chito Cook MD Provider: Jose Enrique Gaona MD    Anesthesia Type: general ASA Status: 3          Anesthesia Type: general    Vitals  Vitals Value Taken Time   /88 04/06/22 1004   Temp     Pulse 84 04/06/22 1004   Resp     SpO2 97 % 04/06/22 1004   Vitals shown include unvalidated device data.        Post Anesthesia Care and Evaluation    Patient location during evaluation: bedside  Patient participation: complete - patient participated  Level of consciousness: awake  Pain management: adequate  Airway patency: patent  Anesthetic complications: No anesthetic complications  PONV Status: none  Cardiovascular status: acceptable and stable  Respiratory status: acceptable  Hydration status: acceptable    Comments: An Anesthesiologist personally participated in the most demanding procedures (including induction and emergence if applicable) in the anesthesia plan, monitored the course of anesthesia administration at frequent intervals and remained physically present and available for immediate diagnosis and treatment of emergencies.

## 2022-04-06 NOTE — DISCHARGE INSTRUCTIONS
DR. ZEE'S DISCHARGE PRECAUTIONS and Answers to FAQs    NO SEX for [SIX] weeks.    NO TUB BATH or POOL for [TWO] week(s), shower only.  Sitz baths are fine.    STITCHES (if present):  wash them daily in the shower with soap and water (any type of soap is fine, it does not need to be antibacterial soap).Look at your stitches (the ones on the outside) when you get home.  You will then know what is normal and can have a point of reference to compare it to if you start to have concerns.  REDNESS, PUS, increase in PAIN, FEVER or CHILLS are all reasons to be seen our office immediately.  Go to the ER, if it is after hours or a weekend.      VAGINAL BLEEDING:   You should not be bleeding more than 1 large pad soaked every hour or two.  Clots (even the size of a lemon or larger) may be normal as long as the bleeding is not heavy and the clots do not continue.      FEVER or CHILLS or NOT FEELING WELL: call our office.  If the office is closed, you need to be seen in acute care or ER.      CHEST PAIN or SHORTNESS OF BREATH/AIR: you need to GO TO THE NEAREST ER or CALL 911.     SWELLING:   A red, painful, hot, swollen leg (usually just one side) can be a sign of a blood clot and should be evaluated immediately.  Call our office.  If it is after hours or a weekend, you must be seen IMMEDIATELY IN THE ER.       Any further QUESTIONS or CONCERNS, please call [facility] at [phone].

## 2022-04-06 NOTE — ANESTHESIA PREPROCEDURE EVALUATION
Anesthesia Evaluation     Patient summary reviewed and Nursing notes reviewed   no history of anesthetic complications:  NPO Solid Status: > 8 hours  NPO Liquid Status: > 2 hours           Airway   Mallampati: II  TM distance: >3 FB  Neck ROM: full  No difficulty expected  Dental      Pulmonary - normal exam    breath sounds clear to auscultation  (+) asthma,  Cardiovascular - negative cardio ROS and normal exam  Exercise tolerance: good (4-7 METS)    Rhythm: regular  Rate: normal        Neuro/Psych- negative ROS  GI/Hepatic/Renal/Endo    (+) morbid obesity,      Musculoskeletal     (+) back pain,   Abdominal   (+) obese,    Substance History - negative use     OB/GYN          Other   arthritis,                      Anesthesia Plan    ASA 3     general       Anesthetic plan, all risks, benefits, and alternatives have been provided, discussed and informed consent has been obtained with: patient.        CODE STATUS:

## 2022-04-08 ENCOUNTER — TELEMEDICINE (OUTPATIENT)
Dept: FAMILY MEDICINE CLINIC | Facility: TELEHEALTH | Age: 47
End: 2022-04-08

## 2022-04-08 DIAGNOSIS — J06.9 UPPER RESPIRATORY TRACT INFECTION, UNSPECIFIED TYPE: Primary | ICD-10-CM

## 2022-04-08 LAB
CYTO UR: NORMAL
LAB AP CASE REPORT: NORMAL
LAB AP CLINICAL INFORMATION: NORMAL
PATH REPORT.FINAL DX SPEC: NORMAL
PATH REPORT.GROSS SPEC: NORMAL

## 2022-04-08 PROCEDURE — 99213 OFFICE O/P EST LOW 20 MIN: CPT | Performed by: NURSE PRACTITIONER

## 2022-04-08 RX ORDER — AMOXICILLIN 875 MG/1
875 TABLET, COATED ORAL 2 TIMES DAILY
Qty: 20 TABLET | Refills: 0 | Status: SHIPPED | OUTPATIENT
Start: 2022-04-08 | End: 2022-04-08 | Stop reason: ALTCHOICE

## 2022-04-08 RX ORDER — AMOXICILLIN AND CLAVULANATE POTASSIUM 875; 125 MG/1; MG/1
1 TABLET, FILM COATED ORAL 2 TIMES DAILY
Qty: 20 TABLET | Refills: 0 | Status: SHIPPED | OUTPATIENT
Start: 2022-04-08 | End: 2022-04-22

## 2022-04-08 RX ORDER — GUAIFENESIN AND DEXTROMETHORPHAN HYDROBROMIDE 600; 30 MG/1; MG/1
1 TABLET, EXTENDED RELEASE ORAL EVERY 12 HOURS SCHEDULED
Qty: 20 TABLET | Refills: 0 | Status: SHIPPED | OUTPATIENT
Start: 2022-04-08 | End: 2022-05-25

## 2022-04-08 NOTE — PROGRESS NOTES
You have chosen to receive care through a telehealth visit.  Do you consent to use a video/audio connection for your medical care today? Yes     CHIEF COMPLAINT  Chief Complaint   Patient presents with   • URI         HPI  Em Montanez is a 46 y.o. female  presents with complaint of chest congestion and cough that has developed over the last 7 days. She reports stopping her allergy medication before she has surgery on Wednesday and now she has cough and dark brown phlegm. She has started having sweats and chills but has no thermometer to check her temperature. She reports no complications from her NICOLLE done on Wednesday. She does not feel the sweats is due to infection of the surgery site. She declines a Covid 19 PCR test today. She is not using anything for symptoms but she is drinking increased amount of fluids.     Review of Systems   Constitutional: Positive for activity change, appetite change, chills, fatigue and fever.   HENT: Positive for congestion, rhinorrhea and sore throat.    Respiratory: Positive for cough (cough and congestion with brown colored phlegm). Negative for shortness of breath and wheezing.    Cardiovascular: Negative.    Gastrointestinal: Negative.    Allergic/Immunologic: Negative.    Neurological: Negative.    Hematological: Negative.    Psychiatric/Behavioral: Negative.        Past Medical History:   Diagnosis Date   • Allergic rhinitis    • Anxiety    • Arthritis    • Asthma     NO INHALER USE   • Back pain 11/21/2017    LUMBAR SPINE X RAY LARGELY UNREMARKABLE. WILL RX PT AND MONITOR FOR IMPROVEMENT. IF PAIN CONTINUES, WILL ORDER MRI   • Depression    • Fatigue 11/21/2017   • Insomnia 12/11/2017    DISCUSSED RISKS AND BENEFITS OF MEDICATIONS. ALSO DISCUSSED SLEEP HYGIENE METHODS INCLUDING AVOIDING SOURCES OF BLUE LIGHT, DEVELOPING ROUTINE, SLEEPING IN A DARK ROOM, AND USING BED FOR SLEEP ONLY. PT HAS TIRED MELATONIN WITH INTERMITTENT EFFECTIVENESS. PT WITHOUT REPORTED SYMPTOMS OF  SLEEP APNEA AT THIS TIME.    • Knee pain 11/21/2017    PREVIOUS R KNEE REPLACEMENT 2/2 ARTHRITIS. PT WOULD LIKE TO DISCUSS OPTIONS WITH ORTHOPEDICS AGAIN   • Ovarian cyst    • Polycystic ovarian syndrome 11/21/2017   • Uterine pain    • Vitamin D deficiency 12/11/2017    CURRENTLY ON VIT D SUPPLEMENTS. WILL RECHECK VIT D LEVEL IN 3 MONTHS.        Family History   Problem Relation Age of Onset   • Heart disease Mother    • Arthritis Mother    • Cancer Other    • Malig Hyperthermia Neg Hx        Social History     Socioeconomic History   • Marital status: Legally    Tobacco Use   • Smoking status: Current Every Day Smoker     Packs/day: 0.50     Years: 30.00     Pack years: 15.00   • Smokeless tobacco: Never Used   Vaping Use   • Vaping Use: Never used   Substance and Sexual Activity   • Alcohol use: Yes     Comment: SOCIALLY   • Drug use: Never   • Sexual activity: Defer       Em Montanez  reports that she has been smoking. She has a 15.00 pack-year smoking history. She has never used smokeless tobacco..           There were no vitals taken for this visit.    PHYSICAL EXAM  Physical Exam   Constitutional: She is oriented to person, place, and time. She appears well-developed and well-nourished. She does not have a sickly appearance. She does not appear ill. No distress.   HENT:   Head: Normocephalic and atraumatic.   Right Ear: Hearing normal.   Left Ear: Hearing normal.   Nose: Mucosal edema, rhinorrhea and congestion present.   Mouth/Throat: Mouth/Lips are normal.  Pulmonary/Chest: Effort normal.  No respiratory distress. She no audible wheeze...  Neurological: She is alert and oriented to person, place, and time.   Psychiatric: She has a normal mood and affect.   Vitals reviewed.      Results for orders placed or performed during the hospital encounter of 04/06/22   Pregnancy, Urine - Urine, Clean Catch    Specimen: Urine, Clean Catch   Result Value Ref Range    HCG, Urine QL Negative Negative        Diagnoses and all orders for this visit:    1. Upper respiratory tract infection, unspecified type (Primary)  -     Discontinue: amoxicillin (AMOXIL) 875 MG tablet; Take 1 tablet by mouth 2 (Two) Times a Day.  Dispense: 20 tablet; Refill: 0  -     amoxicillin-clavulanate (Augmentin) 875-125 MG per tablet; Take 1 tablet by mouth 2 (Two) Times a Day.  Dispense: 20 tablet; Refill: 0  -     guaifenesin-dextromethorphan (MUCINEX DM)  MG tablet sustained-release 12 hour tablet; Take 1 tablet by mouth Every 12 (Twelve) Hours.  Dispense: 20 tablet; Refill: 0    Increase decaffeinated fluids and rest.   Follow up prn worsening s/s or no improvement in 7-10 days.       FOLLOW-UP  As discussed during visit with PCP/[facility] if no improvement or Urgent Care/Emergency Department if worsening of symptoms    Patient verbalizes understanding of medication dosage, comfort measures, instructions for treatment and follow-up.    JOSEMANUEL Ugarte  04/08/2022  08:17 EDT    This visit was performed via Telehealth.  This patient has been instructed to follow-up with their primary care provider if their symptoms worsen or the treatment provided does not resolve their illness.

## 2022-04-12 ENCOUNTER — OFFICE VISIT (OUTPATIENT)
Dept: ORTHOPEDIC SURGERY | Facility: CLINIC | Age: 47
End: 2022-04-12

## 2022-04-12 VITALS — WEIGHT: 283 LBS | BODY MASS INDEX: 40.52 KG/M2 | OXYGEN SATURATION: 97 % | HEART RATE: 79 BPM | HEIGHT: 70 IN

## 2022-04-12 DIAGNOSIS — Z96.652 AFTERCARE FOLLOWING LEFT KNEE JOINT REPLACEMENT SURGERY: ICD-10-CM

## 2022-04-12 DIAGNOSIS — M25.562 LEFT KNEE PAIN, UNSPECIFIED CHRONICITY: Primary | ICD-10-CM

## 2022-04-12 DIAGNOSIS — M76.61 ACHILLES TENDINITIS OF RIGHT LOWER EXTREMITY: ICD-10-CM

## 2022-04-12 DIAGNOSIS — Z47.1 AFTERCARE FOLLOWING LEFT KNEE JOINT REPLACEMENT SURGERY: ICD-10-CM

## 2022-04-12 PROCEDURE — 99213 OFFICE O/P EST LOW 20 MIN: CPT | Performed by: PHYSICIAN ASSISTANT

## 2022-04-12 RX ORDER — AMOXICILLIN 875 MG/1
TABLET, COATED ORAL
COMMUNITY
Start: 2022-04-08 | End: 2022-04-22

## 2022-04-12 NOTE — PROGRESS NOTES
"Chief Complaint  Follow-up of the Left Knee    Subjective          Em Montanez presents to [facility] for follow-up of left knee.  Patient is s/p left total knee arthroplasty performed by Dr. Collins on 04/05/2021.  She is here today for annual follow-up.  She reports no pain of her left knee.  She states swelling overall is improved from previous visit.  She has been doing physical therapy for her right foot/Achilles tendinitis.  She states she has no improvement after performing therapy with her right foot.  She is otherwise pleased with her outcome following surgery.  She has no other new complaints today.    Objective   Allergies   Allergen Reactions   • Prednisone Mental Status Change   • Tramadol GI Intolerance     Nausea and vomiting       Vital Signs:   Pulse 79   Ht 177.8 cm (70\")   Wt 128 kg (283 lb)   SpO2 97%   BMI 40.61 kg/m²       Physical Exam  Constitutional:       Appearance: Normal appearance. Patient is well-developed and normal weight.   HENT:      Head: Normocephalic.      Right Ear: Hearing and external ear normal.      Left Ear: Hearing and external ear normal.      Nose: Nose normal.   Eyes:      Conjunctiva/sclera: Conjunctivae normal.   Cardiovascular:      Rate and Rhythm: Normal rate.   Pulmonary:      Effort: Pulmonary effort is normal.      Breath sounds: No wheezing or rales.   Abdominal:      Palpations: Abdomen is soft.      Tenderness: There is no abdominal tenderness.   Musculoskeletal:      Cervical back: Normal range of motion.   Skin:     Findings: No rash.   Neurological:      Mental Status: Patient is alert and oriented to person, place, and time.   Psychiatric:         Mood and Affect: Mood and affect normal.         Judgment: Judgment normal.     Ortho Exam  Left knee: Well-healed scar, no swelling, nontender to palpate.  AROM is 0 to 120 degrees flexion.  Stable varus valgus stress.  Patella is well tracking.  Good strength of the " quadriceps and hamstrings muscles.  Full plantar flexion.  Full dorsiflexion.  Good muscle tone of the ankle flexors.  Fully weightbearing, gait is nonantalgic.  Sensation is intact, neurovascular intact distally.  Calf is soft, nontender.    Result Review :   The following data was reviewed by: BANG Hays on 04/12/2022:         Imaging Results (Most Recent)     Procedure Component Value Units Date/Time    XR Knee 3 View Left [[id number]] Resulted: 04/12/22 1047     Updated: 04/12/22 1048    Narrative:      X-Ray Report:  Study: X-rays ordered, taken in the office, and reviewed today  Site: left knee  Xray  Indication: TKA  View: AP, Lateral and Sunrise view(s)  Findings: Intact left total knee arthroplasty. No sign of wearing or   loosening.   Prior studies available for comparison: yes                   Assessment and Plan    Problem List Items Addressed This Visit        Musculoskeletal and Injuries    Left knee pain - Primary    Relevant Orders    XR Knee 3 View Left (Completed)    Aftercare following left knee joint replacement surgery      Other Visit Diagnoses     Achilles tendinitis of right lower extremity        Relevant Orders    Ambulatory Referral to Podiatry          Follow Up   Return in about 1 year (around 4/12/2023).  Patient Instructions   Referral to podiatry provided today for right achilles tendinitis.      Ensure antibiotic prophylaxis is given prior to dental procedures.  Call with any changes or concerns.     Follow up in 1 year, repeat x-ray.     Patient was given instructions and counseling regarding her condition or for health maintenance advice. Please see specific information pulled into the AVS if appropriate.

## 2022-04-12 NOTE — PATIENT INSTRUCTIONS
Referral to podiatry provided today for right achilles tendinitis.      Ensure antibiotic prophylaxis is given prior to dental procedures.  Call with any changes or concerns.     Follow up in 1 year, repeat x-ray.

## 2022-04-18 RX ORDER — NEEDLES, FILTER 19GX1 1/2"
NEEDLE, DISPOSABLE MISCELLANEOUS
Qty: 1 EACH | Refills: 3 | Status: SHIPPED | OUTPATIENT
Start: 2022-04-18 | End: 2022-10-10

## 2022-04-22 ENCOUNTER — OFFICE VISIT (OUTPATIENT)
Dept: OBSTETRICS AND GYNECOLOGY | Facility: CLINIC | Age: 47
End: 2022-04-22

## 2022-04-22 VITALS
HEART RATE: 76 BPM | WEIGHT: 279 LBS | BODY MASS INDEX: 40.03 KG/M2 | SYSTOLIC BLOOD PRESSURE: 125 MMHG | DIASTOLIC BLOOD PRESSURE: 81 MMHG

## 2022-04-22 DIAGNOSIS — Z09 POSTOPERATIVE FOLLOW-UP: Primary | ICD-10-CM

## 2022-04-22 PROBLEM — R10.2 CHRONIC PELVIC PAIN IN FEMALE: Status: RESOLVED | Noted: 2022-02-23 | Resolved: 2022-04-22

## 2022-04-22 PROBLEM — R10.2 PELVIC PAIN: Status: RESOLVED | Noted: 2022-01-12 | Resolved: 2022-04-22

## 2022-04-22 PROBLEM — G89.29 CHRONIC PELVIC PAIN IN FEMALE: Status: RESOLVED | Noted: 2022-02-23 | Resolved: 2022-04-22

## 2022-04-22 PROCEDURE — 99024 POSTOP FOLLOW-UP VISIT: CPT | Performed by: STUDENT IN AN ORGANIZED HEALTH CARE EDUCATION/TRAINING PROGRAM

## 2022-04-22 RX ORDER — OXYCODONE HYDROCHLORIDE 5 MG/1
TABLET ORAL
COMMUNITY
End: 2022-05-25 | Stop reason: ALTCHOICE

## 2022-04-22 RX ORDER — ACETAMINOPHEN 500 MG
TABLET ORAL
COMMUNITY
End: 2022-10-10

## 2022-04-22 NOTE — PROGRESS NOTES
Post Operative Visit      CC: Post operative follow up  Chief Complaint   Patient presents with   • Post-op     TOTAL LAPAROSCOPIC HYSTERECTOMY WITH DAVINCI ROBOT       HPI:   Pain:  No  Vaginal bleeding:  No  Vaginal discharge:  No  Fever/chills:  No  Good appetite:  Yes  Normal bladder function:  Yes  Normal bowel function:  Yes      PHYSICAL EXAM:  /81   Pulse 76   Wt 127 kg (279 lb)   LMP  (LMP Unknown)   BMI 40.03 kg/m²   General- NAD, alert and oriented, appropriate  Psych- Normal mood, good memory    Abdomen- Soft, non distended, non tender, no masses  Incision/s-  Clean, dry, intact, healing well     External genitalia- Normal, no lesions  Urethra- Normal, no masses, non tender  Vagina- Vaginal cuff NL intact, no discharge, no bleeding, no mass, non-tender  Bladder- Normal, no masses, non tender, no prolapse  Cvx- Absent  Uterus- Absent  Adnexa- No mass, non tender    Lymphatic- No palpable groin nodes  Ext- No edema, no cyanosis   Skin- No lesions, no rashes, no acanthosis nigricans    ASSESSMENT and PLAN:  Post-operative exam    Diagnoses and all orders for this visit:    1. Postoperative follow-up (Primary)        Operative report, surgical findings and any pathology/photos reviewed.  All questions answered.     Counseling:   • Ok to resume normal activities  • Abstinence until 12 weeks postop   • Return to school/work with limitations no lifting > 25 lbs  • Weight loss/Nutrition reviewed.    Follow Up:  Return in about 15 weeks (around 8/5/2022) for Annual physical.      Chito Cook MD  04/22/2022    [facility]  [facility] [place] [address]  Dept: [phone]  Dept Fax: [phone]  Loc: [phone]  Loc Fax: [phone]

## 2022-05-16 RX ORDER — METFORMIN HYDROCHLORIDE 500 MG/1
TABLET, EXTENDED RELEASE ORAL
Qty: 90 TABLET | Refills: 3 | Status: SHIPPED | OUTPATIENT
Start: 2022-05-16 | End: 2023-01-09

## 2022-05-16 NOTE — TELEPHONE ENCOUNTER
Antihyperglycemics Protocol Passed 05/16/2022 01:38 PM   Protocol Details  No active pregnancy on record    Lipid panel in past year    No positive pregnancy test in the past 12 months    GFR >30ml/min in past year    Recent or future visit with authorizing provider

## 2022-05-24 DIAGNOSIS — F41.9 ANXIETY: ICD-10-CM

## 2022-05-24 DIAGNOSIS — L98.9 SKIN LESIONS: Primary | ICD-10-CM

## 2022-05-25 ENCOUNTER — OFFICE VISIT (OUTPATIENT)
Dept: PODIATRY | Facility: CLINIC | Age: 47
End: 2022-05-25

## 2022-05-25 VITALS
OXYGEN SATURATION: 100 % | BODY MASS INDEX: 40.37 KG/M2 | HEIGHT: 70 IN | TEMPERATURE: 96.2 F | WEIGHT: 282 LBS | DIASTOLIC BLOOD PRESSURE: 94 MMHG | SYSTOLIC BLOOD PRESSURE: 142 MMHG | HEART RATE: 80 BPM

## 2022-05-25 DIAGNOSIS — M79.671 FOOT PAIN, RIGHT: Primary | ICD-10-CM

## 2022-05-25 DIAGNOSIS — M76.61 ACHILLES BURSITIS OF RIGHT LOWER EXTREMITY: ICD-10-CM

## 2022-05-25 DIAGNOSIS — M76.61 ACHILLES TENDINITIS OF RIGHT LOWER EXTREMITY: ICD-10-CM

## 2022-05-25 PROCEDURE — 20605 DRAIN/INJ JOINT/BURSA W/O US: CPT | Performed by: PODIATRIST

## 2022-05-25 PROCEDURE — 99243 OFF/OP CNSLTJ NEW/EST LOW 30: CPT | Performed by: PODIATRIST

## 2022-05-25 RX ORDER — IBUPROFEN 600 MG/1
TABLET ORAL AS NEEDED
COMMUNITY
Start: 2022-04-06 | End: 2022-10-10

## 2022-05-25 RX ORDER — DEXAMETHASONE SODIUM PHOSPHATE 4 MG/ML
2 INJECTION, SOLUTION INTRA-ARTICULAR; INTRALESIONAL; INTRAMUSCULAR; INTRAVENOUS; SOFT TISSUE ONCE
Status: COMPLETED | OUTPATIENT
Start: 2022-05-25 | End: 2022-05-25

## 2022-05-25 RX ORDER — LIDOCAINE HYDROCHLORIDE 10 MG/ML
0.5 INJECTION, SOLUTION INFILTRATION; PERINEURAL ONCE
Status: COMPLETED | OUTPATIENT
Start: 2022-05-25 | End: 2022-05-25

## 2022-05-25 RX ADMIN — DEXAMETHASONE SODIUM PHOSPHATE 2 MG: 4 INJECTION, SOLUTION INTRA-ARTICULAR; INTRALESIONAL; INTRAMUSCULAR; INTRAVENOUS; SOFT TISSUE at 10:06

## 2022-05-25 RX ADMIN — LIDOCAINE HYDROCHLORIDE 0.5 ML: 10 INJECTION, SOLUTION INFILTRATION; PERINEURAL at 10:07

## 2022-05-25 NOTE — PROGRESS NOTES
[facility] - PODIATRY    Today's Date: 05/25/22    Patient Name: Em Montanez  MRN: [id number]  CSN: [id number]  PCP: Ricky Velasquez Jr., MD  Referring Provider: Elizabeth Singh PA    SUBJECTIVE     Chief Complaint   Patient presents with   • Right Foot - Pain, Establish Care     Xray 1/11/2022     HPI: Em Montanez, a 46 y.o.female, presents to clinic.    New, Established, New Problem: New    Location: Posterior right heel    Duration: Late 2021    Onset: Insidious    Nature: Sore, sharp, burning    Stable, worsening, improving: Slowly worsening    Aggravating factors:  Patient relates pain is aggravated by shoe gear and ambulation.      Previous Treatment: Change in shoe gear, change activities, avoiding activities, Motrin 600 mg    Patient denies any fevers, chills, nausea, vomiting, shortness of breath, nor any other constitutional signs nor symptoms.    Patient relates left total knee arthroplasty by Dr. Collins approximately 1 year ago.    Patient relates being treated for bilateral tarsal tunnel syndrome by [facility] in distant past.  She states she was treated with injections.    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    Past Medical History:   Diagnosis Date   • Allergic rhinitis    • Anxiety    • Arthritis    • Asthma     NO INHALER USE   • Back pain 11/21/2017    LUMBAR SPINE X RAY LARGELY UNREMARKABLE. WILL RX PT AND MONITOR FOR IMPROVEMENT. IF PAIN CONTINUES, WILL ORDER MRI   • Chronic pelvic pain in female 02/23/2022   • Depression    • Fatigue 11/21/2017   • Foot pain, right    • Insomnia 12/11/2017    DISCUSSED RISKS AND BENEFITS OF MEDICATIONS. ALSO DISCUSSED SLEEP HYGIENE METHODS INCLUDING AVOIDING SOURCES OF BLUE LIGHT, DEVELOPING ROUTINE, SLEEPING IN A DARK ROOM, AND USING BED FOR SLEEP ONLY. PT HAS TIRED MELATONIN WITH INTERMITTENT EFFECTIVENESS. PT WITHOUT REPORTED SYMPTOMS OF SLEEP APNEA AT THIS TIME.    • Knee pain 11/21/2017  "   PREVIOUS R KNEE REPLACEMENT 2/2 ARTHRITIS. PT WOULD LIKE TO DISCUSS OPTIONS WITH ORTHOPEDICS AGAIN   • Ovarian cyst    • Pelvic pain 01/12/2022   • Polycystic ovarian syndrome 11/21/2017   • Uterine pain    • Vitamin D deficiency 12/11/2017    CURRENTLY ON VIT D SUPPLEMENTS. WILL RECHECK VIT D LEVEL IN 3 MONTHS.      Past Surgical History:   Procedure Laterality Date   • CHOLECYSTECTOMY     • COLONOSCOPY N/A 02/16/2022    Procedure: COLONOSCOPY;  Surgeon: Jb Gonzalez MD;  Location: [facility] ENDOSCOPY;  Service: Gastroenterology;  Laterality: N/A;  hemmorroids   • ENDOMETRIAL ABLATION     • ESSURE TUBAL LIGATION     • JOINT REPLACEMENT      bilateral knee replacement   • LEEP     • OTHER SURGICAL HISTORY      METAL IMPLANTS  knees   • TONSILLECTOMY     • TOTAL LAPAROSCOPIC HYSTERECTOMY N/A 4/6/2022    Procedure: TOTAL LAPAROSCOPIC HYSTERECTOMY WITH DAVINCI ROBOT;  Surgeon: Chito Cook MD;  Location: [facility] MAIN OR;  Service: Robotics - DaVinci;  Laterality: N/A;     Family History   Problem Relation Age of Onset   • Heart disease Mother    • Arthritis Mother    • Cancer Other    • Malig Hyperthermia Neg Hx      Social History     Socioeconomic History   • Marital status: Legally    Tobacco Use   • Smoking status: Current Every Day Smoker     Packs/day: 0.50     Years: 30.00     Pack years: 15.00   • Smokeless tobacco: Never Used   Vaping Use   • Vaping Use: Never used   Substance and Sexual Activity   • Alcohol use: Yes     Comment: SOCIALLY   • Drug use: Never   • Sexual activity: Defer     Allergies   Allergen Reactions   • Prednisone Mental Status Change     Other reaction(s): Mental Status Change   • Tramadol GI Intolerance     Nausea and vomiting  Other reaction(s): GI Intolerance, Vomiting  Nausea and vomiting     Current Outpatient Medications   Medication Sig Dispense Refill   • acetaminophen (TYLENOL) 500 MG tablet acetaminophen 500 mg tablet     • BD Integra Syringe 25G X 1\" 3 ML " misc USE AS DIRECTED ONCE MONTHLY TO ADMINISTER B12 INJECTION 1 each 3   • cetirizine (zyrTEC) 10 MG tablet Take 1 tablet by mouth Daily. 90 tablet 3   • cyanocobalamin 1000 MCG/ML injection Inject 1,000 mcg into the appropriate muscle as directed by prescriber Every 28 (Twenty-Eight) Days.     • cyclobenzaprine (FLEXERIL) 10 MG tablet Take 1 tablet by mouth 3 (Three) Times a Day As Needed for Muscle Spasms. (Patient taking differently: Take 10 mg by mouth Every Night.) 90 tablet 0   • ibuprofen (ADVIL,MOTRIN) 600 MG tablet As Needed.     • lidocaine (LIDODERM) 5 % Place 1 patch on the skin as directed by provider Daily As Needed.     • metFORMIN ER (GLUCOPHAGE-XR) 500 MG 24 hr tablet TAKE ONE TABLET BY MOUTH DAILY WITH EVENING MEAL 90 tablet 3   • multivitamin with minerals tablet tablet Take 1 tablet by mouth Daily.     • ondansetron ODT (ZOFRAN-ODT) 4 MG disintegrating tablet Place 1 tablet on the tongue Every 8 (Eight) Hours As Needed for Nausea or Vomiting. 15 tablet 0   • traZODone (DESYREL) 50 MG tablet TAKE ONE TABLET BY MOUTH ONCE NIGHTLY (Patient taking differently: Take 50 mg by mouth Every Night.) 90 tablet 1   • valACYclovir (Valtrex) 1000 MG tablet Take 1 tablet by mouth Daily. (Patient taking differently: Take 1,000 mg by mouth As Needed.) 10 tablet 2     Current Facility-Administered Medications   Medication Dose Route Frequency Provider Last Rate Last Admin   • dexamethasone sodium phosphate injection 2 mg  2 mg Intramuscular Once Lucas Rivera DPM       • lidocaine (XYLOCAINE) 1 % injection 0.5 mL  0.5 mL Infiltration Once Lucas Rivera DPM         Review of Systems   Constitutional: Negative.    Musculoskeletal:        Painful right Achilles tendon insertion area   All other systems reviewed and are negative.      OBJECTIVE     Vitals:    05/25/22 0923   BP: 142/94   Pulse: 80   Temp: 96.2 °F (35.7 °C)   SpO2: 100%       WBC   Date Value Ref Range Status   03/16/2022 8.87 3.40  - 10.80 10*3/mm3 Final     RBC   Date Value Ref Range Status   03/16/2022 4.82 3.77 - 5.28 10*6/mm3 Final     Hemoglobin   Date Value Ref Range Status   03/16/2022 14.8 12.0 - 15.9 g/dL Final     Hematocrit   Date Value Ref Range Status   03/16/2022 43.9 34.0 - 46.6 % Final     MCV   Date Value Ref Range Status   03/16/2022 91.1 79.0 - 97.0 fL Final     MCH   Date Value Ref Range Status   03/16/2022 30.7 26.6 - 33.0 pg Final     MCHC   Date Value Ref Range Status   03/16/2022 33.7 31.5 - 35.7 g/dL Final     RDW   Date Value Ref Range Status   03/16/2022 13.0 12.3 - 15.4 % Final     RDW-SD   Date Value Ref Range Status   03/16/2022 43.6 37.0 - 54.0 fl Final     MPV   Date Value Ref Range Status   03/16/2022 10.7 6.0 - 12.0 fL Final     Platelets   Date Value Ref Range Status   03/16/2022 265 140 - 450 10*3/mm3 Final     Neutrophil %   Date Value Ref Range Status   03/16/2022 64.2 42.7 - 76.0 % Final     Lymphocyte %   Date Value Ref Range Status   03/16/2022 28.6 19.6 - 45.3 % Final     Monocyte %   Date Value Ref Range Status   03/16/2022 5.0 5.0 - 12.0 % Final     Eosinophil %   Date Value Ref Range Status   03/16/2022 1.2 0.3 - 6.2 % Final     Basophil %   Date Value Ref Range Status   03/16/2022 0.7 0.0 - 1.5 % Final     Immature Grans %   Date Value Ref Range Status   03/16/2022 0.3 0.0 - 0.5 % Final     Neutrophils, Absolute   Date Value Ref Range Status   03/16/2022 5.69 1.70 - 7.00 10*3/mm3 Final     Lymphocytes, Absolute   Date Value Ref Range Status   03/16/2022 2.54 0.70 - 3.10 10*3/mm3 Final     Monocytes, Absolute   Date Value Ref Range Status   03/16/2022 0.44 0.10 - 0.90 10*3/mm3 Final     Eosinophils, Absolute   Date Value Ref Range Status   03/16/2022 0.11 0.00 - 0.40 10*3/mm3 Final     Basophils, Absolute   Date Value Ref Range Status   03/16/2022 0.06 0.00 - 0.20 10*3/mm3 Final     Immature Grans, Absolute   Date Value Ref Range Status   03/16/2022 0.03 0.00 - 0.05 10*3/mm3 Final     nRBC   Date  Value Ref Range Status   03/16/2022 0.0 0.0 - 0.2 /100 WBC Final         Lab Results   Component Value Date    GLUCOSE 92 03/16/2022    BUN 14 03/16/2022    CREATININE 0.96 03/16/2022    EGFRIFNONA 68 12/07/2021    BCR 14.6 03/16/2022    K 4.0 03/16/2022    CO2 27.7 03/16/2022    CALCIUM 9.5 03/16/2022    ALBUMIN 4.30 03/16/2022    LABIL2 1.1 (L) 05/27/2021    AST 21 03/16/2022    ALT 15 03/16/2022       Patient seen in no apparent distress.      PHYSICAL EXAM:     Foot/Ankle Exam:       General:   Appearance: obesity    Orientation: AAOx3    Affect: appropriate    Gait: unimpaired    Shoe Gear:  Casual shoes    VASCULAR      Right Foot Vascularity   Normal vascular exam    Dorsalis pedis:  2+  Posterior tibial:  2+  Skin Temperature: warm    Edema Grading:  None  CFT:  < 3 seconds  Pedal Hair Growth:  Absent  Varicosities: mild varicosities       Left Foot Vascularity   Normal vascular exam    Dorsalis pedis:  2+  Posterior tibial:  2+  Skin Temperature: warm    Edema Grading:  None  CFT:  < 3 seconds  Pedal Hair Growth:  Absent  Varicosities: mild varicosities        NEUROLOGIC     Right Foot Neurologic   Normal sensation    Light touch sensation:  Normal  Vibratory sensation:  Normal  Hot/Cold sensation: normal    Protective Sensation using [place] Monofilament:  10     Left Foot Neurologic   Normal sensation    Light touch sensation:  Normal  Vibratory sensation:  Normal  Hot/cold sensation: normal    Protective Sensation using [place] Monofilament:  10     MUSCULOSKELETAL      Right Foot Musculoskeletal   Tenderness: calcaneus tenderness and achilles    Tenderness comment:  Tenderness to palpation at the retrocalcaneal bursa area.  No signs of edema, erythema, lymphangitis, nor signs of infection.       MUSCLE STRENGTH     Right Foot Muscle Strength   Foot dorsiflexion:  4  Foot plantar flexion:  4  Foot inversion:  4  Foot eversion:  4     Left Foot Muscle Strength   Foot dorsiflexion:   "4  Foot plantar flexion:  4  Foot inversion:  4  Foot eversion:  4     RANGE OF MOTION      Right Foot Range of Motion   Foot and ankle ROM within normal limits       Left Foot Range of Motion   Foot and ankle ROM within normal limits       DERMATOLOGIC     Right Foot Dermatologic   Skin: skin intact    Nails: normal       Left Foot Dermatologic   Skin: skin intact    Nails: normal        RADIOLOGY:      [facility], 11 January 2022    X-Ray Report:  Right foot X-Ray  Indication: Evaluation of right foot pain   AP and Lateral view(s)  Findings: There is no acute osseous abnormalities.   Prior studies available for comparison: no        ASSESSMENT/PLAN     Diagnoses and all orders for this visit:    1. Foot pain, right (Primary)    2. Achilles tendinitis of right lower extremity    3. Achilles bursitis of right lower extremity  -     lidocaine (XYLOCAINE) 1 % injection 0.5 mL  -     dexamethasone sodium phosphate injection 2 mg        Comprehensive lower extremity examination and evaluation was performed.    Discussed findings and treatment plan including risks, benefits, and treatment options with patient in detail. Patient agreed with treatment plan.    Medications and allergies reviewed.  Reviewed available lab values along with other pertinent labs.  These were discussed with the patient.    Treatment Options discussed:  - no treatment at all  - change in shoegear  - change in activities  - RICE therapy  - arch support  - NSAIDs  - PO steroids -patient states she does not want to take p.o. steroids due to \"messing with my brain\".  - injectable steroids    Injection  Date/Time: 05/25/2022  Performed by: Lucas Rivera DPM  Authorized by: Lucas Rivera DPM     Consent: Verbal consent obtained. Written consent obtained.  Risks and benefits: risks, benefits and alternatives were discussed  Consent given by: patient  Patient identity confirmed: verbally with patient  Indications: pain relief    Alcohol " prep was applied to the planned injection site.    Injection site: Right retrocalcaneal bursa area.    Sedation:  Patient sedated: no    Patient position: sitting  Needle size: 27 G  Local anesthetic: 01.0 ml 1% Lidocaine plain and 1.0 ml Decadron 4 mg/mL.   Outcome: pain improved  Patient tolerance: Patient tolerated the procedure well with no immediate complications    CAM walker applied to Right lower leg.  Patient instructed to utilize for partial-weight bearing at all time with CAM walker.  The patient states understanding and agreement with this plan.  Dr. Rivera advised patient may resume driving, but advised not to drive while wearing an ambulatory device.  Advised quick/hard depression of brakes could cause injury to areas.  Also advised of possible legal implications while driving in restrictive device.  The patient states understanding and agreement with these instructions.    Rice Therapy: It is important to treat any injury as soon as possible to help control swelling and increase recovery time. The recognized regimen for immediate treatment of sport injuries includes rest, ice (cold application), compression, and elevation (RICE). Remove the injured athlete from play, apply ice to the affected area, wrap or compress the injured area with an elastic bandage when appropriate, and elevate the injured area above heart level to reduce swelling.  The patient is to not use ice for longer than 20 minutes at a time, with at least 20 minutes of no ice usage between applications.  The patient states understanding and agreement with this plan.    An After Visit Summary was printed and given to the patient at discharge, including (if requested) any available informative/educational handouts regarding diagnosis, treatment, or medications. All questions were answered to patient/family satisfaction. Should symptoms fail to improve or worsen they agree to call or return to clinic or to go to the Emergency Department.  Discussed the importance of following up with any needed screening tests/labs/specialist appointments and any requested follow-up recommended by me today. Importance of maintaining follow-up discussed and patient accepts that missed appointments can delay diagnosis and potentially lead to worsening of conditions.    Return in about 3 weeks (around 6/15/2022) for Recheck., or sooner if acute issues arise.    This document has been electronically signed by Lucas Rivera DPM on May 25, 2022 10:01 EDT

## 2022-06-15 ENCOUNTER — OFFICE VISIT (OUTPATIENT)
Dept: PODIATRY | Facility: CLINIC | Age: 47
End: 2022-06-15

## 2022-06-15 VITALS
HEART RATE: 86 BPM | SYSTOLIC BLOOD PRESSURE: 157 MMHG | TEMPERATURE: 97.5 F | DIASTOLIC BLOOD PRESSURE: 70 MMHG | HEIGHT: 70 IN | WEIGHT: 282 LBS | OXYGEN SATURATION: 99 % | BODY MASS INDEX: 40.37 KG/M2

## 2022-06-15 DIAGNOSIS — M79.671 FOOT PAIN, BILATERAL: ICD-10-CM

## 2022-06-15 DIAGNOSIS — E11.8 DIABETIC FOOT: ICD-10-CM

## 2022-06-15 DIAGNOSIS — M79.671 FOOT PAIN, RIGHT: Primary | ICD-10-CM

## 2022-06-15 DIAGNOSIS — L60.0 ONYCHOCRYPTOSIS: ICD-10-CM

## 2022-06-15 DIAGNOSIS — B35.1 ONYCHOMYCOSIS: ICD-10-CM

## 2022-06-15 DIAGNOSIS — M79.672 FOOT PAIN, BILATERAL: ICD-10-CM

## 2022-06-15 DIAGNOSIS — M76.61 ACHILLES BURSITIS OF RIGHT LOWER EXTREMITY: ICD-10-CM

## 2022-06-15 DIAGNOSIS — E11.9 NON-INSULIN DEPENDENT TYPE 2 DIABETES MELLITUS: ICD-10-CM

## 2022-06-15 DIAGNOSIS — M76.61 ACHILLES TENDINITIS OF RIGHT LOWER EXTREMITY: ICD-10-CM

## 2022-06-15 PROCEDURE — 99213 OFFICE O/P EST LOW 20 MIN: CPT | Performed by: PODIATRIST

## 2022-06-15 PROCEDURE — 11721 DEBRIDE NAIL 6 OR MORE: CPT | Performed by: PODIATRIST

## 2022-06-15 PROCEDURE — G8404 LOW EXTEMITY NEUR EXAM DOCUM: HCPCS | Performed by: PODIATRIST

## 2022-06-15 NOTE — PROGRESS NOTES
[facility] - PODIATRY    Today's Date: 06/15/22    Patient Name: Em Montanez  MRN: [id number]  CSN: [id number]  PCP: Ricky Velasquez Jr., MD, Last PCP Visit: 24 May 2022  Referring Provider: No ref. provider found    SUBJECTIVE     Chief Complaint   Patient presents with   • Right Foot - Pain     Injection f/u     HPI: Em Montanez, a 47 y.o.female, comes to clinic.    New, Established, New Problem:  New problem  Location:  Toenails  Duration:   Greater than five years  Onset:  Gradual  Nature:  sore with palpation.  Stable, worsening, improving:   worsening  Aggravating factors:  Pain with shoe gear and ambulation.  Previous Treatment: Unable to trim their own toenails.    Patient relates significant improvement in right Achilles tendon since last visit.  Patient states she intermittently wears CAM Walker but mainly is returned back to wearing athletic shoes.    Patient denies any fevers, chills, nausea, vomiting, shortness of breath, nor any other constitutional signs nor symptoms.       Patient states their most recent blood glucose reading was 106.    Past Medical History:   Diagnosis Date   • Allergic rhinitis    • Anxiety    • Arthritis    • Asthma     NO INHALER USE   • Back pain 11/21/2017    LUMBAR SPINE X RAY LARGELY UNREMARKABLE. WILL RX PT AND MONITOR FOR IMPROVEMENT. IF PAIN CONTINUES, WILL ORDER MRI   • Chronic pelvic pain in female 02/23/2022   • Depression    • Fatigue 11/21/2017   • Foot pain, right    • Insomnia 12/11/2017    DISCUSSED RISKS AND BENEFITS OF MEDICATIONS. ALSO DISCUSSED SLEEP HYGIENE METHODS INCLUDING AVOIDING SOURCES OF BLUE LIGHT, DEVELOPING ROUTINE, SLEEPING IN A DARK ROOM, AND USING BED FOR SLEEP ONLY. PT HAS TIRED MELATONIN WITH INTERMITTENT EFFECTIVENESS. PT WITHOUT REPORTED SYMPTOMS OF SLEEP APNEA AT THIS TIME.    • Knee pain 11/21/2017    PREVIOUS R KNEE REPLACEMENT 2/2 ARTHRITIS. PT WOULD LIKE TO DISCUSS OPTIONS WITH ORTHOPEDICS AGAIN   •  "Ovarian cyst    • Pelvic pain 01/12/2022   • Polycystic ovarian syndrome 11/21/2017   • Uterine pain    • Vitamin D deficiency 12/11/2017    CURRENTLY ON VIT D SUPPLEMENTS. WILL RECHECK VIT D LEVEL IN 3 MONTHS.      Past Surgical History:   Procedure Laterality Date   • CHOLECYSTECTOMY     • COLONOSCOPY N/A 02/16/2022    Procedure: COLONOSCOPY;  Surgeon: Jb Gonzalez MD;  Location: [facility] ENDOSCOPY;  Service: Gastroenterology;  Laterality: N/A;  hemmorroids   • ENDOMETRIAL ABLATION     • ESSURE TUBAL LIGATION     • JOINT REPLACEMENT      bilateral knee replacement   • LEEP     • OTHER SURGICAL HISTORY      METAL IMPLANTS  knees   • TONSILLECTOMY     • TOTAL LAPAROSCOPIC HYSTERECTOMY N/A 4/6/2022    Procedure: TOTAL LAPAROSCOPIC HYSTERECTOMY WITH DAVINCI ROBOT;  Surgeon: Chito Cook MD;  Location: [facility] MAIN OR;  Service: Robotics - DaVinci;  Laterality: N/A;     Family History   Problem Relation Age of Onset   • Heart disease Mother    • Arthritis Mother    • Cancer Other    • Malig Hyperthermia Neg Hx      Social History     Socioeconomic History   • Marital status: Legally    Tobacco Use   • Smoking status: Current Every Day Smoker     Packs/day: 0.50     Years: 30.00     Pack years: 15.00   • Smokeless tobacco: Never Used   Vaping Use   • Vaping Use: Never used   Substance and Sexual Activity   • Alcohol use: Yes     Comment: SOCIALLY   • Drug use: Never   • Sexual activity: Defer     Allergies   Allergen Reactions   • Prednisone Mental Status Change     Other reaction(s): Mental Status Change   • Tramadol GI Intolerance     Nausea and vomiting  Other reaction(s): GI Intolerance, Vomiting  Nausea and vomiting     Current Outpatient Medications   Medication Sig Dispense Refill   • acetaminophen (TYLENOL) 500 MG tablet acetaminophen 500 mg tablet     • BD Integra Syringe 25G X 1\" 3 ML misc USE AS DIRECTED ONCE MONTHLY TO ADMINISTER B12 INJECTION 1 each 3   • cetirizine (zyrTEC) 10 MG tablet " Take 1 tablet by mouth Daily. 90 tablet 3   • cyanocobalamin 1000 MCG/ML injection Inject 1,000 mcg into the appropriate muscle as directed by prescriber Every 28 (Twenty-Eight) Days.     • cyclobenzaprine (FLEXERIL) 10 MG tablet Take 1 tablet by mouth 3 (Three) Times a Day As Needed for Muscle Spasms. (Patient taking differently: Take 10 mg by mouth Every Night.) 90 tablet 0   • ibuprofen (ADVIL,MOTRIN) 600 MG tablet As Needed.     • lidocaine (LIDODERM) 5 % Place 1 patch on the skin as directed by provider Daily As Needed.     • metFORMIN ER (GLUCOPHAGE-XR) 500 MG 24 hr tablet TAKE ONE TABLET BY MOUTH DAILY WITH EVENING MEAL 90 tablet 3   • multivitamin with minerals tablet tablet Take 1 tablet by mouth Daily.     • ondansetron ODT (ZOFRAN-ODT) 4 MG disintegrating tablet Place 1 tablet on the tongue Every 8 (Eight) Hours As Needed for Nausea or Vomiting. 15 tablet 0   • traZODone (DESYREL) 50 MG tablet TAKE ONE TABLET BY MOUTH ONCE NIGHTLY (Patient taking differently: Take 50 mg by mouth Every Night.) 90 tablet 1   • valACYclovir (Valtrex) 1000 MG tablet Take 1 tablet by mouth Daily. (Patient taking differently: Take 1,000 mg by mouth As Needed.) 10 tablet 2     No current facility-administered medications for this visit.     Review of Systems   Constitutional: Negative.    Skin:        Painful toenails.   All other systems reviewed and are negative.      OBJECTIVE     Vitals:    06/15/22 1526   BP: 157/70   Pulse: 86   Temp: 97.5 °F (36.4 °C)   SpO2: 99%       Patient seen in no apparent distress.      PHYSICAL EXAM:     Foot/Ankle Exam:       General:   Appearance: obesity    Orientation: AAOx3    Affect: appropriate    Gait: unimpaired    Shoe Gear:  Casual shoes    VASCULAR      Right Foot Vascularity   Normal vascular exam    Dorsalis pedis:  2+  Posterior tibial:  2+  Skin Temperature: warm    Edema Grading:  None  CFT:  < 3 seconds  Pedal Hair Growth:  Absent  Varicosities: mild varicosities       Left Foot  Vascularity   Normal vascular exam    Dorsalis pedis:  2+  Posterior tibial:  2+  Skin Temperature: warm    Edema Grading:  None  CFT:  < 3 seconds  Pedal Hair Growth:  Absent  Varicosities: mild varicosities        NEUROLOGIC     Right Foot Neurologic   Normal sensation    Light touch sensation:  Normal  Vibratory sensation:  Normal  Hot/Cold sensation: normal    Protective Sensation using Panola-Harpal Monofilament:  10     Left Foot Neurologic   Normal sensation    Light touch sensation:  Normal  Vibratory sensation:  Normal  Hot/cold sensation: normal    Protective Sensation using Panola-Harpal Monofilament:  10     MUSCLE STRENGTH     Right Foot Muscle Strength   Foot dorsiflexion:  4  Foot plantar flexion:  4  Foot inversion:  4  Foot eversion:  4     Left Foot Muscle Strength   Foot dorsiflexion:  4  Foot plantar flexion:  4  Foot inversion:  4  Foot eversion:  4     RANGE OF MOTION      Right Foot Range of Motion   Foot and ankle ROM within normal limits       Left Foot Range of Motion   Foot and ankle ROM within normal limits       DERMATOLOGIC     Right Foot Dermatologic   Skin: skin intact    Nails: onychomycosis, abnormally thick, subungual debris, dystrophic nails and ingrown toenail    Nails comment:  Toenails 1, 4, and 5     Left Foot Dermatologic   Skin: skin intact    Nails: onychomycosis, abnormally thick, subungual debris, dystrophic nails and ingrown toenail    Nails comment:  Toenails 1, 2 and 5      ASSESSMENT/PLAN     Diagnoses and all orders for this visit:    1. Foot pain, right (Primary)    2. Achilles tendinitis of right lower extremity    3. Achilles bursitis of right lower extremity    4. Foot pain, bilateral    5. Onychomycosis    6. Onychocryptosis    7. Diabetic foot (HCC)    8. Non-insulin dependent type 2 diabetes mellitus (HCC)        Comprehensive lower extremity examination and evaluation was performed.    Discussed findings and treatment plan including risks, benefits, and  treatment options with patient in detail. Patient agreed with treatment plan.    Toenails 1, 4, 5 on Right and 1, 2, 5 on Left were debrided with nail nippers then filed with a Bryanmel nail serena.  Patient tolerated procedure well without complications.    Diabetic foot exam performed and documented this date, compliant with CQM required standards. Detail of findings as noted in physical exam.  Lower extremity Neurologic exam for diabetic patient performed and documented this date, compliant with PQRS required standards. Detail of findings as noted in physical exam.  Advised patient importance of good routine lower extremity hygiene. Advised patient importance of evaluating for intact skin and pain free nail borders.  Advised patient to use mirror to evaluate plantar/ soles of feet for better visualization. Advised patient monitor and phone office to be seen if any cracking to skin, open lesions, painful nail borders or if nails become elongated prior to next visit. Advised patient importance of daily cleansing of lower extremities, followed by good skin cream to maintain normal hydration of skin. Also advised patient importance of close daily monitoring of blood sugar. Advised to regulate diet and medications to maintain control of blood sugar in optimal range. Contact primary care provider if difficulties maintaining blood sugar levels.  Advised Patient of presence of Diabetes Mellitus condition.  Advised Patient risk of progression and worsening or improvement, then return of condition.  Will monitor condition for any change in future. Treat with most appropriate treatment pending status of condition.  Counseled and advised patient extensively on nature and ramifications of diabetes. Standard instructions given to patient for good diabetic foot care and maintenance. Advised importance of careful monitoring to avoid break down and complications secondary to diabetes. Advised patient importance of strict  maintenance of blood sugar control. Advised patient of possible ominous results from neglect of condition, i.e.: amputation/ loss of digits, feet and legs, or even death.  Patient states understands counseling, will monitor closely, continue good hygiene and routine diabetic foot care. Patient will contact office is questions or problems.      Patient is to monitor for recurrence and any new symptoms and to contact Dr. Rivera's office for a follow-up appointment.      The patient states understanding and agreement with this plan.    An After Visit Summary was printed and given to the patient at discharge, including (if requested) any available informative/educational handouts regarding diagnosis, treatment, or medications. All questions were answered to patient/family satisfaction. Should symptoms fail to improve or worsen they agree to call or return to clinic or to go to the Emergency Department. Discussed the importance of following up with any needed screening tests/labs/specialist appointments and any requested follow-up recommended by me today. Importance of maintaining follow-up discussed and patient accepts that missed appointments can delay diagnosis and potentially lead to worsening of conditions.    Return in about 9 weeks (around 8/17/2022) for Toenail Care., or sooner if acute issues arise.    This document has been electronically signed by Lucas Rivera DPM on Hermelinda 15, 2022 16:07 EDT

## 2022-06-29 ENCOUNTER — TELEPHONE (OUTPATIENT)
Dept: OBSTETRICS AND GYNECOLOGY | Facility: CLINIC | Age: 47
End: 2022-06-29

## 2022-06-29 ENCOUNTER — PATIENT MESSAGE (OUTPATIENT)
Dept: INTERNAL MEDICINE | Facility: CLINIC | Age: 47
End: 2022-06-29

## 2022-06-29 DIAGNOSIS — E16.2 HYPOGLYCEMIA: Primary | ICD-10-CM

## 2022-06-29 NOTE — TELEPHONE ENCOUNTER
From: Em Montanez  To: Ricky Velasquez Jr, MD  Sent: 6/29/2022 9:24 AM EDT  Subject: Sugar levels     I'm having issues keeping my blood sugar up its gotten as low as 74 I am constantly eating protein and good foods to keep it maintained but it's not working any suggestions

## 2022-07-15 NOTE — PATIENT INSTRUCTIONS
1.  Please return to clinic at your next scheduled visit.  Contact the clinic ([phone]) at least 24 hours prior in the event you need to cancel.  2.  Do no harm to yourself or others.    3.  Avoid alcohol and drugs.    4.  Take all medications as prescribed.  Please contact the clinic with any concerns. If you are in need of medication refills, please call the clinic at [phone].    5. Should you want to get in touch with your provider, Dr. Juan Quintero, please utilize [employer] or contact the office ([phone]), and staff will be able to page Dr. Quintero directly.  6.  In the event you have personal crisis, contact the following crisis numbers: Suicide Prevention Hotline 1-221.265.7402; [facility] Helpline 8-106-880-UCRN; [facility] [phone]; text HELLO to 587722; or 937.     SPECIFIC RECOMMENDATIONS:     1.      Medications discussed at this encounter:                   - start lexapro     2.      Psychotherapy recommendations:      3.     Return to clinic: 6 weeks

## 2022-07-15 NOTE — PROGRESS NOTES
"Subjective   Em Montanez is a 47 y.o. female who presents today for initial evaluation     Referring Provider:  Ricky Velasquez Jr., MD  [address] [facility]  [address]  [facility] [address]    Chief Complaint: Depression and anxiety    History of Present Illness:     7/15: Chart review: Status post total laparoscopic hysterectomy with da Rosie on .  NICHOLE-7 and PHQ-9 are 0 in April.  March labs show reassuring CMP, negative pregnancy test, reassuring CBC.  No head imaging.  EKG from March shows rate 61, sinus, .  Only on trazodone 50 mg a night.  Seeing pain management.  PDMP only shows 12 oxycodone in April, likely status post laparoscopic surgery.    \"Em\"    : In person.  Interview:  1. Chart review:   2. His/Her Story: \"Tired today. Last week was a really long week.\"  a. P10, G15  b. Starting college in a few weeks. Getting . Taking care of her son.  c. Wakes in the middle of the night sometimes. No grogginess in the mornings. One tab is enough.  d. Depression for years. Anx worse in the last few months (mom  3 years ago this month).  i. Lost Dad 22 years ago.  ii. Sister she doesn't talk to.  e. Has a BF, some support there. Has a daughter and son.   f. Has never been on a psychotropic medication.  3. Depression/Mood: 4/10 depressed mood, moderate, years, feelings of guilt, poor energy and concentration, insomnia. Denies anhedonia, psychomotor retardation. Not gaining weight.  4. Anxiety: 8/10 uncontrolled worrying, moderate, muscle tension, fatigue, poor concentration, irritable, insomnia. Years. Last panic attack was years ago.  5. ADHD:   a. Elementary school:   i. Grades? good  ii. Special classes or failures? n  iii. Got in trouble?  n  iv. Referral for ADHD testing? n  b. Fhx: Son  6. PTSD: denies  7. Substances: denies  8. Therapy: interested (bereaving mom's death, depression and anx)  9. Medication compliant: y  10. Psych ROS: D, Anx, neg for ADHD, deniz, " psychosis  11. No SI HI AVH.    Access to Firearms: yes locked away    PHQ-9 Depression Screening  PHQ-9 Total Score: 10    Little interest or pleasure in doing things? 0-->not at all   Feeling down, depressed, or hopeless? 2-->more than half the days   Trouble falling or staying asleep, or sleeping too much? 1-->several days   Feeling tired or having little energy? 2-->more than half the days   Poor appetite or overeating? 2-->more than half the days   Feeling bad about yourself - or that you are a failure or have let yourself or your family down? 2-->more than half the days   Trouble concentrating on things, such as reading the newspaper or watching television? 1-->several days   Moving or speaking so slowly that other people could have noticed? Or the opposite - being so fidgety or restless that you have been moving around a lot more than usual? 0-->not at all   Thoughts that you would be better off dead, or of hurting yourself in some way? 0-->not at all   PHQ-9 Total Score 10     NICHOLE-7  Feeling nervous, anxious or on edge: More than half the days  Not being able to stop or control worrying: Nearly every day  Worrying too much about different things: Nearly every day  Trouble Relaxing: Nearly every day  Being so restless that it is hard to sit still: Several days  Feeling afraid as if something awful might happen: Several days  Becoming easily annoyed or irritable: More than half the days  NICHOLE 7 Total Score: 15  If you checked any problems, how difficult have these problems made it for you to do your work, take care of things at home, or get along with other people: Not difficult at all    Past Surgical History:  Past Surgical History:   Procedure Laterality Date   • CHOLECYSTECTOMY     • COLONOSCOPY N/A 02/16/2022    Procedure: COLONOSCOPY;  Surgeon: Jb Gonzalez MD;  Location: [facility] ENDOSCOPY;  Service: Gastroenterology;  Laterality: N/A;  hemmorroids   • ENDOMETRIAL ABLATION     • ESSURE TUBAL  "LIGATION     • JOINT REPLACEMENT      bilateral knee replacement   • LEEP     • OTHER SURGICAL HISTORY      METAL IMPLANTS  knees   • TONSILLECTOMY     • TOTAL LAPAROSCOPIC HYSTERECTOMY N/A 4/6/2022    Procedure: TOTAL LAPAROSCOPIC HYSTERECTOMY WITH DAVINCI ROBOT;  Surgeon: Chito Cook MD;  Location: [facility] MAIN OR;  Service: Robotics - DaVinci;  Laterality: N/A;       Problem List:  Patient Active Problem List   Diagnosis   • Anxiety   • Depression   • Left knee pain   • Aftercare following left knee joint replacement surgery   • Allergic rhinitis   • Arthritis   • Asthma   • Insomnia, unspecified   • Polycystic ovaries   • Vitamin D deficiency   • Encounter for Papanicolaou smear of cervix   • Body mass index (BMI) of 40.0-44.9 in adult ([facility])   • Lumbar spondylosis   • Morbid (severe) obesity due to excess calories ([facility])   • Myofascial pain   • Routine health maintenance   • Insomnia, unspecified   • Polycystic ovaries       Allergy:   Allergies   Allergen Reactions   • Prednisone Mental Status Change     Other reaction(s): Mental Status Change   • Tramadol GI Intolerance     Nausea and vomiting  Other reaction(s): GI Intolerance, Vomiting  Nausea and vomiting        Discontinued Medications:  There are no discontinued medications.    Current Medications:   Current Outpatient Medications   Medication Sig Dispense Refill   • acetaminophen (TYLENOL) 500 MG tablet acetaminophen 500 mg tablet     • BD Integra Syringe 25G X 1\" 3 ML misc USE AS DIRECTED ONCE MONTHLY TO ADMINISTER B12 INJECTION 1 each 3   • cetirizine (zyrTEC) 10 MG tablet Take 1 tablet by mouth Daily. 90 tablet 3   • cyanocobalamin 1000 MCG/ML injection Inject 1,000 mcg into the appropriate muscle as directed by prescriber Every 28 (Twenty-Eight) Days.     • cyclobenzaprine (FLEXERIL) 10 MG tablet Take 1 tablet by mouth 3 (Three) Times a Day As Needed for Muscle Spasms. (Patient taking differently: Take 10 mg by mouth Every Night.) 90 tablet 0   • " lidocaine (LIDODERM) 5 % Place 1 patch on the skin as directed by provider Daily As Needed.     • metFORMIN ER (GLUCOPHAGE-XR) 500 MG 24 hr tablet TAKE ONE TABLET BY MOUTH DAILY WITH EVENING MEAL 90 tablet 3   • multivitamin with minerals tablet tablet Take 1 tablet by mouth Daily.     • traZODone (DESYREL) 50 MG tablet TAKE ONE TABLET BY MOUTH ONCE NIGHTLY (Patient taking differently: Take 50 mg by mouth Every Night.) 90 tablet 1   • valACYclovir (Valtrex) 1000 MG tablet Take 1 tablet by mouth Daily. (Patient taking differently: Take 1,000 mg by mouth As Needed.) 10 tablet 2   • escitalopram (Lexapro) 10 MG tablet Take 1 tablet by mouth Daily. 30 tablet 2   • ibuprofen (ADVIL,MOTRIN) 600 MG tablet As Needed.     • ondansetron ODT (ZOFRAN-ODT) 4 MG disintegrating tablet Place 1 tablet on the tongue Every 8 (Eight) Hours As Needed for Nausea or Vomiting. 15 tablet 0     No current facility-administered medications for this visit.       Past Medical History:  Past Medical History:   Diagnosis Date   • Allergic rhinitis    • Anxiety    • Arthritis    • Asthma     NO INHALER USE   • Back pain 11/21/2017    LUMBAR SPINE X RAY LARGELY UNREMARKABLE. WILL RX PT AND MONITOR FOR IMPROVEMENT. IF PAIN CONTINUES, WILL ORDER MRI   • Chronic pelvic pain in female 02/23/2022   • Depression    • Fatigue 11/21/2017   • Foot pain, right    • Insomnia 12/11/2017    DISCUSSED RISKS AND BENEFITS OF MEDICATIONS. ALSO DISCUSSED SLEEP HYGIENE METHODS INCLUDING AVOIDING SOURCES OF BLUE LIGHT, DEVELOPING ROUTINE, SLEEPING IN A DARK ROOM, AND USING BED FOR SLEEP ONLY. PT HAS TIRED MELATONIN WITH INTERMITTENT EFFECTIVENESS. PT WITHOUT REPORTED SYMPTOMS OF SLEEP APNEA AT THIS TIME.    • Knee pain 11/21/2017    PREVIOUS R KNEE REPLACEMENT 2/2 ARTHRITIS. PT WOULD LIKE TO DISCUSS OPTIONS WITH ORTHOPEDICS AGAIN   • Ovarian cyst    • Pelvic pain 01/12/2022   • Polycystic ovarian syndrome 11/21/2017   • Uterine pain    • Vitamin D deficiency  2017    CURRENTLY ON VIT D SUPPLEMENTS. WILL RECHECK VIT D LEVEL IN 3 MONTHS.        Past Psychiatric History:  Began Treatment: trazodone for a year  Diagnoses: D and A  Psychiatrist:Denies  Therapist:Denies  Admission History:Denies  Medication Trials:    Only trazodone    Self Harm: Denies  Suicide Attempts:Denies   Psychosis, Anxiety, Depression: Denies    Substance Abuse History:   Types:Denies all, including illicit; used drugs in her 20s; social alcohol occasionally  Withdrawal Symptoms:Denies  Longest Period Sober:Not Applicable   AA: Not applicable     Social History:  Martial Status:  Employed:Yes  Kids:Yes  House:Lives in a house   History: Denies    Social History     Socioeconomic History   • Marital status: Legally    Tobacco Use   • Smoking status: Current Every Day Smoker     Packs/day: 1.00     Years: 30.00     Pack years: 30.00   • Smokeless tobacco: Never Used   Vaping Use   • Vaping Use: Never used   Substance and Sexual Activity   • Alcohol use: Yes     Comment: SOCIALLY   • Drug use: Not Currently     Types: Cocaine(coke), Methamphetamines, Marijuana   • Sexual activity: Yes     Partners: Male       Family History:   Suicide Attempts: Denies  Suicide Completions:Denies  Depression: MOM, daughter  ADHD: son  AUTISM: son    Family History   Problem Relation Age of Onset   • Depression Mother    • Heart disease Mother    • Arthritis Mother    • Cancer Other    • ADD / ADHD Son    • Self-Injurious Behavior  Daughter    • Depression Daughter    • Anxiety disorder Daughter    • Malig Hyperthermia Neg Hx        Developmental History:       Childhood: rough, good parents, not rich, no abuse  High School:Completed  College: starting soon    · Mental Status Exam  · Appearance  · : groomed, good eye contact, normal street clothes  · Behavior  · : pleasant and cooperative  · Motor  · : No abnormal  · Speech  · :normal rhythm, rate, volume, tone, not hyperverbal, not  "pressured, normal prosidy  · Mood  · : \"More anxious than depressed\"  · Affect  · : mild depression, mood congruent, good variability  · Thought Content  · : negative suicidal ideations, negative homicidal ideations, negative obsessions  · Perceptions  · : negative auditory hallucinations, negative visual hallucinations  · Thought Process  · : linear  · Insight/Judgement  · : Fair/fair  · Cognition  · : grossly intact  · Attention   : intact    Review of Systems:  Review of Systems   Constitutional: Positive for fatigue.   Cardiovascular: Positive for leg swelling.   Musculoskeletal: Positive for joint swelling.       Physical Exam:  Physical Exam    Vital Signs:   /68   Ht 179.1 cm (70.5\")   Wt 133 kg (292 lb 12.8 oz)   BMI 41.42 kg/m²      Lab Results:   Admission on 04/06/2022, Discharged on 04/06/2022   Component Date Value Ref Range Status   • HCG, Urine QL 04/06/2022 Negative  Negative Final   • Case Report 04/06/2022    Final                    Value:Surgical Pathology Report                         Case: [id number]                                  Authorizing Provider:  Chito Cook MD        Collected:           04/06/2022 09:09 AM          Ordering Location:     [facility] MAIN Received:            04/07/2022 03:35 AM                                 OR                                                                           Pathologist:           Dale Lantigua MD                                                            Specimen:    Uterus, Cervix, Bilateral Fallopian Tubes , VAGINAL REMOVAL                               • Clinical Information 04/06/2022    Final    This result contains rich text formatting which cannot be displayed here.   • Final Diagnosis 04/06/2022    Final                    Value:This result contains rich text formatting which cannot be displayed here.   • Gross Description 04/06/2022    Final                    Value:This result contains rich text " formatting which cannot be displayed here.   • Microscopic Description 04/06/2022    Final    This result contains rich text formatting which cannot be displayed here.   Hospital Outpatient Visit on 03/16/2022   Component Date Value Ref Range Status   • QT Interval 03/16/2022 424  ms Final   Lab on 03/16/2022   Component Date Value Ref Range Status   • Hemoglobin A1C 03/16/2022 5.60  4.80 - 5.60 % Final   • Glucose 03/16/2022 92  65 - 99 mg/dL Final   • BUN 03/16/2022 14  6 - 20 mg/dL Final   • Creatinine 03/16/2022 0.96  0.57 - 1.00 mg/dL Final   • Sodium 03/16/2022 138  136 - 145 mmol/L Final   • Potassium 03/16/2022 4.0  3.5 - 5.2 mmol/L Final   • Chloride 03/16/2022 101  98 - 107 mmol/L Final   • CO2 03/16/2022 27.7  22.0 - 29.0 mmol/L Final   • Calcium 03/16/2022 9.5  8.6 - 10.5 mg/dL Final   • Total Protein 03/16/2022 6.8  6.0 - 8.5 g/dL Final   • Albumin 03/16/2022 4.30  3.50 - 5.20 g/dL Final   • ALT (SGPT) 03/16/2022 15  1 - 33 U/L Final   • AST (SGOT) 03/16/2022 21  1 - 32 U/L Final   • Alkaline Phosphatase 03/16/2022 84  39 - 117 U/L Final   • Total Bilirubin 03/16/2022 0.3  0.0 - 1.2 mg/dL Final   • Globulin 03/16/2022 2.5  gm/dL Final   • A/G Ratio 03/16/2022 1.7  g/dL Final   • BUN/Creatinine Ratio 03/16/2022 14.6  7.0 - 25.0 Final   • Anion Gap 03/16/2022 9.3  5.0 - 15.0 mmol/L Final   • eGFR 03/16/2022 73.1  >60.0 mL/min/1.73 Final    National Kidney Foundation and American Society of Nephrology (ASN) Task Force recommended calculation based on the Chronic Kidney Disease Epidemiology Collaboration (CKD-EPI) equation refit without adjustment for race.   • HCG Qualitative 03/16/2022 Negative  Negative Final   • WBC 03/16/2022 8.87  3.40 - 10.80 10*3/mm3 Final   • RBC 03/16/2022 4.82  3.77 - 5.28 10*6/mm3 Final   • Hemoglobin 03/16/2022 14.8  12.0 - 15.9 g/dL Final   • Hematocrit 03/16/2022 43.9  34.0 - 46.6 % Final   • MCV 03/16/2022 91.1  79.0 - 97.0 fL Final   • MCH 03/16/2022 30.7  26.6 - 33.0 pg  Final   • MCHC 03/16/2022 33.7  31.5 - 35.7 g/dL Final   • RDW 03/16/2022 13.0  12.3 - 15.4 % Final   • RDW-SD 03/16/2022 43.6  37.0 - 54.0 fl Final   • MPV 03/16/2022 10.7  6.0 - 12.0 fL Final   • Platelets 03/16/2022 265  140 - 450 10*3/mm3 Final   • Neutrophil % 03/16/2022 64.2  42.7 - 76.0 % Final   • Lymphocyte % 03/16/2022 28.6  19.6 - 45.3 % Final   • Monocyte % 03/16/2022 5.0  5.0 - 12.0 % Final   • Eosinophil % 03/16/2022 1.2  0.3 - 6.2 % Final   • Basophil % 03/16/2022 0.7  0.0 - 1.5 % Final   • Immature Grans % 03/16/2022 0.3  0.0 - 0.5 % Final   • Neutrophils, Absolute 03/16/2022 5.69  1.70 - 7.00 10*3/mm3 Final   • Lymphocytes, Absolute 03/16/2022 2.54  0.70 - 3.10 10*3/mm3 Final   • Monocytes, Absolute 03/16/2022 0.44  0.10 - 0.90 10*3/mm3 Final   • Eosinophils, Absolute 03/16/2022 0.11  0.00 - 0.40 10*3/mm3 Final   • Basophils, Absolute 03/16/2022 0.06  0.00 - 0.20 10*3/mm3 Final   • Immature Grans, Absolute 03/16/2022 0.03  0.00 - 0.05 10*3/mm3 Final   • nRBC 03/16/2022 0.0  0.0 - 0.2 /100 WBC Final   Admission on 03/10/2022, Discharged on 03/10/2022   Component Date Value Ref Range Status   • Glucose 03/10/2022 142 (A) 65 - 99 mg/dL Final   • BUN 03/10/2022 15  6 - 20 mg/dL Final   • Creatinine 03/10/2022 0.91  0.57 - 1.00 mg/dL Final   • Sodium 03/10/2022 139  136 - 145 mmol/L Final   • Potassium 03/10/2022 3.9  3.5 - 5.2 mmol/L Final   • Chloride 03/10/2022 101  98 - 107 mmol/L Final   • CO2 03/10/2022 27.6  22.0 - 29.0 mmol/L Final   • Calcium 03/10/2022 9.7  8.6 - 10.5 mg/dL Final   • Total Protein 03/10/2022 7.2  6.0 - 8.5 g/dL Final   • Albumin 03/10/2022 4.30  3.50 - 5.20 g/dL Final   • ALT (SGPT) 03/10/2022 17  1 - 33 U/L Final   • AST (SGOT) 03/10/2022 21  1 - 32 U/L Final   • Alkaline Phosphatase 03/10/2022 89  39 - 117 U/L Final   • Total Bilirubin 03/10/2022 0.3  0.0 - 1.2 mg/dL Final   • Globulin 03/10/2022 2.9  gm/dL Final   • A/G Ratio 03/10/2022 1.5  g/dL Final   • BUN/Creatinine  Ratio 03/10/2022 16.5  7.0 - 25.0 Final   • Anion Gap 03/10/2022 10.4  5.0 - 15.0 mmol/L Final   • eGFR 03/10/2022 78.0  >60.0 mL/min/1.73 Final    National Kidney Foundation and American Society of Nephrology (ASN) Task Force recommended calculation based on the Chronic Kidney Disease Epidemiology Collaboration (CKD-EPI) equation refit without adjustment for race.   • Lipase 03/10/2022 25  13 - 60 U/L Final   • Color, UA 03/10/2022 Yellow  Yellow, Straw Final   • Appearance, UA 03/10/2022 Clear  Clear Final   • pH, UA 03/10/2022 <=5.0  5.0 - 8.0 Final   • Specific Gravity, UA 03/10/2022 1.009  1.005 - 1.030 Final   • Glucose, UA 03/10/2022 Negative  Negative Final   • Ketones, UA 03/10/2022 Negative  Negative Final   • Bilirubin, UA 03/10/2022 Negative  Negative Final   • Blood, UA 03/10/2022 Small (1+) (A) Negative Final   • Protein, UA 03/10/2022 Negative  Negative Final   • Leuk Esterase, UA 03/10/2022 Negative  Negative Final   • Nitrite, UA 03/10/2022 Negative  Negative Final   • Urobilinogen, UA 03/10/2022 0.2 E.U./dL  0.2 - 1.0 E.U./dL Final   • HCG Quantitative 03/10/2022 <0.50  mIU/mL Final   • Extra Tube 03/10/2022 Hold for add-ons.   Final    Auto resulted.   • Extra Tube 03/10/2022 hold for add-on   Final    Auto resulted   • Extra Tube 03/10/2022 Hold for add-ons.   Final    Auto resulted.   • Extra Tube 03/10/2022 hold for add-on   Final    Auto resulted   • WBC 03/10/2022 10.48  3.40 - 10.80 10*3/mm3 Final   • RBC 03/10/2022 4.81  3.77 - 5.28 10*6/mm3 Final   • Hemoglobin 03/10/2022 15.0  12.0 - 15.9 g/dL Final   • Hematocrit 03/10/2022 44.3  34.0 - 46.6 % Final   • MCV 03/10/2022 92.1  79.0 - 97.0 fL Final   • MCH 03/10/2022 31.2  26.6 - 33.0 pg Final   • MCHC 03/10/2022 33.9  31.5 - 35.7 g/dL Final   • RDW 03/10/2022 13.6  12.3 - 15.4 % Final   • RDW-SD 03/10/2022 46.3  37.0 - 54.0 fl Final   • MPV 03/10/2022 9.4  6.0 - 12.0 fL Final   • Platelets 03/10/2022 248  140 - 450 10*3/mm3 Final   •  Neutrophil % 03/10/2022 68.0  42.7 - 76.0 % Final   • Lymphocyte % 03/10/2022 27.0  19.6 - 45.3 % Final   • Monocyte % 03/10/2022 3.2 (A) 5.0 - 12.0 % Final   • Eosinophil % 03/10/2022 1.1  0.3 - 6.2 % Final   • Basophil % 03/10/2022 0.4  0.0 - 1.5 % Final   • Immature Grans % 03/10/2022 0.3  0.0 - 0.5 % Final   • Neutrophils, Absolute 03/10/2022 7.12 (A) 1.70 - 7.00 10*3/mm3 Final   • Lymphocytes, Absolute 03/10/2022 2.83  0.70 - 3.10 10*3/mm3 Final   • Monocytes, Absolute 03/10/2022 0.34  0.10 - 0.90 10*3/mm3 Final   • Eosinophils, Absolute 03/10/2022 0.12  0.00 - 0.40 10*3/mm3 Final   • Basophils, Absolute 03/10/2022 0.04  0.00 - 0.20 10*3/mm3 Final   • Immature Grans, Absolute 03/10/2022 0.03  0.00 - 0.05 10*3/mm3 Final   • nRBC 03/10/2022 0.0  0.0 - 0.2 /100 WBC Final   • RBC, UA 03/10/2022 3-5 (A) None Seen /HPF Final   • WBC, UA 03/10/2022 0-2 (A) None Seen /HPF Final   • Bacteria, UA 03/10/2022 1+ (A) None Seen /HPF Final   • Squamous Epithelial Cells, UA 03/10/2022 0-2  None Seen, 0-2 /HPF Final   • Hyaline Casts, UA 03/10/2022 0-2  None Seen /LPF Final   • Methodology 03/10/2022 Automated Microscopy   Final   Lab on 02/11/2022   Component Date Value Ref Range Status   • Color, UA 02/11/2022 Yellow  Yellow, Straw Final   • Appearance, UA 02/11/2022 Clear  Clear Final   • pH, UA 02/11/2022 6.5  5.0 - 8.0 Final   • Specific Gravity, UA 02/11/2022 <1.005 (A) 1.005 - 1.030 Final   • Glucose, UA 02/11/2022 Negative  Negative Final   • Ketones, UA 02/11/2022 Negative  Negative Final   • Bilirubin, UA 02/11/2022 Negative  Negative Final   • Blood, UA 02/11/2022 Trace (A) Negative Final   • Protein, UA 02/11/2022 Negative  Negative Final   • Leuk Esterase, UA 02/11/2022 Negative  Negative Final   • Nitrite, UA 02/11/2022 Negative  Negative Final   • Urobilinogen, UA 02/11/2022 0.2 E.U./dL  0.2 - 1.0 E.U./dL Final   • RBC, UA 02/11/2022 0-2  None Seen, 0-2 /HPF Final   • WBC, UA 02/11/2022 0-2  None Seen, 0-2 /HPF  Final   • Bacteria, UA 02/11/2022 None Seen  None Seen /HPF Final   • Squamous Epithelial Cells, UA 02/11/2022 0-2  None Seen, 0-2 /HPF Final   • Hyaline Casts, UA 02/11/2022 0-2  None Seen /LPF Final   • Methodology 02/11/2022 Automated Microscopy   Final       EKG Results:  No orders to display       Imaging Results:  No Images in the past 120 days found..      Assessment & Plan   Diagnoses and all orders for this visit:    1. Generalized anxiety disorder (Primary)  -     Ambulatory Referral to Behavioral Health  -     escitalopram (Lexapro) 10 MG tablet; Take 1 tablet by mouth Daily.  Dispense: 30 tablet; Refill: 2    2. Major depressive disorder, recurrent episode, moderate (HCC)  -     Ambulatory Referral to Behavioral Health  -     escitalopram (Lexapro) 10 MG tablet; Take 1 tablet by mouth Daily.  Dispense: 30 tablet; Refill: 2    3. Bereavement  -     Ambulatory Referral to Behavioral Health    4. Insomnia due to mental condition  -     escitalopram (Lexapro) 10 MG tablet; Take 1 tablet by mouth Daily.  Dispense: 30 tablet; Refill: 2        Visit Diagnoses:    ICD-10-CM ICD-9-CM   1. Generalized anxiety disorder  F41.1 300.02   2. Major depressive disorder, recurrent episode, moderate (HCC)  F33.1 296.32   3. Bereavement  Z63.4 V62.82   4. Insomnia due to mental condition  F51.05 300.9     327.02     7/18: Fam hx of depression puts pt at 2-3x greater risk of depression. Start lexapro, therapy. Continue trazodone. 20 minutes of supportive psychotherapy with goal to strengthen defenses, promote problems solving, restore adaptive functioning and provide symptom relief. The therapeutic alliance was strengthened to encourage the patient to express their thoughts and feelings. Esteem building was enhanced through praise, reassurance, normalizing and encouragement. Coping skills were enhanced to build distress tolerance skills and emotional regulation. Allowed patient to freely discuss issues without interruption  or judgement with unconditional positive regard, active listening skills, and empathy. Provided a safe, confidential environment to facilitate the development of a positive therapeutic relationship and encourage open, honest communication. Assisted patient in identifying risk factors which would indicate the need for higher level of care including thoughts to harm self or others and/or self-harming behavior and encouraged patient to contact this office, call 911, or present to the nearest emergency room should any of these events occur. Assisted patient in processing session content; acknowledged and normalized patient’s thoughts, feelings, and concerns by utilizing a person-centered approach in efforts to build appropriate rapport and a positive therapeutic relationship with open and honest communication. Patient given education on medication side effects, diagnosis/illness and relapse symptoms. Plan to continue supportive psychotherapy in next appointment to provide symptom relief.  Diagnoses: as above  Symptoms: as above  Functional status: good  Mental Status Exam: as above    Treatment plan: Medication management and supportive psychotherapy  Prognosis: good  Progress: 1st visit  6 wks      PLAN:  12. Safety: No acute safety concerns  13. Therapy: Referral Made  14. Risk Assessment: Risk of self-harm acutely is moderate.  Risk factors include anxiety disorder, mood disorder, access to weapons, and recent psychosocial stressors (pandemic). Protective factors include no family history, no present SI, no history of suicide attempts or self-harm in the past, minimal AODA, healthcare seeking, future orientation, willingness to engage in care.  Risk of self-harm chronically is also moderate, but could be further elevated in the event of treatment noncompliance and/or AODA.  15. Meds:  a. START lexapro 10 mg daily. Risks, benefits, alternatives discussed with patient including GI upset, nausea vomiting diarrhea,  theoretical decrease of seizure threshold predisposing the patient to a slightly higher seizure risk, headaches, sexual dysfunction, serotonin syndrome, bleeding risk, increased suicidality in patients 24 years and younger.  After discussion of these risks and benefits, the patient voiced understanding and agreed to proceed.  b. CONTINUE trazodone 50 mg qhs. Risks, benefits, side effects discussed with patient including GI upset, sedation, dizziness/falls risk, grogginess the following day, prolongation of the QTc interval.  After discussion of these risks and benefits, the patient voiced understanding and agreed to proceed.    16. Labs: none  17. Follow up: 6 wks    Patient screened positive for depression based on a PHQ-9 score of  on . Follow-up recommendations include: Prescribed antidepressant medication treatment and Suicide Risk Assessment performed.           TREATMENT PLAN/GOALS: Continue supportive psychotherapy efforts and medications as indicated. Treatment and medication options discussed during today's visit. Patient acknowledged and verbally consented to continue with current treatment plan and was educated on the importance of compliance with treatment and follow-up appointments.    MEDICATION ISSUES:  ENMANUEL reviewed as expected.  Discussed medication options and treatment plan of prescribed medication as well as the risks, benefits, and side effects including potential falls, possible impaired driving and metabolic adversities among others. Patient is agreeable to call the office with any worsening of symptoms or onset of side effects. Patient is agreeable to call 911 or go to the nearest ER should he/she begin having SI/HI. No medication side effects or related complaints today.     MEDS ORDERED DURING VISIT:  New Medications Ordered This Visit   Medications   • escitalopram (Lexapro) 10 MG tablet     Sig: Take 1 tablet by mouth Daily.     Dispense:  30 tablet     Refill:  2       Return in about  6 weeks (around 8/29/2022).         This document has been electronically signed by Juan Quintero MD  July 18, 2022 14:27 EDT      Part of this note may be an electronic transcription/translation of spoken language to printed text using the Dragon Dictation System.

## 2022-07-18 ENCOUNTER — OFFICE VISIT (OUTPATIENT)
Dept: PSYCHIATRY | Facility: CLINIC | Age: 47
End: 2022-07-18

## 2022-07-18 VITALS
SYSTOLIC BLOOD PRESSURE: 146 MMHG | HEIGHT: 71 IN | WEIGHT: 292.8 LBS | BODY MASS INDEX: 40.99 KG/M2 | DIASTOLIC BLOOD PRESSURE: 68 MMHG

## 2022-07-18 DIAGNOSIS — F51.05 INSOMNIA DUE TO MENTAL CONDITION: ICD-10-CM

## 2022-07-18 DIAGNOSIS — F41.1 GENERALIZED ANXIETY DISORDER: Primary | ICD-10-CM

## 2022-07-18 DIAGNOSIS — F33.1 MAJOR DEPRESSIVE DISORDER, RECURRENT EPISODE, MODERATE: ICD-10-CM

## 2022-07-18 DIAGNOSIS — Z63.4 BEREAVEMENT: ICD-10-CM

## 2022-07-18 PROBLEM — M79.18 MYOFASCIAL PAIN: Status: ACTIVE | Noted: 2022-07-18

## 2022-07-18 PROBLEM — E28.2 POLYCYSTIC OVARIES: Status: ACTIVE | Noted: 2017-11-21

## 2022-07-18 PROBLEM — G47.00 INSOMNIA, UNSPECIFIED: Status: ACTIVE | Noted: 2017-12-11

## 2022-07-18 PROBLEM — M47.816 LUMBAR SPONDYLOSIS: Status: ACTIVE | Noted: 2021-09-08

## 2022-07-18 PROBLEM — E66.01 MORBID (SEVERE) OBESITY DUE TO EXCESS CALORIES (HCC): Status: ACTIVE | Noted: 2022-04-26

## 2022-07-18 PROBLEM — Z00.00 ROUTINE HEALTH MAINTENANCE: Status: ACTIVE | Noted: 2022-04-26

## 2022-07-18 PROCEDURE — 90792 PSYCH DIAG EVAL W/MED SRVCS: CPT | Performed by: STUDENT IN AN ORGANIZED HEALTH CARE EDUCATION/TRAINING PROGRAM

## 2022-07-18 RX ORDER — ESCITALOPRAM OXALATE 10 MG/1
10 TABLET ORAL DAILY
Qty: 30 TABLET | Refills: 2 | Status: SHIPPED | OUTPATIENT
Start: 2022-07-18 | End: 2022-08-25

## 2022-07-18 SDOH — SOCIAL STABILITY - SOCIAL INSECURITY: DISSAPEARANCE AND DEATH OF FAMILY MEMBER: Z63.4

## 2022-07-18 NOTE — TREATMENT PLAN
Multi-Disciplinary Problems (from Behavioral Health Treatment Plan)    Active Problems     Problem: Anxiety  Start Date: 07/18/22    Problem Details: The patient self-scales this problem as a 8 with 10 being the worst.        Goal Priority Start Date Expected End Date End Date    Patient will develop and implement behavioral and cognitive strategies to reduce anxiety and irrational fears. -- 07/18/22 -- --    Goal Details: Progress toward goal:  Not appropriate to rate progress toward goal since this is the initial treatment plan.        Goal Intervention Frequency Start Date End Date    Help patient explore past emotional issues in relation to present anxiety. Q Month 07/18/22 --    Intervention Details: Duration of treatment until until remission of symptoms.        Goal Intervention Frequency Start Date End Date    Help patient develop an awareness of their cognitive and physical responses to anxiety. Q Month 07/18/22 --    Intervention Details: Duration of treatment until until remission of symptoms.              Problem: Depression  Start Date: 07/18/22    Problem Details: The patient self-scales this problem as a 4 with 10 being the worst.        Goal Priority Start Date Expected End Date End Date    Patient will demonstrate the ability to initiate new constructive life skills outside of sessions on a consistent basis. -- 07/18/22 -- --    Goal Details: Progress toward goal:  Not appropriate to rate progress toward goal since this is the initial treatment plan.        Goal Intervention Frequency Start Date End Date    Assist patient in setting attainable activities of daily living goals. PRN 07/18/22 --    Goal Intervention Frequency Start Date End Date    Provide education about depression Q Month 07/18/22 --    Intervention Details: Duration of treatment until until remission of symptoms.        Goal Intervention Frequency Start Date End Date    Assist patient in developing healthy coping strategies. Q Month  07/18/22 --    Intervention Details: Duration of treatment until until remission of symptoms.                    Reviewed By     Juan Quintero MD 07/18/22 9189                 I have discussed and reviewed this treatment plan with the patient.

## 2022-07-24 ENCOUNTER — TELEPHONE (OUTPATIENT)
Dept: URGENT CARE | Facility: CLINIC | Age: 47
End: 2022-07-24

## 2022-07-24 NOTE — TELEPHONE ENCOUNTER
----- Message from JOSEMANUEL Campuzano sent at 7/24/2022  1:32 PM EDT -----  . Left message for patient to return call. You may give her the results. She can remove the splint tomorrow and if still having pain follow up with her primary or orthopedics.

## 2022-07-25 ENCOUNTER — OFFICE VISIT (OUTPATIENT)
Dept: INTERNAL MEDICINE | Facility: CLINIC | Age: 47
End: 2022-07-25

## 2022-07-25 VITALS
HEIGHT: 70 IN | HEART RATE: 76 BPM | WEIGHT: 293 LBS | BODY MASS INDEX: 41.95 KG/M2 | TEMPERATURE: 97.5 F | DIASTOLIC BLOOD PRESSURE: 84 MMHG | OXYGEN SATURATION: 96 % | SYSTOLIC BLOOD PRESSURE: 137 MMHG

## 2022-07-25 DIAGNOSIS — S69.92XD INJURY OF LEFT WRIST, SUBSEQUENT ENCOUNTER: Primary | ICD-10-CM
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
## 2022-07-25 PROCEDURE — 99213 OFFICE O/P EST LOW 20 MIN: CPT | Performed by: STUDENT IN AN ORGANIZED HEALTH CARE EDUCATION/TRAINING PROGRAM

## 2022-07-25 NOTE — PROGRESS NOTES
"Chief Complaint  Follow-up (Urgent care) and Wrist Injury (Left arm)    Subjective          Em Montanez presents to [facility]  History of Present Illness    Saturday, fell up some steps and landed on left arm.  Went to urgent care, and had splint placed.  X-rays randi    Still throbbing and quite painful around wrist.    Current Outpatient Medications   Medication Instructions   • acetaminophen (TYLENOL) 500 MG tablet acetaminophen 500 mg tablet   • BD Integra Syringe 25G X 1\" 3 ML misc USE AS DIRECTED ONCE MONTHLY TO ADMINISTER B12 INJECTION   • cetirizine (ZYRTEC) 10 mg, Oral, Daily   • cyanocobalamin 1,000 mcg, Intramuscular, Every 28 Days   • cyclobenzaprine (FLEXERIL) 10 mg, Oral, 3 Times Daily PRN   • escitalopram (LEXAPRO) 10 mg, Oral, Daily   • ibuprofen (ADVIL,MOTRIN) 600 MG tablet As Needed   • lidocaine (LIDODERM) 5 % 1 patch, Transdermal, Daily PRN   • metFORMIN ER (GLUCOPHAGE-XR) 500 MG 24 hr tablet TAKE ONE TABLET BY MOUTH DAILY WITH EVENING MEAL   • multivitamin with minerals tablet tablet 1 tablet, Oral, Daily   • ondansetron ODT (ZOFRAN-ODT) 4 mg, Translingual, Every 8 Hours PRN   • traZODone (DESYREL) 50 MG tablet TAKE ONE TABLET BY MOUTH ONCE NIGHTLY   • valACYclovir (VALTREX) 1,000 mg, Oral, Daily       The following portions of the patient's history were reviewed and updated as appropriate: allergies, current medications, past family history, past medical history, past social history, past surgical history, and problem list.    Objective   Vital Signs:   /84   Pulse 76   Temp 97.5 °F (36.4 °C) (Temporal)   Ht 177.8 cm (70\")   Wt 134 kg (295 lb)   SpO2 96%   BMI 42.33 kg/m²     Wt Readings from Last 3 Encounters:   07/25/22 134 kg (295 lb)   07/23/22 134 kg (295 lb 1.6 oz)   07/18/22 133 kg (292 lb 12.8 oz)     BP Readings from Last 3 Encounters:   07/25/22 137/84   07/23/22 163/97   07/18/22 146/68     Physical Exam  Vitals " reviewed.   Constitutional:       Appearance: Normal appearance.   HENT:      Head: Normocephalic and atraumatic.   Eyes:      Conjunctiva/sclera: Conjunctivae normal.   Cardiovascular:      Rate and Rhythm: Normal rate and regular rhythm.      Pulses: Normal pulses.      Heart sounds: Normal heart sounds. No murmur heard.  Pulmonary:      Effort: Pulmonary effort is normal.      Breath sounds: Normal breath sounds. No wheezing.   Abdominal:      General: Abdomen is flat.      Palpations: Abdomen is soft.      Tenderness: There is no abdominal tenderness.   Musculoskeletal:      Right lower leg: No edema.      Left lower leg: No edema.      Comments: Left wrist and forearm with splint in place   Skin:     General: Skin is warm and dry.   Neurological:      General: No focal deficit present.      Mental Status: She is alert. Mental status is at baseline.   Psychiatric:         Mood and Affect: Mood normal.         Behavior: Behavior normal.         Thought Content: Thought content normal.         Judgment: Judgment normal.          Result Review :   The following data was reviewed by: Malvin Jackson MD on 07/25/2022:  Common labs    Common Labsle 12/7/21 12/7/21 3/10/22 3/10/22 3/16/22 3/16/22 3/16/22    1748 1748 1217 1217 1051 1051 1051   Glucose  103 (A)  142 (A)   92   BUN  11  15   14   Creatinine  0.89  0.91   0.96   eGFR Non African Am  68        Sodium  139  139   138   Potassium  3.9  3.9   4.0   Chloride  104  101   101   Calcium  9.5  9.7   9.5   Albumin  4.00  4.30   4.30   Total Bilirubin  0.2  0.3   0.3   Alkaline Phosphatase  88  89   84   AST (SGOT)  16  21   21   ALT (SGPT)  12  17   15   WBC 11.01 (A)  10.48  8.87     Hemoglobin 14.3  15.0  14.8     Hematocrit 41.8  44.3  43.9     Platelets 259  248  265     Hemoglobin A1C      5.60    (A) Abnormal value                   Lab Results   Component Value Date    COVID19 NOT DETECTED 03/31/2021    INR 0.86 (L) 03/30/2021    BILIRUBINUR Negative  03/10/2022       Procedures        Assessment and Plan    Diagnoses and all orders for this visit:    1. Injury of left wrist, subsequent encounter (Primary)  -     Ambulatory Referral to Orthopedic Surgery  -     XR Wrist 2 View Left; Future  -     XR Hand 3+ View Left; Future    2. Fall, subsequent encounter          There are no discontinued medications.       Follow Up   Return if symptoms worsen or fail to improve.  Patient was given instructions and counseling regarding her condition or for health maintenance advice. Please see specific information pulled into the AVS if appropriate.       Malvin Jackson MD  07/25/22  15:24 EDT

## 2022-07-26 ENCOUNTER — TELEPHONE (OUTPATIENT)
Dept: ORTHOPEDIC SURGERY | Facility: CLINIC | Age: 47
End: 2022-07-26

## 2022-07-26 ENCOUNTER — PATIENT ROUNDING (BHMG ONLY) (OUTPATIENT)
Dept: INTERNAL MEDICINE | Facility: CLINIC | Age: 47
End: 2022-07-26

## 2022-07-26 NOTE — TELEPHONE ENCOUNTER
Caller: Em Montanez    Relationship to patient: Self    Best call back number: [phone]    Chief complaint: LEFT WRIST INJURY/PAIN    Type of visit: NEW PROBLEM    Requested date: ASAP    If rescheduling, when is the original appointment: 7.29.2022    Additional notes: EXISTING PT OF DR SANON. SCHEDULED PT FOR FIRST AVAILABLE APPT ON Friday (7.29.2022). SHE IS WEARING A SPLINT THAT THEY PUT ON HER WRIST AT  THAT GOES ALL THE WAY TO HER ELBOW. SHE WOULD LIKE TO BE SEEN SOONER IF POSSIBLE. SHE DRIVES FOR A LIVING AND THE SPLINT WILL MAKE IT VERY DIFFICULT. DR BLISS HAS THE PT OFF OF WORK FOR NOW AND SHE IS TO GO BACK ON Thursday. SHE IS WILLING TO SEE PA. HUB CANNOT SCHEDULE A NEW PROBLEM W/ PA.

## 2022-07-29 ENCOUNTER — OFFICE VISIT (OUTPATIENT)
Dept: ORTHOPEDIC SURGERY | Facility: CLINIC | Age: 47
End: 2022-07-29

## 2022-07-29 VITALS — HEIGHT: 70 IN | BODY MASS INDEX: 41.95 KG/M2 | OXYGEN SATURATION: 99 % | WEIGHT: 293 LBS | HEART RATE: 63 BPM

## 2022-07-29 DIAGNOSIS — M25.532 LEFT WRIST PAIN: Primary | ICD-10-CM

## 2022-07-29 DIAGNOSIS — S63.502A SPRAIN OF LEFT WRIST, INITIAL ENCOUNTER: ICD-10-CM

## 2022-07-29 PROCEDURE — 99213 OFFICE O/P EST LOW 20 MIN: CPT | Performed by: ORTHOPAEDIC SURGERY

## 2022-07-29 RX ORDER — AMOXICILLIN 500 MG/1
CAPSULE ORAL
COMMUNITY
Start: 2022-07-25 | End: 2022-08-25

## 2022-07-29 NOTE — PROGRESS NOTES
"Chief Complaint  Initial Evaluation of the Left Wrist     Subjective      Em Montanez presents to [facility] for an evaluation of left wrist. On 7/23/22, she missed some steps and fell on concrete. She had immediate left wrist pain. She was treated at  as if she had a fracture. She was placed into a long arm splint.     Allergies   Allergen Reactions   • Prednisone Mental Status Change     Other reaction(s): Mental Status Change   • Tramadol GI Intolerance     Nausea and vomiting  Other reaction(s): GI Intolerance, Vomiting  Nausea and vomiting        Social History     Socioeconomic History   • Marital status: Legally    Tobacco Use   • Smoking status: Current Every Day Smoker     Packs/day: 1.00     Years: 30.00     Pack years: 30.00   • Smokeless tobacco: Never Used   Vaping Use   • Vaping Use: Never used   Substance and Sexual Activity   • Alcohol use: Yes     Comment: SOCIALLY   • Drug use: Not Currently     Types: Cocaine(coke), Methamphetamines, Marijuana   • Sexual activity: Yes     Partners: Male        Review of Systems     Objective   Vital Signs:   Pulse 63   Ht 177.8 cm (70\")   Wt 134 kg (295 lb)   SpO2 99%   BMI 42.33 kg/m²       Physical Exam  Constitutional:       Appearance: Normal appearance. Patient is well-developed and normal weight.   HENT:      Head: Normocephalic.      Right Ear: Hearing and external ear normal.      Left Ear: Hearing and external ear normal.      Nose: Nose normal.   Eyes:      Conjunctiva/sclera: Conjunctivae normal.   Cardiovascular:      Rate and Rhythm: Normal rate.   Pulmonary:      Effort: Pulmonary effort is normal.      Breath sounds: No wheezing or rales.   Abdominal:      Palpations: Abdomen is soft.      Tenderness: There is no abdominal tenderness.   Musculoskeletal:      Cervical back: Normal range of motion.   Skin:     Findings: No rash.   Neurological:      Mental Status: Patient is alert and oriented to " person, place, and time.   Psychiatric:         Mood and Affect: Mood and affect normal.         Judgment: Judgment normal.       Ortho Exam      LEFT WRIST: Sensation grossly intact. Neurovascular intact.  Radial pulse 2+, ulnar pulse 2+. Patient able to wiggle fingers. Mild swelling. Full fist formation. Intact wrist flexion and extension. Mildly tender wrist.       Procedures      Imaging Results (Most Recent)     Procedure Component Value Units Date/Time    XR Wrist 3+ View Left [[id number]] Resulted: 07/29/22 0918     Updated: 07/29/22 0921           Result Review :     X-Ray Report:  Left wrist(s) X-Ray  Indication: Evaluation of left wrist  AP and Lateral view(s)  Findings: No acute fractures or dislocation.   Prior studies available for comparison: yes         XR Wrist 3+ View Left    Result Date: 7/23/2022  Narrative: PROCEDURE: XR WRIST 3+ VW LEFT  COMPARISON: [facility], CR, XR HAND 3+ VW LEFT, 7/23/2022, 21:28.  INDICATIONS: fall - wrist/hand pain and swelling  FINDINGS: 3 views were obtained. No acute fracture or acute malalignment is identified. If symptoms or clinical concerns persist, consider imaging follow-up.      Impression:  No acute fracture or acute malalignment is identified.     COMMENT:  Part of this note is an electronic transcription of spoken language to printed text. The electronic translation/transcription may permit erroneous, or at times, nonsensical (or even sensical) words or phrases to be inadvertently transcribed or omitted; this  has reviewed the note for such errors (as well as additional errors); however, some may still exist.  BRAYAN TAMAYO JR, MD       Electronically Signed and Approved By: BRAYAN TAMAYO JR, MD on 7/23/2022 at 23:13              XR Hand 3+ View Left    Result Date: 7/23/2022  Narrative: PROCEDURE: XR HAND 3+ VW LEFT  COMPARISON: [facility], CR, XR WRIST 3+ VW LEFT, 7/23/2022, 21:28.  INDICATIONS: fall - wrist and  hand pain  FINDINGS: 3 views were obtained. No acute fracture or acute malalignment is identified. If symptoms or clinical concerns persist, consider imaging follow-up.      Impression:  No acute fracture or acute malalignment is identified.     COMMENT:  Part of this note is an electronic transcription of spoken language to printed text. The electronic translation/transcription may permit erroneous, or at times, nonsensical (or even sensical) words or phrases to be inadvertently transcribed or omitted; this  has reviewed the note for such errors (as well as additional errors); however, some may still exist.  BRAYAN TAMAYO JR, MD       Electronically Signed and Approved By: BRAYAN TAMAYO JR, MD on 7/23/2022 at 23:14                       Assessment and Plan     Diagnoses and all orders for this visit:    1. Left wrist pain (Primary)  -     XR Wrist 3+ View Left    2. Sprain of left wrist, initial encounter        Patient placed into a wrist brace. Continue anti-inflammatory. Wean out of the brace in 2 weeks. Ice the wrist as needed.     Call or return if worsening symptoms.    Follow Up     PRN.       Patient was given instructions and counseling regarding her condition or for health maintenance advice. Please see specific information pulled into the AVS if appropriate.     Scribed for Kat Collins MD by Alexa Day.  07/29/22   09:11 EDT    I have personally performed the services described in this document as scribed by the above individual and it is both accurate and complete. Kat Collins MD 07/29/22

## 2022-08-03 ENCOUNTER — OFFICE VISIT (OUTPATIENT)
Dept: OBSTETRICS AND GYNECOLOGY | Facility: CLINIC | Age: 47
End: 2022-08-03

## 2022-08-03 VITALS
HEART RATE: 87 BPM | SYSTOLIC BLOOD PRESSURE: 116 MMHG | WEIGHT: 293 LBS | BODY MASS INDEX: 42.33 KG/M2 | DIASTOLIC BLOOD PRESSURE: 70 MMHG

## 2022-08-03 DIAGNOSIS — Z01.419 WELL WOMAN EXAM WITH ROUTINE GYNECOLOGICAL EXAM: Primary | ICD-10-CM

## 2022-08-03 PROCEDURE — 99396 PREV VISIT EST AGE 40-64: CPT | Performed by: STUDENT IN AN ORGANIZED HEALTH CARE EDUCATION/TRAINING PROGRAM

## 2022-08-03 NOTE — PROGRESS NOTES
Well Woman Visit    Chief Complaint   Patient presents with   • Gynecologic Exam           HPI  Em Montanez is a 47 y.o. female, , who presents for annual well woman exam. No LMP recorded (lmp unknown). Patient has had a hysterectomy.. No bleeding, no vaginal discharge. Having intercourse without complaints. Sexual desire decreased. Recently started Lexapro. Feels like completely emptying bladder does have some loss of urine, but not affecting daily living. No concerns for BM. Smoking < 1/2PPD. Going back to school.  Up-to-date with mammograms, and colonoscopy.  Did have a Pap smear performed in September of last year.  Reports remote history of abnormal Pap smears.  Has never had an abnormal Pap smear with our office.    Additional OB/GYN History   Hysterectomy   .  Last Pap :   Last Completed Pap Smear     This patient has no relevant Health Maintenance data.        Result: Normal 2021     Last MMG :   Last Completed Mammogram     This patient has no relevant Health Maintenance data.         Last Colonoscopy :   Last Completed Colonoscopy          COLORECTAL CANCER SCREENING (COLONOSCOPY - Preferred) Next due on 2022  COLONOSCOPY    2022  Surgical Procedure: COLONOSCOPY    2021  [employer]: WORKFLOW - QUALITY MEASUREMENT - EXCLUSION REASONS - COLORECTAL CANCER SCREENING NOT PERFORMED FOR MEDICAL REASONS                AllergiesPrednisone and Tramadol   Family history of uterine, colon, breast, or ovarian cancer: no  Tobacco Usage?: Yes Em Montanez  reports that she has been smoking. She has a 30.00 pack-year smoking history. She has never used smokeless tobacco.. I have educated her on the risk of diseases from using tobacco products such as cancer, COPD and heart disease.     I advised her to quit and she is not willing to quit.    I spent 3.5 minutes counseling the patient.        OB History        2    Para   2    Term   2       0    AB   0     Living   2       SAB   0    IAB   0    Ectopic   0    Molar   0    Multiple   0    Live Births   2                The additional following portions of the patient's history were reviewed and updated as appropriate: allergies, current medications, past family history, past medical history, past social history, past surgical history and problem list.    Review of Systems   Constitutional: Negative for fatigue and fever.   Respiratory: Negative for cough and shortness of breath.    Cardiovascular: Negative for chest pain.   Gastrointestinal: Negative for abdominal distention and abdominal pain.   Genitourinary: Positive for decreased libido and urgency. Negative for difficulty urinating, dysuria, menstrual problem, pelvic pain, pelvic pressure, vaginal bleeding, vaginal discharge and vaginal pain.       I have reviewed and agree with the HPI, ROS, and historical information as entered above. Chito Cook MD    Objective   /70   Pulse 87   Wt 134 kg (295 lb)   LMP  (LMP Unknown)   BMI 42.33 kg/m²     Physical Exam  Vitals and nursing note reviewed. Exam conducted with a chaperone present.   Constitutional:       General: She is not in acute distress.     Appearance: Normal appearance. She is not toxic-appearing.   HENT:      Head: Normocephalic and atraumatic.   Eyes:      Extraocular Movements: Extraocular movements intact.      Conjunctiva/sclera: Conjunctivae normal.   Cardiovascular:      Pulses: Normal pulses.   Pulmonary:      Effort: Pulmonary effort is normal.   Chest:   Breasts:      Chuck Score is 5.      Right: Normal.      Left: Normal.       Abdominal:      General: There is no distension.      Palpations: Abdomen is soft.      Tenderness: There is no abdominal tenderness.   Genitourinary:     Exam position: Lithotomy position.      Labia:         Right: No tenderness, lesion or injury.         Left: No tenderness, lesion or injury.       Vagina: Normal.      Uterus: Absent.       Adnexa:  Right adnexa normal and left adnexa normal.      Comments: Absent cervix.  Normal vaginal cuff  Musculoskeletal:      Cervical back: Normal range of motion.   Skin:     General: Skin is warm and dry.   Neurological:      Mental Status: She is alert and oriented to person, place, and time.   Psychiatric:         Mood and Affect: Mood normal.         Behavior: Behavior normal.         Thought Content: Thought content normal.            Assessment and Plan  Em Montanez is a 47 y.o.  presenting for well woman examination.  Discussed patient's risk factors including her smoking and obesity.  Recommendation for smoking cessation as well as weight loss.  Patient is having some concerns for decreased sexual desire.  I did discuss with patient this could be relationship to her recent starting of Lexapro medication as there is a side effect of decreased desire.  Patient will discuss with her behavioral health specialist if she wants to continue this medication.  Patient's mammogram up-to-date, recommendation for follow-up screening in January.  Pap smear up-to-date.  Colonoscopy up-to-date.  Review of patient's records with increased risk for diabetes.  Discussed with patient that if metformin being prescribed by Dr. Velasquez is for PCOS that she no longer needs that medication however he may be doing this to help with blood sugar control.  Patient will follow-up with PCP.    WWE    Diagnoses and all orders for this visit:    1. Well woman exam with routine gynecological exam (Primary)        Counseling:  • Weight loss/Nutrition/Wt bearing exercise/Kegels/Core  • Self breast awareness    Preventative:  • Follow up PCP/Specialist PMHx and Labs    She understands the importance of having the above performed in a timely fashion.  The risks of not performing them include, but are not limited to, advanced cancer stages, bone loss from osteoporosis and/or subsequent increase in morbidity and/or mortality.  She is  encouraged to review her results online and/or contact or office if she has questions.     Follow Up:  Return in about 1 year (around 8/3/2023) for Annual physical.      Chito Cook MD  08/03/2022

## 2022-08-25 ENCOUNTER — OFFICE VISIT (OUTPATIENT)
Dept: ENDOCRINOLOGY | Age: 47
End: 2022-08-25

## 2022-08-25 VITALS
HEIGHT: 70 IN | SYSTOLIC BLOOD PRESSURE: 132 MMHG | TEMPERATURE: 98.2 F | HEART RATE: 71 BPM | DIASTOLIC BLOOD PRESSURE: 88 MMHG | OXYGEN SATURATION: 99 % | WEIGHT: 293 LBS | BODY MASS INDEX: 41.95 KG/M2

## 2022-08-25 DIAGNOSIS — E16.2 HYPOGLYCEMIA: Primary | ICD-10-CM

## 2022-08-25 PROCEDURE — 99204 OFFICE O/P NEW MOD 45 MIN: CPT | Performed by: INTERNAL MEDICINE

## 2022-08-25 NOTE — PROGRESS NOTES
Chief Complaint  Hypoglycemia (A1C is at 6.4 and the patient has had swelling in legs she is fatigue all the time weight gain she can sleep 12 hours in a day. Aching body pains referred by pcp ) and Fatigue    Subjective        Em Montanez presents to [facility] ENDOCRINOLOGY  History of Present Illness    48 y/o white female presents for evaluation and treatment recommendations for a low blood sugar.    She states she was first diagnosed with an abnormal/low blood sugar about 10 years ago.  She states she fell off his flight of stairs after losing consciousness.  She states her blood sugar was checked after this episode and she was noted to have a low blood sugar.  She is unable to recall the exact blood sugar reading at that time.  She recovered from her loss of consciousness within a few seconds/ minutes, she recalls.    She states over time her episodes of subjective low blood pressure have increased in frequency.  They occur daily to every few days.  The symptoms never occur fasting/on waking up.  They usually occur during the day.  The symptoms resolve after eating.  She states the symptoms that she experiences is yawning every 2 seconds.  She rarely has lightheadedness associated with these episodes.  She denies feeling cold clammy or sweaty.  She states if her blood sugar is high, she feels nauseous.      She does not know what her blood sugar is when she feels that she is low.  In general her blood sugars have ranged from  mg/dL.  Other than her one episode 10 years ago of loss of consciousness and falling down the stairs, the patient denies experiencing loss of consciousness or seizures.    She has no history of chronic or intermittent glucocorticoid therapy.    Reviewed past medical history, allergies, medication list, family history and social history.  Note is made of the patient having a diagnosis of PCOS.  She was initiated on metformin..    PMH:   PCOS.  She is on  "metformin low-dose.  Weight is increasing  NICOLLE in 4/2022 and ovaries in place  Leep  Escelestino    Reviewed past medical history, NKDA, medication list, family history and social history.  Review of systems is as per HPI, otherwise negative.  Objective   Vital Signs:  /88   Pulse 71   Temp 98.2 °F (36.8 °C) (Temporal)   Ht 177.8 cm (70\")   Wt 135 kg (298 lb)   SpO2 99%   BMI 42.76 kg/m²   Estimated body mass index is 42.76 kg/m² as calculated from the following:    Height as of this encounter: 177.8 cm (70\").    Weight as of this encounter: 135 kg (298 lb).          Physical Exam  Vitals and nursing note reviewed.   Constitutional:       General: She is not in acute distress.     Appearance: She is obese. She is not ill-appearing, toxic-appearing or diaphoretic.   HENT:      Head: Normocephalic and atraumatic.      Nose: Nose normal.      Mouth/Throat:      Mouth: Mucous membranes are moist.      Pharynx: Oropharynx is clear. No oropharyngeal exudate or posterior oropharyngeal erythema.   Eyes:      General: No scleral icterus.     Extraocular Movements: Extraocular movements intact.      Conjunctiva/sclera: Conjunctivae normal.      Pupils: Pupils are equal, round, and reactive to light.   Neck:      Thyroid: No thyroid mass, thyromegaly or thyroid tenderness.      Vascular: No carotid bruit.      Trachea: Trachea normal.   Cardiovascular:      Rate and Rhythm: Normal rate and regular rhythm.      Pulses: Normal pulses.      Heart sounds: Normal heart sounds. No murmur heard.    No friction rub. No gallop.   Pulmonary:      Effort: Pulmonary effort is normal. No respiratory distress.      Breath sounds: Normal breath sounds. No stridor. No wheezing, rhonchi or rales.   Chest:      Chest wall: No tenderness.   Abdominal:      General: Bowel sounds are normal. There is no distension.      Palpations: Abdomen is soft. There is no mass.      Tenderness: There is no abdominal tenderness. There is no rebound. "   Musculoskeletal:         General: No swelling, tenderness, deformity or signs of injury. Normal range of motion.      Cervical back: Normal range of motion and neck supple. No rigidity or tenderness.      Right lower leg: No edema.      Left lower leg: No edema.   Lymphadenopathy:      Cervical: No cervical adenopathy.   Skin:     General: Skin is warm and dry.      Capillary Refill: Capillary refill takes 2 to 3 seconds.      Coloration: Skin is not jaundiced or pale.      Findings: No bruising, erythema, lesion or rash.   Neurological:      General: No focal deficit present.      Mental Status: She is alert and oriented to person, place, and time. Mental status is at baseline.      Cranial Nerves: No cranial nerve deficit.      Sensory: No sensory deficit.      Motor: No weakness.      Coordination: Coordination normal.      Gait: Gait normal.      Deep Tendon Reflexes: Reflexes normal.   Psychiatric:         Mood and Affect: Mood normal.         Behavior: Behavior normal.         Thought Content: Thought content normal.         Judgment: Judgment normal.        Result Review :    CMP    CMP 12/7/21 3/10/22 3/16/22   Glucose 103 (A) 142 (A) 92   BUN 11 15 14   Creatinine 0.89 0.91 0.96   eGFR Non African Am 68     Sodium 139 139 138   Potassium 3.9 3.9 4.0   Chloride 104 101 101   Calcium 9.5 9.7 9.5   Albumin 4.00 4.30 4.30   Total Bilirubin 0.2 0.3 0.3   Alkaline Phosphatase 88 89 84   AST (SGOT) 16 21 21   ALT (SGPT) 12 17 15   (A) Abnormal value            CBC    CBC 12/7/21 3/10/22 3/16/22   WBC 11.01 (A) 10.48 8.87   RBC 4.69 4.81 4.82   Hemoglobin 14.3 15.0 14.8   Hematocrit 41.8 44.3 43.9   MCV 89.1 92.1 91.1   MCH 30.5 31.2 30.7   MCHC 34.2 33.9 33.7   RDW 13.3 13.6 13.0   Platelets 259 248 265   (A) Abnormal value                    Most Recent A1C    HGBA1C Most Recent 3/16/22   Hemoglobin A1C 5.60               A1C Last 3 Results    HGBA1C Last 3 Results 3/16/22   Hemoglobin A1C 5.60                          Assessment and Plan   Diagnoses and all orders for this visit:    1. Hypoglycemia (Primary)  -     Glucose Tolerance, 3 Hours; Future  -     Insulin 3 Specimens; Future  -     Insulin, Total; Future  -     Hemoglobin A1c; Future  -     Cortisol - AM; Future  -     ACTH; Future  -     DHEA-Sulfate; Future  -     Comprehensive Metabolic Panel; Future  -     Insulin, Total; Future  -     Ambulatory Referral to Diabetic Education      The patient has atypical symptoms which she attributes to hypoglycemia.  We will try to document her low blood sugars.  We will have her wear a diagnostic CGM and review the results at the follow-up visit.  We will do basic labs including a hemoglobin A1c, fasting glucose as part of we will CMP and a 3-hour glucose tolerance test with an insulin level drawn with every glucose level obtained during testing.    Return to clinic in a month to review the test results.       Follow Up   Return in about 4 weeks (around 9/22/2022).  Patient was given instructions and counseling regarding her condition or for health maintenance advice. Please see specific information pulled into the AVS if appropriate.

## 2022-08-26 DIAGNOSIS — E04.1 THYROID NODULE: Primary | ICD-10-CM

## 2022-09-15 ENCOUNTER — OFFICE VISIT (OUTPATIENT)
Dept: PODIATRY | Facility: CLINIC | Age: 47
End: 2022-09-15

## 2022-09-15 VITALS
TEMPERATURE: 97.1 F | HEIGHT: 70 IN | DIASTOLIC BLOOD PRESSURE: 77 MMHG | BODY MASS INDEX: 41.95 KG/M2 | HEART RATE: 75 BPM | SYSTOLIC BLOOD PRESSURE: 146 MMHG | OXYGEN SATURATION: 100 % | WEIGHT: 293 LBS

## 2022-09-15 DIAGNOSIS — L60.0 ONYCHOCRYPTOSIS: ICD-10-CM

## 2022-09-15 DIAGNOSIS — E11.8 DIABETIC FOOT: ICD-10-CM

## 2022-09-15 DIAGNOSIS — E11.9 NON-INSULIN DEPENDENT TYPE 2 DIABETES MELLITUS: ICD-10-CM

## 2022-09-15 DIAGNOSIS — M79.671 FOOT PAIN, BILATERAL: Primary | ICD-10-CM

## 2022-09-15 DIAGNOSIS — M79.672 FOOT PAIN, BILATERAL: Primary | ICD-10-CM

## 2022-09-15 DIAGNOSIS — B35.1 ONYCHOMYCOSIS: ICD-10-CM

## 2022-09-15 PROCEDURE — 11721 DEBRIDE NAIL 6 OR MORE: CPT | Performed by: PODIATRIST

## 2022-09-15 PROCEDURE — G8404 LOW EXTEMITY NEUR EXAM DOCUM: HCPCS | Performed by: PODIATRIST

## 2022-09-15 NOTE — PROGRESS NOTES
[facility] - PODIATRY    Today's Date: 09/15/22    Patient Name: Em Montanez  MRN: [id number]  CSN: [id number]  PCP: Ricky Velasquez Jr., MD, Last PCP Visit: 24 May 2022  Referring Provider: No ref. provider found    SUBJECTIVE     Chief Complaint   Patient presents with   • Left Foot - Follow-up, Nail Problem, Callouses   • Right Foot - Follow-up, Pain, Nail Problem, Callouses     Still Having pain in achilles area     HPI: Em Montanez, a 47 y.o.female, comes to clinic.    New, Established, New Problem:  New problem  Location:  Toenails  Duration:   Greater than five years  Onset:  Gradual  Nature:  sore with palpation.  Stable, worsening, improving:   worsening  Aggravating factors:  Pain with shoe gear and ambulation.  Previous Treatment: Unable to trim their own toenails.    Patient relates significant improvement in right Achilles tendon since last visit.  Patient states she intermittently wears CAM Walker but mainly is returned back to wearing athletic shoes.    Patient denies any fevers, chills, nausea, vomiting, shortness of breath, nor any other constitutional signs nor symptoms.       Patient states their most recent blood glucose reading was 106.    Past Medical History:   Diagnosis Date   • Allergic rhinitis    • Anxiety    • Arthritis    • Asthma     NO INHALER USE   • Back pain 11/21/2017    LUMBAR SPINE X RAY LARGELY UNREMARKABLE. WILL RX PT AND MONITOR FOR IMPROVEMENT. IF PAIN CONTINUES, WILL ORDER MRI   • Chronic pelvic pain in female 02/23/2022   • Depression    • Fatigue 11/21/2017   • Foot pain, right    • Insomnia 12/11/2017    DISCUSSED RISKS AND BENEFITS OF MEDICATIONS. ALSO DISCUSSED SLEEP HYGIENE METHODS INCLUDING AVOIDING SOURCES OF BLUE LIGHT, DEVELOPING ROUTINE, SLEEPING IN A DARK ROOM, AND USING BED FOR SLEEP ONLY. PT HAS TIRED MELATONIN WITH INTERMITTENT EFFECTIVENESS. PT WITHOUT REPORTED SYMPTOMS OF SLEEP APNEA AT THIS TIME.    • Knee pain  11/21/2017    PREVIOUS R KNEE REPLACEMENT 2/2 ARTHRITIS. PT WOULD LIKE TO DISCUSS OPTIONS WITH ORTHOPEDICS AGAIN   • Ovarian cyst    • Pelvic pain 01/12/2022   • Polycystic ovarian syndrome 11/21/2017   • Uterine pain    • Vitamin D deficiency 12/11/2017    CURRENTLY ON VIT D SUPPLEMENTS. WILL RECHECK VIT D LEVEL IN 3 MONTHS.      Past Surgical History:   Procedure Laterality Date   • CHOLECYSTECTOMY     • COLONOSCOPY N/A 02/16/2022    Procedure: COLONOSCOPY;  Surgeon: Jb Gonzalez MD;  Location: [facility] ENDOSCOPY;  Service: Gastroenterology;  Laterality: N/A;  hemmorroids   • ENDOMETRIAL ABLATION     • ESSURE TUBAL LIGATION     • JOINT REPLACEMENT      bilateral knee replacement   • LEEP     • OTHER SURGICAL HISTORY      METAL IMPLANTS  knees   • TONSILLECTOMY     • TOTAL LAPAROSCOPIC HYSTERECTOMY N/A 4/6/2022    Procedure: TOTAL LAPAROSCOPIC HYSTERECTOMY WITH DAVINCI ROBOT;  Surgeon: Chito Cook MD;  Location: [facility] MAIN OR;  Service: Robotics - DaVinci;  Laterality: N/A;     Family History   Problem Relation Age of Onset   • Depression Mother    • Heart disease Mother    • Arthritis Mother    • Cancer Other    • ADD / ADHD Son    • Self-Injurious Behavior  Daughter    • Depression Daughter    • Anxiety disorder Daughter    • Malig Hyperthermia Neg Hx      Social History     Socioeconomic History   • Marital status: Legally    Tobacco Use   • Smoking status: Current Every Day Smoker     Packs/day: 1.00     Years: 30.00     Pack years: 30.00   • Smokeless tobacco: Never Used   Vaping Use   • Vaping Use: Never used   Substance and Sexual Activity   • Alcohol use: Yes     Comment: SOCIALLY   • Drug use: Not Currently     Types: Cocaine(coke), Methamphetamines, Marijuana   • Sexual activity: Yes     Partners: Male     Allergies   Allergen Reactions   • Prednisone Mental Status Change     Other reaction(s): Mental Status Change   • Tramadol GI Intolerance     Nausea and vomiting  Other  "reaction(s): GI Intolerance, Vomiting  Nausea and vomiting     Current Outpatient Medications   Medication Sig Dispense Refill   • acetaminophen (TYLENOL) 500 MG tablet acetaminophen 500 mg tablet     • BD Integra Syringe 25G X 1\" 3 ML misc USE AS DIRECTED ONCE MONTHLY TO ADMINISTER B12 INJECTION 1 each 3   • cetirizine (zyrTEC) 10 MG tablet Take 1 tablet by mouth Daily. 90 tablet 3   • cyanocobalamin 1000 MCG/ML injection Inject 1,000 mcg into the appropriate muscle as directed by prescriber Every 28 (Twenty-Eight) Days.     • ibuprofen (ADVIL,MOTRIN) 600 MG tablet As Needed.     • lidocaine (LIDODERM) 5 % Place 1 patch on the skin as directed by provider Daily As Needed.     • metFORMIN ER (GLUCOPHAGE-XR) 500 MG 24 hr tablet TAKE ONE TABLET BY MOUTH DAILY WITH EVENING MEAL 90 tablet 3   • multivitamin with minerals tablet tablet Take 1 tablet by mouth Daily.     • traZODone (DESYREL) 50 MG tablet TAKE ONE TABLET BY MOUTH ONCE NIGHTLY (Patient taking differently: Take 50 mg by mouth Every Night.) 90 tablet 1   • valACYclovir (Valtrex) 1000 MG tablet Take 1 tablet by mouth Daily. (Patient taking differently: Take 1,000 mg by mouth As Needed.) 10 tablet 2     No current facility-administered medications for this visit.     Review of Systems   Constitutional: Negative.    Skin:        Painful toenails.   All other systems reviewed and are negative.      OBJECTIVE     Vitals:    09/15/22 1347   BP: 146/77   Pulse: 75   Temp: 97.1 °F (36.2 °C)   SpO2: 100%       Patient seen in no apparent distress.      PHYSICAL EXAM:     Foot/Ankle Exam:       General:   Diabetic Foot Exam Performed    Appearance: obesity    Orientation: AAOx3    Affect: appropriate    Gait: unimpaired    Shoe Gear:  Casual shoes    VASCULAR      Right Foot Vascularity   Normal vascular exam    Dorsalis pedis:  2+  Posterior tibial:  2+  Skin Temperature: warm    Edema Gradin+ and pitting  CFT:  < 3 seconds  Pedal Hair Growth:  " Absent  Varicosities: mild varicosities       Left Foot Vascularity   Normal vascular exam    Dorsalis pedis:  2+  Posterior tibial:  2+  Skin Temperature: warm    Edema Gradin+ and pitting  CFT:  < 3 seconds  Pedal Hair Growth:  Absent  Varicosities: mild varicosities        NEUROLOGIC     Right Foot Neurologic   Normal sensation    Light touch sensation:  Normal  Vibratory sensation:  Normal  Hot/Cold sensation: normal    Protective Sensation using [place]-Harpal Monofilament:  10     Left Foot Neurologic   Normal sensation    Light touch sensation:  Normal  Vibratory sensation:  Normal  Hot/cold sensation: normal    Protective Sensation using [place]-Harpal Monofilament:  10     MUSCLE STRENGTH     Right Foot Muscle Strength   Foot dorsiflexion:  4  Foot plantar flexion:  4  Foot inversion:  4  Foot eversion:  4     Left Foot Muscle Strength   Foot dorsiflexion:  4  Foot plantar flexion:  4  Foot inversion:  4  Foot eversion:  4     RANGE OF MOTION      Right Foot Range of Motion   Foot and ankle ROM within normal limits       Left Foot Range of Motion   Foot and ankle ROM within normal limits       DERMATOLOGIC     Right Foot Dermatologic   Skin: skin intact    Nails: onychomycosis, abnormally thick, subungual debris, dystrophic nails and ingrown toenail    Nails comment:  Toenails 1, 4, and 5     Left Foot Dermatologic   Skin: skin intact    Nails: onychomycosis, abnormally thick, subungual debris, dystrophic nails and ingrown toenail    Nails comment:  Toenails 1, 2 and 5      ASSESSMENT/PLAN     Diagnoses and all orders for this visit:    1. Foot pain, bilateral (Primary)    2. Diabetic foot (HCC)    3. Onychomycosis    4. Non-insulin dependent type 2 diabetes mellitus (HCC)    5. Onychocryptosis        Comprehensive lower extremity examination and evaluation was performed.    Discussed findings and treatment plan including risks, benefits, and treatment options with patient in detail. Patient agreed  with treatment plan.    Toenails 1, 4, 5 on Right and 1, 2, 5 on Left were debrided with nail nippers then filed with a Bryanmel nail serena.  Patient tolerated procedure well without complications.    Diabetic foot exam performed and documented this date, compliant with CQM required standards. Detail of findings as noted in physical exam.  Lower extremity Neurologic exam for diabetic patient performed and documented this date, compliant with PQRS required standards. Detail of findings as noted in physical exam.  Advised patient importance of good routine lower extremity hygiene. Advised patient importance of evaluating for intact skin and pain free nail borders.  Advised patient to use mirror to evaluate plantar/ soles of feet for better visualization. Advised patient monitor and phone office to be seen if any cracking to skin, open lesions, painful nail borders or if nails become elongated prior to next visit. Advised patient importance of daily cleansing of lower extremities, followed by good skin cream to maintain normal hydration of skin. Also advised patient importance of close daily monitoring of blood sugar. Advised to regulate diet and medications to maintain control of blood sugar in optimal range. Contact primary care provider if difficulties maintaining blood sugar levels.  Advised Patient of presence of Diabetes Mellitus condition.  Advised Patient risk of progression and worsening or improvement, then return of condition.  Will monitor condition for any change in future. Treat with most appropriate treatment pending status of condition.  Counseled and advised patient extensively on nature and ramifications of diabetes. Standard instructions given to patient for good diabetic foot care and maintenance. Advised importance of careful monitoring to avoid break down and complications secondary to diabetes. Advised patient importance of strict maintenance of blood sugar control. Advised patient of possible  ominous results from neglect of condition, i.e.: amputation/ loss of digits, feet and legs, or even death.  Patient states understands counseling, will monitor closely, continue good hygiene and routine diabetic foot care. Patient will contact office is questions or problems.      Patient is to monitor for recurrence and any new symptoms and to contact Dr. Rivera's office for a follow-up appointment.      The patient states understanding and agreement with this plan.    An After Visit Summary was printed and given to the patient at discharge, including (if requested) any available informative/educational handouts regarding diagnosis, treatment, or medications. All questions were answered to patient/family satisfaction. Should symptoms fail to improve or worsen they agree to call or return to clinic or to go to the Emergency Department. Discussed the importance of following up with any needed screening tests/labs/specialist appointments and any requested follow-up recommended by me today. Importance of maintaining follow-up discussed and patient accepts that missed appointments can delay diagnosis and potentially lead to worsening of conditions.    Return in about 9 weeks (around 11/17/2022) for Toenail Care., or sooner if acute issues arise.    This document has been electronically signed by Lucas Rivera DPM on September 15, 2022 14:12 EDT

## 2022-09-16 ENCOUNTER — HOSPITAL ENCOUNTER (OUTPATIENT)
Dept: ULTRASOUND IMAGING | Facility: HOSPITAL | Age: 47
Discharge: HOME | End: 2022-09-16
Admitting: INTERNAL MEDICINE

## 2022-09-16 DIAGNOSIS — E04.1 THYROID NODULE: ICD-10-CM

## 2022-09-16 PROCEDURE — 76536 US EXAM OF HEAD AND NECK: CPT

## 2022-09-18 DIAGNOSIS — E04.1 THYROID NODULE: Primary | ICD-10-CM

## 2022-09-20 DIAGNOSIS — E55.9 VITAMIN D DEFICIENCY: ICD-10-CM

## 2022-09-20 DIAGNOSIS — E04.1 THYROID NODULE: ICD-10-CM

## 2022-09-20 DIAGNOSIS — Z00.00 ANNUAL PHYSICAL EXAM: ICD-10-CM

## 2022-09-20 DIAGNOSIS — Z13.220 SCREENING FOR LIPID DISORDERS: Primary | ICD-10-CM

## 2022-09-29 DIAGNOSIS — F32.A DEPRESSION, UNSPECIFIED DEPRESSION TYPE: ICD-10-CM

## 2022-09-29 DIAGNOSIS — F41.9 ANXIETY: ICD-10-CM

## 2022-09-29 RX ORDER — TRAZODONE HYDROCHLORIDE 50 MG/1
TABLET ORAL
Qty: 90 TABLET | Refills: 1 | Status: SHIPPED | OUTPATIENT
Start: 2022-09-29 | End: 2023-03-24

## 2022-10-04 RX ORDER — VALACYCLOVIR HYDROCHLORIDE 1 G/1
1000 TABLET, FILM COATED ORAL DAILY
Qty: 10 TABLET | Refills: 2 | Status: SHIPPED | OUTPATIENT
Start: 2022-10-04

## 2022-10-05 ENCOUNTER — LAB (OUTPATIENT)
Dept: LAB | Facility: HOSPITAL | Age: 47
End: 2022-10-05

## 2022-10-05 LAB
25(OH)D3 SERPL-MCNC: 32.3 NG/ML (ref 30–100)
ALBUMIN SERPL-MCNC: 4 G/DL (ref 3.5–5.2)
ALBUMIN/GLOB SERPL: 1.4 G/DL
ALP SERPL-CCNC: 81 U/L (ref 39–117)
ALT SERPL W P-5'-P-CCNC: 16 U/L (ref 1–33)
ANION GAP SERPL CALCULATED.3IONS-SCNC: 9.4 MMOL/L (ref 5–15)
AST SERPL-CCNC: 22 U/L (ref 1–32)
BASOPHILS # BLD AUTO: 0.07 10*3/MM3 (ref 0–0.2)
BASOPHILS NFR BLD AUTO: 0.8 % (ref 0–1.5)
BILIRUB SERPL-MCNC: 0.3 MG/DL (ref 0–1.2)
BUN SERPL-MCNC: 12 MG/DL (ref 6–20)
BUN/CREAT SERPL: 14.6 (ref 7–25)
CALCIUM SPEC-SCNC: 9.5 MG/DL (ref 8.6–10.5)
CHLORIDE SERPL-SCNC: 101 MMOL/L (ref 98–107)
CHOLEST SERPL-MCNC: 174 MG/DL (ref 0–200)
CO2 SERPL-SCNC: 28.6 MMOL/L (ref 22–29)
CREAT SERPL-MCNC: 0.82 MG/DL (ref 0.57–1)
DEPRECATED RDW RBC AUTO: 40.1 FL (ref 37–54)
EGFRCR SERPLBLD CKD-EPI 2021: 87.8 ML/MIN/1.73
EOSINOPHIL # BLD AUTO: 0.18 10*3/MM3 (ref 0–0.4)
EOSINOPHIL NFR BLD AUTO: 2.1 % (ref 0.3–6.2)
ERYTHROCYTE [DISTWIDTH] IN BLOOD BY AUTOMATED COUNT: 12 % (ref 12.3–15.4)
GLOBULIN UR ELPH-MCNC: 2.8 GM/DL
GLUCOSE SERPL-MCNC: 110 MG/DL (ref 65–99)
HCT VFR BLD AUTO: 43.4 % (ref 34–46.6)
HDLC SERPL-MCNC: 55 MG/DL (ref 40–60)
HGB BLD-MCNC: 15 G/DL (ref 12–15.9)
IMM GRANULOCYTES # BLD AUTO: 0.02 10*3/MM3 (ref 0–0.05)
IMM GRANULOCYTES NFR BLD AUTO: 0.2 % (ref 0–0.5)
LDLC SERPL CALC-MCNC: 88 MG/DL (ref 0–100)
LDLC/HDLC SERPL: 1.5 {RATIO}
LYMPHOCYTES # BLD AUTO: 2.67 10*3/MM3 (ref 0.7–3.1)
LYMPHOCYTES NFR BLD AUTO: 30.5 % (ref 19.6–45.3)
MCH RBC QN AUTO: 31.6 PG (ref 26.6–33)
MCHC RBC AUTO-ENTMCNC: 34.6 G/DL (ref 31.5–35.7)
MCV RBC AUTO: 91.6 FL (ref 79–97)
MONOCYTES # BLD AUTO: 0.45 10*3/MM3 (ref 0.1–0.9)
MONOCYTES NFR BLD AUTO: 5.1 % (ref 5–12)
NEUTROPHILS NFR BLD AUTO: 5.37 10*3/MM3 (ref 1.7–7)
NEUTROPHILS NFR BLD AUTO: 61.3 % (ref 42.7–76)
NRBC BLD AUTO-RTO: 0 /100 WBC (ref 0–0.2)
PLATELET # BLD AUTO: 269 10*3/MM3 (ref 140–450)
PMV BLD AUTO: 10.4 FL (ref 6–12)
POTASSIUM SERPL-SCNC: 4.3 MMOL/L (ref 3.5–5.2)
PROT SERPL-MCNC: 6.8 G/DL (ref 6–8.5)
RBC # BLD AUTO: 4.74 10*6/MM3 (ref 3.77–5.28)
SODIUM SERPL-SCNC: 139 MMOL/L (ref 136–145)
TRIGL SERPL-MCNC: 182 MG/DL (ref 0–150)
TSH SERPL DL<=0.05 MIU/L-ACNC: 2.35 UIU/ML (ref 0.27–4.2)
VLDLC SERPL-MCNC: 31 MG/DL (ref 5–40)
WBC NRBC COR # BLD: 8.76 10*3/MM3 (ref 3.4–10.8)

## 2022-10-05 PROCEDURE — 82306 VITAMIN D 25 HYDROXY: CPT | Performed by: INTERNAL MEDICINE

## 2022-10-05 PROCEDURE — 85025 COMPLETE CBC W/AUTO DIFF WBC: CPT | Performed by: INTERNAL MEDICINE

## 2022-10-05 PROCEDURE — 84443 ASSAY THYROID STIM HORMONE: CPT | Performed by: INTERNAL MEDICINE

## 2022-10-05 PROCEDURE — 80061 LIPID PANEL: CPT | Performed by: INTERNAL MEDICINE

## 2022-10-05 PROCEDURE — 80053 COMPREHEN METABOLIC PANEL: CPT | Performed by: INTERNAL MEDICINE

## 2022-10-10 ENCOUNTER — OFFICE VISIT (OUTPATIENT)
Dept: INTERNAL MEDICINE | Facility: CLINIC | Age: 47
End: 2022-10-10

## 2022-10-10 VITALS
SYSTOLIC BLOOD PRESSURE: 150 MMHG | OXYGEN SATURATION: 97 % | WEIGHT: 292.4 LBS | HEIGHT: 70 IN | BODY MASS INDEX: 41.86 KG/M2 | DIASTOLIC BLOOD PRESSURE: 84 MMHG | TEMPERATURE: 96.5 F | HEART RATE: 71 BPM

## 2022-10-10 DIAGNOSIS — E04.1 THYROID NODULE: ICD-10-CM

## 2022-10-10 DIAGNOSIS — E66.01 CLASS 3 SEVERE OBESITY WITHOUT SERIOUS COMORBIDITY WITH BODY MASS INDEX (BMI) OF 40.0 TO 44.9 IN ADULT, UNSPECIFIED OBESITY TYPE: ICD-10-CM

## 2022-10-10 DIAGNOSIS — Z23 NEED FOR INFLUENZA VACCINATION: ICD-10-CM

## 2022-10-10 DIAGNOSIS — E28.2 POLYCYSTIC OVARIES: Primary | ICD-10-CM

## 2022-10-10 DIAGNOSIS — E55.9 VITAMIN D DEFICIENCY: ICD-10-CM

## 2022-10-10 PROCEDURE — 99214 OFFICE O/P EST MOD 30 MIN: CPT | Performed by: INTERNAL MEDICINE

## 2022-10-10 PROCEDURE — 90686 IIV4 VACC NO PRSV 0.5 ML IM: CPT | Performed by: INTERNAL MEDICINE

## 2022-10-10 PROCEDURE — 90471 IMMUNIZATION ADMIN: CPT | Performed by: INTERNAL MEDICINE

## 2022-10-10 NOTE — PROGRESS NOTES
"Chief Complaint  Results (Blood work, US Thyroid /Follow up ) and Med Management (Patient state she stopped taking all her medication due to her stressing out. /She said she really needs to start back up. )    Subjective          Em Montanez presents to [facility]  History of Present Illness  PCOS- questionable Dx based on previous ultrasound per OB. Patient has upcoming appointment with endocrinology to further evaluate. Patient no longer taking metformin. She brown snot feel any differently on it.  Patient reports struggling with weight gain.     Current Outpatient Medications   Medication Instructions   • cetirizine (ZYRTEC) 10 mg, Oral, Daily   • cyanocobalamin 1,000 mcg, Intramuscular, Every 28 Days   • lidocaine (LIDODERM) 5 % 1 patch, Transdermal, Daily PRN   • Liraglutide (SAXENDA) 18 MG/3ML injection pen Inject 0.6mg under skin daily for week one, THEN 1.2mg daily for week two, THEN 1.8mg daily for week three, then 2.4mg daily for week four.   • metFORMIN ER (GLUCOPHAGE-XR) 500 MG 24 hr tablet TAKE ONE TABLET BY MOUTH DAILY WITH EVENING MEAL   • multivitamin with minerals tablet tablet 1 tablet, Oral, Daily   • traZODone (DESYREL) 50 MG tablet TAKE ONE TABLET BY MOUTH ONCE NIGHTLY   • valACYclovir (VALTREX) 1,000 mg, Oral, Daily       The following portions of the patient's history were reviewed and updated as appropriate: allergies, current medications, past family history, past medical history, past social history, past surgical history, and problem list.    Objective   Vital Signs:   /84 (BP Location: Right arm)   Pulse 71   Temp 96.5 °F (35.8 °C) (Temporal)   Ht 177.8 cm (70\")   Wt 133 kg (292 lb 6.4 oz)   SpO2 97%   BMI 41.96 kg/m²     Wt Readings from Last 3 Encounters:   10/10/22 133 kg (292 lb 6.4 oz)   09/15/22 (!) 137 kg (301 lb)   08/25/22 135 kg (298 lb)     BP Readings from Last 3 Encounters:   10/10/22 150/84   09/15/22 146/77 "   08/25/22 132/88     Physical Exam   Appearance: No acute distress, well-nourished  Head: normocephalic, atraumatic  Eyes: extraocular movements intact, no scleral icterus, no conjunctival injection  Ears, Nose, and Throat: external ears normal, nares patent, moist mucous membranes  Cardiovascular: regular rate and rhythm. no murmurs, rubs, or gallops. no edema  Respiratory: breathing comfortably, symmetric chest rise, clear to auscultation bilaterally. No wheezes, rales, or rhonchi.  Neuro: alert and oriented to time, place, and person. Normal gait  Psych: normal mood and affect     Result Review :   The following data was reviewed by: Ricky Velasquez Jr, MD on 10/10/2022:  Common labs    Common Labs 3/10/22 3/10/22 3/16/22 3/16/22 3/16/22 10/5/22 10/5/22 10/5/22    1217 1217 1051 1051 1051 0849 0849 0849   Glucose  142 (A)   92   110 (A)   BUN  15   14   12   Creatinine  0.91   0.96   0.82   Sodium  139   138   139   Potassium  3.9   4.0   4.3   Chloride  101   101   101   Calcium  9.7   9.5   9.5   Albumin  4.30   4.30   4.00   Total Bilirubin  0.3   0.3   0.3   Alkaline Phosphatase  89   84   81   AST (SGOT)  21   21   22   ALT (SGPT)  17   15   16   WBC 10.48  8.87   8.76     Hemoglobin 15.0  14.8   15.0     Hematocrit 44.3  43.9   43.4     Platelets 248  265   269     Total Cholesterol       174    Triglycerides       182 (A)    HDL Cholesterol       55    LDL Cholesterol        88    Hemoglobin A1C    5.60       (A) Abnormal value              Lab Results   Component Value Date    COVID19 NOT DETECTED 03/31/2021    INR 0.86 (L) 03/30/2021    BILIRUBINUR Negative 03/10/2022            Assessment and Plan    Diagnoses and all orders for this visit:    1. Polycystic ovaries (Primary)  Comments:  f/u with OB and endo. may remain off metformin.     2. Vitamin D deficiency  Comments:  labs reviewed. encouraged to cont supplement.     3. Need for influenza vaccination  -     FluLaval/Fluzone >6 mos  "([id number])    4. Thyroid nodule  Comments:  noted on previous thyroid US. f/u US scheduled in 1/2023.     5. Class 3 severe obesity without serious comorbidity with body mass index (BMI) of 40.0 to 44.9 in adult, unspecified obesity type (HCC)  Comments:  will start saxenda after discussion of risks and benefits.   Orders:  -     Liraglutide (SAXENDA) 18 MG/3ML injection pen; Inject 0.6mg under skin daily for week one, THEN 1.2mg daily for week two, THEN 1.8mg daily for week three, then 2.4mg daily for week four.  Dispense: 3 mL; Refill: 3          Medications Discontinued During This Encounter   Medication Reason   • acetaminophen (TYLENOL) 500 MG tablet *Therapy completed   • ibuprofen (ADVIL,MOTRIN) 600 MG tablet *Therapy completed   • BD Integra Syringe 25G X 1\" 3 ML misc *Therapy completed        Follow Up   Return in about 6 months (around 4/10/2023) for Recheck.  Patient was given instructions and counseling regarding her condition or for health maintenance advice. Please see specific information pulled into the AVS if appropriate.       Ricky Velasquez Jr, MD  10/10/22  13:47 EDT            "

## 2022-10-12 ENCOUNTER — LAB (OUTPATIENT)
Dept: LAB | Facility: HOSPITAL | Age: 47
End: 2022-10-12

## 2022-10-12 ENCOUNTER — TELEPHONE (OUTPATIENT)
Dept: INTERNAL MEDICINE | Facility: CLINIC | Age: 47
End: 2022-10-12

## 2022-10-12 DIAGNOSIS — E16.2 HYPOGLYCEMIA: ICD-10-CM

## 2022-10-12 LAB
GLUCOSE 1H P 100 G GLC PO SERPL-MCNC: 106 MG/DL (ref 74–180)
GLUCOSE 2H P 100 G GLC PO SERPL-MCNC: 75 MG/DL (ref 74–155)
GLUCOSE 3H P 100 G GLC PO SERPL-MCNC: 80 MG/DL (ref 74–140)
GLUCOSE BLDC GLUCOMTR-MCNC: 95 MG/DL (ref 70–99)
GLUCOSE P FAST SERPL-MCNC: 99 MG/DL (ref 74–106)

## 2022-10-12 PROCEDURE — 82952 GTT-ADDED SAMPLES: CPT

## 2022-10-12 PROCEDURE — 82533 TOTAL CORTISOL: CPT | Performed by: INTERNAL MEDICINE

## 2022-10-12 PROCEDURE — 80053 COMPREHEN METABOLIC PANEL: CPT | Performed by: INTERNAL MEDICINE

## 2022-10-12 PROCEDURE — 83525 ASSAY OF INSULIN: CPT | Performed by: INTERNAL MEDICINE

## 2022-10-12 PROCEDURE — 82024 ASSAY OF ACTH: CPT | Performed by: INTERNAL MEDICINE

## 2022-10-12 PROCEDURE — 82627 DEHYDROEPIANDROSTERONE: CPT | Performed by: INTERNAL MEDICINE

## 2022-10-12 PROCEDURE — 83036 HEMOGLOBIN GLYCOSYLATED A1C: CPT | Performed by: INTERNAL MEDICINE

## 2022-10-12 PROCEDURE — 82951 GLUCOSE TOLERANCE TEST (GTT): CPT

## 2022-10-12 NOTE — TELEPHONE ENCOUNTER
Em Montanez (Key: [id number])  Rx #: [id number]  Saxenda 18MG/3ML pen-injectors      Message from Plan  This drug/product may not be covered under the pharmacy benefit. Prior Authorization is not available.

## 2022-10-14 DIAGNOSIS — E27.40 ADRENAL INSUFFICIENCY: Primary | ICD-10-CM

## 2022-10-14 RX ORDER — COSYNTROPIN 0.25 MG/ML
0.25 INJECTION, POWDER, FOR SOLUTION INTRAMUSCULAR; INTRAVENOUS ONCE
Qty: 1 EACH | Refills: 0 | Status: SHIPPED | OUTPATIENT
Start: 2022-10-14 | End: 2022-10-14

## 2022-10-17 ENCOUNTER — TELEPHONE (OUTPATIENT)
Dept: ENDOCRINOLOGY | Age: 47
End: 2022-10-17

## 2022-10-17 NOTE — TELEPHONE ENCOUNTER
10/17 called and sw pt/ told her they would be calling her for the ACTH test from the infusion center / she is coming in on wed to have a cgm put on     ----- Message from Mena Olivera MD sent at 10/14/2022  8:42 AM EDT -----  Regarding: ACTH stim test  Can you please assist me with ordering an ACTH stim test at the infusion center (or here)?

## 2022-10-21 NOTE — TELEPHONE ENCOUNTER
I see she is having some workup done with endocrinology it appears. I would follow-thru with that testing prior to starting any other therapies at this time.

## 2022-10-28 ENCOUNTER — TELEPHONE (OUTPATIENT)
Dept: ENDOCRINOLOGY | Age: 47
End: 2022-10-28

## 2022-10-28 NOTE — TELEPHONE ENCOUNTER
Hub to share with pt, please have please tell us which marco a sensor she is needing? The 1,2 or 3?

## 2022-10-28 NOTE — TELEPHONE ENCOUNTER
PT CALLED IN AND SAID THAT SHE HAD HER ONLY RANDA SENSOR FALL OFF IN THE MIDDLE OF THE NIGHT. PLEASE CALL BACK AT  [phone] AND LEAVE A DETAILED MESSAGE PLEASE.

## 2022-10-28 NOTE — TELEPHONE ENCOUNTER
Please check if we have enough samples of the freestyle marco a sensors.  If we have an extra sample, kindly call her and let her know she can have one from us.      Otherwise, there should be a number on the packaging which she can call and request replacement for the wasted sensor.

## 2022-11-01 ENCOUNTER — TELEPHONE (OUTPATIENT)
Dept: ENDOCRINOLOGY | Age: 47
End: 2022-11-01

## 2022-11-01 NOTE — TELEPHONE ENCOUNTER
PT CALLED ON THE 28TH OF OCT AND SHE STILL DOES NOT HAVE THE RANDA SENSOR 2. PLEASE CALL HER BACK [phone]

## 2022-11-03 NOTE — TELEPHONE ENCOUNTER
11/3 called and sw pt / printed off the report for dr Waggoner to review it fell off after 7 days / sent in the sensors to [facility] told pt we may need to do a PA

## 2022-11-08 DIAGNOSIS — E27.40 ADRENAL INSUFFICIENCY: Primary | ICD-10-CM

## 2022-11-08 RX ORDER — COSYNTROPIN 0.25 MG/ML
0.25 INJECTION, POWDER, FOR SOLUTION INTRAMUSCULAR; INTRAVENOUS ONCE
Status: SHIPPED | OUTPATIENT
Start: 2022-11-08

## 2022-11-10 ENCOUNTER — TELEMEDICINE (OUTPATIENT)
Dept: ENDOCRINOLOGY | Age: 47
End: 2022-11-10

## 2022-11-10 DIAGNOSIS — E27.40 ADRENAL INSUFFICIENCY: Primary | ICD-10-CM

## 2022-11-10 DIAGNOSIS — R73.03 PREDIABETES: ICD-10-CM

## 2022-11-10 DIAGNOSIS — E16.2 HYPOGLYCEMIA: ICD-10-CM

## 2022-11-10 PROCEDURE — 99214 OFFICE O/P EST MOD 30 MIN: CPT | Performed by: INTERNAL MEDICINE

## 2022-11-10 RX ORDER — FLASH GLUCOSE SENSOR
1 KIT MISCELLANEOUS
Qty: 6 EACH | Refills: 4 | Status: SHIPPED | OUTPATIENT
Start: 2022-11-10 | End: 2023-01-09

## 2022-11-10 RX ORDER — FLASH GLUCOSE SCANNING READER
1 EACH MISCELLANEOUS ONCE
Qty: 1 EACH | Refills: 0 | Status: SHIPPED | OUTPATIENT
Start: 2022-11-10 | End: 2022-11-10

## 2022-11-10 NOTE — PROGRESS NOTES
Chief Complaint  No chief complaint on file.     This is a video visit. Patient presents for follow-up for low blood sugars.      She continues to feel very tired.  She states when her blood sugars are below 100 mg/dL, she feels symptomatic.  She states she starts feeling bad once her blood sugars are at or below 130 mg/dL.    Her freestyle marco a download was reviewed for October 14 through October 27, 2022.  She was in target range 99% of the time with an average glucose of 97 mg/dL.  1% of the time she was noted to be low i.e. between 54 to 69 mg/dL.    Labs results from 10/12/2022 were reviewed and note made of changes consistent with adrenal insufficiency.  Her hemoglobin A1c is consistent with prediabetes.    We are trying to schedule an ACTH stimulation test.    Subjective        Em Montanez presents to [facility] ENDOCRINOLOGY  History of Present Illness    48 y/o white female presents for evaluation and treatment recommendations for a low blood sugar.    She states she was first diagnosed with an abnormal/low blood sugar about 10 years ago.  She states she fell off his flight of stairs after losing consciousness.  She states her blood sugar was checked after this episode and she was noted to have a low blood sugar.  She is unable to recall the exact blood sugar reading at that time.  She recovered from her loss of consciousness within a few seconds/ minutes, she recalls.    She states over time her episodes of subjective low blood pressure have increased in frequency.  They occur daily to every few days.  The symptoms never occur fasting/on waking up.  They usually occur during the day.  The symptoms resolve after eating.  She states the symptoms that she experiences is yawning every 2 seconds.  She rarely has lightheadedness associated with these episodes.  She denies feeling cold clammy or sweaty.  She states if her blood sugar is high, she feels nauseous.      She does not know  "what her blood sugar is when she feels that she is low.  In general her blood sugars have ranged from  mg/dL.  Other than her one episode 10 years ago of loss of consciousness and falling down the stairs, the patient denies experiencing loss of consciousness or seizures.    She has no history of chronic or intermittent glucocorticoid therapy.    Reviewed past medical history, allergies, medication list, family history and social history.  Note is made of the patient having a diagnosis of PCOS.  She was initiated on metformin..    PMH:   PCOS.  She is on metformin low-dose.  Weight is increasing  NICOLLE in 4/2022 and ovaries in place  Leep  Esure    Reviewed past medical history, NKDA, medication list, family history and social history.  Review of systems is as per HPI, otherwise negative.  Objective   Vital Signs:  There were no vitals taken for this visit.  Estimated body mass index is 41.96 kg/m² as calculated from the following:    Height as of 10/10/22: 177.8 cm (70\").    Weight as of 10/10/22: 133 kg (292 lb 6.4 oz).          Physical Exam  Vitals and nursing note reviewed.   Constitutional:       General: She is not in acute distress.     Appearance: She is obese. She is not ill-appearing, toxic-appearing or diaphoretic.      Comments:  female, looks older than her stated age.  She is of obese body habitus.  Her face is plethoric.  She looks tired.   HENT:      Head: Normocephalic and atraumatic.      Nose: Nose normal.      Mouth/Throat:      Pharynx: No oropharyngeal exudate or posterior oropharyngeal erythema.   Eyes:      General: No scleral icterus.     Extraocular Movements: Extraocular movements intact.      Conjunctiva/sclera: Conjunctivae normal.      Pupils: Pupils are equal, round, and reactive to light.   Neck:      Thyroid: No thyroid mass, thyromegaly or thyroid tenderness.      Trachea: Trachea normal.   Pulmonary:      Effort: No respiratory distress.   Abdominal:      Palpations: " Abdomen is soft.   Musculoskeletal:         General: No swelling. Normal range of motion.      Cervical back: Normal range of motion.   Skin:     Capillary Refill: Capillary refill takes 2 to 3 seconds.      Coloration: Skin is not jaundiced or pale.      Findings: No bruising, erythema, lesion or rash.   Neurological:      General: No focal deficit present.      Mental Status: She is alert and oriented to person, place, and time. Mental status is at baseline.      Cranial Nerves: No cranial nerve deficit.      Sensory: No sensory deficit.      Motor: No weakness.      Coordination: Coordination normal.      Gait: Gait normal.      Deep Tendon Reflexes: Reflexes normal.   Psychiatric:         Mood and Affect: Mood normal.         Behavior: Behavior normal.         Thought Content: Thought content normal.         Judgment: Judgment normal.        Result Review :    CMP    CMP 3/16/22 10/5/22 10/12/22   Glucose 92 110 (A) 94   BUN 14 12 20   Creatinine 0.96 0.82 0.88   Sodium 138 139 140   Potassium 4.0 4.3 4.4   Chloride 101 101 101   Calcium 9.5 9.5 9.2   Total Protein   6.4   Albumin 4.30 4.00 4.0   Globulin   2.4   Total Bilirubin 0.3 0.3 <0.2   Alkaline Phosphatase 84 81 91   AST (SGOT) 21 22 14   ALT (SGPT) 15 16 13   (A) Abnormal value       Comments are available for some flowsheets but are not being displayed.           CBC    CBC 3/10/22 3/16/22 10/5/22   WBC 10.48 8.87 8.76   RBC 4.81 4.82 4.74   Hemoglobin 15.0 14.8 15.0   Hematocrit 44.3 43.9 43.4   MCV 92.1 91.1 91.6   MCH 31.2 30.7 31.6   MCHC 33.9 33.7 34.6   RDW 13.6 13.0 12.0 (A)   Platelets 248 265 269   (A) Abnormal value            Lipid Panel    Lipid Panel 10/5/22   Total Cholesterol 174   Triglycerides 182 (A)   HDL Cholesterol 55   VLDL Cholesterol 31   LDL Cholesterol  88   LDL/HDL Ratio 1.50   (A) Abnormal value            TSH    TSH 10/5/22   TSH 2.350           Most Recent A1C    HGBA1C Most Recent 10/12/22   Hemoglobin A1C 5.7 (A)   (A)  Abnormal value       Comments are available for some flowsheets but are not being displayed.               A1C Last 3 Results    HGBA1C Last 3 Results 3/16/22 10/12/22   Hemoglobin A1C 5.60 5.7 (A)   (A) Abnormal value       Comments are available for some flowsheets but are not being displayed.                         Assessment and Plan   Diagnoses and all orders for this visit:    1. Adrenal insufficiency (HCC) (Primary)  -     Cancel: Comprehensive Metabolic Panel; Future    2. Prediabetes  -     Cancel: Comprehensive Metabolic Panel; Future    3. Hypoglycemia  -     Continuous Blood Gluc Sensor (FreeStyle Cherie 14 Day Sensor) misc; 1 each Every 14 (Fourteen) Days. Freestyle Cherie 14-day sensors one for each 14 day period to check 6 times a day. Dx: E 1  Dispense: 6 each; Refill: 4  -     Continuous Blood Gluc  (FreeStyle Cherie 14 Day Rayne) device; 1 each 1 (One) Time for 1 dose. Freestyle Cherie 14-day reader to check 6 times a day. Dx: E 1  Dispense and educate patient on use.  Dispense: 1 each; Refill: 0  -     Cancel: Comprehensive Metabolic Panel; Future      The patient has atypical symptoms which she attributes to hypoglycemia.    She has prediabetes.  She has labs consistent with adrenal insufficiency.    We are in the process of scheduling an ACTH suppression test at the infusion center.    Return to clinic in 1 month to review the test results and treatment plan.       Follow Up   Return in about 4 weeks (around 12/8/2022) for May double book as last appointment of the morning clinic or last appointment on Monday or Friday..  Patient was given instructions and counseling regarding her condition or for health maintenance advice. Please see specific information pulled into the AVS if appropriate.

## 2022-11-14 RX ORDER — CYANOCOBALAMIN 1000 UG/ML
1000 INJECTION, SOLUTION INTRAMUSCULAR; SUBCUTANEOUS
Qty: 10 ML | Refills: 0 | Status: SHIPPED | OUTPATIENT
Start: 2022-11-14

## 2022-11-17 ENCOUNTER — OFFICE VISIT (OUTPATIENT)
Dept: PODIATRY | Facility: CLINIC | Age: 47
End: 2022-11-17

## 2022-11-17 VITALS
HEIGHT: 70 IN | SYSTOLIC BLOOD PRESSURE: 137 MMHG | TEMPERATURE: 97.1 F | BODY MASS INDEX: 41.95 KG/M2 | WEIGHT: 293 LBS | DIASTOLIC BLOOD PRESSURE: 85 MMHG | OXYGEN SATURATION: 98 % | HEART RATE: 79 BPM

## 2022-11-17 DIAGNOSIS — L60.0 ONYCHOCRYPTOSIS: ICD-10-CM

## 2022-11-17 DIAGNOSIS — M79.672 FOOT PAIN, BILATERAL: Primary | ICD-10-CM

## 2022-11-17 DIAGNOSIS — E11.9 NON-INSULIN DEPENDENT TYPE 2 DIABETES MELLITUS: ICD-10-CM

## 2022-11-17 DIAGNOSIS — E11.8 DIABETIC FOOT: ICD-10-CM

## 2022-11-17 DIAGNOSIS — M79.671 FOOT PAIN, BILATERAL: Primary | ICD-10-CM

## 2022-11-17 DIAGNOSIS — B35.1 ONYCHOMYCOSIS: ICD-10-CM

## 2022-11-17 PROCEDURE — G8404 LOW EXTEMITY NEUR EXAM DOCUM: HCPCS | Performed by: PODIATRIST

## 2022-11-17 PROCEDURE — 11721 DEBRIDE NAIL 6 OR MORE: CPT | Performed by: PODIATRIST

## 2022-11-17 NOTE — PROGRESS NOTES
[facility] - PODIATRY    Today's Date: 11/17/22    Patient Name: Em Montanez  MRN: [id number]  CSN: [id number]  PCP: Ricky Velasquez Jr., MD, Last PCP Visit: 10/12/2022  Referring Provider: No ref. provider found    SUBJECTIVE     Chief Complaint   Patient presents with   • Left Foot - Follow-up, Nail Problem, Pain, Callouses     Having pain on side of foot    • Right Foot - Follow-up, Nail Problem, Pain, Callouses     Having heel pain      HPI: Em Montanez, a 47 y.o.female, comes to clinic.    New, Established, New Problem:  est  Location:  Toenails  Duration:   Greater than five years  Onset:  Gradual  Nature:  sore with palpation.  Stable, worsening, improving:   stable  Aggravating factors:  Pain with shoe gear and ambulation.  Previous Treatment: debridement    Patient denies any fevers, chills, nausea, vomiting, shortness of breath, nor any other constitutional signs nor symptoms.       Patient states their most recent blood glucose reading was 112.    Seeing Endocrinology for adrenal issues.      Past Medical History:   Diagnosis Date   • Allergic rhinitis    • Anxiety    • Arthritis    • Asthma     NO INHALER USE   • Back pain 11/21/2017    LUMBAR SPINE X RAY LARGELY UNREMARKABLE. WILL RX PT AND MONITOR FOR IMPROVEMENT. IF PAIN CONTINUES, WILL ORDER MRI   • Chronic pelvic pain in female 02/23/2022   • Depression    • Fatigue 11/21/2017   • Foot pain, right    • Insomnia 12/11/2017    DISCUSSED RISKS AND BENEFITS OF MEDICATIONS. ALSO DISCUSSED SLEEP HYGIENE METHODS INCLUDING AVOIDING SOURCES OF BLUE LIGHT, DEVELOPING ROUTINE, SLEEPING IN A DARK ROOM, AND USING BED FOR SLEEP ONLY. PT HAS TIRED MELATONIN WITH INTERMITTENT EFFECTIVENESS. PT WITHOUT REPORTED SYMPTOMS OF SLEEP APNEA AT THIS TIME.    • Knee pain 11/21/2017    PREVIOUS R KNEE REPLACEMENT 2/2 ARTHRITIS. PT WOULD LIKE TO DISCUSS OPTIONS WITH ORTHOPEDICS AGAIN   • Ovarian cyst    • Pelvic pain 01/12/2022   •  Polycystic ovarian syndrome 11/21/2017   • Uterine pain    • Vitamin D deficiency 12/11/2017    CURRENTLY ON VIT D SUPPLEMENTS. WILL RECHECK VIT D LEVEL IN 3 MONTHS.      Past Surgical History:   Procedure Laterality Date   • CHOLECYSTECTOMY     • COLONOSCOPY N/A 02/16/2022    Procedure: COLONOSCOPY;  Surgeon: Jb Gonzalez MD;  Location: [facility] ENDOSCOPY;  Service: Gastroenterology;  Laterality: N/A;  hemmorroids   • ENDOMETRIAL ABLATION     • ESSURE TUBAL LIGATION     • JOINT REPLACEMENT      bilateral knee replacement   • LEEP     • OTHER SURGICAL HISTORY      METAL IMPLANTS  knees   • TONSILLECTOMY     • TOTAL LAPAROSCOPIC HYSTERECTOMY N/A 4/6/2022    Procedure: TOTAL LAPAROSCOPIC HYSTERECTOMY WITH DAVINCI ROBOT;  Surgeon: Chito Cook MD;  Location: [facility] MAIN OR;  Service: Robotics - DaVinci;  Laterality: N/A;     Family History   Problem Relation Age of Onset   • Depression Mother    • Heart disease Mother    • Arthritis Mother    • Cancer Other    • ADD / ADHD Son    • Self-Injurious Behavior  Daughter    • Depression Daughter    • Anxiety disorder Daughter    • Malig Hyperthermia Neg Hx      Social History     Socioeconomic History   • Marital status: Legally    Tobacco Use   • Smoking status: Every Day     Packs/day: 1.00     Years: 30.00     Pack years: 30.00     Types: Cigarettes   • Smokeless tobacco: Never   Vaping Use   • Vaping Use: Never used   Substance and Sexual Activity   • Alcohol use: Yes     Comment: SOCIALLY   • Drug use: Not Currently     Types: Cocaine(coke), Methamphetamines, Marijuana   • Sexual activity: Yes     Partners: Male     Allergies   Allergen Reactions   • Prednisone Mental Status Change     Other reaction(s): Mental Status Change   • Tramadol GI Intolerance     Nausea and vomiting  Other reaction(s): GI Intolerance, Vomiting  Nausea and vomiting     Current Outpatient Medications   Medication Sig Dispense Refill   • cetirizine (zyrTEC) 10 MG tablet Take  1 tablet by mouth Daily. 90 tablet 3   • Continuous Blood Gluc Sensor (FreeStyle Cherie 14 Day Sensor) misc 1 each Every 14 (Fourteen) Days. Freestyle Cherie 14-day sensors one for each 14 day period to check 6 times a day. Dx: E 1 6 each 4   • Continuous Blood Gluc Sensor (FreeStyle Cherie 2 Sensor) misc 1 Device Every 14 (Fourteen) Days. 6 each 1   • cyanocobalamin 1000 MCG/ML injection Inject 1 mL into the appropriate muscle as directed by prescriber Every 28 (Twenty-Eight) Days. 10 mL 0   • lidocaine (LIDODERM) 5 % Place 1 patch on the skin as directed by provider Daily As Needed.     • metFORMIN ER (GLUCOPHAGE-XR) 500 MG 24 hr tablet TAKE ONE TABLET BY MOUTH DAILY WITH EVENING MEAL 90 tablet 3   • multivitamin with minerals tablet tablet Take 1 tablet by mouth Daily.     • traZODone (DESYREL) 50 MG tablet TAKE ONE TABLET BY MOUTH ONCE NIGHTLY 90 tablet 1   • valACYclovir (Valtrex) 1000 MG tablet Take 1 tablet by mouth Daily. 10 tablet 2     Current Facility-Administered Medications   Medication Dose Route Frequency Provider Last Rate Last Admin   • cosyntropin (CORTROSYN) injection 0.25 mg  0.25 mg Intravenous Once Mena Olivera MD         Review of Systems   Constitutional: Negative.    Skin:        Painful toenails.   All other systems reviewed and are negative.      OBJECTIVE     Vitals:    22 1428   BP: 137/85   Pulse: 79   Temp: 97.1 °F (36.2 °C)   SpO2: 98%       Patient seen in no apparent distress.      PHYSICAL EXAM:     Foot/Ankle Exam:       General:   Diabetic Foot Exam Performed    Appearance: obesity    Orientation: AAOx3    Affect: appropriate    Gait: unimpaired    Shoe Gear:  Casual shoes    VASCULAR      Right Foot Vascularity   Normal vascular exam    Dorsalis pedis:  2+  Posterior tibial:  2+  Skin Temperature: warm    Edema Gradin+ and pitting  CFT:  < 3 seconds  Pedal Hair Growth:  Absent  Varicosities: mild varicosities       Left Foot Vascularity   Normal vascular exam     Dorsalis pedis:  2+  Posterior tibial:  2+  Skin Temperature: warm    Edema Gradin+ and pitting  CFT:  < 3 seconds  Pedal Hair Growth:  Absent  Varicosities: mild varicosities        NEUROLOGIC     Right Foot Neurologic   Normal sensation    Light touch sensation:  Normal  Vibratory sensation:  Normal  Hot/Cold sensation: normal    Protective Sensation using Childress-Harpal Monofilament:  10     Left Foot Neurologic   Normal sensation    Light touch sensation:  Normal  Vibratory sensation:  Normal  Hot/cold sensation: normal    Protective Sensation using Childress-Harpal Monofilament:  10     MUSCLE STRENGTH     Right Foot Muscle Strength   Foot dorsiflexion:  4  Foot plantar flexion:  4  Foot inversion:  4  Foot eversion:  4     Left Foot Muscle Strength   Foot dorsiflexion:  4  Foot plantar flexion:  4  Foot inversion:  4  Foot eversion:  4     RANGE OF MOTION      Right Foot Range of Motion   Foot and ankle ROM within normal limits       Left Foot Range of Motion   Foot and ankle ROM within normal limits       DERMATOLOGIC     Right Foot Dermatologic   Skin: skin intact    Nails: onychomycosis, abnormally thick, subungual debris, dystrophic nails and ingrown toenail    Nails comment:  Toenails 1, 4, and 5     Left Foot Dermatologic   Skin: skin intact    Nails: onychomycosis, abnormally thick, subungual debris, dystrophic nails and ingrown toenail    Nails comment:  Toenails 1, 2 and 5      ASSESSMENT/PLAN     Diagnoses and all orders for this visit:    1. Foot pain, bilateral (Primary)    2. Diabetic foot (HCC)    3. Onychomycosis    4. Onychocryptosis    5. Non-insulin dependent type 2 diabetes mellitus (HCC)        Comprehensive lower extremity examination and evaluation was performed.    Discussed findings and treatment plan including risks, benefits, and treatment options with patient in detail. Patient agreed with treatment plan.    Toenails 1, 4, 5 on Right and 1, 2, 5 on Left were debrided with  nail nippers then filed with a Nikki nail serena.  Patient tolerated procedure well without complications.    Diabetic foot exam performed and documented this date, compliant with CQM required standards. Detail of findings as noted in physical exam.  Lower extremity Neurologic exam for diabetic patient performed and documented this date, compliant with PQRS required standards. Detail of findings as noted in physical exam.  Advised patient importance of good routine lower extremity hygiene. Advised patient importance of evaluating for intact skin and pain free nail borders.  Advised patient to use mirror to evaluate plantar/ soles of feet for better visualization. Advised patient monitor and phone office to be seen if any cracking to skin, open lesions, painful nail borders or if nails become elongated prior to next visit. Advised patient importance of daily cleansing of lower extremities, followed by good skin cream to maintain normal hydration of skin. Also advised patient importance of close daily monitoring of blood sugar. Advised to regulate diet and medications to maintain control of blood sugar in optimal range. Contact primary care provider if difficulties maintaining blood sugar levels.  Advised Patient of presence of Diabetes Mellitus condition.  Advised Patient risk of progression and worsening or improvement, then return of condition.  Will monitor condition for any change in future. Treat with most appropriate treatment pending status of condition.  Counseled and advised patient extensively on nature and ramifications of diabetes. Standard instructions given to patient for good diabetic foot care and maintenance. Advised importance of careful monitoring to avoid break down and complications secondary to diabetes. Advised patient importance of strict maintenance of blood sugar control. Advised patient of possible ominous results from neglect of condition, i.e.: amputation/ loss of digits, feet and legs,  or even death.  Patient states understands counseling, will monitor closely, continue good hygiene and routine diabetic foot care. Patient will contact office is questions or problems.      Patient is to monitor for recurrence and any new symptoms and to contact Dr. Rivera's office for a follow-up appointment.      The patient states understanding and agreement with this plan.    An After Visit Summary was printed and given to the patient at discharge, including (if requested) any available informative/educational handouts regarding diagnosis, treatment, or medications. All questions were answered to patient/family satisfaction. Should symptoms fail to improve or worsen they agree to call or return to clinic or to go to the Emergency Department. Discussed the importance of following up with any needed screening tests/labs/specialist appointments and any requested follow-up recommended by me today. Importance of maintaining follow-up discussed and patient accepts that missed appointments can delay diagnosis and potentially lead to worsening of conditions.    Return in about 9 weeks (around 1/19/2023) for Toenail Care., or sooner if acute issues arise.    This document has been electronically signed by Lucas Rivera DPM on November 17, 2022 14:53 EST

## 2022-11-21 ENCOUNTER — TELEPHONE (OUTPATIENT)
Dept: ENDOCRINOLOGY | Age: 47
End: 2022-11-21

## 2022-11-21 NOTE — TELEPHONE ENCOUNTER
Please let the patient know.  Kindly instruct her to check her fingerstick glucose readings as directed, especially at the time of any hypoglycemic symptoms.

## 2022-11-30 ENCOUNTER — OFFICE VISIT (OUTPATIENT)
Dept: INTERNAL MEDICINE | Facility: CLINIC | Age: 47
End: 2022-11-30

## 2022-11-30 VITALS
WEIGHT: 293 LBS | TEMPERATURE: 97.8 F | BODY MASS INDEX: 41.95 KG/M2 | DIASTOLIC BLOOD PRESSURE: 63 MMHG | HEART RATE: 75 BPM | SYSTOLIC BLOOD PRESSURE: 119 MMHG | HEIGHT: 70 IN | OXYGEN SATURATION: 98 %

## 2022-11-30 DIAGNOSIS — J01.00 ACUTE NON-RECURRENT MAXILLARY SINUSITIS: Primary | ICD-10-CM

## 2022-11-30 PROCEDURE — 99213 OFFICE O/P EST LOW 20 MIN: CPT | Performed by: NURSE PRACTITIONER

## 2022-11-30 RX ORDER — FLUTICASONE PROPIONATE 50 MCG
2 SPRAY, SUSPENSION (ML) NASAL DAILY
Qty: 16 G | Refills: 0 | Status: SHIPPED | OUTPATIENT
Start: 2022-11-30 | End: 2023-01-09

## 2022-11-30 RX ORDER — AZITHROMYCIN 250 MG/1
TABLET, FILM COATED ORAL
Qty: 6 TABLET | Refills: 0 | Status: SHIPPED | OUTPATIENT
Start: 2022-11-30 | End: 2023-01-09

## 2022-11-30 NOTE — PROGRESS NOTES
"Chief Complaint  Nasal Congestion and Fatigue (Pt does nto want to be swabbed )    Subjective          Em Montanez presents to [facility]  Sinusitis  This is a new problem. Episode onset: 10 days  The problem is unchanged. There has been no fever. Associated symptoms include congestion and sinus pressure. Pertinent negatives include no chills, coughing, diaphoresis, ear pain, headaches, hoarse voice, neck pain, shortness of breath, sneezing, sore throat or swollen glands. Treatments tried: Dayquil. The treatment provided no relief.         Current Outpatient Medications   Medication Instructions   • azithromycin (Zithromax Z-Priyank) 250 MG tablet Take 2 tablets by mouth on day 1, then 1 tablet daily on days 2-5   • cetirizine (ZYRTEC) 10 mg, Oral, Daily   • Continuous Blood Gluc Sensor (FreeStyle Cherie 14 Day Sensor) misc 1 each, Does not apply, Every 14 Days, Freestyle Cherie 14-day sensors one for each 14 day period to check 6 times a day. Dx: E 1   • Continuous Blood Gluc Sensor (FreeStyle Cherie 2 Sensor) misc 1 Device, Does not apply, Every 14 Days   • cyanocobalamin 1,000 mcg, Intramuscular, Every 28 Days   • fluticasone (Flonase) 50 MCG/ACT nasal spray 2 sprays, Nasal, Daily   • lidocaine (LIDODERM) 5 % 1 patch, Transdermal, Daily PRN   • metFORMIN ER (GLUCOPHAGE-XR) 500 MG 24 hr tablet TAKE ONE TABLET BY MOUTH DAILY WITH EVENING MEAL   • multivitamin with minerals tablet tablet 1 tablet, Oral, Daily   • traZODone (DESYREL) 50 MG tablet TAKE ONE TABLET BY MOUTH ONCE NIGHTLY   • valACYclovir (VALTREX) 1,000 mg, Oral, Daily       The following portions of the patient's history were reviewed and updated as appropriate: allergies, current medications, past family history, past medical history, past social history, past surgical history, and problem list.    Objective   Vital Signs:   /63   Pulse 75   Temp 97.8 °F (36.6 °C)   Ht 177.8 cm (70\")   Wt 136 kg " (299 lb)   SpO2 98%   BMI 42.90 kg/m²     Wt Readings from Last 3 Encounters:   11/30/22 136 kg (299 lb)   11/17/22 135 kg (297 lb)   10/10/22 133 kg (292 lb 6.4 oz)     BP Readings from Last 3 Encounters:   11/30/22 119/63   11/17/22 137/85   10/10/22 150/84     Physical Exam  Vitals and nursing note reviewed.   Constitutional:       General: She is not in acute distress.     Appearance: Normal appearance. She is not ill-appearing.   HENT:      Right Ear: Tympanic membrane, ear canal and external ear normal.      Left Ear: Tympanic membrane, ear canal and external ear normal.      Nose: Congestion and rhinorrhea present.      Right Sinus: Maxillary sinus tenderness present.      Left Sinus: Maxillary sinus tenderness present.   Eyes:      Extraocular Movements: Extraocular movements intact.      Conjunctiva/sclera: Conjunctivae normal.   Cardiovascular:      Rate and Rhythm: Normal rate.   Pulmonary:      Effort: Pulmonary effort is normal.      Breath sounds: Normal breath sounds.   Skin:     General: Skin is warm and dry.      Capillary Refill: Capillary refill takes less than 2 seconds.   Neurological:      General: No focal deficit present.      Mental Status: She is alert and oriented to person, place, and time. Mental status is at baseline.   Psychiatric:         Mood and Affect: Mood normal.         Behavior: Behavior normal.         Thought Content: Thought content normal.         Judgment: Judgment normal.              Result Review :   The following data was reviewed by: JOSEMANUEL Delgado on 11/30/2022:  Common labs    Common Labs 3/16/22 3/16/22 3/16/22 10/5/22 10/5/22 10/5/22 10/12/22 10/12/22    1051 1051 1051 0849 0849 0849 0929 0929   Glucose   92   110 (A)  94   BUN   14   12  20   Creatinine   0.96   0.82  0.88   Sodium   138   139  140   Potassium   4.0   4.3  4.4   Chloride   101   101  101   Calcium   9.5   9.5  9.2   Total Protein        6.4   Albumin   4.30   4.00  4.0   Total  Bilirubin   0.3   0.3  <0.2   Alkaline Phosphatase   84   81  91   AST (SGOT)   21   22  14   ALT (SGPT)   15   16  13   WBC 8.87   8.76       Hemoglobin 14.8   15.0       Hematocrit 43.9   43.4       Platelets 265   269       Total Cholesterol     174      Triglycerides     182 (A)      HDL Cholesterol     55      LDL Cholesterol      88      Hemoglobin A1C  5.60     5.7 (A)    (A) Abnormal value       Comments are available for some flowsheets but are not being displayed.                  Lab Results   Component Value Date    COVID19 NOT DETECTED 03/31/2021    INR 0.86 (L) 03/30/2021    BILIRUBINUR Negative 03/10/2022       Procedures        Assessment and Plan    Diagnoses and all orders for this visit:    1. Acute non-recurrent maxillary sinusitis (Primary)  -     azithromycin (Zithromax Z-Priyank) 250 MG tablet; Take 2 tablets by mouth on day 1, then 1 tablet daily on days 2-5  Dispense: 6 tablet; Refill: 0  -     fluticasone (Flonase) 50 MCG/ACT nasal spray; 2 sprays into the nostril(s) as directed by provider Daily.  Dispense: 16 g; Refill: 0      - increase fluid intake   - tylenol/motrin PRN for fever control   - cool mist humidifier in the room   - vicks to the chest       There are no discontinued medications.       Follow Up   No follow-ups on file.  Patient was given instructions and counseling regarding her condition or for health maintenance advice. Please see specific information pulled into the AVS if appropriate.       Cristine Guthrie, JOSEMANUEL  11/30/22  10:25 EST

## 2022-12-14 DIAGNOSIS — E27.40 ADRENAL INSUFFICIENCY: Primary | ICD-10-CM

## 2022-12-14 RX ORDER — SODIUM CHLORIDE 9 MG/ML
250 INJECTION, SOLUTION INTRAVENOUS ONCE
Status: CANCELLED | OUTPATIENT
Start: 2022-12-19

## 2022-12-14 RX ORDER — COSYNTROPIN 0.25 MG/ML
0.25 INJECTION, POWDER, FOR SOLUTION INTRAMUSCULAR; INTRAVENOUS ONCE
Status: CANCELLED | OUTPATIENT
Start: 2022-12-19

## 2022-12-29 ENCOUNTER — HOSPITAL ENCOUNTER (OUTPATIENT)
Dept: INFUSION THERAPY | Facility: HOSPITAL | Age: 47
Discharge: HOME | End: 2022-12-29
Payer: COMMERCIAL

## 2022-12-29 DIAGNOSIS — E27.40 ADRENAL INSUFFICIENCY: Primary | ICD-10-CM

## 2022-12-29 RX ORDER — COSYNTROPIN 0.25 MG/ML
0.25 INJECTION, POWDER, FOR SOLUTION INTRAMUSCULAR; INTRAVENOUS ONCE
Status: DISCONTINUED | OUTPATIENT
Start: 2022-12-29 | End: 2022-12-31 | Stop reason: HOSPADM

## 2022-12-29 RX ORDER — SODIUM CHLORIDE 9 MG/ML
250 INJECTION, SOLUTION INTRAVENOUS ONCE
Status: CANCELLED | OUTPATIENT
Start: 2022-12-29

## 2022-12-29 RX ORDER — SODIUM CHLORIDE 9 MG/ML
250 INJECTION, SOLUTION INTRAVENOUS ONCE
Status: DISCONTINUED | OUTPATIENT
Start: 2022-12-29 | End: 2022-12-31 | Stop reason: HOSPADM

## 2022-12-29 RX ORDER — COSYNTROPIN 0.25 MG/ML
0.25 INJECTION, POWDER, FOR SOLUTION INTRAMUSCULAR; INTRAVENOUS ONCE
Status: CANCELLED | OUTPATIENT
Start: 2022-12-29

## 2022-12-29 NOTE — CODE DOCUMENTATION
Patient presented to department for ACTH stim test. Had not been NPO and had a cup of coffee. STIM test could not be completed due to this. Patient will reschedule for next week.

## 2023-01-02 ENCOUNTER — HOSPITAL ENCOUNTER (OUTPATIENT)
Dept: INFUSION THERAPY | Facility: HOSPITAL | Age: 48
Discharge: HOME | End: 2023-01-02
Admitting: INTERNAL MEDICINE
Payer: COMMERCIAL

## 2023-01-02 VITALS
HEIGHT: 70 IN | OXYGEN SATURATION: 97 % | WEIGHT: 293 LBS | HEART RATE: 66 BPM | DIASTOLIC BLOOD PRESSURE: 62 MMHG | RESPIRATION RATE: 20 BRPM | SYSTOLIC BLOOD PRESSURE: 122 MMHG | TEMPERATURE: 97.9 F | BODY MASS INDEX: 41.95 KG/M2

## 2023-01-02 DIAGNOSIS — E27.40 ADRENAL INSUFFICIENCY: Primary | ICD-10-CM

## 2023-01-02 PROCEDURE — 82024 ASSAY OF ACTH: CPT

## 2023-01-02 PROCEDURE — 82533 TOTAL CORTISOL: CPT | Performed by: INTERNAL MEDICINE

## 2023-01-02 PROCEDURE — 82024 ASSAY OF ACTH: CPT | Performed by: INTERNAL MEDICINE

## 2023-01-02 PROCEDURE — 25010000002 COSYNTROPIN PER 0.25 MG: Performed by: INTERNAL MEDICINE

## 2023-01-02 RX ORDER — COSYNTROPIN 0.25 MG/ML
0.25 INJECTION, POWDER, FOR SOLUTION INTRAMUSCULAR; INTRAVENOUS ONCE
Status: COMPLETED | OUTPATIENT
Start: 2023-01-02 | End: 2023-01-02

## 2023-01-02 RX ORDER — COSYNTROPIN 0.25 MG/ML
0.25 INJECTION, POWDER, FOR SOLUTION INTRAMUSCULAR; INTRAVENOUS ONCE
Status: CANCELLED | OUTPATIENT
Start: 2023-01-02

## 2023-01-02 RX ADMIN — COSYNTROPIN 0.25 MG: 0.25 INJECTION, POWDER, LYOPHILIZED, FOR SOLUTION INTRAVENOUS at 08:17

## 2023-01-09 ENCOUNTER — OFFICE VISIT (OUTPATIENT)
Dept: ENDOCRINOLOGY | Age: 48
End: 2023-01-09
Payer: COMMERCIAL

## 2023-01-09 VITALS
SYSTOLIC BLOOD PRESSURE: 114 MMHG | OXYGEN SATURATION: 97 % | BODY MASS INDEX: 41.95 KG/M2 | HEART RATE: 70 BPM | TEMPERATURE: 96.8 F | HEIGHT: 70 IN | WEIGHT: 293 LBS | DIASTOLIC BLOOD PRESSURE: 80 MMHG

## 2023-01-09 DIAGNOSIS — E27.40 ADRENAL INSUFFICIENCY: ICD-10-CM

## 2023-01-09 DIAGNOSIS — E16.2 HYPOGLYCEMIA: ICD-10-CM

## 2023-01-09 DIAGNOSIS — E66.01 MORBID (SEVERE) OBESITY DUE TO EXCESS CALORIES: ICD-10-CM

## 2023-01-09 DIAGNOSIS — E55.9 VITAMIN D DEFICIENCY: ICD-10-CM

## 2023-01-09 DIAGNOSIS — E78.5 DYSLIPIDEMIA: ICD-10-CM

## 2023-01-09 DIAGNOSIS — E28.2 POLYCYSTIC OVARIES: ICD-10-CM

## 2023-01-09 DIAGNOSIS — R73.03 PREDIABETES: Primary | ICD-10-CM

## 2023-01-09 PROCEDURE — 99214 OFFICE O/P EST MOD 30 MIN: CPT | Performed by: INTERNAL MEDICINE

## 2023-01-09 RX ORDER — ACARBOSE 25 MG/1
25 TABLET ORAL
Qty: 270 TABLET | Refills: 3 | Status: SHIPPED | OUTPATIENT
Start: 2023-01-09 | End: 2024-01-09

## 2023-01-09 RX ORDER — METFORMIN HYDROCHLORIDE 500 MG/1
TABLET, EXTENDED RELEASE ORAL
Qty: 90 TABLET | Refills: 3 | Status: SHIPPED | OUTPATIENT
Start: 2023-01-09

## 2023-01-09 NOTE — PROGRESS NOTES
Chief Complaint  Adrenal Problem    Subjective        Em Montanez presents to [facility]     Follow up:    Interval history negative for any new symptoms, ER visits or hospitalizations.    We do not have any report of her freestyle marco a download available.  Patient reports her lowest blood sugar noted was in the low 60s on her freestyle marco a that she wore in/around November, 2022.  She had stopped all her medications, including metformin on her own.  She had been on metformin for her PCOS.    She states she feels symptoms of hypoglycemia when her blood sugar is at or below 100 mg/dL.  She describes her symptoms of hypoglycemia as yawning and generally feeling sick/malaise.  The symptoms resolved with food.    Labs reviewed with the patient.  3-hour glucose tolerance test was normal.  Her repeat ACTH stim test was normal.  Her hemoglobin A1c is consistent with prediabetes.      History of Present Illness    48 y/o white female presents for evaluation and treatment recommendations for a low blood sugar.    She states she was first diagnosed with an abnormal/low blood sugar about 10 years ago.  She states she fell off a flight of stairs after losing consciousness.  She states her blood sugar was checked after this episode and she was noted to have a low blood sugar.  She is unable to recall the exact blood sugar reading at that time.  She recovered from her loss of consciousness within a few seconds/ minutes, she recalls.    She states over time her episodes of subjective low blood pressure have increased in frequency.  They occur daily to every few days.  The symptoms never occur fasting/on waking up.  They usually occur during the day.  The symptoms resolve after eating.  She states the symptoms that she experiences is yawning every 2 seconds.  She rarely has lightheadedness associated with these episodes.  She denies feeling cold clammy or sweaty.  She states if her blood  sugar is high, she feels nauseous.      She does not know what her blood sugar is when she feels that she is low.  In general her blood sugars have ranged from  mg/dL.  Other than her one episode 10 years ago of loss of consciousness and falling down the stairs, the patient denies experiencing loss of consciousness or seizures.    She has no history of chronic or intermittent glucocorticoid therapy.    Reviewed past medical history, allergies, medication list, family history and social history.  Note is made of the patient having a diagnosis of PCOS.  She was initiated on metformin..    PMH:   PCOS.  She is on metformin low-dose.  Weight is increasing  NICOLLE in 4/2022 and ovaries in place  Leep  Esure    Reviewed past medical history, NKDA, medication list, family history and social history.  Review of systems is as per HPI, otherwise negative.  Objective   Vital Signs:  /80   Pulse 70   Temp 96.8 °F (36 °C) (Temporal)   Ht 177.8 cm (70\")   Wt 134 kg (296 lb)   SpO2 97%   BMI 42.47 kg/m²   Estimated body mass index is 42.47 kg/m² as calculated from the following:    Height as of this encounter: 177.8 cm (70\").    Weight as of this encounter: 134 kg (296 lb).          Physical Exam  Vitals and nursing note reviewed.   Constitutional:       General: She is not in acute distress.     Appearance: She is obese. She is not ill-appearing, toxic-appearing or diaphoretic.   HENT:      Head: Normocephalic and atraumatic.      Nose: Nose normal.      Mouth/Throat:      Mouth: Mucous membranes are moist.      Pharynx: Oropharynx is clear. No oropharyngeal exudate or posterior oropharyngeal erythema.   Eyes:      General: No scleral icterus.     Extraocular Movements: Extraocular movements intact.      Conjunctiva/sclera: Conjunctivae normal.      Pupils: Pupils are equal, round, and reactive to light.   Neck:      Thyroid: No thyroid mass, thyromegaly or thyroid tenderness.      Vascular: No carotid bruit.       Trachea: Trachea normal.   Cardiovascular:      Rate and Rhythm: Normal rate and regular rhythm.      Pulses: Normal pulses.      Heart sounds: Normal heart sounds. No murmur heard.    No friction rub. No gallop.   Pulmonary:      Effort: Pulmonary effort is normal. No respiratory distress.      Breath sounds: Normal breath sounds. No stridor. No wheezing, rhonchi or rales.   Chest:      Chest wall: No tenderness.   Abdominal:      General: Bowel sounds are normal. There is no distension.      Palpations: Abdomen is soft. There is no mass.      Tenderness: There is no abdominal tenderness. There is no rebound.   Musculoskeletal:         General: No swelling, tenderness, deformity or signs of injury. Normal range of motion.      Cervical back: Normal range of motion and neck supple. No rigidity or tenderness.      Right lower leg: No edema.      Left lower leg: No edema.   Lymphadenopathy:      Cervical: No cervical adenopathy.   Skin:     General: Skin is warm and dry.      Capillary Refill: Capillary refill takes 2 to 3 seconds.      Coloration: Skin is not jaundiced or pale.      Findings: No bruising, erythema, lesion or rash.   Neurological:      General: No focal deficit present.      Mental Status: She is alert and oriented to person, place, and time. Mental status is at baseline.      Cranial Nerves: No cranial nerve deficit.      Sensory: No sensory deficit.      Motor: No weakness.      Coordination: Coordination normal.      Gait: Gait normal.      Deep Tendon Reflexes: Reflexes normal.   Psychiatric:         Mood and Affect: Mood normal.         Behavior: Behavior normal.         Thought Content: Thought content normal.         Judgment: Judgment normal.        Result Review :    CMP    CMP 3/16/22 10/5/22 10/12/22   Glucose 92 110 (A) 94   BUN 14 12 20   Creatinine 0.96 0.82 0.88   eGFR 73.1 87.8    Sodium 138 139 140   Potassium 4.0 4.3 4.4   Chloride 101 101 101   Calcium 9.5 9.5 9.2   Total Protein    6.4   Total Protein 6.8 6.8    Albumin 4.30 4.00 4.0   Globulin   2.4   Globulin 2.5 2.8    Total Bilirubin 0.3 0.3 <0.2   Alkaline Phosphatase 84 81 91   AST (SGOT) 21 22 14   ALT (SGPT) 15 16 13   Albumin/Globulin Ratio 1.7 1.4    BUN/Creatinine Ratio 14.6 14.6 23   Anion Gap 9.3 9.4    (A) Abnormal value       Comments are available for some flowsheets but are not being displayed.           CBC    CBC 3/10/22 3/16/22 10/5/22   WBC 10.48 8.87 8.76   RBC 4.81 4.82 4.74   Hemoglobin 15.0 14.8 15.0   Hematocrit 44.3 43.9 43.4   MCV 92.1 91.1 91.6   MCH 31.2 30.7 31.6   MCHC 33.9 33.7 34.6   RDW 13.6 13.0 12.0 (A)   Platelets 248 265 269   (A) Abnormal value            Lipid Panel    Lipid Panel 10/5/22   Total Cholesterol 174   Triglycerides 182 (A)   HDL Cholesterol 55   VLDL Cholesterol 31   LDL Cholesterol  88   LDL/HDL Ratio 1.50   (A) Abnormal value            TSH    TSH 10/5/22   TSH 2.350           Most Recent A1C    HGBA1C Most Recent 10/12/22   Hemoglobin A1C 5.7 (A)   (A) Abnormal value       Comments are available for some flowsheets but are not being displayed.               A1C Last 3 Results    HGBA1C Last 3 Results 3/16/22 10/12/22   Hemoglobin A1C 5.60 5.7 (A)   (A) Abnormal value       Comments are available for some flowsheets but are not being displayed.                      Assessment and Plan   Diagnoses and all orders for this visit:    1. Prediabetes (Primary)  -     acarbose (PRECOSE) 25 MG tablet; Take 1 tablet by mouth 3 (Three) Times a Day With Meals.  Dispense: 270 tablet; Refill: 3  -     metFORMIN ER (GLUCOPHAGE-XR) 500 MG 24 hr tablet; Take 2 tabs with meals twice a day  Dispense: 90 tablet; Refill: 3  -     Hemoglobin A1c; Future  -     Comprehensive Metabolic Panel; Future  -     TSH; Future    2. Adrenal insufficiency (HCC)    3. Hypoglycemia  -     acarbose (PRECOSE) 25 MG tablet; Take 1 tablet by mouth 3 (Three) Times a Day With Meals.  Dispense: 270 tablet; Refill: 3  -      metFORMIN ER (GLUCOPHAGE-XR) 500 MG 24 hr tablet; Take 2 tabs with meals twice a day  Dispense: 90 tablet; Refill: 3    4. Vitamin D deficiency  -     Vitamin D,25-Hydroxy; Future  -     TSH; Future    5. Polycystic ovaries    6. Morbid (severe) obesity due to excess calories (HCC)    7. Dyslipidemia  -     Lipid Panel; Future  -     TSH; Future      The patient has prediabetes and reports atypical symptoms which she attributes to hypoglycemia.  Recommend lifestyle changes i.e. restricting carbs in the diet and maintain an exercise routine.  Patient reports snoring.  We will consider a sleep study at the next follow-up visit.  We will have her start acarbose 25 mg tablets, 1 tablet with each meal.  We will have her start her metformin again 500 mg XR tabs, 2 tabs twice a day with food.  Initiate these medications 1 to 2 weeks apart from each other.  Document blood sugar with onset of any unusual symptoms.    Repeat labs in 3-4 months, 1 week prior to follow-up visit.       Follow Up   Return in about 4 months (around 5/9/2023) for please do labs a week before follow up. F-up 3-4 months.  Patient was given instructions and counseling regarding her condition or for health maintenance advice. Please see specific information pulled into the AVS if appropriate.        actual/standing

## 2023-01-30 ENCOUNTER — TELEMEDICINE (OUTPATIENT)
Dept: FAMILY MEDICINE CLINIC | Facility: TELEHEALTH | Age: 48
End: 2023-01-30
Payer: COMMERCIAL

## 2023-01-30 DIAGNOSIS — J01.00 ACUTE MAXILLARY SINUSITIS, RECURRENCE NOT SPECIFIED: Primary | ICD-10-CM

## 2023-01-30 PROCEDURE — 99213 OFFICE O/P EST LOW 20 MIN: CPT | Performed by: NURSE PRACTITIONER

## 2023-01-30 RX ORDER — AMOXICILLIN AND CLAVULANATE POTASSIUM 875; 125 MG/1; MG/1
1 TABLET, FILM COATED ORAL 2 TIMES DAILY
Qty: 20 TABLET | Refills: 0 | Status: SHIPPED | OUTPATIENT
Start: 2023-01-30 | End: 2023-02-10

## 2023-01-30 RX ORDER — GUAIFENESIN 600 MG/1
1200 TABLET, EXTENDED RELEASE ORAL 2 TIMES DAILY
Qty: 20 TABLET | Refills: 0 | Status: SHIPPED | OUTPATIENT
Start: 2023-01-30

## 2023-01-30 NOTE — PROGRESS NOTES
You have chosen to receive care through a telehealth visit.  Do you consent to use a video/audio connection for your medical care today? Yes     CHIEF COMPLAINT  No chief complaint on file.        HPI  Em Montanez is a 47 y.o. female  presents with complaint of several days history of sinus congestion, nasal congestion with thick yellow discharge, PND, sweats/chills.  Denies fever, cough.    Review of Systems   See HPI    Past Medical History:   Diagnosis Date   • Allergic rhinitis    • Anxiety    • Arthritis    • Asthma     NO INHALER USE   • Back pain 11/21/2017    LUMBAR SPINE X RAY LARGELY UNREMARKABLE. WILL RX PT AND MONITOR FOR IMPROVEMENT. IF PAIN CONTINUES, WILL ORDER MRI   • Chronic pelvic pain in female 02/23/2022   • Depression    • Fatigue 11/21/2017   • Foot pain, right    • Insomnia 12/11/2017    DISCUSSED RISKS AND BENEFITS OF MEDICATIONS. ALSO DISCUSSED SLEEP HYGIENE METHODS INCLUDING AVOIDING SOURCES OF BLUE LIGHT, DEVELOPING ROUTINE, SLEEPING IN A DARK ROOM, AND USING BED FOR SLEEP ONLY. PT HAS TIRED MELATONIN WITH INTERMITTENT EFFECTIVENESS. PT WITHOUT REPORTED SYMPTOMS OF SLEEP APNEA AT THIS TIME.    • Knee pain 11/21/2017    PREVIOUS R KNEE REPLACEMENT 2/2 ARTHRITIS. PT WOULD LIKE TO DISCUSS OPTIONS WITH ORTHOPEDICS AGAIN   • Ovarian cyst    • Pelvic pain 01/12/2022   • Polycystic ovarian syndrome 11/21/2017   • Uterine pain    • Vitamin D deficiency 12/11/2017    CURRENTLY ON VIT D SUPPLEMENTS. WILL RECHECK VIT D LEVEL IN 3 MONTHS.        Family History   Problem Relation Age of Onset   • Depression Mother    • Heart disease Mother    • Arthritis Mother    • Cancer Other    • ADD / ADHD Son    • Self-Injurious Behavior  Daughter    • Depression Daughter    • Anxiety disorder Daughter    • Malig Hyperthermia Neg Hx        Social History     Socioeconomic History   • Marital status: Legally    Tobacco Use   • Smoking status: Every Day     Packs/day: 1.00     Years: 30.00     Pack  years: 30.00     Types: Cigarettes   • Smokeless tobacco: Never   Vaping Use   • Vaping Use: Never used   Substance and Sexual Activity   • Alcohol use: Yes     Comment: SOCIALLY   • Drug use: Not Currently     Types: Cocaine(coke), Methamphetamines, Marijuana   • Sexual activity: Yes     Partners: Male       Em Montanez  reports that she has been smoking cigarettes. She has a 30.00 pack-year smoking history. She has never used smokeless tobacco..               LMP  (LMP Unknown)     PHYSICAL EXAM  Physical Exam   Constitutional: She is oriented to person, place, and time. She appears well-developed and well-nourished. She does not have a sickly appearance. She does not appear ill.   HENT:   Head: Normocephalic and atraumatic.   Maxillary sinus tenderness   Pulmonary/Chest: Effort normal.  No respiratory distress.  Neurological: She is alert and oriented to person, place, and time.         Diagnoses and all orders for this visit:    1. Acute maxillary sinusitis, recurrence not specified (Primary)  -     amoxicillin-clavulanate (AUGMENTIN) 875-125 MG per tablet; Take 1 tablet by mouth 2 (Two) Times a Day for 10 days.  Dispense: 20 tablet; Refill: 0  -     guaiFENesin (Mucinex) 600 MG 12 hr tablet; Take 2 tablets by mouth 2 (Two) Times a Day.  Dispense: 20 tablet; Refill: 0    --take medications as prescribed  --increase fluids, rest as needed, tylenol or ibuprofen for pain  --f/u in 5-7 days if no improvement        FOLLOW-UP  As discussed during visit with PCP/[facility] if no improvement or Urgent Care/Emergency Department if worsening of symptoms    Patient verbalizes understanding of medication dosage, comfort measures, instructions for treatment and follow-up.    Alexa Dalal, APRN  01/30/2023  08:46 EST    The use of a video visit has been reviewed with the patient and verbal informed consent has been obtained. Myself and Em Montanez participated in this visit. The patient is located in [facility]  [place] [id number].    I am located in [place], [place]. [employer] were utilized. I spent 8 minutes in the patient's chart for this visit.

## 2023-01-30 NOTE — PATIENT INSTRUCTIONS
Sinusitis, Adult  Sinusitis is inflammation of your sinuses. Sinuses are hollow spaces in the bones around your face. Your sinuses are located:  Around your eyes.  In the middle of your forehead.  Behind your nose.  In your cheekbones.  Mucus normally drains out of your sinuses. When your nasal tissues become inflamed or swollen, mucus can become trapped or blocked. This allows bacteria, viruses, and fungi to grow, which leads to infection. Most infections of the sinuses are caused by a virus.  Sinusitis can develop quickly. It can last for up to 4 weeks (acute) or for more than 12 weeks (chronic). Sinusitis often develops after a cold.  What are the causes?  This condition is caused by anything that creates swelling in the sinuses or stops mucus from draining. This includes:  Allergies.  Asthma.  Infection from bacteria or viruses.  Deformities or blockages in your nose or sinuses.  Abnormal growths in the nose (nasal polyps).  Pollutants, such as chemicals or irritants in the air.  Infection from fungi (rare).  What increases the risk?  You are more likely to develop this condition if you:  Have a weak body defense system (immune system).  Do a lot of swimming or diving.  Overuse nasal sprays.  Smoke.  What are the signs or symptoms?  The main symptoms of this condition are pain and a feeling of pressure around the affected sinuses. Other symptoms include:  Stuffy nose or congestion.  Thick drainage from your nose.  Swelling and warmth over the affected sinuses.  Headache.  Upper toothache.  A cough that may get worse at night.  Extra mucus that collects in the throat or the back of the nose (postnasal drip).  Decreased sense of smell and taste.  Fatigue.  A fever.  Sore throat.  Bad breath.  How is this diagnosed?  This condition is diagnosed based on:  Your symptoms.  Your medical history.  A physical exam.  Tests to find out if your condition is acute or chronic. This may include:  Checking your nose for nasal  polyps.  Viewing your sinuses using a device that has a light (endoscope).  Testing for allergies or bacteria.  Imaging tests, such as an MRI or CT scan.  In rare cases, a bone biopsy may be done to rule out more serious types of fungal sinus disease.  How is this treated?  Treatment for sinusitis depends on the cause and whether your condition is chronic or acute.  If caused by a virus, your symptoms should go away on their own within 10 days. You may be given medicines to relieve symptoms. They include:  Medicines that shrink swollen nasal passages (topical intranasal decongestants).  Medicines that treat allergies (antihistamines).  A spray that eases inflammation of the nostrils (topical intranasal corticosteroids).  Rinses that help get rid of thick mucus in your nose (nasal saline washes).  If caused by bacteria, your health care provider may recommend waiting to see if your symptoms improve. Most bacterial infections will get better without antibiotic medicine. You may be given antibiotics if you have:  A severe infection.  A weak immune system.  If caused by narrow nasal passages or nasal polyps, you may need to have surgery.  Follow these instructions at home:  Medicines  Take, use, or apply over-the-counter and prescription medicines only as told by your health care provider. These may include nasal sprays.  If you were prescribed an antibiotic medicine, take it as told by your health care provider. Do not stop taking the antibiotic even if you start to feel better.  Hydrate and humidify    Drink enough fluid to keep your urine pale yellow. Staying hydrated will help to thin your mucus.  Use a cool mist humidifier to keep the humidity level in your home above 50%.  Inhale steam for 10-15 minutes, 3-4 times a day, or as told by your health care provider. You can do this in the bathroom while a hot shower is running.  Limit your exposure to cool or dry air.  Rest  Rest as much as possible.  Sleep with your  head raised (elevated).  Make sure you get enough sleep each night.  General instructions    Apply a warm, moist washcloth to your face 3-4 times a day or as told by your health care provider. This will help with discomfort.  Wash your hands often with soap and water to reduce your exposure to germs. If soap and water are not available, use hand .  Do not smoke. Avoid being around people who are smoking (secondhand smoke).  Keep all follow-up visits as told by your health care provider. This is important.  Contact a health care provider if:  You have a fever.  Your symptoms get worse.  Your symptoms do not improve within 10 days.  Get help right away if:  You have a severe headache.  You have persistent vomiting.  You have severe pain or swelling around your face or eyes.  You have vision problems.  You develop confusion.  Your neck is stiff.  You have trouble breathing.  Summary  Sinusitis is soreness and inflammation of your sinuses. Sinuses are hollow spaces in the bones around your face.  This condition is caused by nasal tissues that become inflamed or swollen. The swelling traps or blocks the flow of mucus. This allows bacteria, viruses, and fungi to grow, which leads to infection.  If you were prescribed an antibiotic medicine, take it as told by your health care provider. Do not stop taking the antibiotic even if you start to feel better.  Keep all follow-up visits as told by your health care provider. This is important.  This information is not intended to replace advice given to you by your health care provider. Make sure you discuss any questions you have with your health care provider.  Document Revised: 05/20/2019 Document Reviewed: 05/20/2019  [employer] Patient Education © 2022 [employer]

## 2023-02-10 ENCOUNTER — OFFICE VISIT (OUTPATIENT)
Dept: PODIATRY | Facility: CLINIC | Age: 48
End: 2023-02-10
Payer: COMMERCIAL

## 2023-02-10 VITALS
HEIGHT: 70 IN | TEMPERATURE: 98.2 F | OXYGEN SATURATION: 94 % | WEIGHT: 293 LBS | DIASTOLIC BLOOD PRESSURE: 78 MMHG | HEART RATE: 78 BPM | SYSTOLIC BLOOD PRESSURE: 124 MMHG | BODY MASS INDEX: 41.95 KG/M2

## 2023-02-10 DIAGNOSIS — L60.0 ONYCHOCRYPTOSIS: ICD-10-CM

## 2023-02-10 DIAGNOSIS — E11.9 NON-INSULIN DEPENDENT TYPE 2 DIABETES MELLITUS: ICD-10-CM

## 2023-02-10 DIAGNOSIS — M79.672 FOOT PAIN, BILATERAL: Primary | ICD-10-CM

## 2023-02-10 DIAGNOSIS — E11.8 DIABETIC FOOT: ICD-10-CM

## 2023-02-10 DIAGNOSIS — M79.671 FOOT PAIN, BILATERAL: Primary | ICD-10-CM

## 2023-02-10 DIAGNOSIS — B35.1 ONYCHOMYCOSIS: ICD-10-CM

## 2023-02-10 PROCEDURE — G8404 LOW EXTEMITY NEUR EXAM DOCUM: HCPCS | Performed by: PODIATRIST

## 2023-02-10 PROCEDURE — 11721 DEBRIDE NAIL 6 OR MORE: CPT | Performed by: PODIATRIST

## 2023-02-10 NOTE — PROGRESS NOTES
[facility] - PODIATRY    Today's Date: 02/10/23    Patient Name: Em Montanez  MRN: [id number]  CSN: [id number]  PCP: Ricky Velasquez Jr., MD, Last PCP Visit: 10/12/2022  Referring Provider: No ref. provider found    SUBJECTIVE     Chief Complaint   Patient presents with   • Left Foot - Follow-up, Nail Problem     Still had achilles pain numbnes ball of feet painat night   • Right Foot - Follow-up, Nail Problem     Still has achilles pain numbness in ball of feet all the time     HPI: Em Montanez, a 47 y.o.female, comes to clinic.    New, Established, New Problem:  est  Location:  Toenails  Duration:   Greater than five years  Onset:  Gradual  Nature:  sore with palpation.  Stable, worsening, improving:   stable  Aggravating factors:  Pain with shoe gear and ambulation.  Previous Treatment: debridement    Patient denies any fevers, chills, nausea, vomiting, shortness of breath, nor any other constitutional signs nor symptoms.       Patient states their most recent blood glucose reading was 122.    Past Medical History:   Diagnosis Date   • Allergic rhinitis    • Anxiety    • Arthritis    • Asthma     NO INHALER USE   • Back pain 11/21/2017    LUMBAR SPINE X RAY LARGELY UNREMARKABLE. WILL RX PT AND MONITOR FOR IMPROVEMENT. IF PAIN CONTINUES, WILL ORDER MRI   • Chronic pelvic pain in female 02/23/2022   • Depression    • Fatigue 11/21/2017   • Foot pain, right    • Insomnia 12/11/2017    DISCUSSED RISKS AND BENEFITS OF MEDICATIONS. ALSO DISCUSSED SLEEP HYGIENE METHODS INCLUDING AVOIDING SOURCES OF BLUE LIGHT, DEVELOPING ROUTINE, SLEEPING IN A DARK ROOM, AND USING BED FOR SLEEP ONLY. PT HAS TIRED MELATONIN WITH INTERMITTENT EFFECTIVENESS. PT WITHOUT REPORTED SYMPTOMS OF SLEEP APNEA AT THIS TIME.    • Knee pain 11/21/2017    PREVIOUS R KNEE REPLACEMENT 2/2 ARTHRITIS. PT WOULD LIKE TO DISCUSS OPTIONS WITH ORTHOPEDICS AGAIN   • Ovarian cyst    • Pelvic pain 01/12/2022   • Polycystic  ovarian syndrome 11/21/2017   • Uterine pain    • Vitamin D deficiency 12/11/2017    CURRENTLY ON VIT D SUPPLEMENTS. WILL RECHECK VIT D LEVEL IN 3 MONTHS.      Past Surgical History:   Procedure Laterality Date   • CHOLECYSTECTOMY     • COLONOSCOPY N/A 02/16/2022    Procedure: COLONOSCOPY;  Surgeon: Jb Gonzalez MD;  Location: [facility] ENDOSCOPY;  Service: Gastroenterology;  Laterality: N/A;  hemmorroids   • ENDOMETRIAL ABLATION     • ESSURE TUBAL LIGATION     • JOINT REPLACEMENT      bilateral knee replacement   • LEEP     • OTHER SURGICAL HISTORY      METAL IMPLANTS  knees   • TONSILLECTOMY     • TOTAL LAPAROSCOPIC HYSTERECTOMY N/A 4/6/2022    Procedure: TOTAL LAPAROSCOPIC HYSTERECTOMY WITH DAVINCI ROBOT;  Surgeon: Chito Cook MD;  Location: [facility] MAIN OR;  Service: Robotics - DaVinci;  Laterality: N/A;     Family History   Problem Relation Age of Onset   • Depression Mother    • Heart disease Mother    • Arthritis Mother    • Cancer Other    • ADD / ADHD Son    • Self-Injurious Behavior  Daughter    • Depression Daughter    • Anxiety disorder Daughter    • Malig Hyperthermia Neg Hx      Social History     Socioeconomic History   • Marital status: Legally    Tobacco Use   • Smoking status: Every Day     Packs/day: 1.00     Years: 30.00     Pack years: 30.00     Types: Cigarettes   • Smokeless tobacco: Never   Vaping Use   • Vaping Use: Never used   Substance and Sexual Activity   • Alcohol use: Yes     Comment: SOCIALLY   • Drug use: Not Currently     Types: Cocaine(coke), Methamphetamines, Marijuana   • Sexual activity: Yes     Partners: Male     Allergies   Allergen Reactions   • Prednisone Mental Status Change     Other reaction(s): Mental Status Change   • Tramadol GI Intolerance     Nausea and vomiting  Other reaction(s): GI Intolerance, Vomiting  Nausea and vomiting     Current Outpatient Medications   Medication Sig Dispense Refill   • acarbose (PRECOSE) 25 MG tablet Take 1 tablet by  mouth 3 (Three) Times a Day With Meals. 270 tablet 3   • cetirizine (zyrTEC) 10 MG tablet Take 1 tablet by mouth Daily. 90 tablet 3   • cyanocobalamin 1000 MCG/ML injection Inject 1 mL into the appropriate muscle as directed by prescriber Every 28 (Twenty-Eight) Days. 10 mL 0   • lidocaine (LIDODERM) 5 % Place 1 patch on the skin as directed by provider Daily As Needed.     • metFORMIN ER (GLUCOPHAGE-XR) 500 MG 24 hr tablet Take 2 tabs with meals twice a day 90 tablet 3   • multivitamin with minerals tablet tablet Take 1 tablet by mouth Daily.     • traZODone (DESYREL) 50 MG tablet TAKE ONE TABLET BY MOUTH ONCE NIGHTLY 90 tablet 1   • valACYclovir (Valtrex) 1000 MG tablet Take 1 tablet by mouth Daily. 10 tablet 2   • guaiFENesin (Mucinex) 600 MG 12 hr tablet Take 2 tablets by mouth 2 (Two) Times a Day. 20 tablet 0     Current Facility-Administered Medications   Medication Dose Route Frequency Provider Last Rate Last Admin   • cosyntropin (CORTROSYN) injection 0.25 mg  0.25 mg Intravenous Once Mena Olivera MD         Review of Systems   Constitutional: Negative.    Skin:        Painful toenails.   All other systems reviewed and are negative.      OBJECTIVE     Vitals:    02/10/23 1048   BP: 124/78   Pulse: 78   Temp: 98.2 °F (36.8 °C)   SpO2: 94%       Patient seen in no apparent distress.      PHYSICAL EXAM:     Foot/Ankle Exam:       General:   Diabetic Foot Exam Performed    Appearance: obesity    Orientation: AAOx3    Affect: appropriate    Gait: unimpaired    Shoe Gear:  Casual shoes    VASCULAR      Right Foot Vascularity   Normal vascular exam    Dorsalis pedis:  2+  Posterior tibial:  2+  Skin Temperature: warm    Edema Gradin+ and pitting  CFT:  < 3 seconds  Pedal Hair Growth:  Absent  Varicosities: mild varicosities       Left Foot Vascularity   Normal vascular exam    Dorsalis pedis:  2+  Posterior tibial:  2+  Skin Temperature: warm    Edema Gradin+ and pitting  CFT:  < 3 seconds  Pedal Hair  Growth:  Absent  Varicosities: mild varicosities        NEUROLOGIC     Right Foot Neurologic   Normal sensation    Light touch sensation:  Normal  Vibratory sensation:  Normal  Hot/Cold sensation: normal    Protective Sensation using Coyote-Harpal Monofilament:  10     Left Foot Neurologic   Normal sensation    Light touch sensation:  Normal  Vibratory sensation:  Normal  Hot/cold sensation: normal    Protective Sensation using Coyote-Harpal Monofilament:  10     MUSCLE STRENGTH     Right Foot Muscle Strength   Foot dorsiflexion:  4  Foot plantar flexion:  4  Foot inversion:  4  Foot eversion:  4     Left Foot Muscle Strength   Foot dorsiflexion:  4  Foot plantar flexion:  4  Foot inversion:  4  Foot eversion:  4     RANGE OF MOTION      Right Foot Range of Motion   Foot and ankle ROM within normal limits       Left Foot Range of Motion   Foot and ankle ROM within normal limits       DERMATOLOGIC     Right Foot Dermatologic   Skin: skin intact    Nails: onychomycosis, abnormally thick, subungual debris, dystrophic nails and ingrown toenail    Nails comment:  Toenails 1, 4, and 5     Left Foot Dermatologic   Skin: skin intact    Nails: onychomycosis, abnormally thick, subungual debris, dystrophic nails and ingrown toenail    Nails comment:  Toenails 1, 2 and 5      ASSESSMENT/PLAN     Diagnoses and all orders for this visit:    1. Foot pain, bilateral (Primary)    2. Onychocryptosis    3. Diabetic foot (HCC)    4. Non-insulin dependent type 2 diabetes mellitus (HCC)    5. Onychomycosis        Comprehensive lower extremity examination and evaluation was performed.    Discussed findings and treatment plan including risks, benefits, and treatment options with patient in detail. Patient agreed with treatment plan.    Toenails 1, 4, 5 on Right and 1, 2, 5 on Left were debrided with nail nippers then filed with a Bryanmel nail serena.  Patient tolerated procedure well without complications.    Diabetic foot exam  performed and documented this date, compliant with CQM required standards. Detail of findings as noted in physical exam.  Lower extremity Neurologic exam for diabetic patient performed and documented this date, compliant with PQRS required standards. Detail of findings as noted in physical exam.  Advised patient importance of good routine lower extremity hygiene. Advised patient importance of evaluating for intact skin and pain free nail borders.  Advised patient to use mirror to evaluate plantar/ soles of feet for better visualization. Advised patient monitor and phone office to be seen if any cracking to skin, open lesions, painful nail borders or if nails become elongated prior to next visit. Advised patient importance of daily cleansing of lower extremities, followed by good skin cream to maintain normal hydration of skin. Also advised patient importance of close daily monitoring of blood sugar. Advised to regulate diet and medications to maintain control of blood sugar in optimal range. Contact primary care provider if difficulties maintaining blood sugar levels.  Advised Patient of presence of Diabetes Mellitus condition.  Advised Patient risk of progression and worsening or improvement, then return of condition.  Will monitor condition for any change in future. Treat with most appropriate treatment pending status of condition.  Counseled and advised patient extensively on nature and ramifications of diabetes. Standard instructions given to patient for good diabetic foot care and maintenance. Advised importance of careful monitoring to avoid break down and complications secondary to diabetes. Advised patient importance of strict maintenance of blood sugar control. Advised patient of possible ominous results from neglect of condition, i.e.: amputation/ loss of digits, feet and legs, or even death.  Patient states understands counseling, will monitor closely, continue good hygiene and routine diabetic foot care.  Patient will contact office is questions or problems.      Patient is to monitor for recurrence and any new symptoms and to contact Dr. Rivera's office for a follow-up appointment.      The patient states understanding and agreement with this plan.    An After Visit Summary was printed and given to the patient at discharge, including (if requested) any available informative/educational handouts regarding diagnosis, treatment, or medications. All questions were answered to patient/family satisfaction. Should symptoms fail to improve or worsen they agree to call or return to clinic or to go to the Emergency Department. Discussed the importance of following up with any needed screening tests/labs/specialist appointments and any requested follow-up recommended by me today. Importance of maintaining follow-up discussed and patient accepts that missed appointments can delay diagnosis and potentially lead to worsening of conditions.    Return in about 9 weeks (around 4/14/2023) for Toenail Care., or sooner if acute issues arise.    This document has been electronically signed by Lucas Rivera DPM on February 10, 2023 10:57 EST

## 2023-02-16 ENCOUNTER — TRANSCRIBE ORDERS (OUTPATIENT)
Dept: ADMINISTRATIVE | Facility: HOSPITAL | Age: 48
End: 2023-02-16
Payer: COMMERCIAL

## 2023-02-16 DIAGNOSIS — Z12.31 SCREENING MAMMOGRAM, ENCOUNTER FOR: Primary | ICD-10-CM

## 2023-02-28 ENCOUNTER — HOSPITAL ENCOUNTER (OUTPATIENT)
Dept: MAMMOGRAPHY | Facility: HOSPITAL | Age: 48
Discharge: HOME | End: 2023-02-28
Admitting: INTERNAL MEDICINE
Payer: COMMERCIAL

## 2023-02-28 DIAGNOSIS — Z12.31 SCREENING MAMMOGRAM, ENCOUNTER FOR: ICD-10-CM

## 2023-02-28 PROCEDURE — 77063 BREAST TOMOSYNTHESIS BI: CPT

## 2023-02-28 PROCEDURE — 77067 SCR MAMMO BI INCL CAD: CPT

## 2023-03-02 RX ORDER — MELOXICAM 15 MG/1
TABLET ORAL
COMMUNITY
Start: 2023-02-27

## 2023-03-07 ENCOUNTER — TELEPHONE (OUTPATIENT)
Dept: ORTHOPEDIC SURGERY | Facility: CLINIC | Age: 48
End: 2023-03-07
Payer: COMMERCIAL

## 2023-03-08 ENCOUNTER — OFFICE VISIT (OUTPATIENT)
Dept: OTOLARYNGOLOGY | Facility: CLINIC | Age: 48
End: 2023-03-08
Payer: COMMERCIAL

## 2023-03-08 VITALS
SYSTOLIC BLOOD PRESSURE: 120 MMHG | WEIGHT: 293 LBS | TEMPERATURE: 96.9 F | BODY MASS INDEX: 41.02 KG/M2 | HEART RATE: 68 BPM | DIASTOLIC BLOOD PRESSURE: 77 MMHG | HEIGHT: 71 IN

## 2023-03-08 DIAGNOSIS — E04.1 THYROID CYST: Primary | ICD-10-CM

## 2023-03-08 DIAGNOSIS — R22.1 NECK FULLNESS: ICD-10-CM

## 2023-03-08 PROCEDURE — 1160F RVW MEDS BY RX/DR IN RCRD: CPT | Performed by: OTOLARYNGOLOGY

## 2023-03-08 PROCEDURE — 1159F MED LIST DOCD IN RCRD: CPT | Performed by: OTOLARYNGOLOGY

## 2023-03-08 PROCEDURE — 99203 OFFICE O/P NEW LOW 30 MIN: CPT | Performed by: OTOLARYNGOLOGY

## 2023-03-08 NOTE — PROGRESS NOTES
Patient Name: Em Montanez   Visit Date: 03/08/2023   Patient ID: [id number]  Provider: Kenny Wells MD    Sex: female  Location: [facility]   YOB: 1975  Location Address: [address] [place] [address], [address],?[place]?[address]    Primary Care Provider Ricky Velasquez Jr., MD  Location Phone: [phone]    Referring Provider: Ricky Franklin        Chief Complaint  Thyroid nodule    History of Present Illness  Em Montanez is a 47 y.o. female who presents to [facility] today as a consult from Ricky Franklin for evaluation of her neck.  She tells me that a few months ago she noticed that her neck appears more full than previously.  She cannot recall any inciting illness or incident.  She is not experiencing any neck pain, sore throat, dysphagia, hoarseness, or otalgia.  She denies any family history of thyroid cancer or personal history of radiation exposure.  Thyroid ultrasound on 9/16/2022 revealed a right 0.4 cm cyst as well as perithyroidal lymph nodes which were normal in size and shape.  Her most recent thyroid function testing on 10/5/2022 revealed a normal TSH of 2.350.  She does smoke around 1 pack/day.      Past Medical History:   Diagnosis Date   • Allergic rhinitis    • Anxiety    • Arthritis    • Asthma     NO INHALER USE   • Back pain 11/21/2017    LUMBAR SPINE X RAY LARGELY UNREMARKABLE. WILL RX PT AND MONITOR FOR IMPROVEMENT. IF PAIN CONTINUES, WILL ORDER MRI   • Chronic pelvic pain in female 02/23/2022   • Depression    • Fatigue 11/21/2017   • Foot pain, right    • Insomnia 12/11/2017    DISCUSSED RISKS AND BENEFITS OF MEDICATIONS. ALSO DISCUSSED SLEEP HYGIENE METHODS INCLUDING AVOIDING SOURCES OF BLUE LIGHT, DEVELOPING ROUTINE, SLEEPING IN A DARK ROOM, AND USING BED FOR SLEEP ONLY. PT HAS TIRED MELATONIN WITH INTERMITTENT EFFECTIVENESS. PT WITHOUT REPORTED SYMPTOMS OF SLEEP APNEA AT  THIS TIME.    • Knee pain 11/21/2017    PREVIOUS R KNEE REPLACEMENT 2/2 ARTHRITIS. PT WOULD LIKE TO DISCUSS OPTIONS WITH ORTHOPEDICS AGAIN   • Ovarian cyst    • Pelvic pain 01/12/2022   • Polycystic ovarian syndrome 11/21/2017   • Uterine pain    • Vitamin D deficiency 12/11/2017    CURRENTLY ON VIT D SUPPLEMENTS. WILL RECHECK VIT D LEVEL IN 3 MONTHS.        Past Surgical History:   Procedure Laterality Date   • CHOLECYSTECTOMY     • COLONOSCOPY N/A 02/16/2022    Procedure: COLONOSCOPY;  Surgeon: Jb Gonzalez MD;  Location: [facility] ENDOSCOPY;  Service: Gastroenterology;  Laterality: N/A;  hemmorroids   • ENDOMETRIAL ABLATION     • ESSURE TUBAL LIGATION     • JOINT REPLACEMENT      bilateral knee replacement   • LEEP     • OTHER SURGICAL HISTORY      METAL IMPLANTS  knees   • TONSILLECTOMY     • TOTAL LAPAROSCOPIC HYSTERECTOMY N/A 4/6/2022    Procedure: TOTAL LAPAROSCOPIC HYSTERECTOMY WITH DAVINCI ROBOT;  Surgeon: Chito Cook MD;  Location: [facility] MAIN OR;  Service: Robotics - DaVinci;  Laterality: N/A;         Current Outpatient Medications:   •  acarbose (PRECOSE) 25 MG tablet, Take 1 tablet by mouth 3 (Three) Times a Day With Meals., Disp: 270 tablet, Rfl: 3  •  cetirizine (zyrTEC) 10 MG tablet, Take 1 tablet by mouth Daily., Disp: 90 tablet, Rfl: 3  •  cyanocobalamin 1000 MCG/ML injection, Inject 1 mL into the appropriate muscle as directed by prescriber Every 28 (Twenty-Eight) Days., Disp: 10 mL, Rfl: 0  •  guaiFENesin (Mucinex) 600 MG 12 hr tablet, Take 2 tablets by mouth 2 (Two) Times a Day., Disp: 20 tablet, Rfl: 0  •  lidocaine (LIDODERM) 5 %, Place 1 patch on the skin as directed by provider Daily As Needed., Disp: , Rfl:   •  meloxicam (MOBIC) 15 MG tablet, , Disp: , Rfl:   •  metFORMIN ER (GLUCOPHAGE-XR) 500 MG 24 hr tablet, Take 2 tabs with meals twice a day, Disp: 90 tablet, Rfl: 3  •  multivitamin with minerals tablet tablet, Take 1 tablet by mouth Daily., Disp: , Rfl:   •  traZODone  "(DESYREL) 50 MG tablet, TAKE ONE TABLET BY MOUTH ONCE NIGHTLY, Disp: 90 tablet, Rfl: 1  •  valACYclovir (Valtrex) 1000 MG tablet, Take 1 tablet by mouth Daily., Disp: 10 tablet, Rfl: 2    Current Facility-Administered Medications:   •  cosyntropin (CORTROSYN) injection 0.25 mg, 0.25 mg, Intravenous, Once, Mena Olivera MD     Allergies   Allergen Reactions   • Prednisone Mental Status Change     Other reaction(s): Mental Status Change   • Tramadol GI Intolerance     Nausea and vomiting  Other reaction(s): GI Intolerance, Vomiting  Nausea and vomiting       Social History     Tobacco Use   • Smoking status: Every Day     Packs/day: 1.00     Years: 30.00     Pack years: 30.00     Types: Cigarettes   • Smokeless tobacco: Never   Vaping Use   • Vaping Use: Never used   Substance Use Topics   • Alcohol use: Yes     Comment: SOCIALLY   • Drug use: Not Currently     Types: Cocaine(coke), Methamphetamines, Marijuana        Objective     Vital Signs:   /77   Pulse 68   Temp 96.9 °F (36.1 °C)   Ht 179.1 cm (70.5\")   Wt (!) 137 kg (301 lb)   BMI 42.58 kg/m²       Physical Exam    General: Well developed, well nourished patient of stated age in no acute distress. Voice is strong and clear.   Head: Normocephalic and atraumatic.  Face: No lesions.  Bilateral parotid and submandibular glands are unremarkable.  Stensen's and Warthin's ducts are productive of clear saliva bilaterally.  House-Brackmann I/VI     bilaterally.   muscles and temporomandibular joint nontender to palpation.  No TMJ crepitus.  Eyes: PERRLA, sclerae anicteric, no conjunctival injection. Extraocular movements are intact and full. No nystagmus.   Ears: Auricles are normal in appearance. Bilateral external auditory canals are unremarkable. Bilateral tympanic membranes are clear and without effusion. Hearing normal to conversational voice.   Nose: External nose is normal in appearance. Bilateral nares are patent with normal appearing " mucosa. Septum midline. Turbinates are unremarkable. No lesions.   Oral Cavity: Lips are normal in appearance. Oral mucosa is unremarkable. Gingiva is unremarkable.  Partial dentition for age. Tongue is unremarkable with good movement. Hard palate is unremarkable.   Oropharynx: Soft palate is unremarkable with full movement. Uvula is unremarkable. Bilateral tonsils are surgically absent. Posterior oropharynx is unremarkable.    Larynx and hypopharynx: Deferred secondary to gag reflex.  Neck: Supple.  No mass.  Nontender to palpation.  Trachea midline. Thyroid normal size and without nodules to palpation.   Lymphatic: No lymphadenopathy upon palpation.  Respiratory: Clear to auscultation bilaterally, nonlabored respirations    Cardiovascular: RRR, no murmurs, rubs, or gallops,   Psychiatric: Appropriate affect, cooperative   Neurologic: Oriented x 3, strength symmetric in all extremities, Cranial Nerves II-XII are grossly intact to confrontation   Skin: Warm and dry. No rashes.    Procedures           Result Review :               Assessment and Plan    Diagnoses and all orders for this visit:    1. Thyroid cyst (Primary)    2. Neck fullness    Impressions and findings were discussed at great length.  Currently, she is seen for evaluation of neck fullness over the past few months.  We reviewed and discussed the results of her 9/16/2022 thyroid ultrasound which revealed a small right-sided cyst and normal-appearing lymph nodes.  We discussed the differential for her symptoms including musculoskeletal possibilities amongst others.  Options for further evaluation and management were discussed and reassurance was offered.  She was given ample time to ask questions, all of which were answered to her satisfaction.  She will call to arrange follow-up if she develops any concerning signs or symptoms which we discussed.        Follow Up   Return if symptoms worsen or fail to improve.  Patient was given instructions and  counseling regarding her condition or for health maintenance advice. Please see specific information pulled into the AVS if appropriate.

## 2023-03-24 DIAGNOSIS — F41.9 ANXIETY: ICD-10-CM

## 2023-03-24 DIAGNOSIS — F32.A DEPRESSION, UNSPECIFIED DEPRESSION TYPE: ICD-10-CM

## 2023-03-24 RX ORDER — TRAZODONE HYDROCHLORIDE 50 MG/1
TABLET ORAL
Qty: 90 TABLET | Refills: 1 | Status: SHIPPED | OUTPATIENT
Start: 2023-03-24

## 2023-03-30 ENCOUNTER — HOSPITAL ENCOUNTER (OUTPATIENT)
Dept: GENERAL RADIOLOGY | Facility: HOSPITAL | Age: 48
Discharge: HOME | End: 2023-03-30
Admitting: NURSE PRACTITIONER
Payer: COMMERCIAL

## 2023-03-30 ENCOUNTER — TRANSCRIBE ORDERS (OUTPATIENT)
Dept: GENERAL RADIOLOGY | Facility: HOSPITAL | Age: 48
End: 2023-03-30
Payer: COMMERCIAL

## 2023-03-30 DIAGNOSIS — M47.816 LUMBAR SPONDYLOSIS: Primary | ICD-10-CM

## 2023-03-30 DIAGNOSIS — M47.816 LUMBAR SPONDYLOSIS: ICD-10-CM

## 2023-03-30 PROCEDURE — 72100 X-RAY EXAM L-S SPINE 2/3 VWS: CPT

## 2023-04-03 ENCOUNTER — TELEPHONE (OUTPATIENT)
Dept: NEUROSURGERY | Facility: CLINIC | Age: 48
End: 2023-04-03
Payer: COMMERCIAL

## 2023-04-03 NOTE — TELEPHONE ENCOUNTER
The [facility] received a fax that requires your attention. The document has been indexed to the patient’s chart for your review.      Reason for sending: RECORDS RECEIVED    Documents Description: PROGRESS NOTE 3/31/23    Name of Sender: [facility]    Date Indexed: 04/03/23     Notes (if needed): ADDRESSED TO SAFIA GIANG

## 2023-04-04 ENCOUNTER — OFFICE VISIT (OUTPATIENT)
Dept: ORTHOPEDIC SURGERY | Facility: CLINIC | Age: 48
End: 2023-04-04
Payer: COMMERCIAL

## 2023-04-04 VITALS — HEIGHT: 70 IN | BODY MASS INDEX: 41.52 KG/M2 | WEIGHT: 290 LBS

## 2023-04-04 DIAGNOSIS — J30.9 ALLERGIC RHINITIS, UNSPECIFIED SEASONALITY, UNSPECIFIED TRIGGER: ICD-10-CM

## 2023-04-04 DIAGNOSIS — Z47.1 AFTERCARE FOLLOWING LEFT KNEE JOINT REPLACEMENT SURGERY: Primary | ICD-10-CM

## 2023-04-04 DIAGNOSIS — Z96.652 AFTERCARE FOLLOWING LEFT KNEE JOINT REPLACEMENT SURGERY: Primary | ICD-10-CM

## 2023-04-04 PROCEDURE — 99213 OFFICE O/P EST LOW 20 MIN: CPT | Performed by: ORTHOPAEDIC SURGERY

## 2023-04-04 RX ORDER — CYCLOBENZAPRINE HCL 10 MG
TABLET ORAL
COMMUNITY
Start: 2023-03-20

## 2023-04-04 RX ORDER — ACETAMINOPHEN 500 MG
TABLET ORAL
COMMUNITY
Start: 2023-03-20

## 2023-04-04 RX ORDER — CETIRIZINE HYDROCHLORIDE 10 MG/1
TABLET ORAL
Qty: 90 TABLET | Refills: 1 | Status: SHIPPED | OUTPATIENT
Start: 2023-04-04

## 2023-04-04 NOTE — PROGRESS NOTES
"Chief Complaint  Pain and Follow-up of the Left Knee     Subjective      Em Montanez presents to [facility] for follow up of the left knee. Patient is s/p left total knee arthroplasty performed on 04/05/2021. She is here for her 2 year check up.  She has no complaints.      Allergies   Allergen Reactions   • Prednisone Mental Status Change     Other reaction(s): Mental Status Change   • Tramadol GI Intolerance     Nausea and vomiting  Other reaction(s): GI Intolerance, Vomiting  Nausea and vomiting        Social History     Socioeconomic History   • Marital status: Legally    Tobacco Use   • Smoking status: Every Day     Packs/day: 1.00     Years: 30.00     Pack years: 30.00     Types: Cigarettes   • Smokeless tobacco: Never   Vaping Use   • Vaping Use: Never used   Substance and Sexual Activity   • Alcohol use: Yes     Comment: SOCIALLY   • Drug use: Not Currently     Types: Cocaine(coke), Methamphetamines, Marijuana   • Sexual activity: Yes     Partners: Male        Review of Systems     Objective   Vital Signs:   Ht 177.8 cm (70\")   Wt 132 kg (290 lb)   BMI 41.61 kg/m²       Physical Exam  Constitutional:       Appearance: Normal appearance. Patient is well-developed and normal weight.   HENT:      Head: Normocephalic.      Right Ear: Hearing and external ear normal.      Left Ear: Hearing and external ear normal.      Nose: Nose normal.   Eyes:      Conjunctiva/sclera: Conjunctivae normal.   Cardiovascular:      Rate and Rhythm: Normal rate.   Pulmonary:      Effort: Pulmonary effort is normal.      Breath sounds: No wheezing or rales.   Abdominal:      Palpations: Abdomen is soft.      Tenderness: There is no abdominal tenderness.   Musculoskeletal:      Cervical back: Normal range of motion.   Skin:     Findings: No rash.   Neurological:      Mental Status: Patient is alert and oriented to person, place, and time.   Psychiatric:         Mood and Affect: Mood and " affect normal.         Judgment: Judgment normal.       Ortho Exam      LEFT KNEE Flexion 130. Extension 0. Stable to varus/valgus stress. Stable to anterior/posterior drawer. Neurovascularly intact. Negative Queta. Negative Lachman. Positive EHL, FHL, HS and TA. Sensation intact to light touch all 5 nerves of the foot. Ambulates with Antalgic gait. Patella is well tracking. Calf supple, non-tender. Negative tenderness to the medial joint line. Negative tenderness to the lateral joint line. Negative Crepitus. Good strength to hamstrings, quadriceps, dorsiflexors, and plantar flexors.  Knee Extensor Mechanism intact        Procedures      Imaging Results (Most Recent)     Procedure Component Value Units Date/Time    XR Knee 3 View Left [[id number]] Resulted: 04/04/23 1005     Updated: 04/04/23 1005           Result Review :     X-Ray Report:  Left knee X-Ray  Indication: Evaluation of the left knee.   AP/Lateral and Candor view(s)  Findings: Intact left knee replacement.  No signs of loosening, subsidence or periprosthetic fracture.   Prior studies available for comparison: Yes               Assessment and Plan     Diagnoses and all orders for this visit:    1. Aftercare following left knee joint replacement surgery (Primary)  -     XR Knee 3 View Left      Discussed the treatment plan with the patient. I reviewed the X-rays that were obtained today with the patient. Modify activities as needed.     Call or return if worsening symptoms.    Follow Up     2 years      Patient was given instructions and counseling regarding her condition or for health maintenance advice. Please see specific information pulled into the AVS if appropriate.     Scribed for Kat Collins MD by Johanny Triana MA.  04/04/23   10:20 EDT    I have personally performed the services described in this document as scribed by the above individual and it is both accurate and complete. Kat Collins MD 04/04/23

## 2023-04-17 ENCOUNTER — OFFICE VISIT (OUTPATIENT)
Dept: PODIATRY | Facility: CLINIC | Age: 48
End: 2023-04-17
Payer: COMMERCIAL

## 2023-04-17 VITALS
TEMPERATURE: 97.8 F | DIASTOLIC BLOOD PRESSURE: 83 MMHG | HEART RATE: 66 BPM | WEIGHT: 293 LBS | OXYGEN SATURATION: 98 % | HEIGHT: 70 IN | SYSTOLIC BLOOD PRESSURE: 122 MMHG | BODY MASS INDEX: 41.95 KG/M2

## 2023-04-17 DIAGNOSIS — B35.1 ONYCHOMYCOSIS: ICD-10-CM

## 2023-04-17 DIAGNOSIS — L60.0 ONYCHOCRYPTOSIS: ICD-10-CM

## 2023-04-17 DIAGNOSIS — M79.672 FOOT PAIN, BILATERAL: Primary | ICD-10-CM

## 2023-04-17 DIAGNOSIS — E11.9 NON-INSULIN DEPENDENT TYPE 2 DIABETES MELLITUS: ICD-10-CM

## 2023-04-17 DIAGNOSIS — E11.8 DIABETIC FOOT: ICD-10-CM

## 2023-04-17 DIAGNOSIS — M79.671 FOOT PAIN, BILATERAL: Primary | ICD-10-CM

## 2023-04-17 NOTE — PROGRESS NOTES
[facility] - P[facility]IATRY    Today's Date: 04/17/23    Patient Name: Em Montanez  MRN: [id number]  CSN: [id number]  PCP: Ricky Velasquez Jr., MD, Last PCP Visit: 3/14/2023  Referring Provider: No ref. provider found    SUBJECTIVE     Chief Complaint   Patient presents with   • Left Foot - Follow-up   • Right Foot - Follow-up   • Foot Pain     Left Heel Pain      HPI: Em Montanez, a 47 y.o.female, comes to clinic.    New, Established, New Problem:  est  Location:  Toenails  Duration:   Greater than five years  Onset:  Gradual  Nature:  sore with palpation.  Stable, worsening, improving:   stable  Aggravating factors:  Pain with shoe gear and ambulation.  Previous Treatment: debridement    Patient denies any fevers, chills, nausea, vomiting, shortness of breath, nor any other constitutional signs nor symptoms.       Patient states their most recent blood glucose reading was 113.    No recent medical changes.    Past Medical History:   Diagnosis Date   • Allergic rhinitis    • Anxiety    • Arthritis    • Asthma     NO INHALER USE   • Back pain 11/21/2017    LUMBAR SPINE X RAY LARGELY UNREMARKABLE. WILL RX PT AND MONITOR FOR IMPROVEMENT. IF PAIN CONTINUES, WILL ORDER MRI   • Chronic pelvic pain in female 02/23/2022   • Depression    • Fatigue 11/21/2017   • Foot pain, right    • Insomnia 12/11/2017    DISCUSSED RISKS AND BENEFITS OF MEDICATIONS. ALSO DISCUSSED SLEEP HYGIENE METHODS INCLUDING AVOIDING SOURCES OF BLUE LIGHT, DEVELOPING ROUTINE, SLEEPING IN A DARK ROOM, AND USING BED FOR SLEEP ONLY. PT HAS TIRED MELATONIN WITH INTERMITTENT EFFECTIVENESS. PT WITHOUT REPORTED SYMPTOMS OF SLEEP APNEA AT THIS TIME.    • Knee pain 11/21/2017    PREVIOUS R KNEE REPLACEMENT 2/2 ARTHRITIS. PT WOULD LIKE TO DISCUSS OPTIONS WITH ORTHOPEDICS AGAIN   • Ovarian cyst    • Pelvic pain 01/12/2022   • Polycystic ovarian syndrome 11/21/2017   • Uterine pain    • Vitamin D deficiency 12/11/2017    CURRENTLY  ON VIT D SUPPLEMENTS. WILL RECHECK VIT D LEVEL IN 3 MONTHS.      Past Surgical History:   Procedure Laterality Date   • CHOLECYSTECTOMY     • COLONOSCOPY N/A 02/16/2022    Procedure: COLONOSCOPY;  Surgeon: Jb Gonzalez MD;  Location: [facility] ENDOSCOPY;  Service: Gastroenterology;  Laterality: N/A;  hemmorroids   • ENDOMETRIAL ABLATION     • ESSURE TUBAL LIGATION     • JOINT REPLACEMENT      bilateral knee replacement   • LEEP     • OTHER SURGICAL HISTORY      METAL IMPLANTS  knees   • TONSILLECTOMY     • TOTAL LAPAROSCOPIC HYSTERECTOMY N/A 4/6/2022    Procedure: TOTAL LAPAROSCOPIC HYSTERECTOMY WITH DAVINCI ROBOT;  Surgeon: Chito Cook MD;  Location: [facility] MAIN OR;  Service: Robotics - DaVinci;  Laterality: N/A;     Family History   Problem Relation Age of Onset   • Depression Mother    • Heart disease Mother    • Arthritis Mother    • Cancer Other    • ADD / ADHD Son    • Self-Injurious Behavior  Daughter    • Depression Daughter    • Anxiety disorder Daughter    • Malig Hyperthermia Neg Hx      Social History     Socioeconomic History   • Marital status: Legally    Tobacco Use   • Smoking status: Every Day     Packs/day: 1.00     Years: 30.00     Pack years: 30.00     Types: Cigarettes   • Smokeless tobacco: Never   Vaping Use   • Vaping Use: Never used   Substance and Sexual Activity   • Alcohol use: Yes     Comment: SOCIALLY   • Drug use: Not Currently     Types: Cocaine(coke), Methamphetamines, Marijuana   • Sexual activity: Yes     Partners: Male     Allergies   Allergen Reactions   • Prednisone Mental Status Change     Other reaction(s): Mental Status Change   • Tramadol GI Intolerance     Nausea and vomiting  Other reaction(s): GI Intolerance, Vomiting  Nausea and vomiting     Current Outpatient Medications   Medication Sig Dispense Refill   • cetirizine (zyrTEC) 10 MG tablet TAKE ONE TABLET BY MOUTH DAILY 90 tablet 1   • cyanocobalamin 1000 MCG/ML injection Inject 1 mL into the  appropriate muscle as directed by prescriber Every 28 (Twenty-Eight) Days. 10 mL 0   • lidocaine (LIDODERM) 5 % Place 1 patch on the skin as directed by provider Daily As Needed.     • meloxicam (MOBIC) 15 MG tablet      • multivitamin with minerals tablet tablet Take 1 tablet by mouth Daily.     • traZODone (DESYREL) 50 MG tablet TAKE ONE TABLET BY MOUTH ONCE NIGHTLY 90 tablet 1   • valACYclovir (Valtrex) 1000 MG tablet Take 1 tablet by mouth Daily. 10 tablet 2   • acetaminophen (TYLENOL) 500 MG tablet  (Patient not taking: Reported on 2023)     • cyclobenzaprine (FLEXERIL) 10 MG tablet  (Patient not taking: Reported on 2023)     • guaiFENesin (Mucinex) 600 MG 12 hr tablet Take 2 tablets by mouth 2 (Two) Times a Day. (Patient not taking: Reported on 2023) 20 tablet 0   • metFORMIN ER (GLUCOPHAGE-XR) 500 MG 24 hr tablet Take 2 tabs with meals twice a day (Patient not taking: Reported on 2023) 90 tablet 3     Current Facility-Administered Medications   Medication Dose Route Frequency Provider Last Rate Last Admin   • cosyntropin (CORTROSYN) injection 0.25 mg  0.25 mg Intravenous Once Mena Olivera MD         Review of Systems   Constitutional: Negative.    Skin:        Painful toenails.   All other systems reviewed and are negative.      OBJECTIVE     Vitals:    23 1032   BP: 122/83   Pulse: 66   Temp: 97.8 °F (36.6 °C)   SpO2: 98%       Patient seen in no apparent distress.      PHYSICAL EXAM:     Foot/Ankle Exam    GENERAL  Diabetic foot exam performed    Appearance:  obese  Orientation:  AAOx3  Affect:  appropriate  Gait:  unimpaired  Assistance:  independent  Right shoe gear: casual shoe  Left shoe gear: casual shoe    VASCULAR     Right Foot Vascularity   Normal vascular exam    Dorsalis pedis:  2+  Posterior tibial:  2+  Skin temperature:  warm  Edema gradin+ and pitting  CFT:  < 3 seconds  Pedal hair growth:  Absent  Varicosities:  mild varicosities     Left Foot Vascularity    Normal vascular exam    Dorsalis pedis:  2+  Posterior tibial:  2+  Skin temperature:  warm  Edema gradin+ and pitting  CFT:  < 3 seconds  Pedal hair growth:  Absent  Varicosities:  mild varicosities     NEUROLOGIC     Right Foot Neurologic   Normal sensation    Light touch sensation: normal  Vibratory sensation: normal  Hot/Cold sensation: normal  Protective Sensation using [place]arpal Monofilament:   Sites intact: 10  Sites tested: 10     Left Foot Neurologic   Normal sensation    Light touch sensation: normal  Vibratory sensation: normal  Hot/Cold sensation:  normal  Protective Sensation using [place]al Monofilament:   Sites intact: 10  Sites tested: 10    MUSCLE STRENGTH     Right Foot Muscle Strength   Foot dorsiflexion:  4  Foot plantar flexion:  4  Foot inversion:  4  Foot eversion:  4     Left Foot Muscle Strength   Foot dorsiflexion:  4  Foot plantar flexion:  4  Foot inversion:  4  Foot eversion:  4    RANGE OF MOTION     Right Foot Range of Motion   Foot and ankle ROM within normal limits       Left Foot Range of Motion   Foot and ankle ROM within normal limits      DERMATOLOGIC      Right Foot Dermatologic   Skin  Right foot skin is intact.   Nails  1.  Positive for onychomycosis, abnormal thickness, subungual debris, dystrophic nail and ingrown toenail.  4.  Positive for elongated, onychomycosis, abnormal thickness, subungual debris, dystrophic nail and ingrown toenail.  5.  Positive for elongated, onychomycosis, abnormal thickness, subungual debris, dystrophic nail and ingrown toenail.     Left Foot Dermatologic   Skin  Left foot skin is intact.   Nails  1.  Positive for elongated, onychomycosis, abnormal thickness, subungual debris, dystrophic nail and ingrown toenail.  2.  Positive for elongated, onychomycosis, abnormal thickness, subungual debris, dystrophic nail and ingrown toenail.  5.  Positive for elongated, onychomycosis, abnormally thick, subungual debris, dystrophic nail  and ingrown toenail.      ASSESSMENT/PLAN     Diagnoses and all orders for this visit:    1. Foot pain, bilateral (Primary)    2. Onychocryptosis    3. Diabetic foot    4. Non-insulin dependent type 2 diabetes mellitus    5. Onychomycosis        Comprehensive lower extremity examination and evaluation was performed.    Discussed findings and treatment plan including risks, benefits, and treatment options with patient in detail. Patient agreed with treatment plan.    Toenails 1, 4, 5 on Right and 1, 2, 5 on Left were debrided with nail nippers then filed with a Bryanmel nail serena.  Patient tolerated procedure well without complications.    Diabetic foot exam performed and documented this date, compliant with CQM required standards. Detail of findings as noted in physical exam.  Lower extremity Neurologic exam for diabetic patient performed and documented this date, compliant with PQRS required standards. Detail of findings as noted in physical exam.  Advised patient importance of good routine lower extremity hygiene. Advised patient importance of evaluating for intact skin and pain free nail borders.  Advised patient to use mirror to evaluate plantar/ soles of feet for better visualization. Advised patient monitor and phone office to be seen if any cracking to skin, open lesions, painful nail borders or if nails become elongated prior to next visit. Advised patient importance of daily cleansing of lower extremities, followed by good skin cream to maintain normal hydration of skin. Also advised patient importance of close daily monitoring of blood sugar. Advised to regulate diet and medications to maintain control of blood sugar in optimal range. Contact primary care provider if difficulties maintaining blood sugar levels.  Advised Patient of presence of Diabetes Mellitus condition.  Advised Patient risk of progression and worsening or improvement, then return of condition.  Will monitor condition for any change in  future. Treat with most appropriate treatment pending status of condition.  Counseled and advised patient extensively on nature and ramifications of diabetes. Standard instructions given to patient for good diabetic foot care and maintenance. Advised importance of careful monitoring to avoid break down and complications secondary to diabetes. Advised patient importance of strict maintenance of blood sugar control. Advised patient of possible ominous results from neglect of condition, i.e.: amputation/ loss of digits, feet and legs, or even death.  Patient states understands counseling, will monitor closely, continue good hygiene and routine diabetic foot care. Patient will contact office is questions or problems.      Patient is to monitor for recurrence and any new symptoms and to contact Dr. Rivera's office for a follow-up appointment.      The patient states understanding and agreement with this plan.    An After Visit Summary was printed and given to the patient at discharge, including (if requested) any available informative/educational handouts regarding diagnosis, treatment, or medications. All questions were answered to patient/family satisfaction. Should symptoms fail to improve or worsen they agree to call or return to clinic or to go to the Emergency Department. Discussed the importance of following up with any needed screening tests/labs/specialist appointments and any requested follow-up recommended by me today. Importance of maintaining follow-up discussed and patient accepts that missed appointments can delay diagnosis and potentially lead to worsening of conditions.    Return in about 9 weeks (around 6/19/2023) for Toenail Care., or sooner if acute issues arise.    This document has been electronically signed by Lucas Rivera DPM on April 17, 2023 10:46 EDT

## 2023-05-02 ENCOUNTER — PRIOR AUTHORIZATION (OUTPATIENT)
Dept: INTERNAL MEDICINE | Facility: CLINIC | Age: 48
End: 2023-05-02

## 2023-05-02 ENCOUNTER — OFFICE VISIT (OUTPATIENT)
Dept: INTERNAL MEDICINE | Facility: CLINIC | Age: 48
End: 2023-05-02
Payer: COMMERCIAL

## 2023-05-02 VITALS
DIASTOLIC BLOOD PRESSURE: 75 MMHG | SYSTOLIC BLOOD PRESSURE: 126 MMHG | HEIGHT: 70 IN | OXYGEN SATURATION: 98 % | WEIGHT: 293 LBS | BODY MASS INDEX: 41.95 KG/M2 | TEMPERATURE: 98.7 F | HEART RATE: 65 BPM

## 2023-05-02 DIAGNOSIS — Z23 NEED FOR PNEUMOCOCCAL VACCINATION: ICD-10-CM

## 2023-05-02 DIAGNOSIS — Z23 NEED FOR COVID-19 VACCINE: ICD-10-CM

## 2023-05-02 DIAGNOSIS — R79.89 ABNORMAL THYROID BLOOD TEST: ICD-10-CM

## 2023-05-02 DIAGNOSIS — R23.2 HOT FLASHES: ICD-10-CM

## 2023-05-02 DIAGNOSIS — E28.2 POLYCYSTIC OVARIES: Primary | ICD-10-CM

## 2023-05-02 DIAGNOSIS — F51.01 PRIMARY INSOMNIA: ICD-10-CM

## 2023-05-02 DIAGNOSIS — R73.02 IMPAIRED GLUCOSE TOLERANCE: ICD-10-CM

## 2023-05-02 DIAGNOSIS — E16.2 HYPOGLYCEMIA: ICD-10-CM

## 2023-05-02 DIAGNOSIS — Z11.59 NEED FOR HEPATITIS C SCREENING TEST: ICD-10-CM

## 2023-05-02 DIAGNOSIS — R35.0 FREQUENT URINATION: ICD-10-CM

## 2023-05-02 LAB
ALBUMIN SERPL-MCNC: 4 G/DL (ref 3.5–5.2)
ALBUMIN UR-MCNC: <1.2 MG/DL
ALBUMIN/GLOB SERPL: 1.5 G/DL
ALP SERPL-CCNC: 88 U/L (ref 39–117)
ALT SERPL W P-5'-P-CCNC: 11 U/L (ref 1–33)
ANION GAP SERPL CALCULATED.3IONS-SCNC: 10.6 MMOL/L (ref 5–15)
AST SERPL-CCNC: 17 U/L (ref 1–32)
BACTERIA UR QL AUTO: NORMAL /HPF
BASOPHILS # BLD AUTO: 0.05 10*3/MM3 (ref 0–0.2)
BASOPHILS NFR BLD AUTO: 0.5 % (ref 0–1.5)
BILIRUB BLD-MCNC: NEGATIVE MG/DL
BILIRUB SERPL-MCNC: 0.3 MG/DL (ref 0–1.2)
BILIRUB UR QL STRIP: NEGATIVE
BUN SERPL-MCNC: 17 MG/DL (ref 6–20)
BUN/CREAT SERPL: 21.8 (ref 7–25)
CALCIUM SPEC-SCNC: 9.6 MG/DL (ref 8.6–10.5)
CHLORIDE SERPL-SCNC: 103 MMOL/L (ref 98–107)
CHOLEST SERPL-MCNC: 176 MG/DL (ref 0–200)
CLARITY UR: CLEAR
CLARITY, POC: CLEAR
CO2 SERPL-SCNC: 26.4 MMOL/L (ref 22–29)
COLOR UR: YELLOW
COLOR UR: YELLOW
CREAT SERPL-MCNC: 0.78 MG/DL (ref 0.57–1)
CREAT UR-MCNC: 25.6 MG/DL
DEPRECATED RDW RBC AUTO: 41.5 FL (ref 37–54)
EGFRCR SERPLBLD CKD-EPI 2021: 93.2 ML/MIN/1.73
EOSINOPHIL # BLD AUTO: 0.11 10*3/MM3 (ref 0–0.4)
EOSINOPHIL NFR BLD AUTO: 1.1 % (ref 0.3–6.2)
ERYTHROCYTE [DISTWIDTH] IN BLOOD BY AUTOMATED COUNT: 12.8 % (ref 12.3–15.4)
EXPIRATION DATE: ABNORMAL
GLOBULIN UR ELPH-MCNC: 2.6 GM/DL
GLUCOSE SERPL-MCNC: 86 MG/DL (ref 65–99)
GLUCOSE UR STRIP-MCNC: NEGATIVE MG/DL
GLUCOSE UR STRIP-MCNC: NEGATIVE MG/DL
HCT VFR BLD AUTO: 43.5 % (ref 34–46.6)
HDLC SERPL-MCNC: 58 MG/DL (ref 40–60)
HGB BLD-MCNC: 15.1 G/DL (ref 12–15.9)
HGB UR QL STRIP.AUTO: ABNORMAL
HYALINE CASTS UR QL AUTO: NORMAL /LPF
IMM GRANULOCYTES # BLD AUTO: 0.03 10*3/MM3 (ref 0–0.05)
IMM GRANULOCYTES NFR BLD AUTO: 0.3 % (ref 0–0.5)
KETONES UR QL STRIP: NEGATIVE
KETONES UR QL: NEGATIVE
LDLC SERPL CALC-MCNC: 97 MG/DL (ref 0–100)
LDLC/HDLC SERPL: 1.62 {RATIO}
LEUKOCYTE EST, POC: NEGATIVE
LEUKOCYTE ESTERASE UR QL STRIP.AUTO: NEGATIVE
LYMPHOCYTES # BLD AUTO: 3.11 10*3/MM3 (ref 0.7–3.1)
LYMPHOCYTES NFR BLD AUTO: 31.6 % (ref 19.6–45.3)
Lab: ABNORMAL
MCH RBC QN AUTO: 30.8 PG (ref 26.6–33)
MCHC RBC AUTO-ENTMCNC: 34.7 G/DL (ref 31.5–35.7)
MCV RBC AUTO: 88.6 FL (ref 79–97)
MICROALBUMIN/CREAT UR: NORMAL MG/G{CREAT}
MONOCYTES # BLD AUTO: 0.43 10*3/MM3 (ref 0.1–0.9)
MONOCYTES NFR BLD AUTO: 4.4 % (ref 5–12)
NEUTROPHILS NFR BLD AUTO: 6.11 10*3/MM3 (ref 1.7–7)
NEUTROPHILS NFR BLD AUTO: 62.1 % (ref 42.7–76)
NITRITE UR QL STRIP: NEGATIVE
NITRITE UR-MCNC: NEGATIVE MG/ML
NRBC BLD AUTO-RTO: 0.1 /100 WBC (ref 0–0.2)
PH UR STRIP.AUTO: 6.5 [PH] (ref 5–8)
PH UR: 6 [PH] (ref 5–8)
PLATELET # BLD AUTO: 276 10*3/MM3 (ref 140–450)
PMV BLD AUTO: 10.5 FL (ref 6–12)
POTASSIUM SERPL-SCNC: 4.5 MMOL/L (ref 3.5–5.2)
PROT SERPL-MCNC: 6.6 G/DL (ref 6–8.5)
PROT UR QL STRIP: NEGATIVE
PROT UR STRIP-MCNC: NEGATIVE MG/DL
RBC # BLD AUTO: 4.91 10*6/MM3 (ref 3.77–5.28)
RBC # UR STRIP: ABNORMAL /UL
RBC # UR STRIP: NORMAL /HPF
REF LAB TEST METHOD: NORMAL
SODIUM SERPL-SCNC: 140 MMOL/L (ref 136–145)
SP GR UR STRIP: 1.01 (ref 1–1.03)
SP GR UR: 1.01 (ref 1–1.03)
SQUAMOUS #/AREA URNS HPF: NORMAL /HPF
TRIGL SERPL-MCNC: 121 MG/DL (ref 0–150)
TSH SERPL DL<=0.05 MIU/L-ACNC: 1.82 UIU/ML (ref 0.27–4.2)
UROBILINOGEN UR QL STRIP: ABNORMAL
UROBILINOGEN UR QL: NORMAL
VLDLC SERPL-MCNC: 21 MG/DL (ref 5–40)
WBC # UR STRIP: NORMAL /HPF
WBC NRBC COR # BLD: 9.84 10*3/MM3 (ref 3.4–10.8)

## 2023-05-02 PROCEDURE — 82043 UR ALBUMIN QUANTITATIVE: CPT | Performed by: INTERNAL MEDICINE

## 2023-05-02 PROCEDURE — 80053 COMPREHEN METABOLIC PANEL: CPT | Performed by: INTERNAL MEDICINE

## 2023-05-02 PROCEDURE — 84443 ASSAY THYROID STIM HORMONE: CPT | Performed by: INTERNAL MEDICINE

## 2023-05-02 PROCEDURE — 83036 HEMOGLOBIN GLYCOSYLATED A1C: CPT | Performed by: INTERNAL MEDICINE

## 2023-05-02 PROCEDURE — 86803 HEPATITIS C AB TEST: CPT | Performed by: INTERNAL MEDICINE

## 2023-05-02 PROCEDURE — 87086 URINE CULTURE/COLONY COUNT: CPT | Performed by: INTERNAL MEDICINE

## 2023-05-02 PROCEDURE — 85025 COMPLETE CBC W/AUTO DIFF WBC: CPT | Performed by: INTERNAL MEDICINE

## 2023-05-02 PROCEDURE — 80061 LIPID PANEL: CPT | Performed by: INTERNAL MEDICINE

## 2023-05-02 PROCEDURE — 82570 ASSAY OF URINE CREATININE: CPT | Performed by: INTERNAL MEDICINE

## 2023-05-02 PROCEDURE — 81001 URINALYSIS AUTO W/SCOPE: CPT | Performed by: INTERNAL MEDICINE

## 2023-05-02 RX ORDER — LEVOTHYROXINE SODIUM 0.05 MG/1
50 TABLET ORAL DAILY
Qty: 90 TABLET | Refills: 1 | Status: SHIPPED | OUTPATIENT
Start: 2023-05-02

## 2023-05-02 RX ORDER — FLASH GLUCOSE SENSOR
1 KIT MISCELLANEOUS ONCE
Qty: 90 EACH | Refills: 1 | Status: SHIPPED | OUTPATIENT
Start: 2023-05-02 | End: 2023-05-02

## 2023-05-02 RX ORDER — FLASH GLUCOSE SCANNING READER
1 EACH MISCELLANEOUS
Qty: 6 EACH | Refills: 3 | Status: SHIPPED | OUTPATIENT
Start: 2023-05-02

## 2023-05-02 NOTE — TELEPHONE ENCOUNTER
PA REQUIRED:    Continuous Blood Gluc  (FreeStyle Cherie 14 Day [place]) device (05/02/2023)    Continuous Blood Gluc Sensor (FreeStyle Cherie 14 Day Sensor) misc (05/02/2023)    INDEXED VIA ONBASE

## 2023-05-02 NOTE — PROGRESS NOTES
Chief Complaint  Weight Loss (Discuss weight loss medication ), Menopause (Feels she may be staring menopause, hot at night, wondering if there is anything she can take - frequent bathroom trips, 3 full bladder within 15 minutes ), and Diabetes (Cgm had a freestyle from endo- got knocked off arm, wasn't able to get another one- would like to see about getting another one)    Subjective          Em Montanez presents to [facility]  History of Present Illness  Patient reports ongoing nocturia cause sleep disturbances.   Impaired glucose tolerance- patient due for recheck. She is no longer taking metormin. Recommended acarbose by endocrinology. Patient previusly on freestyle cherie and was able to monitor blood glucose.   Patient inquires about use of phentermine to help with weight loss efforts.       Current Outpatient Medications   Medication Instructions   • Continuous Blood Gluc  (FreeStyle Cherie 14 Day [place]) device 1 Device, Does not apply, Every 14 Days   • cyanocobalamin 1,000 mcg, Intramuscular, Every 28 Days   • levothyroxine (SYNTHROID) 50 mcg, Oral, Daily   • lidocaine (LIDODERM) 5 % 1 patch, Transdermal, Daily PRN   • meloxicam (MOBIC) 15 MG tablet No dose, route, or frequency recorded.   • metFORMIN ER (GLUCOPHAGE-XR) 500 MG 24 hr tablet Take 2 tabs with meals twice a day   • multivitamin with minerals tablet tablet 1 tablet, Oral, Daily   • traZODone (DESYREL) 50 MG tablet TAKE ONE TABLET BY MOUTH ONCE NIGHTLY   • valACYclovir (VALTREX) 1,000 mg, Oral, Daily       The following portions of the patient's history were reviewed and updated as appropriate: allergies, current medications, past family history, past medical history, past social history, past surgical history, and problem list.    Objective   Vital Signs:   /75 (BP Location: Left arm, Patient Position: Sitting, Cuff Size: Adult)   Pulse 65   Temp 98.7 °F (37.1 °C) (Temporal)   " Ht 177.8 cm (70\")   Wt 135 kg (298 lb 9.6 oz)   SpO2 98%   BMI 42.84 kg/m²     Wt Readings from Last 3 Encounters:   05/02/23 135 kg (298 lb 9.6 oz)   04/17/23 (!) 138 kg (304 lb 3.2 oz)   04/04/23 132 kg (290 lb)     BP Readings from Last 3 Encounters:   05/02/23 126/75   04/17/23 122/83   03/08/23 120/77     Physical Exam   Appearance: No acute distress, well-nourished  Head: normocephalic, atraumatic  Eyes: extraocular movements intact, no scleral icterus, no conjunctival injection  Ears, Nose, and Throat: external ears normal, nares patent, moist mucous membranes  Cardiovascular: regular rate and rhythm. no murmurs, rubs, or gallops. no edema  Respiratory: breathing comfortably, symmetric chest rise, clear to auscultation bilaterally. No wheezes, rales, or rhonchi.  Neuro: alert and oriented to time, place, and person. Normal gait  Psych: normal mood and affect     Result Review :   The following data was reviewed by: Ricky Velasquez Jr, MD on 05/02/2023:  Common labs        10/5/2022    08:49 10/12/2022    09:29 5/2/2023    12:20   Common Labs   Glucose 110   94   86     BUN 12   20   17     Creatinine 0.82   0.88   0.78     Sodium 139   140   140     Potassium 4.3   4.4   4.5     Chloride 101   101   103     Calcium 9.5   9.2   9.6     Total Protein  6.4      Albumin 4.00   4.0   4.0     Total Bilirubin 0.3   <0.2   0.3     Alkaline Phosphatase 81   91   88     AST (SGOT) 22   14   17     ALT (SGPT) 16   13   11     WBC 8.76    9.84     Hemoglobin 15.0    15.1     Hematocrit 43.4    43.5     Platelets 269    276     Total Cholesterol 174    176     Triglycerides 182    121     HDL Cholesterol 55    58     LDL Cholesterol  88    97     Hemoglobin A1C  5.7   5.80     Microalbumin, Urine   <1.2         Lab Results   Component Value Date    COVID19 NOT DETECTED 03/31/2021    INR 0.86 (L) 03/30/2021    BILIRUBINUR Negative 05/02/2023          Assessment and Plan    Diagnoses and all orders for this " visit:    1. Polycystic ovaries (Primary)  Comments:  encouraged to restart metformin. may also help with weight loss efforts.     2. Frequent urination  -     POCT urinalysis dipstick, automated  -     Urinalysis With Microscopic - Urine, Clean Catch; Future  -     Urine Culture - Urine, Urine, Clean Catch; Future  -     Urinalysis With Microscopic - Urine, Clean Catch  -     Urine Culture - Urine, Urine, Clean Catch    3. Primary insomnia  Comments:  hope for help with diuresis during day.     4. Need for pneumococcal vaccination  -     Pneumococcal Conjugate Vaccine 20-Valent All    5. Impaired glucose tolerance  -     Microalbumin / Creatinine Urine Ratio - Urine, Clean Catch  -     Hemoglobin A1c  -     Comprehensive Metabolic Panel  -     CBC & Differential  -     Lipid Panel  -     Ambulatory Referral for Diabetic Eye Exam-Ophthalmology    6. Need for hepatitis C screening test  -     HCV Antibody Rfx To Qnt PCR  -     Interpretation:    7. Hot flashes  -     TSH Rfx On Abnormal To Free T4    8. Need for COVID-19 vaccine  -     COVID-19 Bivalent (Pfizer) 12+yrs    9. Hypoglycemia  -     Continuous Blood Gluc  (FreeStyle Cherie 14 Day [place]) device; 1 Device Every 14 (Fourteen) Days.  Dispense: 6 each; Refill: 3  -     Continuous Blood Gluc Sensor (FreeStyle Cherie 14 Day Sensor) misc; 1 application 1 (One) Time for 1 dose.  Dispense: 90 each; Refill: 1    10. Abnormal thyroid blood test  Comments:  after discussion of risks and benefits, will start low dose thyroid medication to help with weight loss efforts.   Orders:  -     TSH Rfx On Abnormal To Free T4  -     levothyroxine (Synthroid) 50 MCG tablet; Take 1 tablet by mouth Daily.  Dispense: 90 tablet; Refill: 1        Medications Discontinued During This Encounter   Medication Reason   • acetaminophen (TYLENOL) 500 MG tablet *Therapy completed   • cetirizine (zyrTEC) 10 MG tablet *Therapy completed   • cyclobenzaprine (FLEXERIL) 10 MG tablet  *Therapy completed   • guaiFENesin (Mucinex) 600 MG 12 hr tablet *Therapy completed          Follow Up   Return in about 3 months (around 8/2/2023) for Recheck.  Patient was given instructions and counseling regarding her condition or for health maintenance advice. Please see specific information pulled into the AVS if appropriate.       Ricky Velasquez Jr, MD  05/04/23  15:08 EDT

## 2023-05-03 LAB
BACTERIA SPEC AEROBE CULT: NO GROWTH
HBA1C MFR BLD: 5.8 % (ref 4.8–5.6)
HCV AB SERPL QL IA: NORMAL
HCV IGG SERPL QL IA: NON REACTIVE

## 2023-05-03 NOTE — TELEPHONE ENCOUNTER
Em Montanez Key: [id number] - PA Case ID: [id number] - Rx #: [id number] help? Call us at [phone]  Status  Sent to [facility]

## 2023-05-08 NOTE — TELEPHONE ENCOUNTER
Approved  The request has been approved. The authorization is effective from 05/03/2023 to 05/02/2024, as long as the member is enrolled in their current health plan.

## 2023-05-16 ENCOUNTER — OFFICE VISIT (OUTPATIENT)
Dept: PODIATRY | Facility: CLINIC | Age: 48
End: 2023-05-16
Payer: COMMERCIAL

## 2023-05-16 VITALS
DIASTOLIC BLOOD PRESSURE: 84 MMHG | OXYGEN SATURATION: 98 % | HEIGHT: 70 IN | WEIGHT: 293 LBS | BODY MASS INDEX: 41.95 KG/M2 | SYSTOLIC BLOOD PRESSURE: 125 MMHG | HEART RATE: 72 BPM | TEMPERATURE: 97.7 F

## 2023-05-16 DIAGNOSIS — G57.51 TARSAL TUNNEL SYNDROME OF RIGHT SIDE: ICD-10-CM

## 2023-05-16 DIAGNOSIS — E11.9 NON-INSULIN DEPENDENT TYPE 2 DIABETES MELLITUS: ICD-10-CM

## 2023-05-16 DIAGNOSIS — M79.671 FOOT PAIN, RIGHT: Primary | ICD-10-CM

## 2023-05-16 DIAGNOSIS — E11.8 DIABETIC FOOT: ICD-10-CM

## 2023-05-16 NOTE — PROGRESS NOTES
[facility] - PODIATRY    Today's Date: 05/16/23    Patient Name: Em Montanez  MRN: [id number]  CSN: [id number]  PCP: Ricky Velasquez Jr., MD, Last PCP Visit: 3/14/2023  Referring Provider: No ref. provider found    SUBJECTIVE     Chief Complaint   Patient presents with   • Right Foot - Follow-up     States she continues to have increased pain in right foot  sharp shooting pains cramping in toes     HPI: Em Montanez, a 47 y.o.female, comes to clinic.    New, Established, New Problem: New problem  Location: Right tarsal tunnel syndrome  Duration: Greater than 1 year  Onset: Recurring  Nature: Pain, tingling, cramping  Stable, worsening, improving: Worsening  Aggravating factors:  Patient relates pain is aggravated by shoe gear and ambulation.    Previous Treatment: Previous neurology evaluation    Patient denies any fevers, chills, nausea, vomiting, shortness of breath, nor any other constitutional signs nor symptoms.       Patient states their most recent blood glucose reading was 146.    Past Medical History:   Diagnosis Date   • Allergic rhinitis    • Anxiety    • Arthritis    • Asthma     NO INHALER USE   • Back pain 11/21/2017    LUMBAR SPINE X RAY LARGELY UNREMARKABLE. WILL RX PT AND MONITOR FOR IMPROVEMENT. IF PAIN CONTINUES, WILL ORDER MRI   • Chronic pelvic pain in female 02/23/2022   • Depression    • Fatigue 11/21/2017   • Foot pain, right    • Insomnia 12/11/2017    DISCUSSED RISKS AND BENEFITS OF MEDICATIONS. ALSO DISCUSSED SLEEP HYGIENE METHODS INCLUDING AVOIDING SOURCES OF BLUE LIGHT, DEVELOPING ROUTINE, SLEEPING IN A DARK ROOM, AND USING BED FOR SLEEP ONLY. PT HAS TIRED MELATONIN WITH INTERMITTENT EFFECTIVENESS. PT WITHOUT REPORTED SYMPTOMS OF SLEEP APNEA AT THIS TIME.    • Knee pain 11/21/2017    PREVIOUS R KNEE REPLACEMENT 2/2 ARTHRITIS. PT WOULD LIKE TO DISCUSS OPTIONS WITH ORTHOPEDICS AGAIN   • Ovarian cyst    • Pelvic pain 01/12/2022   • Polycystic ovarian  syndrome 11/21/2017   • Uterine pain    • Vitamin D deficiency 12/11/2017    CURRENTLY ON VIT D SUPPLEMENTS. WILL RECHECK VIT D LEVEL IN 3 MONTHS.      Past Surgical History:   Procedure Laterality Date   • CHOLECYSTECTOMY     • COLONOSCOPY N/A 02/16/2022    Procedure: COLONOSCOPY;  Surgeon: Jb Gonzalez MD;  Location: [facility] ENDOSCOPY;  Service: Gastroenterology;  Laterality: N/A;  hemmorroids   • ENDOMETRIAL ABLATION     • ESSURE TUBAL LIGATION     • JOINT REPLACEMENT      bilateral knee replacement   • LEEP     • OTHER SURGICAL HISTORY      METAL IMPLANTS  knees   • TONSILLECTOMY     • TOTAL LAPAROSCOPIC HYSTERECTOMY N/A 4/6/2022    Procedure: TOTAL LAPAROSCOPIC HYSTERECTOMY WITH DAVINCI ROBOT;  Surgeon: Chito Cook MD;  Location: [facility] MAIN OR;  Service: Robotics - DaVinci;  Laterality: N/A;     Family History   Problem Relation Age of Onset   • Depression Mother    • Heart disease Mother    • Arthritis Mother    • Cancer Other    • ADD / ADHD Son    • Self-Injurious Behavior  Daughter    • Depression Daughter    • Anxiety disorder Daughter    • Malig Hyperthermia Neg Hx      Social History     Socioeconomic History   • Marital status: Legally    Tobacco Use   • Smoking status: Every Day     Packs/day: 1.00     Years: 30.00     Pack years: 30.00     Types: Cigarettes   • Smokeless tobacco: Never   Vaping Use   • Vaping Use: Never used   Substance and Sexual Activity   • Alcohol use: Yes     Comment: SOCIALLY   • Drug use: Not Currently     Types: Cocaine(coke), Methamphetamines, Marijuana   • Sexual activity: Yes     Partners: Male     Allergies   Allergen Reactions   • Prednisone Mental Status Change     Other reaction(s): Mental Status Change   • Tramadol GI Intolerance     Nausea and vomiting  Other reaction(s): GI Intolerance, Vomiting  Nausea and vomiting     Current Outpatient Medications   Medication Sig Dispense Refill   • Continuous Blood Gluc  (FreeStyle Cherie 14 Day  [place]) device 1 Device Every 14 (Fourteen) Days. 6 each 3   • Continuous Blood Gluc Sensor (FreeStyle Cherie 2 Sensor) misc      • cyanocobalamin 1000 MCG/ML injection Inject 1 mL into the appropriate muscle as directed by prescriber Every 28 (Twenty-Eight) Days. 10 mL 0   • levothyroxine (Synthroid) 50 MCG tablet Take 1 tablet by mouth Daily. 90 tablet 1   • lidocaine (LIDODERM) 5 % Place 1 patch on the skin as directed by provider Daily As Needed.     • meloxicam (MOBIC) 15 MG tablet      • metFORMIN ER (GLUCOPHAGE-XR) 500 MG 24 hr tablet Take 2 tabs with meals twice a day 90 tablet 3   • multivitamin with minerals tablet tablet Take 1 tablet by mouth Daily.     • traZODone (DESYREL) 50 MG tablet TAKE ONE TABLET BY MOUTH ONCE NIGHTLY 90 tablet 1   • valACYclovir (Valtrex) 1000 MG tablet Take 1 tablet by mouth Daily. (Patient taking differently: Take 1 tablet by mouth As Needed.) 10 tablet 2     Current Facility-Administered Medications   Medication Dose Route Frequency Provider Last Rate Last Admin   • cosyntropin (CORTROSYN) injection 0.25 mg  0.25 mg Intravenous Once Mena Olivera MD         Review of Systems   Constitutional: Negative.    Musculoskeletal:        Pain in right medial rear foot   All other systems reviewed and are negative.      OBJECTIVE     Vitals:    23 1008   BP: 125/84   Pulse: 72   Temp: 97.7 °F (36.5 °C)   SpO2: 98%       Patient seen in no apparent distress.      PHYSICAL EXAM:     Foot/Ankle Exam    GENERAL  Diabetic foot exam performed    Appearance:  obese  Orientation:  AAOx3  Affect:  appropriate  Gait:  unimpaired  Assistance:  independent  Right shoe gear: casual shoe  Left shoe gear: casual shoe    VASCULAR     Right Foot Vascularity   Normal vascular exam    Dorsalis pedis:  2+  Posterior tibial:  2+  Skin temperature:  warm  Edema gradin+ and pitting  CFT:  < 3 seconds  Pedal hair growth:  Absent  Varicosities:  mild varicosities     Left Foot Vascularity   Normal  vascular exam    Dorsalis pedis:  2+  Posterior tibial:  2+  Skin temperature:  warm  Edema gradin+ and pitting  CFT:  < 3 seconds  Pedal hair growth:  Absent  Varicosities:  mild varicosities     NEUROLOGIC     Right Foot Neurologic   Normal sensation    Light touch sensation: normal  Vibratory sensation: normal  Hot/Cold sensation: normal  Protective Sensation using Cowdrey-Harpal Monofilament:   Sites intact: 10  Sites tested: 10     Left Foot Neurologic   Normal sensation    Light touch sensation: normal  Vibratory sensation: normal  Hot/Cold sensation:  normal  Protective Sensation using Cowdrey-Harpal Monofilament:   Sites intact: 10  Sites tested: 10    MUSCULOSKELETAL     Right Foot Musculoskeletal   Tenderness:  (Tarsal tunnel)    MUSCLE STRENGTH     Right Foot Muscle Strength   Foot dorsiflexion:  4  Foot plantar flexion:  4  Foot inversion:  4  Foot eversion:  4     Left Foot Muscle Strength   Foot dorsiflexion:  4  Foot plantar flexion:  4  Foot inversion:  4  Foot eversion:  4    RANGE OF MOTION     Right Foot Range of Motion   Foot and ankle ROM within normal limits       Left Foot Range of Motion   Foot and ankle ROM within normal limits      DERMATOLOGIC      Right Foot Dermatologic   Skin  Right foot skin is intact.      Left Foot Dermatologic   Skin  Left foot skin is intact.       ASSESSMENT/PLAN     Diagnoses and all orders for this visit:    1. Foot pain, right (Primary)    2. Tarsal tunnel syndrome of right side  -     Case Request; Standing  -     ceFAZolin (ANCEF) 2 g in sodium chloride 0.9 % 100 mL IVPB  -     Case Request    3. Non-insulin dependent type 2 diabetes mellitus    4. Diabetic foot    Other orders  -     Follow Anesthesia Guidelines / Protocol; Future  -     Follow Anesthesia Guidelines / Protocol; Standing  -     Verify NPO Status; Standing  -     Obtain informed consent (if not collected inpatient or PAT); Standing  -     Notify Physician - Standard;  Standing        Comprehensive lower extremity examination and evaluation was performed.    Discussed findings and treatment plan including risks, benefits, and treatment options with patient in detail. Patient agreed with treatment plan.    Surgery: Right tarsal tunnel release.    The risks and benefits of the surgery were discussed with the patient.  This discussion included possible complications of requiring further surgery, possible delayed wound healing, further surgery requiring amputation of the foot or leg, and also included the complication of death.  All the patient's questions were answered to their satisfaction.  Patient states they would like to proceed with the procedure.    Discussed with the patient at length surgical versus nonsurgical options.  Explained to the patient in detail that surgical intervention is only indicated when the level of pain is such that it negatively affects her daily life.    It was explained to the patient that surgical interventions often times do not relieve all of the pain associated with the deformity    Risks and complications associated with surgical versus nonsurgical options were explained.  The patient understands that the problem will most likely continue to evolve and surgical intervention is the only treatment plan that actually corrects the deformities.    Upon discussion of non-surgical conservative option, surgical correction, post-operative requirements along with risk and benefits of the surgery along with expected outcomes, the patient states they would like to proceed with the scheduling surgery.      The patient has decided to move forward with surgical intervention understanding all of these risks and complications.    Surgery is planned for 9 June 2023.    Diabetic foot exam performed and documented this date, compliant with CQM required standards. Detail of findings as noted in physical exam.  Lower extremity Neurologic exam for diabetic patient  performed and documented this date, compliant with PQRS required standards. Detail of findings as noted in physical exam.  Advised patient importance of good routine lower extremity hygiene. Advised patient importance of evaluating for intact skin and pain free nail borders.  Advised patient to use mirror to evaluate plantar/ soles of feet for better visualization. Advised patient monitor and phone office to be seen if any cracking to skin, open lesions, painful nail borders or if nails become elongated prior to next visit. Advised patient importance of daily cleansing of lower extremities, followed by good skin cream to maintain normal hydration of skin. Also advised patient importance of close daily monitoring of blood sugar. Advised to regulate diet and medications to maintain control of blood sugar in optimal range. Contact primary care provider if difficulties maintaining blood sugar levels.  Advised Patient of presence of Diabetes Mellitus condition.  Advised Patient risk of progression and worsening or improvement, then return of condition.  Will monitor condition for any change in future. Treat with most appropriate treatment pending status of condition.  Counseled and advised patient extensively on nature and ramifications of diabetes. Standard instructions given to patient for good diabetic foot care and maintenance. Advised importance of careful monitoring to avoid break down and complications secondary to diabetes. Advised patient importance of strict maintenance of blood sugar control. Advised patient of possible ominous results from neglect of condition, i.e.: amputation/ loss of digits, feet and legs, or even death.  Patient states understands counseling, will monitor closely, continue good hygiene and routine diabetic foot care. Patient will contact office is questions or problems.      Patient is to monitor for recurrence and any new symptoms and to contact Dr. Rivera's office for a follow-up  appointment.      The patient states understanding and agreement with this plan.    An After Visit Summary was printed and given to the patient at discharge, including (if requested) any available informative/educational handouts regarding diagnosis, treatment, or medications. All questions were answered to patient/family satisfaction. Should symptoms fail to improve or worsen they agree to call or return to clinic or to go to the Emergency Department. Discussed the importance of following up with any needed screening tests/labs/specialist appointments and any requested follow-up recommended by me today. Importance of maintaining follow-up discussed and patient accepts that missed appointments can delay diagnosis and potentially lead to worsening of conditions.    Return in about 4 weeks (around 6/13/2023) for Post Operative, Cast change., or sooner if acute issues arise.    This document has been electronically signed by Lucas Rivera DPM on May 16, 2023 10:31 EDT

## 2023-06-13 NOTE — PRE-PROCEDURE INSTRUCTIONS
PATIENT INSTRUCTED TO BE:    - NPO AFTER MIDNIGHT EXCEPT CAN HAVE CLEAR LIQUIDS 2 HOURS PRIOR TO SURGERY ARRIVAL TIME     - TO HOLD ALL VITAMINS, SUPPLEMENTS, NSAIDS FOR ONE WEEK PRIOR TO THEIR SURGICAL PROCEDURE    - INSTRUCTED PT TO USE SURGICAL SOAP 1 TIME THE NIGHT PRIOR TO SURGERY OR THE AM OF SURGERY.   USE SOAP FROM NECK TO TOES AVOID THEIR FACE, HAIR, AND PRIVATE PARTS. INSTRUCTED NO LOTIONS, JEWELRY, PIERCINGS, OR DEODORANT DAY OF SURGERY    - IF DIABETIC, CHECK BLOOD GLUCOSE IF LESS THAN 70 CALL PREOP AREA -AM OF SURGERY     - INSTRUCTED TO TAKE THE FOLLOWING MEDICATIONS THE DAY OF SURGERY:         SYNTHROID, VALTREX PRN    TAKE METFORMIN BY 6PM THE NIGHT PRIOR TO SURGERY     PATIENT VERBALIZED UNDERSTANDING

## 2023-06-16 ENCOUNTER — ANESTHESIA (OUTPATIENT)
Dept: PERIOP | Facility: HOSPITAL | Age: 48
End: 2023-06-16
Payer: COMMERCIAL

## 2023-06-16 ENCOUNTER — HOSPITAL ENCOUNTER (OUTPATIENT)
Facility: HOSPITAL | Age: 48
Setting detail: HOSPITAL OUTPATIENT SURGERY
Discharge: HOME | End: 2023-06-16
Attending: PODIATRIST | Admitting: PODIATRIST
Payer: COMMERCIAL

## 2023-06-16 ENCOUNTER — ANESTHESIA EVENT (OUTPATIENT)
Dept: PERIOP | Facility: HOSPITAL | Age: 48
End: 2023-06-16
Payer: COMMERCIAL

## 2023-06-16 VITALS
OXYGEN SATURATION: 100 % | WEIGHT: 293 LBS | HEIGHT: 70 IN | SYSTOLIC BLOOD PRESSURE: 120 MMHG | RESPIRATION RATE: 18 BRPM | TEMPERATURE: 96.6 F | BODY MASS INDEX: 41.95 KG/M2 | DIASTOLIC BLOOD PRESSURE: 58 MMHG | HEART RATE: 65 BPM

## 2023-06-16 DIAGNOSIS — G57.51 TARSAL TUNNEL SYNDROME OF RIGHT SIDE: ICD-10-CM

## 2023-06-16 PROCEDURE — 25010000002 MIDAZOLAM PER 1MG: Performed by: ANESTHESIOLOGY

## 2023-06-16 PROCEDURE — 25010000002 PROPOFOL 10 MG/ML EMULSION: Performed by: NURSE ANESTHETIST, CERTIFIED REGISTERED

## 2023-06-16 PROCEDURE — 25010000002 CEFAZOLIN PER 500 MG: Performed by: PODIATRIST

## 2023-06-16 PROCEDURE — 25010000002 ONDANSETRON PER 1 MG: Performed by: NURSE ANESTHETIST, CERTIFIED REGISTERED

## 2023-06-16 PROCEDURE — 25010000002 FENTANYL CITRATE (PF) 50 MCG/ML SOLUTION: Performed by: NURSE ANESTHETIST, CERTIFIED REGISTERED

## 2023-06-16 PROCEDURE — 25010000002 HYDROMORPHONE 1 MG/ML SOLUTION: Performed by: NURSE ANESTHETIST, CERTIFIED REGISTERED

## 2023-06-16 PROCEDURE — 25010000002 SUGAMMADEX 200 MG/2ML SOLUTION: Performed by: NURSE ANESTHETIST, CERTIFIED REGISTERED

## 2023-06-16 RX ORDER — BUPIVACAINE HYDROCHLORIDE 2.5 MG/ML
INJECTION, SOLUTION EPIDURAL; INFILTRATION; INTRACAUDAL AS NEEDED
Status: DISCONTINUED | OUTPATIENT
Start: 2023-06-16 | End: 2023-06-16 | Stop reason: HOSPADM

## 2023-06-16 RX ORDER — LIDOCAINE HYDROCHLORIDE 20 MG/ML
INJECTION, SOLUTION EPIDURAL; INFILTRATION; INTRACAUDAL; PERINEURAL AS NEEDED
Status: DISCONTINUED | OUTPATIENT
Start: 2023-06-16 | End: 2023-06-16 | Stop reason: SURG

## 2023-06-16 RX ORDER — MEPERIDINE HYDROCHLORIDE 25 MG/ML
12.5 INJECTION INTRAMUSCULAR; INTRAVENOUS; SUBCUTANEOUS
Status: DISCONTINUED | OUTPATIENT
Start: 2023-06-16 | End: 2023-06-16 | Stop reason: HOSPADM

## 2023-06-16 RX ORDER — PROMETHAZINE HYDROCHLORIDE 12.5 MG/1
12.5 TABLET ORAL EVERY 6 HOURS PRN
Qty: 30 TABLET | Refills: 0 | Status: SHIPPED | OUTPATIENT
Start: 2023-06-16

## 2023-06-16 RX ORDER — ONDANSETRON 2 MG/ML
4 INJECTION INTRAMUSCULAR; INTRAVENOUS ONCE AS NEEDED
Status: DISCONTINUED | OUTPATIENT
Start: 2023-06-16 | End: 2023-06-16 | Stop reason: HOSPADM

## 2023-06-16 RX ORDER — MIDAZOLAM HYDROCHLORIDE 2 MG/2ML
2 INJECTION, SOLUTION INTRAMUSCULAR; INTRAVENOUS ONCE
Status: COMPLETED | OUTPATIENT
Start: 2023-06-16 | End: 2023-06-16

## 2023-06-16 RX ORDER — CEFAZOLIN SODIUM IN 0.9 % NACL 3 G/100 ML
3 INTRAVENOUS SOLUTION, PIGGYBACK (ML) INTRAVENOUS ONCE
Status: COMPLETED | OUTPATIENT
Start: 2023-06-16 | End: 2023-06-16

## 2023-06-16 RX ORDER — MAGNESIUM HYDROXIDE 1200 MG/15ML
LIQUID ORAL AS NEEDED
Status: DISCONTINUED | OUTPATIENT
Start: 2023-06-16 | End: 2023-06-16 | Stop reason: HOSPADM

## 2023-06-16 RX ORDER — ONDANSETRON 2 MG/ML
INJECTION INTRAMUSCULAR; INTRAVENOUS AS NEEDED
Status: DISCONTINUED | OUTPATIENT
Start: 2023-06-16 | End: 2023-06-16 | Stop reason: SURG

## 2023-06-16 RX ORDER — FENTANYL CITRATE 50 UG/ML
INJECTION, SOLUTION INTRAMUSCULAR; INTRAVENOUS AS NEEDED
Status: DISCONTINUED | OUTPATIENT
Start: 2023-06-16 | End: 2023-06-16 | Stop reason: SURG

## 2023-06-16 RX ORDER — PROMETHAZINE HYDROCHLORIDE 25 MG/1
25 SUPPOSITORY RECTAL ONCE AS NEEDED
Status: DISCONTINUED | OUTPATIENT
Start: 2023-06-16 | End: 2023-06-16 | Stop reason: HOSPADM

## 2023-06-16 RX ORDER — ROCURONIUM BROMIDE 10 MG/ML
INJECTION, SOLUTION INTRAVENOUS AS NEEDED
Status: DISCONTINUED | OUTPATIENT
Start: 2023-06-16 | End: 2023-06-16 | Stop reason: SURG

## 2023-06-16 RX ORDER — HYDROCODONE BITARTRATE AND ACETAMINOPHEN 5; 325 MG/1; MG/1
1-2 TABLET ORAL EVERY 4 HOURS PRN
Qty: 30 TABLET | Refills: 0 | Status: SHIPPED | OUTPATIENT
Start: 2023-06-16

## 2023-06-16 RX ORDER — PROPOFOL 10 MG/ML
VIAL (ML) INTRAVENOUS AS NEEDED
Status: DISCONTINUED | OUTPATIENT
Start: 2023-06-16 | End: 2023-06-16 | Stop reason: SURG

## 2023-06-16 RX ORDER — SODIUM CHLORIDE, SODIUM LACTATE, POTASSIUM CHLORIDE, CALCIUM CHLORIDE 600; 310; 30; 20 MG/100ML; MG/100ML; MG/100ML; MG/100ML
9 INJECTION, SOLUTION INTRAVENOUS CONTINUOUS PRN
Status: DISCONTINUED | OUTPATIENT
Start: 2023-06-16 | End: 2023-06-16 | Stop reason: HOSPADM

## 2023-06-16 RX ORDER — PROMETHAZINE HYDROCHLORIDE 12.5 MG/1
25 TABLET ORAL ONCE AS NEEDED
Status: DISCONTINUED | OUTPATIENT
Start: 2023-06-16 | End: 2023-06-16 | Stop reason: HOSPADM

## 2023-06-16 RX ORDER — ACETAMINOPHEN 500 MG
1000 TABLET ORAL ONCE
Status: COMPLETED | OUTPATIENT
Start: 2023-06-16 | End: 2023-06-16

## 2023-06-16 RX ORDER — OXYCODONE HYDROCHLORIDE 5 MG/1
5 TABLET ORAL
Status: COMPLETED | OUTPATIENT
Start: 2023-06-16 | End: 2023-06-16

## 2023-06-16 RX ADMIN — Medication 3 G: at 16:10

## 2023-06-16 RX ADMIN — LIDOCAINE HYDROCHLORIDE 100 MG: 20 INJECTION, SOLUTION EPIDURAL; INFILTRATION; INTRACAUDAL; PERINEURAL at 16:11

## 2023-06-16 RX ADMIN — FENTANYL CITRATE 50 MCG: 50 INJECTION, SOLUTION INTRAMUSCULAR; INTRAVENOUS at 16:11

## 2023-06-16 RX ADMIN — ONDANSETRON 4 MG: 2 INJECTION INTRAMUSCULAR; INTRAVENOUS at 16:22

## 2023-06-16 RX ADMIN — FENTANYL CITRATE 50 MCG: 50 INJECTION, SOLUTION INTRAMUSCULAR; INTRAVENOUS at 16:31

## 2023-06-16 RX ADMIN — ROCURONIUM BROMIDE 10 MG: 10 INJECTION, SOLUTION INTRAVENOUS at 16:30

## 2023-06-16 RX ADMIN — OXYCODONE HYDROCHLORIDE 5 MG: 5 TABLET ORAL at 17:50

## 2023-06-16 RX ADMIN — MIDAZOLAM HYDROCHLORIDE 2 MG: 1 INJECTION, SOLUTION INTRAMUSCULAR; INTRAVENOUS at 15:56

## 2023-06-16 RX ADMIN — SUGAMMADEX 200 MG: 100 INJECTION, SOLUTION INTRAVENOUS at 17:08

## 2023-06-16 RX ADMIN — OXYCODONE HYDROCHLORIDE 5 MG: 5 TABLET ORAL at 17:35

## 2023-06-16 RX ADMIN — ACETAMINOPHEN 1000 MG: 500 TABLET ORAL at 12:53

## 2023-06-16 RX ADMIN — SODIUM CHLORIDE, POTASSIUM CHLORIDE, SODIUM LACTATE AND CALCIUM CHLORIDE 9 ML/HR: 600; 310; 30; 20 INJECTION, SOLUTION INTRAVENOUS at 12:53

## 2023-06-16 RX ADMIN — ROCURONIUM BROMIDE 50 MG: 10 INJECTION, SOLUTION INTRAVENOUS at 16:13

## 2023-06-16 RX ADMIN — PROPOFOL 200 MG: 10 INJECTION, EMULSION INTRAVENOUS at 16:13

## 2023-06-16 RX ADMIN — HYDROMORPHONE HYDROCHLORIDE 0.5 MG: 1 INJECTION, SOLUTION INTRAMUSCULAR; INTRAVENOUS; SUBCUTANEOUS at 17:34

## 2023-06-16 NOTE — OP NOTE
Pre-Operative Diagnosis:  - Right tarsal tunnel syndrome    Post-Operative Diagnosis:  Same as pre-op diagnosis    Surgeon  Nicole    Procedure Performed/Technique    1.  Tarsal tunnel release    2.  Plantar fasciotomy    3.  Below-knee casting    Anesthesia  General    HEMOSTASIS:  Well-padded pneumatic thigh tourniquet at 350 mmHg times 28 minutes.    INJECTABLES:  30 mL of 0.25% Marcaine plain.  1ml of Dexadron (4mg/ml)    SUTURES:  3-0 Nylon.    SPECIMENS:  None.    DRAINS:  None.    COMPLICATIONS:  None.    Estimated Blood Loss:  None     PROCEDURE IN DETAIL:  Written consent was obtained from the patient prior to any medication or sedation being administered.    Under mild sedation, the patient was brought to the operating room and placed on the operating table in the supine position.  A well-padded pneumatic thigh tourniquet was placed about the patient's right mid-thigh.  General anesthesia was administered.  The right foot was then scrubbed, prepped, and draped in the usual aseptic manner.  An Esmarch bandage was utilized to exsanguinate the patient's right foot and lower leg, and the well-padded pneumatic thigh tourniquet was inflated to a pressure of 350 mmHg.    Attention was directed to the medial aspect of the right ankle where anatomical structures were identified as landmarks.  Approximately, a 10 cm linear incision was made immediately posterior to the outline of the anatomic structures indicating the tibial artery and neurovascular structures just posterior and inferior to the medial malleolus.  Incision was deepened through the subcutaneous tissues using sharp and blunt dissection.  Care was taken to identify and retract and vital neurovascular structures.  All bleeders were ligated and cauterized as necessary.    At this time, the incision was carefully deepened, taking great care to protect the tibial artery and posterior tibial nerve and associated neurovascular structures.  The soft tissue  constricture around the neurovascular bundles was evident.  The retinaculum was identified and carefully transected.  The surrounding soft tissue from the surrounding neurovascular structures was carefully freed from the surrounding tissue.  Upon freeing the nerve structures, the superficial and deep branches of the calcaneal nerve were identified and carefully freed from the surrounding soft tissue.   All the areas constricted around the nerve were freed.    Attention was directed to the plantar aspect of the incision, which the cory pedis was followed, and soft tissue dissection was continued.  The plantar fascial ligament was released on the medial one-third of its insertion of the medial calcaneal tubercle.    The wound was flushed with copious amounts of sterile normal saline.  The skin edges were reapproximated and coapted using 3-0 Nylon.       The tourniquet was deflated with prompt hyperemic response seen to the surgical site in toes two through five on the right foot.      A postoperative block consisting of 20 mL of 0.25% Marcaine were injected about the surgical site.    The surgical sites were dressed with Aquacel Ag dressing and covered with a sterile compressive dressing consisting of Adaptic, 4x4s, Kerlix, and Coban.    Posterior splint cast was applied using cast padding and OrthoGlass casting with an ACE wrap.    The patient tolerated the procedure and anesthesia well.  The patient was transferred to the recovery room with vital signs stable and vascular status intact to all toes of the right foot.  Following a period of postoperative monitoring, the patient will be transferred home having been given written and oral postoperative instructions.      The patient is to contact Dr. Rivera for all postoperative follow-up care, and if any problems should arise.

## 2023-06-16 NOTE — DISCHARGE INSTRUCTIONS
DISCHARGE INSTRUCTIONS  ORTHOPEDICS      For your surgery you had:  General anesthesia (you may have a sore throat for the first 24 hours)  Local anesthesia  You received an anesthesia medication today that can cause hormonal forms of birth control to be ineffective. You should use a different form of birth control (to prevent pregnancy) for 7 days.     You may experience dizziness, drowsiness, or light-headedness for several hours following surgery  Do not stay alone today or tonight.  Limit your activity for 24 hours.  Resume your diet slowly.  Follow whatever special dietary instructions you may have been given by the doctor.  You should not drive or operate machinery or drink alcohol for 24 hours or while you are taking pain medication.  You should not sign any legally binding documents.  If you had an axillary or regional block, you will not have control of the involved limb for up to 12 hours.  Protect the arm with a sling or follow your physician's specific instructions.  You may remove dressing:  [] in 24 hours  [] in 48 hours  [x] Other: KEEP DRESSING CLEAN, DRY, AND INTACT UNTIL FOLLOW-UP APPT.   You may shower or bathe: SATURDAY; KEEP DRESSING DRY AT ALL TIMES   Sleep with the injured part elevated on a pillow.  Medications per physician's instructions as indicated on Discharge Medication Information Sheet.  Follow verbal instructions of your doctor.  Sit with the lower leg propped up on a footstool or chair with pillows.  Exercise toes for 10 minutes every hour while awake. Ice bag to injured area for 72 hours.  Apply 20 minutes on - 20 minutes off.  Never place ice directly on skin or cast.    Avoid getting cast or dressing wet.  In addition to these instructions, follow the discharge instructions on postoperative arthroscopic surgery.    SPECIAL INSTRUCTIONS:     FOLLOW VERBAL INSTRUCTIONS OF   DR CORRAL        Last dose of pain medication was given at:   TYLENOL 1000 MG AT 12:53 PM  OXY IR 5 MG AT  5:35 PM  OXY IR 5 MG AT 5:50 PM     NOTIFY THE PHYSICIAN IF YOU EXPERIENCE:  Numbness of fingers or toes.  Inability to move fingers or toes.  Extreme coldness, paleness or blue dis-coloration of fingers or toes.  Excessive swelling of affected surgical site or swelling that causes the cast to rub or cut into skin.  Pain unrelieved by pain medication  Nausea/vomiting not relieved by prescribed medication  Unable to urinate in 6 hours after surgery  Temperature greater than 101 degree Fahrenheit or chills  If unable to reach your doctor, please go to the closest emergency room

## 2023-06-16 NOTE — H&P (VIEW-ONLY)
[facility] - PODIATRY    Today's Date: 06/16/23    Patient Name: Em Montanez  MRN: [id number]  CSN: [id number]  PCP: Ricky Velasquez Jr., MD, Last PCP Visit: 3/14/2023  Referring Provider: Lucas Rivera,*    SUBJECTIVE     No chief complaint on file.    HPI: Em Montanez, a 48 y.o.female, comes to clinic.    New, Established, New Problem: est  Location: Right tarsal tunnel syndrome  Duration: Greater than 1 year  Onset: Recurring  Nature: Pain, tingling, cramping  Stable, worsening, improving: no improvement  Aggravating factors:  Patient relates pain is aggravated by shoe gear and ambulation.    Previous Treatment: Previous neurology evaluation    Patient denies any fevers, chills, nausea, vomiting, shortness of breath, nor any other constitutional signs nor symptoms.       Medical changes:  None since last visit..      Past Medical History:   Diagnosis Date   • Allergic rhinitis    • Anxiety    • Arthritis    • Asthma     NO INHALER USE   • Back pain 11/21/2017    LUMBAR SPINE X RAY LARGELY UNREMARKABLE. WILL RX PT AND MONITOR FOR IMPROVEMENT. IF PAIN CONTINUES, WILL ORDER MRI   • Chronic pelvic pain in female 02/23/2022   • Depression    • Disease of thyroid gland    • Fatigue 11/21/2017   • Foot pain, right    • Insomnia 12/11/2017    DISCUSSED RISKS AND BENEFITS OF MEDICATIONS. ALSO DISCUSSED SLEEP HYGIENE METHODS INCLUDING AVOIDING SOURCES OF BLUE LIGHT, DEVELOPING ROUTINE, SLEEPING IN A DARK ROOM, AND USING BED FOR SLEEP ONLY. PT HAS TIRED MELATONIN WITH INTERMITTENT EFFECTIVENESS. PT WITHOUT REPORTED SYMPTOMS OF SLEEP APNEA AT THIS TIME.    • Knee pain 11/21/2017    PREVIOUS R KNEE REPLACEMENT 2/2 ARTHRITIS. PT WOULD LIKE TO DISCUSS OPTIONS WITH ORTHOPEDICS AGAIN   • Ovarian cyst    • Pelvic pain 01/12/2022   • Polycystic ovarian syndrome 11/21/2017   • Right foot pain     TARSAL   • Uterine pain    • Vitamin D deficiency 12/11/2017    CURRENTLY ON VIT D  SUPPLEMENTS. WILL RECHECK VIT D LEVEL IN 3 MONTHS.      Past Surgical History:   Procedure Laterality Date   • CHOLECYSTECTOMY     • COLONOSCOPY N/A 02/16/2022    Procedure: COLONOSCOPY;  Surgeon: Jb Gonzalez MD;  Location: [facility] ENDOSCOPY;  Service: Gastroenterology;  Laterality: N/A;  hemmorroids   • ENDOMETRIAL ABLATION     • ESSURE TUBAL LIGATION     • JOINT REPLACEMENT      bilateral knee replacement   • LEEP     • OTHER SURGICAL HISTORY      METAL IMPLANTS  knees   • TONSILLECTOMY     • TOTAL LAPAROSCOPIC HYSTERECTOMY N/A 4/6/2022    Procedure: TOTAL LAPAROSCOPIC HYSTERECTOMY WITH DAVINCI ROBOT;  Surgeon: Chito Cook MD;  Location: [facility] MAIN OR;  Service: Robotics - DaVinci;  Laterality: N/A;     Family History   Problem Relation Age of Onset   • Depression Mother    • Heart disease Mother    • Arthritis Mother    • Cancer Other    • ADD / ADHD Son    • Self-Injurious Behavior  Daughter    • Depression Daughter    • Anxiety disorder Daughter    • Malig Hyperthermia Neg Hx      Social History     Socioeconomic History   • Marital status:    Tobacco Use   • Smoking status: Every Day     Packs/day: 1.00     Years: 30.00     Pack years: 30.00     Types: Cigarettes   • Smokeless tobacco: Never   • Tobacco comments:     INSTRUCTED NO SMOKING 24 HR PRIOR TO SURGERY    Vaping Use   • Vaping Use: Never used   Substance and Sexual Activity   • Alcohol use: Yes     Comment: SOCIALLY   • Drug use: Not Currently     Types: Cocaine(coke), Methamphetamines, Marijuana   • Sexual activity: Defer     Partners: Male     Allergies   Allergen Reactions   • Prednisone Mental Status Change     Other reaction(s): Mental Status Change   • Tramadol GI Intolerance     Nausea and vomiting  Other reaction(s): GI Intolerance, Vomiting  Nausea and vomiting     Current Facility-Administered Medications   Medication Dose Route Frequency Provider Last Rate Last Admin   • ceFAZolin in Sodium Chloride (ANCEF) IVPB  solution 3 g  3 g Intravenous Once Lucas Rivera DPM       • lactated ringers infusion  9 mL/hr Intravenous Continuous PRN Reyes, Mirabelle, DO 9 mL/hr at 23 1253 9 mL/hr at 23 1253   • Midazolam HCl (PF) (VERSED) injection 2 mg  2 mg Intravenous Once Reyes, Mirabelle, DO         Review of Systems   Constitutional: Negative.    Musculoskeletal:        Pain in right medial rear foot   All other systems reviewed and are negative.      OBJECTIVE     Vitals:    23 1213   BP: 134/75   Pulse: 70   Resp: 20   Temp: 97.6 °F (36.4 °C)   SpO2: 97%       Patient seen in no apparent distress.      PHYSICAL EXAM:     Foot/Ankle Exam    GENERAL  Diabetic foot exam performed    Appearance:  obese  Orientation:  AAOx3  Affect:  appropriate  Gait:  unimpaired  Assistance:  independent  Right shoe gear: casual shoe  Left shoe gear: casual shoe    VASCULAR     Right Foot Vascularity   Normal vascular exam    Dorsalis pedis:  2+  Posterior tibial:  2+  Skin temperature:  warm  Edema gradin+ and pitting  CFT:  < 3 seconds  Pedal hair growth:  Absent  Varicosities:  mild varicosities     Left Foot Vascularity   Normal vascular exam    Dorsalis pedis:  2+  Posterior tibial:  2+  Skin temperature:  warm  Edema gradin+ and pitting  CFT:  < 3 seconds  Pedal hair growth:  Absent  Varicosities:  mild varicosities     NEUROLOGIC     Right Foot Neurologic   Normal sensation    Light touch sensation: normal  Vibratory sensation: normal  Hot/Cold sensation: normal  Protective Sensation using [place] Monofilament:   Sites intact: 10  Sites tested: 10     Left Foot Neurologic   Normal sensation    Light touch sensation: normal  Vibratory sensation: normal  Hot/Cold sensation:  normal  Protective Sensation using [place] Monofilament:   Sites intact: 10  Sites tested: 10    MUSCULOSKELETAL     Right Foot Musculoskeletal   Tenderness:  (Tarsal tunnel)    MUSCLE STRENGTH     Right Foot Muscle  Strength   Foot dorsiflexion:  4  Foot plantar flexion:  4  Foot inversion:  4  Foot eversion:  4     Left Foot Muscle Strength   Foot dorsiflexion:  4  Foot plantar flexion:  4  Foot inversion:  4  Foot eversion:  4    RANGE OF MOTION     Right Foot Range of Motion   Foot and ankle ROM within normal limits       Left Foot Range of Motion   Foot and ankle ROM within normal limits      DERMATOLOGIC      Right Foot Dermatologic   Skin  Right foot skin is intact.      Left Foot Dermatologic   Skin  Left foot skin is intact.       ASSESSMENT/PLAN     Diagnoses and all orders for this visit:    1. Tarsal tunnel syndrome of right side  -     ceFAZolin in Sodium Chloride (ANCEF) IVPB solution 3 g    Other orders  -     Follow Anesthesia Guidelines / Protocol; Standing  -     Verify NPO Status; Standing  -     Obtain informed consent (if not collected inpatient or PAT); Standing  -     Notify Physician - Standard; Standing  -     Follow Anesthesia Guidelines / Protocol  -     Verify NPO Status  -     Obtain informed consent (if not collected inpatient or PAT)  -     Notify Physician - Standard  -     Vital Signs - Per Anesthesia Protocol; Standing  -     Remove Scopolamine Patch 24 Hours After Application; Standing  -     Pulse Oximetry, Continuous; Standing  -     Insert Peripheral IV; Standing  -     lactated ringers infusion  -     acetaminophen (TYLENOL) tablet 1,000 mg  -     Midazolam HCl (PF) (VERSED) injection 2 mg  -     Remove Scopolamine Patch 24 Hours After Application  -     Pulse Oximetry, Continuous  -     Insert Peripheral IV        Comprehensive lower extremity examination and evaluation was performed.    Discussed findings and treatment plan including risks, benefits, and treatment options with patient in detail. Patient agreed with treatment plan.    Surgery: Right tarsal tunnel release.    The risks and benefits of the surgery were discussed with the patient.  This discussion included possible  complications of requiring further surgery, possible delayed wound healing, further surgery requiring amputation of the foot or leg, and also included the complication of death.  All the patient's questions were answered to their satisfaction.  Patient states they would like to proceed with the procedure.    Discussed with the patient at length surgical versus nonsurgical options.  Explained to the patient in detail that surgical intervention is only indicated when the level of pain is such that it negatively affects her daily life.    It was explained to the patient that surgical interventions often times do not relieve all of the pain associated with the deformity    Risks and complications associated with surgical versus nonsurgical options were explained.  The patient understands that the problem will most likely continue to evolve and surgical intervention is the only treatment plan that actually corrects the deformities.    Upon discussion of non-surgical conservative option, surgical correction, post-operative requirements along with risk and benefits of the surgery along with expected outcomes, the patient states they would like to proceed with the scheduling surgery.      The patient has decided to move forward with surgical intervention understanding all of these risks and complications.    Surgery is planned for 9 June 2023.    Diabetic foot exam performed and documented this date, compliant with CQM required standards. Detail of findings as noted in physical exam.  Lower extremity Neurologic exam for diabetic patient performed and documented this date, compliant with PQRS required standards. Detail of findings as noted in physical exam.  Advised patient importance of good routine lower extremity hygiene. Advised patient importance of evaluating for intact skin and pain free nail borders.  Advised patient to use mirror to evaluate plantar/ soles of feet for better visualization. Advised patient monitor  and phone office to be seen if any cracking to skin, open lesions, painful nail borders or if nails become elongated prior to next visit. Advised patient importance of daily cleansing of lower extremities, followed by good skin cream to maintain normal hydration of skin. Also advised patient importance of close daily monitoring of blood sugar. Advised to regulate diet and medications to maintain control of blood sugar in optimal range. Contact primary care provider if difficulties maintaining blood sugar levels.  Advised Patient of presence of Diabetes Mellitus condition.  Advised Patient risk of progression and worsening or improvement, then return of condition.  Will monitor condition for any change in future. Treat with most appropriate treatment pending status of condition.  Counseled and advised patient extensively on nature and ramifications of diabetes. Standard instructions given to patient for good diabetic foot care and maintenance. Advised importance of careful monitoring to avoid break down and complications secondary to diabetes. Advised patient importance of strict maintenance of blood sugar control. Advised patient of possible ominous results from neglect of condition, i.e.: amputation/ loss of digits, feet and legs, or even death.  Patient states understands counseling, will monitor closely, continue good hygiene and routine diabetic foot care. Patient will contact office is questions or problems.      Patient is to monitor for recurrence and any new symptoms and to contact Dr. Rivera's office for a follow-up appointment.      The patient states understanding and agreement with this plan.    An After Visit Summary was printed and given to the patient at discharge, including (if requested) any available informative/educational handouts regarding diagnosis, treatment, or medications. All questions were answered to patient/family satisfaction. Should symptoms fail to improve or worsen they agree to  call or return to clinic or to go to the Emergency Department. Discussed the importance of following up with any needed screening tests/labs/specialist appointments and any requested follow-up recommended by me today. Importance of maintaining follow-up discussed and patient accepts that missed appointments can delay diagnosis and potentially lead to worsening of conditions.    No follow-ups on file., or sooner if acute issues arise.    This document has been electronically signed by Lucas Rivera DPM on June 16, 2023 13:55 EDT

## 2023-06-16 NOTE — PROGRESS NOTES
[facility] - PODIATRY    Today's Date: 06/16/23    Patient Name: Em Montanez  MRN: [id number]  CSN: [id number]  PCP: Ricky Velasquez Jr., MD, Last PCP Visit: 3/14/2023  Referring Provider: Lucas Rivera,*    SUBJECTIVE     No chief complaint on file.    HPI: Em Montanez, a 48 y.o.female, comes to clinic.    New, Established, New Problem: est  Location: Right tarsal tunnel syndrome  Duration: Greater than 1 year  Onset: Recurring  Nature: Pain, tingling, cramping  Stable, worsening, improving: no improvement  Aggravating factors:  Patient relates pain is aggravated by shoe gear and ambulation.    Previous Treatment: Previous neurology evaluation    Patient denies any fevers, chills, nausea, vomiting, shortness of breath, nor any other constitutional signs nor symptoms.       Medical changes:  None since last visit..      Past Medical History:   Diagnosis Date   • Allergic rhinitis    • Anxiety    • Arthritis    • Asthma     NO INHALER USE   • Back pain 11/21/2017    LUMBAR SPINE X RAY LARGELY UNREMARKABLE. WILL RX PT AND MONITOR FOR IMPROVEMENT. IF PAIN CONTINUES, WILL ORDER MRI   • Chronic pelvic pain in female 02/23/2022   • Depression    • Disease of thyroid gland    • Fatigue 11/21/2017   • Foot pain, right    • Insomnia 12/11/2017    DISCUSSED RISKS AND BENEFITS OF MEDICATIONS. ALSO DISCUSSED SLEEP HYGIENE METHODS INCLUDING AVOIDING SOURCES OF BLUE LIGHT, DEVELOPING ROUTINE, SLEEPING IN A DARK ROOM, AND USING BED FOR SLEEP ONLY. PT HAS TIRED MELATONIN WITH INTERMITTENT EFFECTIVENESS. PT WITHOUT REPORTED SYMPTOMS OF SLEEP APNEA AT THIS TIME.    • Knee pain 11/21/2017    PREVIOUS R KNEE REPLACEMENT 2/2 ARTHRITIS. PT WOULD LIKE TO DISCUSS OPTIONS WITH ORTHOPEDICS AGAIN   • Ovarian cyst    • Pelvic pain 01/12/2022   • Polycystic ovarian syndrome 11/21/2017   • Right foot pain     TARSAL   • Uterine pain    • Vitamin D deficiency 12/11/2017    CURRENTLY ON VIT D  SUPPLEMENTS. WILL RECHECK VIT D LEVEL IN 3 MONTHS.      Past Surgical History:   Procedure Laterality Date   • CHOLECYSTECTOMY     • COLONOSCOPY N/A 02/16/2022    Procedure: COLONOSCOPY;  Surgeon: Jb Gonzalez MD;  Location: [facility] ENDOSCOPY;  Service: Gastroenterology;  Laterality: N/A;  hemmorroids   • ENDOMETRIAL ABLATION     • ESSURE TUBAL LIGATION     • JOINT REPLACEMENT      bilateral knee replacement   • LEEP     • OTHER SURGICAL HISTORY      METAL IMPLANTS  knees   • TONSILLECTOMY     • TOTAL LAPAROSCOPIC HYSTERECTOMY N/A 4/6/2022    Procedure: TOTAL LAPAROSCOPIC HYSTERECTOMY WITH DAVINCI ROBOT;  Surgeon: Chito Cook MD;  Location: [facility] MAIN OR;  Service: Robotics - DaVinci;  Laterality: N/A;     Family History   Problem Relation Age of Onset   • Depression Mother    • Heart disease Mother    • Arthritis Mother    • Cancer Other    • ADD / ADHD Son    • Self-Injurious Behavior  Daughter    • Depression Daughter    • Anxiety disorder Daughter    • Malig Hyperthermia Neg Hx      Social History     Socioeconomic History   • Marital status:    Tobacco Use   • Smoking status: Every Day     Packs/day: 1.00     Years: 30.00     Pack years: 30.00     Types: Cigarettes   • Smokeless tobacco: Never   • Tobacco comments:     INSTRUCTED NO SMOKING 24 HR PRIOR TO SURGERY    Vaping Use   • Vaping Use: Never used   Substance and Sexual Activity   • Alcohol use: Yes     Comment: SOCIALLY   • Drug use: Not Currently     Types: Cocaine(coke), Methamphetamines, Marijuana   • Sexual activity: Defer     Partners: Male     Allergies   Allergen Reactions   • Prednisone Mental Status Change     Other reaction(s): Mental Status Change   • Tramadol GI Intolerance     Nausea and vomiting  Other reaction(s): GI Intolerance, Vomiting  Nausea and vomiting     Current Facility-Administered Medications   Medication Dose Route Frequency Provider Last Rate Last Admin   • ceFAZolin in Sodium Chloride (ANCEF) IVPB  solution 3 g  3 g Intravenous Once Lucas Rivera DPM       • lactated ringers infusion  9 mL/hr Intravenous Continuous PRN Reyes, Mirabelle, DO 9 mL/hr at 23 1253 9 mL/hr at 23 1253   • Midazolam HCl (PF) (VERSED) injection 2 mg  2 mg Intravenous Once Reyes, Mirabelle, DO         Review of Systems   Constitutional: Negative.    Musculoskeletal:        Pain in right medial rear foot   All other systems reviewed and are negative.      OBJECTIVE     Vitals:    23 1213   BP: 134/75   Pulse: 70   Resp: 20   Temp: 97.6 °F (36.4 °C)   SpO2: 97%       Patient seen in no apparent distress.      PHYSICAL EXAM:     Foot/Ankle Exam    GENERAL  Diabetic foot exam performed    Appearance:  obese  Orientation:  AAOx3  Affect:  appropriate  Gait:  unimpaired  Assistance:  independent  Right shoe gear: casual shoe  Left shoe gear: casual shoe    VASCULAR     Right Foot Vascularity   Normal vascular exam    Dorsalis pedis:  2+  Posterior tibial:  2+  Skin temperature:  warm  Edema gradin+ and pitting  CFT:  < 3 seconds  Pedal hair growth:  Absent  Varicosities:  mild varicosities     Left Foot Vascularity   Normal vascular exam    Dorsalis pedis:  2+  Posterior tibial:  2+  Skin temperature:  warm  Edema gradin+ and pitting  CFT:  < 3 seconds  Pedal hair growth:  Absent  Varicosities:  mild varicosities     NEUROLOGIC     Right Foot Neurologic   Normal sensation    Light touch sensation: normal  Vibratory sensation: normal  Hot/Cold sensation: normal  Protective Sensation using [place] Monofilament:   Sites intact: 10  Sites tested: 10     Left Foot Neurologic   Normal sensation    Light touch sensation: normal  Vibratory sensation: normal  Hot/Cold sensation:  normal  Protective Sensation using Center-H[place]al Monofilament:   Sites intact: 10  Sites tested: 10    MUSCULOSKELETAL     Right Foot Musculoskeletal   Tenderness:  (Tarsal tunnel)    MUSCLE STRENGTH     Right Foot Muscle  Strength   Foot dorsiflexion:  4  Foot plantar flexion:  4  Foot inversion:  4  Foot eversion:  4     Left Foot Muscle Strength   Foot dorsiflexion:  4  Foot plantar flexion:  4  Foot inversion:  4  Foot eversion:  4    RANGE OF MOTION     Right Foot Range of Motion   Foot and ankle ROM within normal limits       Left Foot Range of Motion   Foot and ankle ROM within normal limits      DERMATOLOGIC      Right Foot Dermatologic   Skin  Right foot skin is intact.      Left Foot Dermatologic   Skin  Left foot skin is intact.       ASSESSMENT/PLAN     Diagnoses and all orders for this visit:    1. Tarsal tunnel syndrome of right side  -     ceFAZolin in Sodium Chloride (ANCEF) IVPB solution 3 g    Other orders  -     Follow Anesthesia Guidelines / Protocol; Standing  -     Verify NPO Status; Standing  -     Obtain informed consent (if not collected inpatient or PAT); Standing  -     Notify Physician - Standard; Standing  -     Follow Anesthesia Guidelines / Protocol  -     Verify NPO Status  -     Obtain informed consent (if not collected inpatient or PAT)  -     Notify Physician - Standard  -     Vital Signs - Per Anesthesia Protocol; Standing  -     Remove Scopolamine Patch 24 Hours After Application; Standing  -     Pulse Oximetry, Continuous; Standing  -     Insert Peripheral IV; Standing  -     lactated ringers infusion  -     acetaminophen (TYLENOL) tablet 1,000 mg  -     Midazolam HCl (PF) (VERSED) injection 2 mg  -     Remove Scopolamine Patch 24 Hours After Application  -     Pulse Oximetry, Continuous  -     Insert Peripheral IV        Comprehensive lower extremity examination and evaluation was performed.    Discussed findings and treatment plan including risks, benefits, and treatment options with patient in detail. Patient agreed with treatment plan.    Surgery: Right tarsal tunnel release.    The risks and benefits of the surgery were discussed with the patient.  This discussion included possible  complications of requiring further surgery, possible delayed wound healing, further surgery requiring amputation of the foot or leg, and also included the complication of death.  All the patient's questions were answered to their satisfaction.  Patient states they would like to proceed with the procedure.    Discussed with the patient at length surgical versus nonsurgical options.  Explained to the patient in detail that surgical intervention is only indicated when the level of pain is such that it negatively affects her daily life.    It was explained to the patient that surgical interventions often times do not relieve all of the pain associated with the deformity    Risks and complications associated with surgical versus nonsurgical options were explained.  The patient understands that the problem will most likely continue to evolve and surgical intervention is the only treatment plan that actually corrects the deformities.    Upon discussion of non-surgical conservative option, surgical correction, post-operative requirements along with risk and benefits of the surgery along with expected outcomes, the patient states they would like to proceed with the scheduling surgery.      The patient has decided to move forward with surgical intervention understanding all of these risks and complications.    Surgery is planned for 9 June 2023.    Diabetic foot exam performed and documented this date, compliant with CQM required standards. Detail of findings as noted in physical exam.  Lower extremity Neurologic exam for diabetic patient performed and documented this date, compliant with PQRS required standards. Detail of findings as noted in physical exam.  Advised patient importance of good routine lower extremity hygiene. Advised patient importance of evaluating for intact skin and pain free nail borders.  Advised patient to use mirror to evaluate plantar/ soles of feet for better visualization. Advised patient monitor  and phone office to be seen if any cracking to skin, open lesions, painful nail borders or if nails become elongated prior to next visit. Advised patient importance of daily cleansing of lower extremities, followed by good skin cream to maintain normal hydration of skin. Also advised patient importance of close daily monitoring of blood sugar. Advised to regulate diet and medications to maintain control of blood sugar in optimal range. Contact primary care provider if difficulties maintaining blood sugar levels.  Advised Patient of presence of Diabetes Mellitus condition.  Advised Patient risk of progression and worsening or improvement, then return of condition.  Will monitor condition for any change in future. Treat with most appropriate treatment pending status of condition.  Counseled and advised patient extensively on nature and ramifications of diabetes. Standard instructions given to patient for good diabetic foot care and maintenance. Advised importance of careful monitoring to avoid break down and complications secondary to diabetes. Advised patient importance of strict maintenance of blood sugar control. Advised patient of possible ominous results from neglect of condition, i.e.: amputation/ loss of digits, feet and legs, or even death.  Patient states understands counseling, will monitor closely, continue good hygiene and routine diabetic foot care. Patient will contact office is questions or problems.      Patient is to monitor for recurrence and any new symptoms and to contact Dr. Rivera's office for a follow-up appointment.      The patient states understanding and agreement with this plan.    An After Visit Summary was printed and given to the patient at discharge, including (if requested) any available informative/educational handouts regarding diagnosis, treatment, or medications. All questions were answered to patient/family satisfaction. Should symptoms fail to improve or worsen they agree to  call or return to clinic or to go to the Emergency Department. Discussed the importance of following up with any needed screening tests/labs/specialist appointments and any requested follow-up recommended by me today. Importance of maintaining follow-up discussed and patient accepts that missed appointments can delay diagnosis and potentially lead to worsening of conditions.    No follow-ups on file., or sooner if acute issues arise.    This document has been electronically signed by Lucas Rivera DPM on June 16, 2023 13:55 EDT

## 2023-06-16 NOTE — ANESTHESIA PREPROCEDURE EVALUATION
Anesthesia Evaluation     Patient summary reviewed and Nursing notes reviewed   no history of anesthetic complications:   NPO Solid Status: > 8 hours  NPO Liquid Status: > 2 hours           Airway   Mallampati: II  TM distance: >3 FB  Neck ROM: full  No difficulty expected  Dental - normal exam     Pulmonary - normal exam    breath sounds clear to auscultation  (+) a smoker Current Smoked day of surgery, asthma,  Cardiovascular - negative cardio ROS and normal exam  Exercise tolerance: good (4-7 METS)    Rhythm: regular  Rate: normal        Neuro/Psych  (+) numbness, psychiatric history Anxiety and Depression  GI/Hepatic/Renal/Endo    (+) morbid obesity, diabetes mellitus (reactive hypoglycemia), thyroid problem hypothyroidism    ROS Comment: Adrenal insufficiency    Musculoskeletal     (+) back pain      ROS comment: Lumbar spondylosis  Abdominal    Substance History - negative use     OB/GYN negative ob/gyn ROS         Other   arthritis,     ROS/Med Hx Other: PAT Nursing Notes unavailable.                 Anesthesia Plan    ASA 2     general     (Patient understands anesthesia not responsible for dental damage.)  intravenous induction     Anesthetic plan, risks, benefits, and alternatives have been provided, discussed and informed consent has been obtained with: patient.  Pre-procedure education provided  Use of blood products discussed with patient  Consented to blood products.    Plan discussed with CRNA.    CODE STATUS:

## 2023-06-16 NOTE — ANESTHESIA POSTPROCEDURE EVALUATION
Patient: Em Montanez    Procedure Summary       Date: 06/16/23 Room / Location: [facility] OR 03 / [facility] MAIN OR    Anesthesia Start: 1604 Anesthesia Stop: 1724    Procedures:       TARSAL TUNNEL RELEASE (Right: Foot)      FOOT PLANTAR FASCIECTOMY (Right: Foot)      CAST APPLICATION LEG (Right) Diagnosis:       Tarsal tunnel syndrome of right side      (Tarsal tunnel syndrome of right side [G57.51])    Surgeons: Lucas Rivera DPM Provider: Robel Nñuez CRNA    Anesthesia Type: general ASA Status: 2            Anesthesia Type: general    Vitals  Vitals Value Taken Time   /68 06/16/23 1750   Temp 36.3 °C (97.3 °F) 06/16/23 1755   Pulse 74 06/16/23 1755   Resp 16 06/16/23 1755   SpO2 100 % 06/16/23 1755           Post Anesthesia Care and Evaluation    Patient location during evaluation: bedside  Patient participation: complete - patient participated  Level of consciousness: awake  Pain management: adequate    Airway patency: patent  PONV Status: controlled  Cardiovascular status: acceptable  Respiratory status: acceptable  Hydration status: acceptable

## 2023-06-28 NOTE — PROGRESS NOTES
GYN Problem/Follow Up Visit    Chief Complaint   Patient presents with   • Follow-up     previous pelvic pain/discuss possible hyst           HPI  Em Montanez is a 46 y.o. female, , who presents for follow-up for chronic pelvic pain.    Patient was last seen in January of this year.  Reports that she is still having occasional waxing waning lower abdominal pain greater on her right side.  She reports this is coming around the time of her supposed menses that she has been tracking that out.  Denies any vaginal bleeding.  Reports that she had a urinalysis done which did not show any blood most recently here in February.  Did undergo a colonoscopy and that did not show any signs of polyps.  She does continue to smoke cigarettes.  Denies any other changes in her medications.  Does take an NSAID for Achilles discomfort.  She otherwise presents today wanting to move forward with surgical management of her discomfort.    Additional OB/GYN History   No LMP recorded (lmp unknown). Patient has had an ablation.  Current contraception: contraceptive methods: Esher coils  Desires to: do not start contraception  Allergies : Prednisone and Tramadol     The additional following portions of the patient's history were reviewed and updated as appropriate: allergies, current medications, past family history, past medical history, past social history, past surgical history and problem list.    Review of Systems   Constitutional: Negative for fatigue and fever.   Respiratory: Negative for cough and shortness of breath.    Cardiovascular: Negative for chest pain.   Gastrointestinal: Positive for abdominal pain. Negative for abdominal distention.   Genitourinary: Positive for pelvic pain. Negative for difficulty urinating, dysuria, menstrual problem, vaginal bleeding, vaginal discharge and vaginal pain.       I have reviewed and agree with the HPI, ROS, and historical information as entered above. Chito Cook,  "MD    Objective   /76   Pulse 100   Ht 177.8 cm (70\")   Wt 129 kg (285 lb)   LMP  (LMP Unknown)   Breastfeeding No   BMI 40.89 kg/m²     Physical Exam  Vitals and nursing note reviewed.   Constitutional:       General: She is not in acute distress.     Appearance: Normal appearance. She is not toxic-appearing.   HENT:      Head: Normocephalic and atraumatic.   Eyes:      Extraocular Movements: Extraocular movements intact.      Conjunctiva/sclera: Conjunctivae normal.   Cardiovascular:      Pulses: Normal pulses.   Pulmonary:      Effort: Pulmonary effort is normal.   Abdominal:      General: There is no distension.      Palpations: Abdomen is soft.      Tenderness: There is no abdominal tenderness.   Musculoskeletal:      Cervical back: Normal range of motion.   Skin:     General: Skin is warm and dry.   Neurological:      Mental Status: She is alert and oriented to person, place, and time.   Psychiatric:         Mood and Affect: Mood normal.         Behavior: Behavior normal.         Thought Content: Thought content normal.            Assessment and Plan  Em Montanez is a 46 y.o.  presenting for follow-up for chronic pelvic pain.  Patient has been monitoring symptoms with no improvement since seen in January.  Did undergo colonoscopy to rule out GI manifestations of discomfort.  Did not have urological evaluation however urinalysis did show improvement without any blood in her urine.  Discussed with Em had potential causes for pain being pelvic in origin with etiologies such as post ablative tubal syndrome, as well as pain from E sure sterilization procedure in the past.  Discussed with Em having a hysterectomy with bilateral salpingectomy performed.  At this point time she wants to move forward with surgical intervention.  Discussed with Em leaving her ovaries in place that she is currently 46 with the average age of menopause is 51-1/2.  She does not have a family history of " [facility]   • APPENDECTOMY  age 12   •  SECTION, CLASSIC  1985   • ovarian cancer.  Lifetime risk of ovarian cancer of 170 was discussed with patient.  She also has been taking Metformin did have a hemoglobin A1c performed back in August of the last year.  Discussed with Em getting CMP as well as hemoglobin A1c and EKG of preoperative clearance.  Discussed with her potential complications of surgery including bleeding, injury to surrounding structures, infection, as well as the potential for having to convert to an open procedure.  Discussed that if things were able to go appropriately that this would be considered a same-day procedure and that most we will do well 5 to 7 days out.  Will need to be on pelvic rest for the next 12 weeks.  She understands these complications and risk is when to move forward with surgical intervention.  Discussed with Em that this may also not be gynecological origin and that she may still have persistent chronic pain after the removal of pelvic organs.  Smoking cessation and continued exercise was recommended.     Diagnoses and all orders for this visit:    1. Pelvic pain (Primary)  -     Hemoglobin A1c; Future  -     Comprehensive Metabolic Panel; Future  -     ECG 12 Lead; Future    2. Chronic pelvic pain in female        She understands the importance of having the above orders performed in a timely fashion.  The risks of not performing them include, but are not limited to, cancer and/or subsequent increase in morbidity and/or mortality.  She is encouraged to review her results online and/or contact or office if she has questions.     Follow Up:  Return in about 5 weeks (around 3/30/2022) for Preoperative H&P .        Chito Cook MD  02/23/2022     TAKE 1/2 TABLET BY MOUTH EVERY DAY 6/12/23   Khoa Guevara MD   lisinopril (ZESTRIL) 10 MG tablet TAKE 0.5 TABLETS BY MOUTH DAILY. TAKES 3-4 DAYS PER WEEK 5/2/23   Khoa Guevara MD   triamcinolone (ARISTOCORT) 0.1 % cream Apply to affected areas twice daily (am, pm) until clear 4/25/23   Merary Fontenot MD   bumetanide (BUMEX) 1 MG tablet TAKE 1.5 TABLETS BY MOUTH 3 TIMES DAILY. 4/7/23   Khoa Guevara MD   metoPROLOL succinate (TOPROL-XL) 25 MG 24 hr tablet TAKE 1/2 TABLET BY MOUTH EVERY DAY 3/22/23   Khoa Guevara MD   lidocaine (LIDODERM) 5 % patch Place 1 patch onto the skin every 24 hours. Do not start before December 20, 2022. Remove patch 12 hours after applying 12/20/22 1/19/23  Malcom Stein MD   metOLazone (ZAROXOLYN) 2.5 MG tablet TAKE 1 TABLET BY MOUTH EVERY OTHER DAY 9/16/22   Khoa Guevara MD   nystatin (MYCOSTATIN) 137441 UNIT/GM ointment Mix 50:50 on fingertips with hydrocortisone ointment and apply twice daily until clear 8/8/22   Merary Fontenot MD   nystatin (MYCOSTATIN) 193097 UNIT/GM powder Apply topically 2 times daily. 8/8/22   Merary Fontenot MD   fluconazole (DIFLUCAN) 200 MG tablet For flares of groin rash, take 1 pill x 1 dose and repeat in 7 days (2 pills total), repeat as needed for flares 8/8/22   Merary Fontenot MD   linaclotide (LINZESS) 145 MCG capsule Take 1 capsule by mouth daily (before breakfast). 7/21/22   Lynne Ramirez DO   docusate sodium 100 MG tablet Take 100 mg by mouth as needed. 1-2 tabs PRN daily for constipation 7/11/22   System, Provider Not In   Magnesium 400 MG Tab Take 400 mg by mouth 2 times daily. 6/16/22   Lynne Ramirez DO   Calcium 600 MG tablet Take 2 tablets by mouth 2 times daily. 6/16/22   System, Provider Not In   ferrous sulfate 325 (65 FE) MG tablet Take 1 tablet by mouth in the morning and 1 tablet before bedtime. 6/15/22   Lynne Ramirez DO   polyethylene glycol (MIRALAX) 17 GM/SCOOP powder  Take 17 g by mouth daily. 5/14/22   Provider, Outside   Alpha-Lipoic Acid 600 MG Cap Take 1 capsule by mouth daily. 6/1/22   System, Provider Not In   Cholecalciferol (vitamin D3) 125 mcg (5,000 units) capsule Take 125 mcg by mouth 2 (two) times a day. 6/1/22   System, Provider Not In   levothyroxine 125 MCG tablet Take 2 tablets by mouth daily (before breakfast). Do not start before May 14, 2022. 5/14/22   Willis Greene MD    Allergies   Allergen Reactions   • Myrbetriq [Mirabegron Base] RASH   • Percocet [Oxycodone-Acetaminophen] Nausea & Vomiting     Pt states tolerates Vicoden (and just took some) as of 08/31/2020   • Metformin DIARRHEA   • Morphine VOMITING   • Diclofenac RASH     Itching   • Elmiron [Pentosan Polysulfate Sodium] RASH   • Neurontin [Gabapentin] RASH   • Voltaren RASH        Review of Systems:  As per HPI    Physical Exam     Vitals with min/max:      Vital Last Value 24 Hour Range   Temperature 98.3 °F (36.8 °C) (06/28/23 0222) Temp  Min: 98.3 °F (36.8 °C)  Max: 98.3 °F (36.8 °C)   Pulse 77 (06/28/23 0222) Pulse  Min: 77  Max: 77   Respiratory 18 (06/28/23 0222) Resp  Min: 18  Max: 18   Non-Invasive  Blood Pressure (!) 142/65 (06/28/23 0222) BP  Min: 142/65  Max: 142/65   Pulse Oximetry 98 % (06/28/23 0222) SpO2  Min: 98 %  Max: 98 %   Arterial   Blood Pressure   No data recorded         CONSTITUTIONAL: 71 year old female morbidly obese patient, Awake, Alert, interactive. Non-toxic in appearance. No signs of distress.   HEAD: Normocephalic, Atraumatic.  PULMONARY: Lungs clear to auscultation without distress. No tachypnea or abnormal breath sounds  CARDIOVASCULAR: Regular rate and rhythm noted on auscultation. No abnormal heart sounds   GASTROINTESTINAL: Abdomen is soft, non-tender, and non-distended.   NEUROLOGIC: No acute change of mental status, No acute gross sensory or motor deficits.  MUSCULOSKELETAL: No new joint or extremity swelling or deformity.  Left lower extremity is  [facility] [[id number]]  Is this a telephone or verbal order?: This is a telephone order from the admitting physician         Gavin Marcus MD  06/28/23 0422

## 2023-07-19 ENCOUNTER — TELEPHONE (OUTPATIENT)
Dept: PHYSICAL THERAPY | Facility: CLINIC | Age: 48
End: 2023-07-19

## 2023-07-24 ENCOUNTER — TREATMENT (OUTPATIENT)
Dept: PHYSICAL THERAPY | Facility: CLINIC | Age: 48
End: 2023-07-24
Payer: COMMERCIAL

## 2023-07-24 DIAGNOSIS — G57.51 TARSAL TUNNEL SYNDROME OF RIGHT SIDE: Primary | ICD-10-CM

## 2023-07-24 DIAGNOSIS — R26.89 IMBALANCE: ICD-10-CM

## 2023-07-24 DIAGNOSIS — R26.9 GAIT DISTURBANCE: ICD-10-CM

## 2023-07-24 DIAGNOSIS — M79.671 RIGHT FOOT PAIN: ICD-10-CM

## 2023-07-24 DIAGNOSIS — R26.2 DIFFICULTY WALKING: ICD-10-CM

## 2023-07-24 PROCEDURE — 97140 MANUAL THERAPY 1/> REGIONS: CPT | Performed by: PHYSICAL THERAPIST

## 2023-07-24 PROCEDURE — 97110 THERAPEUTIC EXERCISES: CPT | Performed by: PHYSICAL THERAPIST

## 2023-07-24 PROCEDURE — 97530 THERAPEUTIC ACTIVITIES: CPT | Performed by: PHYSICAL THERAPIST

## 2023-07-24 NOTE — PROGRESS NOTES
"Physical Therapy Daily Treatment Note  [address], [address] [place] [address]        Patient: Em Montanez   : 1975  Diagnosis/ICD-10 Code:  Tarsal tunnel syndrome of right side [G57.51]  Referring practitioner: Lucas Rivera, *  Date of Initial Visit: Type: THERAPY  Noted: 2023  Today's Date: 2023  Patient seen for 2 sessions             Subjective   Em Montanez denies having any pain in her right foot upon arrival today.  She states that he wanted her to continue with CAM boot, but states she has just been walking in her regular shoes without any pain or issues.  She reports that she has an appointment with Dr. Collins to get her left hip checked out after she reports \"Falling off \" of knee scooter after her surgery on her right foot.    Objective       Active Range of Motion      Right Ankle/Foot   Dorsiflexion (ke): 3 degrees   Plantar flexion: 45 degrees   Inversion: 33 degrees   Eversion: 14 degrees     See Exercise and Manual Logs for complete treatment.       Assessment/Plan    Pt tolerated therapy session well - with progression of therapeutic exercises, CKC-Functional activities, and Manual therapy. She has improved, but continues to have deficits in Her Right ankle/foot  ROM,  Strength, and Stability; limiting function and ability to perform ADLs at this time.  Pt voiced no complaints of pain during or post session today.    Progress per Plan of Care           Timed:  Manual Therapy:    8     mins  06639;  Therapeutic Exercise:    22     mins  22709;     Neuromuscular Yanna:    0    mins  44317;    Therapeutic Activity:     8     mins  15395;     Gait Trainin     mins  03903;     Ultrasound:     0     mins  36002;    Electrical Stimulation:    0     mins  28852;  Iontophoresis     0     mins  50178    Untimed:  Electrical Stimulation:    0     mins  41568 ( );  Mechanical Traction:    0     mins  84103;   Fluidotherapy     0     mins  68000  Hot pack     0 "     mins  43631  Cold pack     0     mins  68397    Timed Treatment:   38   mins   Total Treatment:     38   mins        Denae Mcdaniel PTA  Physical Therapist Assistant

## 2023-07-26 ENCOUNTER — TREATMENT (OUTPATIENT)
Dept: PHYSICAL THERAPY | Facility: CLINIC | Age: 48
End: 2023-07-26
Payer: COMMERCIAL

## 2023-07-26 DIAGNOSIS — R26.9 GAIT DISTURBANCE: ICD-10-CM

## 2023-07-26 DIAGNOSIS — G57.51 TARSAL TUNNEL SYNDROME OF RIGHT SIDE: Primary | ICD-10-CM

## 2023-07-26 DIAGNOSIS — R26.2 DIFFICULTY WALKING: ICD-10-CM

## 2023-07-26 DIAGNOSIS — M79.671 RIGHT FOOT PAIN: ICD-10-CM

## 2023-07-26 NOTE — PROGRESS NOTES
Physical Therapy Daily Treatment Note  [address], [address], [place] [address]      Patient: Em Montanez   : 1975  Diagnosis/ICD-10 Code:  Tarsal tunnel syndrome of right side [G57.51]  Referring practitioner: Lucas Rivera, *  Date of Initial Visit: Type: THERAPY  Noted: 2023  Today's Date: 2023  Patient seen for 3 sessions             Subjective   Em Montanez reports: no pain of difficulty with R foot at beginning of session today.    Objective   See Exercise, Manual, and Modality Logs for complete treatment.       Assessment/Plan  Patient tolerated all exercise progressions and manual therapy well today but continues to demonstrate R ankle strength/ROM deficits limiting function. Continue to progress per patient tolerance.    Progress per Plan of Care           Timed:  Manual Therapy:    8     mins  90380;  Therapeutic Exercise:    22     mins  94127;     Neuromuscular Yanna:    0    mins  31094;    Therapeutic Activity:     8     mins  08034;     Gait Trainin     mins  30882;     Ultrasound:     0     mins  40616;    Electrical Stimulation:    0     mins  15502;  Iontophoresis     0     mins  72392    Untimed:  Electrical Stimulation:    0     mins  80075 ( );  Mechanical Traction:    0     mins  63572;   Fluidotherapy     0     mins  36664  Hot pack     0     Mins    Cold pack                       0     Mins     Timed Treatment:   38   mins   Total Treatment:     38   mins        Gifty Browne PT    Electronically signed [unfilled]  [place] License: [id number]  NPI number: [id number]

## 2023-07-26 NOTE — PROGRESS NOTES
Physical Therapy Daily Treatment Note  [address] [place] [address]        Patient: Em Montanez   : 1975  Diagnosis/ICD-10 Code:  Tarsal tunnel syndrome of right side [G57.51]  Referring practitioner: Lucas Rivera, *  Date of Initial Visit: No linked episodes  Today's Date: 2023  Patient seen for Visit count could not be calculated. Make sure you are using a visit which is associated with an episode. sessions             Subjective   Em Montanez reports: ***    Objective     Strength/Myotome Testing      Right Ankle/Foot   Dorsiflexion: 4  Right ankle plantar flexion strength: not tested per sx precautions.  Inversion: 4  Eversion: 4     See Exercise and Manual Logs for complete treatment.       Assessment/Plan    Pt tolerated therapy session well - with progression of therapeutic exercises, CKC-Functional activities, and Manual therapy. She has improved, but continues to have deficits in Her Right ankle/foot  ROM,  Strength, and Stability; limiting function and ability to perform ADLs at this time.  Pt voiced no complaints of pain during or post session today.     Progress per Plan of Care               Timed:  Manual Therapy:    6     mins  55511;  Therapeutic Exercise:    16     mins  98647;     Neuromuscular Yanna:    0    mins  42739;    Therapeutic Activity:     8     mins  84161;     Gait Trainin     mins  36890;     Ultrasound:     0     mins  48768;    Electrical Stimulation:    0     mins  18556;  Iontophoresis     0     mins  74956    Untimed:  Electrical Stimulation:    0     mins  31422 ( );  Mechanical Traction:    0     mins  88514;   Fluidotherapy     0     mins  41861  Hot pack     0     mins  93227  Cold pack     0     mins  61415    Timed Treatment:   30   mins   Total Treatment:     30   mins        Denae Mcdaniel PTA  Physical Therapist Assistant

## 2023-07-27 ENCOUNTER — OFFICE VISIT (OUTPATIENT)
Dept: ORTHOPEDIC SURGERY | Facility: CLINIC | Age: 48
End: 2023-07-27
Payer: COMMERCIAL

## 2023-07-27 VITALS — WEIGHT: 293 LBS | BODY MASS INDEX: 41.95 KG/M2 | HEIGHT: 70 IN

## 2023-07-27 DIAGNOSIS — Z96.653 HISTORY OF BILATERAL KNEE REPLACEMENT: ICD-10-CM

## 2023-07-27 DIAGNOSIS — M25.561 PAIN IN BOTH KNEES, UNSPECIFIED CHRONICITY: Primary | ICD-10-CM

## 2023-07-27 DIAGNOSIS — M25.562 PAIN IN BOTH KNEES, UNSPECIFIED CHRONICITY: Primary | ICD-10-CM

## 2023-07-27 RX ORDER — DICLOFENAC SODIUM 75 MG/1
75 TABLET, DELAYED RELEASE ORAL 2 TIMES DAILY
Qty: 60 TABLET | Refills: 1 | Status: SHIPPED | OUTPATIENT
Start: 2023-07-27

## 2023-07-27 NOTE — PROGRESS NOTES
"Chief Complaint  Pain and Follow-up of the Left Knee and Pain and Follow-up of the Right Knee     Subjective      Em Montanez presents to [facility] for follow up of bilateral knees. She had a left total knee arthroplasty 2 years ago and the right one was replaced about 8 years ago.  She had a tarsal release of her foot in June.  She stopped and caught a dip in the curb and fell.  The injury was about 4 weeks ago.  She has difficulty with stairs, standing and ambulation.  She wants to assess the structure of her replacements.          Allergies   Allergen Reactions    Prednisone Mental Status Change     Other reaction(s): Mental Status Change    Tramadol GI Intolerance     Nausea and vomiting  Other reaction(s): GI Intolerance, Vomiting  Nausea and vomiting        Social History     Socioeconomic History    Marital status:    Tobacco Use    Smoking status: Every Day     Packs/day: 1.00     Years: 30.00     Pack years: 30.00     Types: Cigarettes    Smokeless tobacco: Never    Tobacco comments:     INSTRUCTED NO SMOKING 24 HR PRIOR TO SURGERY    Vaping Use    Vaping Use: Never used   Substance and Sexual Activity    Alcohol use: Yes     Comment: SOCIALLY    Drug use: Not Currently     Types: Cocaine(coke), Methamphetamines, Marijuana    Sexual activity: Defer     Partners: Male        Review of Systems     Objective   Vital Signs:   Ht 177.8 cm (70\")   Wt 133 kg (293 lb)   BMI 42.04 kg/m²       Physical Exam  Constitutional:       Appearance: Normal appearance. Patient is well-developed and normal weight.   HENT:      Head: Normocephalic.      Right Ear: Hearing and external ear normal.      Left Ear: Hearing and external ear normal.      Nose: Nose normal.   Eyes:      Conjunctiva/sclera: Conjunctivae normal.   Cardiovascular:      Rate and Rhythm: Normal rate.   Pulmonary:      Effort: Pulmonary effort is normal.      Breath sounds: No wheezing or rales.   Abdominal: "      Palpations: Abdomen is soft.      Tenderness: There is no abdominal tenderness.   Musculoskeletal:      Cervical back: Normal range of motion.   Skin:     Findings: No rash.   Neurological:      Mental Status: Patient is alert and oriented to person, place, and time.   Psychiatric:         Mood and Affect: Mood and affect normal.         Judgment: Judgment normal.       Ortho Exam      BILATERAL KNEES Flexion 115. Extension 0. Stable to varus/valgus stress. Stable to anterior/posterior drawer. Neurovascularly intact. Negative Queta. Negative Lachman. Positive EHL, FHL, HS and TA. Sensation intact to light touch all 5 nerves of the foot. Ambulates with Antalgic gait. Patella is well tracking. Calf supple, non-tender. Positive tenderness to the medial joint line. Positive tenderness to the lateral joint line. Negative Crepitus. Good strength to hamstrings, quadriceps, dorsiflexors, and plantar flexors.  Knee Extensor Mechanism intact        Procedures      Imaging Results (Most Recent)       Procedure Component Value Units Date/Time    XR Knee 3 View Left [[id number]] Resulted: 07/27/23 1423     Updated: 07/27/23 1426    XR Knee 3 View Right [[id number]] Resulted: 07/27/23 1423     Updated: 07/27/23 1426             Result Review :     X-Ray Report:  Right knee X-Ray  Indication: Evaluation of the right knee  AP/Lateral and Cobalt view(s)  Findings: Intact right knee replacement No signs of loosening, subsidence or periprosthetic fracture.   Prior studies available for comparison: yes    X-Ray Report:  Left knee X-Ray  Indication: Evaluation of the left knee  AP/Lateral and Cobalt view(s)  Findings: Intact left knee replacement.  No signs of loosening, subsidence or periprosthetic fracture.   Prior studies available for comparison: yes             Assessment and Plan     Diagnoses and all orders for this visit:    1. Pain in both knees, unspecified chronicity (Primary)  -     XR Knee 3 View Left  -     XR Knee  3 View Right    2. History of bilateral knee replacement        Discussed the treatment plan with the patient. I reviewed the X-rays that were obtained today with the patient.     Prescribed anti inflammatory.      HEP exercises.          Educated on risk of smoking. Discussed options for smoking cessation. and Call or return if worsening symptoms.    Follow Up     PRN      Patient was given instructions and counseling regarding her condition or for health maintenance advice. Please see specific information pulled into the AVS if appropriate.     Scribed for Kat Collins MD by Johanny Triana MA.  07/27/23   14:22 EDT    I have personally performed the services described in this document as scribed by the above individual and it is both accurate and complete. Kat Collins MD 07/28/23

## 2023-08-02 ENCOUNTER — TELEPHONE (OUTPATIENT)
Dept: PHYSICAL THERAPY | Facility: CLINIC | Age: 48
End: 2023-08-02

## 2023-08-07 ENCOUNTER — OFFICE VISIT (OUTPATIENT)
Dept: INTERNAL MEDICINE | Facility: CLINIC | Age: 48
End: 2023-08-07
Payer: COMMERCIAL

## 2023-08-07 ENCOUNTER — OFFICE VISIT (OUTPATIENT)
Dept: PODIATRY | Facility: CLINIC | Age: 48
End: 2023-08-07
Payer: COMMERCIAL

## 2023-08-07 VITALS
SYSTOLIC BLOOD PRESSURE: 120 MMHG | WEIGHT: 293 LBS | OXYGEN SATURATION: 95 % | TEMPERATURE: 97.4 F | BODY MASS INDEX: 43.76 KG/M2 | DIASTOLIC BLOOD PRESSURE: 81 MMHG | HEART RATE: 75 BPM

## 2023-08-07 VITALS
TEMPERATURE: 97.5 F | SYSTOLIC BLOOD PRESSURE: 133 MMHG | HEART RATE: 67 BPM | OXYGEN SATURATION: 98 % | BODY MASS INDEX: 41.95 KG/M2 | RESPIRATION RATE: 20 BRPM | WEIGHT: 293 LBS | HEIGHT: 70 IN | DIASTOLIC BLOOD PRESSURE: 80 MMHG

## 2023-08-07 DIAGNOSIS — E16.2 HYPOGLYCEMIA: ICD-10-CM

## 2023-08-07 DIAGNOSIS — G57.51 TARSAL TUNNEL SYNDROME OF RIGHT SIDE: ICD-10-CM

## 2023-08-07 DIAGNOSIS — R79.89 ABNORMAL THYROID BLOOD TEST: ICD-10-CM

## 2023-08-07 DIAGNOSIS — G57.52 TARSAL TUNNEL SYNDROME, LEFT: Primary | ICD-10-CM

## 2023-08-07 DIAGNOSIS — E55.9 VITAMIN D DEFICIENCY: ICD-10-CM

## 2023-08-07 DIAGNOSIS — E11.8 DIABETIC FOOT: ICD-10-CM

## 2023-08-07 DIAGNOSIS — M79.672 FOOT PAIN, LEFT: ICD-10-CM

## 2023-08-07 DIAGNOSIS — R73.02 IMPAIRED GLUCOSE TOLERANCE: ICD-10-CM

## 2023-08-07 DIAGNOSIS — Z00.00 ANNUAL PHYSICAL EXAM: Primary | ICD-10-CM

## 2023-08-07 DIAGNOSIS — E11.9 NON-INSULIN DEPENDENT TYPE 2 DIABETES MELLITUS: ICD-10-CM

## 2023-08-07 LAB
25(OH)D3 SERPL-MCNC: 32.8 NG/ML (ref 30–100)
ALBUMIN SERPL-MCNC: 4.3 G/DL (ref 3.5–5.2)
ALBUMIN UR-MCNC: <1.2 MG/DL
ALBUMIN/GLOB SERPL: 1.6 G/DL
ALP SERPL-CCNC: 90 U/L (ref 39–117)
ALT SERPL W P-5'-P-CCNC: 16 U/L (ref 1–33)
ANION GAP SERPL CALCULATED.3IONS-SCNC: 13.6 MMOL/L (ref 5–15)
AST SERPL-CCNC: 22 U/L (ref 1–32)
BASOPHILS # BLD AUTO: 0.07 10*3/MM3 (ref 0–0.2)
BASOPHILS NFR BLD AUTO: 0.8 % (ref 0–1.5)
BILIRUB SERPL-MCNC: 0.3 MG/DL (ref 0–1.2)
BUN SERPL-MCNC: 14 MG/DL (ref 6–20)
BUN/CREAT SERPL: 16.1 (ref 7–25)
CALCIUM SPEC-SCNC: 9.8 MG/DL (ref 8.6–10.5)
CHLORIDE SERPL-SCNC: 100 MMOL/L (ref 98–107)
CHOLEST SERPL-MCNC: 175 MG/DL (ref 0–200)
CO2 SERPL-SCNC: 26.4 MMOL/L (ref 22–29)
CREAT SERPL-MCNC: 0.87 MG/DL (ref 0.57–1)
CREAT UR-MCNC: 20.5 MG/DL
DEPRECATED RDW RBC AUTO: 44.5 FL (ref 37–54)
EGFRCR SERPLBLD CKD-EPI 2021: 81.3 ML/MIN/1.73
EOSINOPHIL # BLD AUTO: 0.16 10*3/MM3 (ref 0–0.4)
EOSINOPHIL NFR BLD AUTO: 1.7 % (ref 0.3–6.2)
ERYTHROCYTE [DISTWIDTH] IN BLOOD BY AUTOMATED COUNT: 13.1 % (ref 12.3–15.4)
GLOBULIN UR ELPH-MCNC: 2.7 GM/DL
GLUCOSE SERPL-MCNC: 84 MG/DL (ref 65–99)
HBA1C MFR BLD: 5.6 % (ref 4.8–5.6)
HCT VFR BLD AUTO: 43.3 % (ref 34–46.6)
HDLC SERPL-MCNC: 51 MG/DL (ref 40–60)
HGB BLD-MCNC: 14.8 G/DL (ref 12–15.9)
IMM GRANULOCYTES # BLD AUTO: 0.03 10*3/MM3 (ref 0–0.05)
IMM GRANULOCYTES NFR BLD AUTO: 0.3 % (ref 0–0.5)
LDLC SERPL CALC-MCNC: 90 MG/DL (ref 0–100)
LDLC/HDLC SERPL: 1.64 {RATIO}
LYMPHOCYTES # BLD AUTO: 3.33 10*3/MM3 (ref 0.7–3.1)
LYMPHOCYTES NFR BLD AUTO: 35.8 % (ref 19.6–45.3)
MCH RBC QN AUTO: 31.6 PG (ref 26.6–33)
MCHC RBC AUTO-ENTMCNC: 34.2 G/DL (ref 31.5–35.7)
MCV RBC AUTO: 92.5 FL (ref 79–97)
MICROALBUMIN/CREAT UR: NORMAL MG/G{CREAT}
MONOCYTES # BLD AUTO: 0.51 10*3/MM3 (ref 0.1–0.9)
MONOCYTES NFR BLD AUTO: 5.5 % (ref 5–12)
NEUTROPHILS NFR BLD AUTO: 5.21 10*3/MM3 (ref 1.7–7)
NEUTROPHILS NFR BLD AUTO: 55.9 % (ref 42.7–76)
NRBC BLD AUTO-RTO: 0.1 /100 WBC (ref 0–0.2)
PLATELET # BLD AUTO: 261 10*3/MM3 (ref 140–450)
PMV BLD AUTO: 10.8 FL (ref 6–12)
POTASSIUM SERPL-SCNC: 4.5 MMOL/L (ref 3.5–5.2)
PROT SERPL-MCNC: 7 G/DL (ref 6–8.5)
RBC # BLD AUTO: 4.68 10*6/MM3 (ref 3.77–5.28)
SODIUM SERPL-SCNC: 140 MMOL/L (ref 136–145)
TRIGL SERPL-MCNC: 203 MG/DL (ref 0–150)
VLDLC SERPL-MCNC: 34 MG/DL (ref 5–40)
WBC NRBC COR # BLD: 9.31 10*3/MM3 (ref 3.4–10.8)

## 2023-08-07 PROCEDURE — 85025 COMPLETE CBC W/AUTO DIFF WBC: CPT | Performed by: INTERNAL MEDICINE

## 2023-08-07 PROCEDURE — 82570 ASSAY OF URINE CREATININE: CPT | Performed by: INTERNAL MEDICINE

## 2023-08-07 PROCEDURE — 83036 HEMOGLOBIN GLYCOSYLATED A1C: CPT | Performed by: INTERNAL MEDICINE

## 2023-08-07 PROCEDURE — 80061 LIPID PANEL: CPT | Performed by: INTERNAL MEDICINE

## 2023-08-07 PROCEDURE — 80053 COMPREHEN METABOLIC PANEL: CPT | Performed by: INTERNAL MEDICINE

## 2023-08-07 PROCEDURE — 82043 UR ALBUMIN QUANTITATIVE: CPT | Performed by: INTERNAL MEDICINE

## 2023-08-07 PROCEDURE — 84443 ASSAY THYROID STIM HORMONE: CPT | Performed by: INTERNAL MEDICINE

## 2023-08-07 PROCEDURE — 3044F HG A1C LEVEL LT 7.0%: CPT | Performed by: INTERNAL MEDICINE

## 2023-08-07 PROCEDURE — 99396 PREV VISIT EST AGE 40-64: CPT | Performed by: INTERNAL MEDICINE

## 2023-08-07 PROCEDURE — 82306 VITAMIN D 25 HYDROXY: CPT | Performed by: INTERNAL MEDICINE

## 2023-08-07 RX ORDER — METFORMIN HYDROCHLORIDE 500 MG/1
500 TABLET, EXTENDED RELEASE ORAL NIGHTLY
Qty: 90 TABLET | Refills: 3 | Status: SHIPPED | OUTPATIENT
Start: 2023-08-07

## 2023-08-07 NOTE — H&P (VIEW-ONLY)
[facility] - PODIATRY    Today's Date: 08/07/23    Patient Name: Em Montanez  MRN: [id number]  CSN: [id number]  PCP: Ricky Velasquez Jr., MD, Last PCP Visit: 3/14/2023  Referring Provider: No ref. provider found    SUBJECTIVE     Chief Complaint   Patient presents with    Right Foot - Follow-up     Tarsal tunnel syndrome of right side     Left Foot - Follow-up     Tarsal tunnel syndrome of left side      HPI: Em Montanez, a 48 y.o.female, comes to clinic preoperative history and physical for left tarsal tunnel release..    Patient states her right surgical site for tarsal tunnel release has relieved her symptoms and completed physical therapy.    Patient states she would like to schedule the same surgery on her left side.    Patient denies any fevers, chills, nausea, vomiting, shortness of breathe, nor any other constitutional signs nor symptoms.    Past Medical History:   Diagnosis Date    Allergic rhinitis     Anxiety     Arthritis     Asthma     NO INHALER USE    Back pain 11/21/2017    LUMBAR SPINE X RAY LARGELY UNREMARKABLE. WILL RX PT AND MONITOR FOR IMPROVEMENT. IF PAIN CONTINUES, WILL ORDER MRI    Chronic pelvic pain in female 02/23/2022    Depression     Disease of thyroid gland     Fatigue 11/21/2017    Foot pain, right     Insomnia 12/11/2017    DISCUSSED RISKS AND BENEFITS OF MEDICATIONS. ALSO DISCUSSED SLEEP HYGIENE METHODS INCLUDING AVOIDING SOURCES OF BLUE LIGHT, DEVELOPING ROUTINE, SLEEPING IN A DARK ROOM, AND USING BED FOR SLEEP ONLY. PT HAS TIRED MELATONIN WITH INTERMITTENT EFFECTIVENESS. PT WITHOUT REPORTED SYMPTOMS OF SLEEP APNEA AT THIS TIME.     Knee pain 11/21/2017    PREVIOUS R KNEE REPLACEMENT 2/2 ARTHRITIS. PT WOULD LIKE TO DISCUSS OPTIONS WITH ORTHOPEDICS AGAIN    Ovarian cyst     Pelvic pain 01/12/2022    Polycystic ovarian syndrome 11/21/2017    Right foot pain     TARSAL    Uterine pain     Vitamin D deficiency 12/11/2017    CURRENTLY ON VIT D  SUPPLEMENTS. WILL RECHECK VIT D LEVEL IN 3 MONTHS.      Past Surgical History:   Procedure Laterality Date    CAST APPLICATION Right 6/16/2023    Procedure: CAST APPLICATION LEG;  Surgeon: Lucas Rivera DPM;  Location: [facility] MAIN OR;  Service: Podiatry;  Laterality: Right;    CHOLECYSTECTOMY      COLONOSCOPY N/A 02/16/2022    Procedure: COLONOSCOPY;  Surgeon: Jb Gonzalez MD;  Location: [facility] ENDOSCOPY;  Service: Gastroenterology;  Laterality: N/A;  hemmorroids    ENDOMETRIAL ABLATION      ESSURE TUBAL LIGATION      JOINT REPLACEMENT      bilateral knee replacement    LEEP      OTHER SURGICAL HISTORY      METAL IMPLANTS  knees    PLANTAR FASCIA RELEASE Right 6/16/2023    Procedure: FOOT PLANTAR FASCIECTOMY;  Surgeon: Lucas Rivera DPM;  Location: [facility] MAIN OR;  Service: Podiatry;  Laterality: Right;    TARSAL TUNNEL RELEASE Right 6/16/2023    Procedure: TARSAL TUNNEL RELEASE;  Surgeon: Lucas Rivera DPM;  Location: [facility] MAIN OR;  Service: Podiatry;  Laterality: Right;    TONSILLECTOMY      TOTAL LAPAROSCOPIC HYSTERECTOMY N/A 4/6/2022    Procedure: TOTAL LAPAROSCOPIC HYSTERECTOMY WITH DAVINCI ROBOT;  Surgeon: Chito Cook MD;  Location: [facility] MAIN OR;  Service: Robotics - DaVinci;  Laterality: N/A;     Family History   Problem Relation Age of Onset    Depression Mother     Heart disease Mother     Arthritis Mother     Cancer Other     ADD / ADHD Son     Self-Injurious Behavior  Daughter     Depression Daughter     Anxiety disorder Daughter     Malig Hyperthermia Neg Hx      Social History     Socioeconomic History    Marital status:    Tobacco Use    Smoking status: Every Day     Packs/day: 1.00     Years: 30.00     Pack years: 30.00     Types: Cigarettes    Smokeless tobacco: Never    Tobacco comments:     INSTRUCTED NO SMOKING 24 HR PRIOR TO SURGERY    Vaping Use    Vaping Use: Never used   Substance and Sexual Activity    Alcohol use: Yes     Comment:  SOCIALLY    Drug use: Not Currently     Types: Cocaine(coke), Methamphetamines, Marijuana    Sexual activity: Defer     Partners: Male     Allergies   Allergen Reactions    Prednisone Mental Status Change     Other reaction(s): Mental Status Change    Tramadol GI Intolerance     Nausea and vomiting  Other reaction(s): GI Intolerance, Vomiting  Nausea and vomiting     Current Outpatient Medications   Medication Sig Dispense Refill    Continuous Blood Gluc  (FreeStyle Cherie 14 Day [place]) device 1 Device Every 14 (Fourteen) Days. 6 each 3    Continuous Blood Gluc Sensor (FreeStyle Cherie 2 Sensor) misc       cyanocobalamin 1000 MCG/ML injection Inject 1 mL into the appropriate muscle as directed by prescriber Every 28 (Twenty-Eight) Days. 10 mL 0    diclofenac (VOLTAREN) 75 MG EC tablet Take 1 tablet by mouth 2 (Two) Times a Day. 60 tablet 1    levothyroxine (Synthroid) 50 MCG tablet Take 1 tablet by mouth Daily. 90 tablet 1    meloxicam (MOBIC) 15 MG tablet       metFORMIN ER (GLUCOPHAGE-XR) 500 MG 24 hr tablet Take 2 tabs with meals twice a day (Patient taking differently: Take 2 tablets by mouth Daily With Breakfast. Take 2 tabs with meals twice a day  TAKES FOR PCOS  INSTRUCTED PER ANESTHESIA STANDING ORDERS) 90 tablet 3    multivitamin with minerals tablet tablet Take 1 tablet by mouth Daily.      traZODone (DESYREL) 50 MG tablet TAKE ONE TABLET BY MOUTH ONCE NIGHTLY 90 tablet 1    valACYclovir (Valtrex) 1000 MG tablet Take 1 tablet by mouth Daily. 10 tablet 2     Current Facility-Administered Medications   Medication Dose Route Frequency Provider Last Rate Last Admin    cosyntropin (CORTROSYN) injection 0.25 mg  0.25 mg Intravenous Once Mena Olivera MD         Review of Systems   Constitutional: Negative.    Musculoskeletal:         Postop right tarsal tunnel release.  Patient states same symptoms on her left side tarsal tunnel area.   All other systems reviewed and are negative.    OBJECTIVE      Vitals:    23 0938   BP: 120/81   Pulse: 75   Temp: 97.4 °F (36.3 °C)   SpO2: 95%       Patient seen in no apparent distress.      PHYSICAL EXAM:     Foot/Ankle Exam    GENERAL  Diabetic foot exam performed    Appearance:  obese  Orientation:  AAOx3  Affect:  appropriate  Right shoe gear: casual shoe  Left shoe gear: casual shoe    VASCULAR     Right Foot Vascularity   Dorsalis pedis:  2+  Posterior tibial:  2+  Skin temperature:  warm  Edema gradin+ and pitting  CFT:  < 3 seconds  Pedal hair growth:  Absent  Varicosities:  mild varicosities     Left Foot Vascularity   Dorsalis pedis:  2+  Posterior tibial:  2+  Skin temperature:  warm  Edema gradin+ and pitting  CFT:  < 3 seconds  Pedal hair growth:  Absent  Varicosities:  mild varicosities     NEUROLOGIC     Right Foot Neurologic   Normal sensation    Light touch sensation: normal  Vibratory sensation: normal  Hot/Cold sensation: normal  Protective Sensation using Paradise-Harpal Monofilament:   Sites intact: 10  Sites tested: 10     Left Foot Neurologic   Normal sensation    Light touch sensation: normal  Vibratory sensation: normal  Hot/Cold sensation:  normal  Protective Sensation using Paradise-Harpal Monofilament:   Sites intact: 10  Sites tested: 10    MUSCULOSKELETAL     Right Foot Musculoskeletal   Tenderness:  (Tarsal tunnel)     Left Foot Musculoskeletal   Ecchymosis:  none  Tenderness:  (+ Tinel's Sign along the tibial nerve track)    MUSCLE STRENGTH     Right Foot Muscle Strength   Foot dorsiflexion:  4  Foot plantar flexion:  4  Foot inversion:  4  Foot eversion:  4     Left Foot Muscle Strength   Foot dorsiflexion:  4  Foot plantar flexion:  4  Foot inversion:  4  Foot eversion:  4    RANGE OF MOTION     Right Foot Range of Motion   Foot and ankle ROM within normal limits       Left Foot Range of Motion   Foot and ankle ROM within normal limits      DERMATOLOGIC      Right Foot Dermatologic   Skin  Right foot skin is intact.       Left Foot Dermatologic   Skin  Left foot skin is intact.     TESTS     Left Foot Tests   PT Tinel's sign: positive     Right foot additional comments: Tarsal tunnel area shows skin edges well-coapted with no signs of dehiscence.  No hypertrophic scar formation.  Healing well    No signs of edema, erythema, lymphangitis, nor signs of infection.    ASSESSMENT/PLAN     Diagnoses and all orders for this visit:    1. Tarsal tunnel syndrome, left (Primary)  -     Case Request; Standing  -     ceFAZolin (ANCEF) 2 g in sodium chloride 0.9 % 100 mL IVPB  -     Case Request    2. Foot pain, left    3. Non-insulin dependent type 2 diabetes mellitus    4. Diabetic foot    Other orders  -     Follow Anesthesia Guidelines / Protocol; Future  -     Follow Anesthesia Guidelines / Protocol; Standing  -     Verify NPO Status; Standing  -     Obtain informed consent (if not collected inpatient or PAT); Standing  -     Notify Physician - Standard; Standing      Planned surgery: Left tarsal tunnel release on September 1, 2023.    The risks and benefits of the surgery were discussed with the patient.  This discussion included possible complications of requiring further surgery, possible delayed wound healing, further surgery requiring amputation of the foot or leg, and also included the complication of death.  All the patient's questions were answered to their satisfaction.  Patient states they would like to proceed with the procedure.    Discussed with the patient at length surgical versus nonsurgical options.  Explained to the patient in detail that surgical intervention is only indicated when the level of pain is such that it negatively affects her daily life.    It was explained to the patient that surgical interventions often times do not relieve all of the pain associated with the deformity    Risks and complications associated with surgical versus nonsurgical options were explained.  The patient understands that the problem  will most likely continue to evolve and surgical intervention is the only treatment plan that actually corrects the deformities.    Upon discussion of non-surgical conservative option, surgical correction, post-operative requirements along with risk and benefits of the surgery along with expected outcomes, the patient states they would like to proceed with the scheduling surgery.      The patient has decided to move forward with surgical intervention understanding all of these risks and complications.    An After Visit Summary was printed and given to the patient at discharge, including (if requested) any available informative/educational handouts regarding diagnosis, treatment, or medications. All questions were answered to patient/family satisfaction. Should symptoms fail to improve or worsen they agree to call or return to clinic or to go to the Emergency Department. Discussed the importance of following up with any needed screening tests/labs/specialist appointments and any requested follow-up recommended by me today. Importance of maintaining follow-up discussed and patient accepts that missed appointments can delay diagnosis and potentially lead to worsening of conditions.    Return in about 29 days (around 9/5/2023) for Post Operative, Cast change., or sooner if acute issues arise.    This document has been electronically signed by Lucas Rivera DPM on August 7, 2023 10:11 EDT

## 2023-08-07 NOTE — PROGRESS NOTES
[facility] - PODIATRY    Today's Date: 08/07/23    Patient Name: Em Montanez  MRN: [id number]  CSN: [id number]  PCP: Ricky Velasquez Jr., MD, Last PCP Visit: 3/14/2023  Referring Provider: No ref. provider found    SUBJECTIVE     Chief Complaint   Patient presents with    Right Foot - Follow-up     Tarsal tunnel syndrome of right side     Left Foot - Follow-up     Tarsal tunnel syndrome of left side      HPI: Em Montanez, a 48 y.o.female, comes to clinic preoperative history and physical for left tarsal tunnel release..    Patient states her right surgical site for tarsal tunnel release has relieved her symptoms and completed physical therapy.    Patient states she would like to schedule the same surgery on her left side.    Patient denies any fevers, chills, nausea, vomiting, shortness of breathe, nor any other constitutional signs nor symptoms.    Past Medical History:   Diagnosis Date    Allergic rhinitis     Anxiety     Arthritis     Asthma     NO INHALER USE    Back pain 11/21/2017    LUMBAR SPINE X RAY LARGELY UNREMARKABLE. WILL RX PT AND MONITOR FOR IMPROVEMENT. IF PAIN CONTINUES, WILL ORDER MRI    Chronic pelvic pain in female 02/23/2022    Depression     Disease of thyroid gland     Fatigue 11/21/2017    Foot pain, right     Insomnia 12/11/2017    DISCUSSED RISKS AND BENEFITS OF MEDICATIONS. ALSO DISCUSSED SLEEP HYGIENE METHODS INCLUDING AVOIDING SOURCES OF BLUE LIGHT, DEVELOPING ROUTINE, SLEEPING IN A DARK ROOM, AND USING BED FOR SLEEP ONLY. PT HAS TIRED MELATONIN WITH INTERMITTENT EFFECTIVENESS. PT WITHOUT REPORTED SYMPTOMS OF SLEEP APNEA AT THIS TIME.     Knee pain 11/21/2017    PREVIOUS R KNEE REPLACEMENT 2/2 ARTHRITIS. PT WOULD LIKE TO DISCUSS OPTIONS WITH ORTHOPEDICS AGAIN    Ovarian cyst     Pelvic pain 01/12/2022    Polycystic ovarian syndrome 11/21/2017    Right foot pain     TARSAL    Uterine pain     Vitamin D deficiency 12/11/2017    CURRENTLY ON VIT D  SUPPLEMENTS. WILL RECHECK VIT D LEVEL IN 3 MONTHS.      Past Surgical History:   Procedure Laterality Date    CAST APPLICATION Right 6/16/2023    Procedure: CAST APPLICATION LEG;  Surgeon: Lucas Rivera DPM;  Location: [facility] MAIN OR;  Service: Podiatry;  Laterality: Right;    CHOLECYSTECTOMY      COLONOSCOPY N/A 02/16/2022    Procedure: COLONOSCOPY;  Surgeon: Jb Gonzalez MD;  Location: [facility] ENDOSCOPY;  Service: Gastroenterology;  Laterality: N/A;  hemmorroids    ENDOMETRIAL ABLATION      ESSURE TUBAL LIGATION      JOINT REPLACEMENT      bilateral knee replacement    LEEP      OTHER SURGICAL HISTORY      METAL IMPLANTS  knees    PLANTAR FASCIA RELEASE Right 6/16/2023    Procedure: FOOT PLANTAR FASCIECTOMY;  Surgeon: Lucas Rivera DPM;  Location: [facility] MAIN OR;  Service: Podiatry;  Laterality: Right;    TARSAL TUNNEL RELEASE Right 6/16/2023    Procedure: TARSAL TUNNEL RELEASE;  Surgeon: Lucas Rivera DPM;  Location: [facility] MAIN OR;  Service: Podiatry;  Laterality: Right;    TONSILLECTOMY      TOTAL LAPAROSCOPIC HYSTERECTOMY N/A 4/6/2022    Procedure: TOTAL LAPAROSCOPIC HYSTERECTOMY WITH DAVINCI ROBOT;  Surgeon: Chito Cook MD;  Location: [facility] MAIN OR;  Service: Robotics - DaVinci;  Laterality: N/A;     Family History   Problem Relation Age of Onset    Depression Mother     Heart disease Mother     Arthritis Mother     Cancer Other     ADD / ADHD Son     Self-Injurious Behavior  Daughter     Depression Daughter     Anxiety disorder Daughter     Malig Hyperthermia Neg Hx      Social History     Socioeconomic History    Marital status:    Tobacco Use    Smoking status: Every Day     Packs/day: 1.00     Years: 30.00     Pack years: 30.00     Types: Cigarettes    Smokeless tobacco: Never    Tobacco comments:     INSTRUCTED NO SMOKING 24 HR PRIOR TO SURGERY    Vaping Use    Vaping Use: Never used   Substance and Sexual Activity    Alcohol use: Yes     Comment:  SOCIALLY    Drug use: Not Currently     Types: Cocaine(coke), Methamphetamines, Marijuana    Sexual activity: Defer     Partners: Male     Allergies   Allergen Reactions    Prednisone Mental Status Change     Other reaction(s): Mental Status Change    Tramadol GI Intolerance     Nausea and vomiting  Other reaction(s): GI Intolerance, Vomiting  Nausea and vomiting     Current Outpatient Medications   Medication Sig Dispense Refill    Continuous Blood Gluc  (FreeStyle Cherie 14 Day [place]) device 1 Device Every 14 (Fourteen) Days. 6 each 3    Continuous Blood Gluc Sensor (FreeStyle Cherie 2 Sensor) misc       cyanocobalamin 1000 MCG/ML injection Inject 1 mL into the appropriate muscle as directed by prescriber Every 28 (Twenty-Eight) Days. 10 mL 0    diclofenac (VOLTAREN) 75 MG EC tablet Take 1 tablet by mouth 2 (Two) Times a Day. 60 tablet 1    levothyroxine (Synthroid) 50 MCG tablet Take 1 tablet by mouth Daily. 90 tablet 1    meloxicam (MOBIC) 15 MG tablet       metFORMIN ER (GLUCOPHAGE-XR) 500 MG 24 hr tablet Take 2 tabs with meals twice a day (Patient taking differently: Take 2 tablets by mouth Daily With Breakfast. Take 2 tabs with meals twice a day  TAKES FOR PCOS  INSTRUCTED PER ANESTHESIA STANDING ORDERS) 90 tablet 3    multivitamin with minerals tablet tablet Take 1 tablet by mouth Daily.      traZODone (DESYREL) 50 MG tablet TAKE ONE TABLET BY MOUTH ONCE NIGHTLY 90 tablet 1    valACYclovir (Valtrex) 1000 MG tablet Take 1 tablet by mouth Daily. 10 tablet 2     Current Facility-Administered Medications   Medication Dose Route Frequency Provider Last Rate Last Admin    cosyntropin (CORTROSYN) injection 0.25 mg  0.25 mg Intravenous Once Mena Olivera MD         Review of Systems   Constitutional: Negative.    Musculoskeletal:         Postop right tarsal tunnel release.  Patient states same symptoms on her left side tarsal tunnel area.   All other systems reviewed and are negative.    OBJECTIVE      Vitals:    23 0938   BP: 120/81   Pulse: 75   Temp: 97.4 øF (36.3 øC)   SpO2: 95%       Patient seen in no apparent distress.      PHYSICAL EXAM:     Foot/Ankle Exam    GENERAL  Diabetic foot exam performed    Appearance:  obese  Orientation:  AAOx3  Affect:  appropriate  Right shoe gear: casual shoe  Left shoe gear: casual shoe    VASCULAR     Right Foot Vascularity   Dorsalis pedis:  2+  Posterior tibial:  2+  Skin temperature:  warm  Edema gradin+ and pitting  CFT:  < 3 seconds  Pedal hair growth:  Absent  Varicosities:  mild varicosities     Left Foot Vascularity   Dorsalis pedis:  2+  Posterior tibial:  2+  Skin temperature:  warm  Edema gradin+ and pitting  CFT:  < 3 seconds  Pedal hair growth:  Absent  Varicosities:  mild varicosities     NEUROLOGIC     Right Foot Neurologic   Normal sensation    Light touch sensation: normal  Vibratory sensation: normal  Hot/Cold sensation: normal  Protective Sensation using [place] Monofilament:   Sites intact: 10  Sites tested: 10     Left Foot Neurologic   Normal sensation    Light touch sensation: normal  Vibratory sensation: normal  Hot/Cold sensation:  normal  Protective Sensation using [place] Monofilament:   Sites intact: 10  Sites tested: 10    MUSCULOSKELETAL     Right Foot Musculoskeletal   Tenderness:  (Tarsal tunnel)     Left Foot Musculoskeletal   Ecchymosis:  none  Tenderness:  (+ Tinel's Sign along the tibial nerve track)    MUSCLE STRENGTH     Right Foot Muscle Strength   Foot dorsiflexion:  4  Foot plantar flexion:  4  Foot inversion:  4  Foot eversion:  4     Left Foot Muscle Strength   Foot dorsiflexion:  4  Foot plantar flexion:  4  Foot inversion:  4  Foot eversion:  4    RANGE OF MOTION     Right Foot Range of Motion   Foot and ankle ROM within normal limits       Left Foot Range of Motion   Foot and ankle ROM within normal limits      DERMATOLOGIC      Right Foot Dermatologic   Skin  Right foot skin is intact.       Left Foot Dermatologic   Skin  Left foot skin is intact.     TESTS     Left Foot Tests   PT Tinel's sign: positive     Right foot additional comments: Tarsal tunnel area shows skin edges well-coapted with no signs of dehiscence.  No hypertrophic scar formation.  Healing well    No signs of edema, erythema, lymphangitis, nor signs of infection.    ASSESSMENT/PLAN     Diagnoses and all orders for this visit:    1. Tarsal tunnel syndrome, left (Primary)  -     Case Request; Standing  -     ceFAZolin (ANCEF) 2 g in sodium chloride 0.9 % 100 mL IVPB  -     Case Request    2. Foot pain, left    3. Non-insulin dependent type 2 diabetes mellitus    4. Diabetic foot    Other orders  -     Follow Anesthesia Guidelines / Protocol; Future  -     Follow Anesthesia Guidelines / Protocol; Standing  -     Verify NPO Status; Standing  -     Obtain informed consent (if not collected inpatient or PAT); Standing  -     Notify Physician - Standard; Standing      Planned surgery: Left tarsal tunnel release on September 1, 2023.    The risks and benefits of the surgery were discussed with the patient.  This discussion included possible complications of requiring further surgery, possible delayed wound healing, further surgery requiring amputation of the foot or leg, and also included the complication of death.  All the patient's questions were answered to their satisfaction.  Patient states they would like to proceed with the procedure.    Discussed with the patient at length surgical versus nonsurgical options.  Explained to the patient in detail that surgical intervention is only indicated when the level of pain is such that it negatively affects her daily life.    It was explained to the patient that surgical interventions often times do not relieve all of the pain associated with the deformity    Risks and complications associated with surgical versus nonsurgical options were explained.  The patient understands that the problem  will most likely continue to evolve and surgical intervention is the only treatment plan that actually corrects the deformities.    Upon discussion of non-surgical conservative option, surgical correction, post-operative requirements along with risk and benefits of the surgery along with expected outcomes, the patient states they would like to proceed with the scheduling surgery.      The patient has decided to move forward with surgical intervention understanding all of these risks and complications.    An After Visit Summary was printed and given to the patient at discharge, including (if requested) any available informative/educational handouts regarding diagnosis, treatment, or medications. All questions were answered to patient/family satisfaction. Should symptoms fail to improve or worsen they agree to call or return to clinic or to go to the Emergency Department. Discussed the importance of following up with any needed screening tests/labs/specialist appointments and any requested follow-up recommended by me today. Importance of maintaining follow-up discussed and patient accepts that missed appointments can delay diagnosis and potentially lead to worsening of conditions.    Return in about 29 days (around 9/5/2023) for Post Operative, Cast change., or sooner if acute issues arise.    This document has been electronically signed by Lucas Rivera DPM on August 7, 2023 10:11 EDT

## 2023-08-07 NOTE — PROGRESS NOTES
"Chief Complaint  Weight Loss, Menopause, and Diabetes (3 month follow up)    Subjective          Em Montanez presents to [facility]  History of Present Illness  Weight loss- patient is recovering from foot surgery and is going to have another surgery. Patient is on thyroid supplement.   Insomnia- patient reports continue to have issues with sleep. She reports waking to go to the bathroom at night.   PCOS - doing well on metfomrin. Patient does have intermittent hypoglycemia. She has sensor and noticed having blood glucose in the 60s sometimes.     Current Outpatient Medications   Medication Instructions    Continuous Blood Gluc  (FreeStyle Cherie 14 Day [place]) device 1 Device, Does not apply, Every 14 Days    Continuous Blood Gluc Sensor (FreeStyle Cherie 2 Sensor) misc 1 each, Subcutaneous, Every 14 Days    cyanocobalamin 1,000 mcg, Intramuscular, Every 28 Days    diclofenac (VOLTAREN) 75 mg, Oral, 2 Times Daily    levothyroxine (SYNTHROID) 50 mcg, Oral, Daily    meloxicam (MOBIC) 15 MG tablet No dose, route, or frequency recorded.    metFORMIN ER (GLUCOPHAGE-XR) 500 mg, Oral, Nightly    multivitamin with minerals tablet tablet 1 tablet, Oral, Daily    traZODone (DESYREL) 50 MG tablet TAKE ONE TABLET BY MOUTH ONCE NIGHTLY    valACYclovir (VALTREX) 1,000 mg, Oral, Daily       The following portions of the patient's history were reviewed and updated as appropriate: allergies, current medications, past family history, past medical history, past social history, past surgical history, and problem list.    Objective   Vital Signs:   /80 (BP Location: Left arm, Patient Position: Sitting, Cuff Size: Large Adult)   Pulse 67   Temp 97.5 øF (36.4 øC) (Tympanic)   Resp 20   Ht 177.8 cm (70\")   Wt (!) 140 kg (309 lb)   SpO2 98%   BMI 44.34 kg/mý     BP Readings from Last 3 Encounters:   08/07/23 133/80   08/07/23 120/81   07/21/23 136/83     Wt Readings " from Last 3 Encounters:   08/07/23 (!) 140 kg (309 lb)   08/07/23 (!) 138 kg (305 lb)   07/27/23 133 kg (293 lb)     Class 3 Severe Obesity (BMI >=40). Obesity-related health conditions include the following: hypertension and diabetes mellitus. Obesity is worsening. BMI is is above average; BMI management plan is completed. We discussed pharmacologic options including synthroid .    Physical Exam     Appearance: No acute distress, well-nourished  Head: normocephalic, atraumatic  Eyes: extraocular movements intact, no scleral icterus, no conjunctival injection  Ears, Nose, and Throat: external ears normal, nares patent, moist mucous membranes  Cardiovascular: regular rate and rhythm. no murmurs, rubs, or gallops. no edema  Respiratory: breathing comfortably, symmetric chest rise, clear to auscultation bilaterally. No wheezes, rales, or rhonchi.  Neuro: alert and oriented to time, place, and person. Normal gait  Psych: normal mood and affect     Result Review :   The following data was reviewed by: Ricky Velasquez Jr, MD on 08/07/2023:  Common labs          10/5/2022    08:49 10/12/2022    09:29 5/2/2023    12:20   Common Labs   Glucose 110  94  86    BUN 12  20  17    Creatinine 0.82  0.88  0.78    Sodium 139  140  140    Potassium 4.3  4.4  4.5    Chloride 101  101  103    Calcium 9.5  9.2  9.6    Total Protein  6.4     Albumin 4.00  4.0  4.0    Total Bilirubin 0.3  <0.2  0.3    Alkaline Phosphatase 81  91  88    AST (SGOT) 22  14  17    ALT (SGPT) 16  13  11    WBC 8.76   9.84    Hemoglobin 15.0   15.1    Hematocrit 43.4   43.5    Platelets 269   276    Total Cholesterol 174   176    Triglycerides 182   121    HDL Cholesterol 55   58    LDL Cholesterol  88   97    Hemoglobin A1C  5.7  5.80    Microalbumin, Urine   <1.2           Lab Results   Component Value Date    COVID19 NOT DETECTED 03/31/2021    INR 0.86 (L) 03/30/2021    BILIRUBINUR Negative 05/02/2023        Assessment and Plan    Diagnoses and all  orders for this visit:    1. Annual exam (Primary)    2. Vitamin D deficiency  -     Vitamin D,25-Hydroxy    3. Tarsal tunnel syndrome of right side  Comments:  doing well. pt with upcoming surgery for left foot.    4. Abnormal thyroid blood test  -     TSH    5. Impaired glucose tolerance  -     CBC & Differential  -     Comprehensive Metabolic Panel  -     Hemoglobin A1c  -     Lipid Panel  -     Microalbumin / Creatinine Urine Ratio - Urine, Clean Catch  -     metFORMIN ER (GLUCOPHAGE-XR) 500 MG 24 hr tablet; Take 1 tablet by mouth Every Night.  Dispense: 90 tablet; Refill: 3    6. Hypoglycemia  -     metFORMIN ER (GLUCOPHAGE-XR) 500 MG 24 hr tablet; Take 1 tablet by mouth Every Night.  Dispense: 90 tablet; Refill: 3  -     Continuous Blood Gluc Sensor (FreeStyle Cherie 2 Sensor) misc; Inject 1 each under the skin into the appropriate area as directed Every 14 (Fourteen) Days.  Dispense: 4 each; Refill: 3      Advised on diet, physical activity, etc      Medications Discontinued During This Encounter   Medication Reason    metFORMIN ER (GLUCOPHAGE-XR) 500 MG 24 hr tablet Reorder    Continuous Blood Gluc Sensor (FreeStyle Cherie 2 Sensor) misc Reorder          Follow Up   Return in about 4 months (around 12/7/2023) for Recheck, HTN, DM.  Patient was given instructions and counseling regarding her condition or for health maintenance advice. Please see specific information pulled into the AVS if appropriate.       Ricky Velasquez Jr, MD  08/07/23  13:37 EDT

## 2023-08-08 LAB — TSH SERPL DL<=0.05 MIU/L-ACNC: 2.15 UIU/ML (ref 0.27–4.2)

## 2023-08-21 RX ORDER — CYANOCOBALAMIN 1000 UG/ML
INJECTION, SOLUTION INTRAMUSCULAR; SUBCUTANEOUS
Qty: 10 ML | Refills: 0 | Status: SHIPPED | OUTPATIENT
Start: 2023-08-21

## 2023-08-23 ENCOUNTER — OFFICE VISIT (OUTPATIENT)
Dept: OBSTETRICS AND GYNECOLOGY | Facility: CLINIC | Age: 48
End: 2023-08-23
Payer: COMMERCIAL

## 2023-08-23 VITALS
WEIGHT: 293 LBS | BODY MASS INDEX: 41.95 KG/M2 | HEIGHT: 70 IN | SYSTOLIC BLOOD PRESSURE: 126 MMHG | DIASTOLIC BLOOD PRESSURE: 87 MMHG | HEART RATE: 75 BPM

## 2023-08-23 DIAGNOSIS — Z01.419 WELL WOMAN EXAM WITH ROUTINE GYNECOLOGICAL EXAM: Primary | ICD-10-CM

## 2023-08-23 DIAGNOSIS — E66.01 MORBID (SEVERE) OBESITY DUE TO EXCESS CALORIES: ICD-10-CM

## 2023-08-23 DIAGNOSIS — Z72.0 TOBACCO USE: ICD-10-CM

## 2023-08-23 PROBLEM — E28.2 POLYCYSTIC OVARIES: Status: RESOLVED | Noted: 2017-11-21 | Resolved: 2023-08-23

## 2023-08-23 PROBLEM — Z12.4 ENCOUNTER FOR PAPANICOLAOU SMEAR OF CERVIX: Status: RESOLVED | Noted: 2021-10-06 | Resolved: 2023-08-23

## 2023-08-23 PROBLEM — Z47.1 AFTERCARE FOLLOWING LEFT KNEE JOINT REPLACEMENT SURGERY: Status: RESOLVED | Noted: 2021-07-02 | Resolved: 2023-08-23

## 2023-08-23 PROBLEM — Z96.652 AFTERCARE FOLLOWING LEFT KNEE JOINT REPLACEMENT SURGERY: Status: RESOLVED | Noted: 2021-07-02 | Resolved: 2023-08-23

## 2023-08-23 PROCEDURE — 99396 PREV VISIT EST AGE 40-64: CPT | Performed by: STUDENT IN AN ORGANIZED HEALTH CARE EDUCATION/TRAINING PROGRAM

## 2023-08-23 NOTE — PROGRESS NOTES
Well Woman Visit    Chief Complaint   Patient presents with    Gynecologic Exam           HPI  Em Montanez is a 48 y.o. female, , who presents for annual well woman exam. No LMP recorded (lmp unknown). Patient has had a hysterectomy.  Occassional breast tenderness. No changes to breast that she is aware of. Screening mammogram  2023 normal. Continues to smoke. No desire to quit. No urinary or bowel complaints.  Sexually active no complaints.  Denies any menopausal symptoms.     Additional OB/GYN History   S/p total laparoscopic hysterectomy with da Rosie robot on 2022.  Ovaries left in situ  Last Pap :   Last Completed Pap Smear       This patient has no relevant Health Maintenance data.          Result: Normal  History of abnormal Pap smear:  Reports history of LEEP and abnormal Pap smears in past.  Last MMG :   Last Completed Mammogram            Ordered - MAMMOGRAM (Every 2 Years) Ordered on 2023  Mammo Screening Digital Tomosynthesis Bilateral With CAD    2022  Mammo Diagnostic Digital Tomosynthesis Bilateral With CAD    2021  Mammo Diagnostic Digital Tomosynthesis Left With CAD    2021  Outside Procedure: ME TOMOSYNTHESIS MAMMOGRAPHY    2020  Mammo Diagnostic Digital Tomosynthesis Left With CAD    Only the first 5 history entries have been loaded, but more history exists.                   Last Colonoscopy :   Last Completed Colonoscopy            COLORECTAL CANCER SCREENING (COLONOSCOPY - Preferred) Next due on 2022  COLONOSCOPY    2022  Surgical Procedure: COLONOSCOPY    2021  Smart[employer]: WORKFLOW - QUALITY MEASUREMENT - EXCLUSION REASONS - COLORECTAL CANCER SCREENING NOT PERFORMED FOR MEDICAL REASONS                  Hx Myriad intake? : Yes.  Qualified? : No    AllergiesPrednisone and Tramadol   Family history of uterine, colon, breast, or ovarian cancer: no  Tobacco Usage?: Yes Em Montanez  reports that  "she has been smoking cigarettes. She has a 30.00 pack-year smoking history. She has never used smokeless tobacco.. I have educated her on the risk of diseases from using tobacco products such as cancer, COPD, and heart disease.     I advised her to quit and she is not willing to quit.    I spent 3  minutes counseling the patient.        OB History          2    Para   2    Term   2       0    AB   0    Living   2         SAB   0    IAB   0    Ectopic   0    Molar   0    Multiple   0    Live Births   2                The additional following portions of the patient's history were reviewed and updated as appropriate: allergies, current medications, past family history, past medical history, past social history, past surgical history, and problem list.    Review of Systems   Constitutional:  Negative for fatigue and fever.   Respiratory:  Negative for cough and shortness of breath.    Cardiovascular:  Negative for chest pain.   Gastrointestinal:  Negative for abdominal distention and abdominal pain.   Genitourinary:  Positive for amenorrhea. Negative for breast pain, decreased urine volume, difficulty urinating, dyspareunia, dysuria, pelvic pain, pelvic pressure, urgency, urinary incontinence, vaginal bleeding, vaginal discharge and vaginal pain.     I have reviewed and agree with the HPI, ROS, and historical information as entered above. Chito Cook MD    Objective   /87   Pulse 75   Ht 177.8 cm (70\")   Wt (!) 140 kg (307 lb 12.8 oz)   LMP  (LMP Unknown)   BMI 44.16 kg/mý     Physical Exam  Vitals and nursing note reviewed. Exam conducted with a chaperone present.   Constitutional:       General: She is not in acute distress.     Appearance: Normal appearance. She is obese. She is not toxic-appearing.   HENT:      Head: Normocephalic and atraumatic.   Eyes:      Extraocular Movements: Extraocular movements intact.      Conjunctiva/sclera: Conjunctivae normal.   Cardiovascular:      Rate " and Rhythm: Normal rate and regular rhythm.      Pulses: Normal pulses.   Pulmonary:      Effort: Pulmonary effort is normal.   Chest:   Breasts:     Right: Normal. Tenderness present.      Left: Normal.      Comments: Muscle wall tenderness, no palpable masses   Abdominal:      General: There is no distension.      Palpations: Abdomen is soft.      Tenderness: There is no abdominal tenderness.   Genitourinary:     General: Normal vulva.      Exam position: Lithotomy position.      Labia:         Right: No tenderness, lesion or injury.         Left: No tenderness, lesion or injury.       Vagina: Normal.      Uterus: Absent.       Adnexa:         Right: No mass, tenderness or fullness.          Left: No mass, tenderness or fullness.        Comments: Absent cervix   Musculoskeletal:      Cervical back: Normal range of motion.   Skin:     General: Skin is warm and dry.   Neurological:      Mental Status: She is alert and oriented to person, place, and time.   Psychiatric:         Mood and Affect: Mood normal.         Behavior: Behavior normal.         Thought Content: Thought content normal.          Assessment and Plan  Em Montanez is a 48 y.o.  presenting for well woman visit.  Patient overall doing well this year.  25 pound weight gain since last visit.  Review of mammogram BI-RADS 1 in February.  We will start ordering annual mammogram for patient, to be performed in 2024.  Pap smear up-to-date as of .  Recommendation for Pap smear with mandatory HPV cotesting at next appointment .  Smoking cessation recommended.  Diet and exercise.  Over-the-counter medication for breast tenderness.    WWE    Diagnoses and all orders for this visit:    1. Well woman exam with routine gynecological exam (Primary)  -     Mammo Screening Digital Tomosynthesis Bilateral With CAD; Future    2. Body mass index (BMI) of 40.0-44.9 in adult  Overview:  Last Assessment & Plan:   Formatting of this note might  be different from the original.  Condition: stable    Educated patient on normal BMI range of 18.5 to 24.9    Advised to monitor nutrition to not exceed caloric needs, or as indicated by PCP in order to maintain a healthy weight and BMI.    Advised to engage in aerobic physical activity, if indicated to be safe by PCP, to assist with maintaining a healthy weight and BMI.     Advised to follow up with PCP to address nutrition as needed to assist with reaching or maintaining a healthy weight and BMI.     Follow up in: three months      3. Morbid (severe) obesity due to excess calories  Overview:  Last Assessment & Plan:   Formatting of this note might be different from the original.  Condition: stable    Co-morbidities: N/A BMI > 40    Follow up in: three months      4. Tobacco use        Counseling:  Weight loss/Nutrition/Wt bearing exercise/Kegels/Core  Smoking cessation    Preventative:  MMG  Follow up PCP/Specialist PMHx and Labs    Does not qualify.      She understands the importance of having the above performed in a timely fashion.  The risks of not performing them include, but are not limited to, advanced cancer stages, bone loss from osteoporosis and/or subsequent increase in morbidity and/or mortality.  She is encouraged to review her results online and/or contact or office if she has questions.     Follow Up:  Return in about 1 year (around 8/23/2024), or if symptoms worsen or fail to improve, for Annual physical.        Chito Cook MD  08/23/2023

## 2023-08-29 ENCOUNTER — OFFICE VISIT (OUTPATIENT)
Dept: ORTHOPEDIC SURGERY | Facility: CLINIC | Age: 48
End: 2023-08-29
Payer: COMMERCIAL

## 2023-08-29 VITALS — WEIGHT: 293 LBS | BODY MASS INDEX: 41.95 KG/M2 | HEIGHT: 70 IN

## 2023-08-29 DIAGNOSIS — M25.552 LEFT HIP PAIN: Primary | ICD-10-CM

## 2023-08-29 DIAGNOSIS — M16.12 OSTEOARTHRITIS OF LEFT HIP, UNSPECIFIED OSTEOARTHRITIS TYPE: ICD-10-CM

## 2023-08-29 NOTE — PROGRESS NOTES
"Chief Complaint  Initial Evaluation of the Left Hip     Subjective      Em Montanez presents to [facility] for initial evaluation of the left hip. She has had pain for awhile.  She has difficulty donning her shoes.  She has pain when lying on it.  Her pain is on the groin.  She denies any injury or fall.  She is going to a chiropractor.  She notes that gives some relief.     Allergies   Allergen Reactions    Prednisone Mental Status Change     Other reaction(s): Mental Status Change    Tramadol GI Intolerance     Nausea and vomiting  Other reaction(s): GI Intolerance, Vomiting  Nausea and vomiting        Social History     Socioeconomic History    Marital status:    Tobacco Use    Smoking status: Every Day     Packs/day: 1.00     Years: 30.00     Pack years: 30.00     Types: Cigarettes    Smokeless tobacco: Never    Tobacco comments:     INSTRUCTED NO SMOKING 24 HR PRIOR TO SURGERY    Vaping Use    Vaping Use: Never used   Substance and Sexual Activity    Alcohol use: Yes     Comment: SOCIALLY    Drug use: Not Currently     Types: Cocaine(coke), Methamphetamines, Marijuana    Sexual activity: Yes     Partners: Male     Birth control/protection: Hysterectomy        I reviewed the patient's chief complaint, history of present illness, review of systems, past medical history, surgical history, family history, social history, medications, and allergy list.     Review of Systems     Constitutional: Denies fevers, chills, weight loss  Cardiovascular: Denies chest pain, shortness of breath  Skin: Denies rashes, acute skin changes  Neurologic: Denies headache, loss of consciousness        Vital Signs:   Ht 177.8 cm (70\")   Wt (!) 139 kg (307 lb)   BMI 44.05 kg/mý          Physical Exam  General: Alert. No acute distress    Ortho Exam        LEFT HIP No skin discoloration, atrophy or swelling. Positive EHL, FHL, GS, and TA. Sensation intact to all 5 nerves of the foot. Positive " straight leg raise. Leg lengths appear equal. Ambulates with Antalgic gait Negative Sophy. Negative Javier. Good strength to hamstrings, quadriceps, dorsiflexors, and plantar flexors. Knee Extensor Mechanism  intact         Procedures      Imaging Results (Most Recent)       Procedure Component Value Units Date/Time    XR Hip With or Without Pelvis 2 - 3 View Left [[id number]] Resulted: 08/29/23 1346     Updated: 08/29/23 1349             Result Review :     X-Ray Report:  Left hip X-Ray  Indication: Evaluation of the left hip  AP/Lateral view(s)  Findings: Mild arthritis. Bone spurring noted.    Prior studies available for comparison: no        Assessment and Plan     Diagnoses and all orders for this visit:    1. Left hip pain (Primary)  -     XR Hip With or Without Pelvis 2 - 3 View Left    2. Osteoarthritis of left hip, unspecified osteoarthritis type        Discussed the treatment plan with the patient. I reviewed the X-rays that were obtained today with the patient.       HEP exercises.      Educated on risk of smoking. Discussed options for smoking cessation. and Call or return if worsening symptoms.    Follow Up     PRN      Patient was given instructions and counseling regarding her condition or for health maintenance advice. Please see specific information pulled into the AVS if appropriate.     Scribed for Kat Collins MD by Johanny Triana MA.  08/29/23   13:46 EDT    I have personally performed the services described in this document as scribed by the above individual and it is both accurate and complete. Kat Collins MD 08/30/23

## 2023-08-31 NOTE — PRE-PROCEDURE INSTRUCTIONS
PATIENT INSTRUCTED TO BE:    - NPO AFTER MIDNIGHT EXCEPT CAN HAVE CLEAR LIQUIDS 2 HOURS PRIOR TO SURGERY ARRIVAL TIME     - TO HOLD ALL VITAMINS, SUPPLEMENTS, NSAIDS FOR ONE WEEK PRIOR TO THEIR SURGICAL PROCEDURE    - INSTRUCTED PT TO USE SURGICAL SOAP 1 TIME THE NIGHT PRIOR TO SURGERY OR THE AM OF SURGERY.   USE SOAP FROM NECK TO TOES AVOID THEIR FACE, HAIR, AND PRIVATE PARTS. INSTRUCTED NO LOTIONS, JEWELRY, PIERCINGS, OR DEODORANT DAY OF SURGERY        - INSTRUCTED TO TAKE THE FOLLOWING MEDICATIONS THE DAY OF SURGERY:   Synthroid    PATIENT VERBALIZED UNDERSTANDING

## 2023-09-01 ENCOUNTER — ANESTHESIA (OUTPATIENT)
Dept: PERIOP | Facility: HOSPITAL | Age: 48
End: 2023-09-01
Payer: COMMERCIAL

## 2023-09-01 ENCOUNTER — HOSPITAL ENCOUNTER (OUTPATIENT)
Facility: HOSPITAL | Age: 48
Setting detail: HOSPITAL OUTPATIENT SURGERY
Discharge: HOME | End: 2023-09-01
Attending: PODIATRIST | Admitting: PODIATRIST
Payer: COMMERCIAL

## 2023-09-01 ENCOUNTER — ANESTHESIA EVENT (OUTPATIENT)
Dept: PERIOP | Facility: HOSPITAL | Age: 48
End: 2023-09-01
Payer: COMMERCIAL

## 2023-09-01 VITALS
WEIGHT: 293 LBS | HEIGHT: 69 IN | SYSTOLIC BLOOD PRESSURE: 120 MMHG | TEMPERATURE: 97 F | OXYGEN SATURATION: 95 % | HEART RATE: 64 BPM | RESPIRATION RATE: 16 BRPM | BODY MASS INDEX: 43.4 KG/M2 | DIASTOLIC BLOOD PRESSURE: 67 MMHG

## 2023-09-01 DIAGNOSIS — G57.52 TARSAL TUNNEL SYNDROME, LEFT: ICD-10-CM

## 2023-09-01 PROCEDURE — 25010000002 PROPOFOL 10 MG/ML EMULSION: Performed by: MARRIAGE & FAMILY THERAPIST

## 2023-09-01 PROCEDURE — 25010000002 DEXAMETHASONE PER 1 MG: Performed by: MARRIAGE & FAMILY THERAPIST

## 2023-09-01 PROCEDURE — 25010000002 CEFAZOLIN IN DEXTROSE 2000 MG/ 100 ML SOLUTION: Performed by: PODIATRIST

## 2023-09-01 PROCEDURE — 25010000002 ONDANSETRON PER 1 MG: Performed by: MARRIAGE & FAMILY THERAPIST

## 2023-09-01 PROCEDURE — 0 MEPERIDINE PER 100 MG: Performed by: MARRIAGE & FAMILY THERAPIST

## 2023-09-01 PROCEDURE — 25010000002 FENTANYL CITRATE (PF) 50 MCG/ML SOLUTION: Performed by: MARRIAGE & FAMILY THERAPIST

## 2023-09-01 PROCEDURE — 25010000002 KETOROLAC TROMETHAMINE PER 15 MG: Performed by: MARRIAGE & FAMILY THERAPIST

## 2023-09-01 PROCEDURE — 28035 DECOMPRESSION OF TIBIA NERVE: CPT | Performed by: PODIATRIST

## 2023-09-01 PROCEDURE — 25010000002 HYDROMORPHONE 1 MG/ML SOLUTION: Performed by: MARRIAGE & FAMILY THERAPIST

## 2023-09-01 PROCEDURE — 28008 INCISION OF FOOT FASCIA: CPT | Performed by: PODIATRIST

## 2023-09-01 PROCEDURE — 25010000002 MIDAZOLAM PER 1MG: Performed by: ANESTHESIOLOGY

## 2023-09-01 PROCEDURE — 25010000002 BUPIVACAINE (PF) 0.25 % SOLUTION: Performed by: PODIATRIST

## 2023-09-01 RX ORDER — KETOROLAC TROMETHAMINE 30 MG/ML
INJECTION, SOLUTION INTRAMUSCULAR; INTRAVENOUS AS NEEDED
Status: DISCONTINUED | OUTPATIENT
Start: 2023-09-01 | End: 2023-09-01 | Stop reason: SURG

## 2023-09-01 RX ORDER — DEXAMETHASONE SODIUM PHOSPHATE 4 MG/ML
INJECTION, SOLUTION INTRA-ARTICULAR; INTRALESIONAL; INTRAMUSCULAR; INTRAVENOUS; SOFT TISSUE AS NEEDED
Status: DISCONTINUED | OUTPATIENT
Start: 2023-09-01 | End: 2023-09-01 | Stop reason: SURG

## 2023-09-01 RX ORDER — ACETAMINOPHEN 500 MG
1000 TABLET ORAL ONCE
Status: COMPLETED | OUTPATIENT
Start: 2023-09-01 | End: 2023-09-01

## 2023-09-01 RX ORDER — OXYCODONE HCL 5 MG/5 ML
5 SOLUTION, ORAL ORAL EVERY 4 HOURS PRN
Status: DISCONTINUED | OUTPATIENT
Start: 2023-09-01 | End: 2023-09-01 | Stop reason: HOSPADM

## 2023-09-01 RX ORDER — PROMETHAZINE HYDROCHLORIDE 12.5 MG/1
12.5 TABLET ORAL EVERY 8 HOURS PRN
Qty: 30 TABLET | Refills: 0 | Status: SHIPPED | OUTPATIENT
Start: 2023-09-01

## 2023-09-01 RX ORDER — SODIUM CHLORIDE, SODIUM LACTATE, POTASSIUM CHLORIDE, CALCIUM CHLORIDE 600; 310; 30; 20 MG/100ML; MG/100ML; MG/100ML; MG/100ML
9 INJECTION, SOLUTION INTRAVENOUS CONTINUOUS PRN
Status: DISCONTINUED | OUTPATIENT
Start: 2023-09-01 | End: 2023-09-01 | Stop reason: HOSPADM

## 2023-09-01 RX ORDER — HYDROCODONE BITARTRATE AND ACETAMINOPHEN 5; 325 MG/1; MG/1
1-2 TABLET ORAL EVERY 4 HOURS PRN
Qty: 30 TABLET | Refills: 0 | Status: SHIPPED | OUTPATIENT
Start: 2023-09-01

## 2023-09-01 RX ORDER — PROMETHAZINE HYDROCHLORIDE 25 MG/1
25 SUPPOSITORY RECTAL ONCE AS NEEDED
Status: DISCONTINUED | OUTPATIENT
Start: 2023-09-01 | End: 2023-09-01 | Stop reason: HOSPADM

## 2023-09-01 RX ORDER — BUPIVACAINE HYDROCHLORIDE 2.5 MG/ML
INJECTION, SOLUTION EPIDURAL; INFILTRATION; INTRACAUDAL AS NEEDED
Status: DISCONTINUED | OUTPATIENT
Start: 2023-09-01 | End: 2023-09-01 | Stop reason: HOSPADM

## 2023-09-01 RX ORDER — FENTANYL CITRATE 50 UG/ML
INJECTION, SOLUTION INTRAMUSCULAR; INTRAVENOUS AS NEEDED
Status: DISCONTINUED | OUTPATIENT
Start: 2023-09-01 | End: 2023-09-01 | Stop reason: SURG

## 2023-09-01 RX ORDER — ONDANSETRON 2 MG/ML
INJECTION INTRAMUSCULAR; INTRAVENOUS AS NEEDED
Status: DISCONTINUED | OUTPATIENT
Start: 2023-09-01 | End: 2023-09-01 | Stop reason: SURG

## 2023-09-01 RX ORDER — CEFAZOLIN SODIUM 2 G/100ML
2 INJECTION, SOLUTION INTRAVENOUS ONCE
Status: COMPLETED | OUTPATIENT
Start: 2023-09-01 | End: 2023-09-01

## 2023-09-01 RX ORDER — MAGNESIUM HYDROXIDE 1200 MG/15ML
LIQUID ORAL AS NEEDED
Status: DISCONTINUED | OUTPATIENT
Start: 2023-09-01 | End: 2023-09-01 | Stop reason: HOSPADM

## 2023-09-01 RX ORDER — MIDAZOLAM HYDROCHLORIDE 2 MG/2ML
2 INJECTION, SOLUTION INTRAMUSCULAR; INTRAVENOUS ONCE
Status: COMPLETED | OUTPATIENT
Start: 2023-09-01 | End: 2023-09-01

## 2023-09-01 RX ORDER — PROMETHAZINE HYDROCHLORIDE 12.5 MG/1
25 TABLET ORAL ONCE AS NEEDED
Status: DISCONTINUED | OUTPATIENT
Start: 2023-09-01 | End: 2023-09-01 | Stop reason: HOSPADM

## 2023-09-01 RX ORDER — GLYCOPYRROLATE 0.2 MG/ML
INJECTION INTRAMUSCULAR; INTRAVENOUS AS NEEDED
Status: DISCONTINUED | OUTPATIENT
Start: 2023-09-01 | End: 2023-09-01 | Stop reason: SURG

## 2023-09-01 RX ORDER — MEPERIDINE HYDROCHLORIDE 25 MG/ML
12.5 INJECTION INTRAMUSCULAR; INTRAVENOUS; SUBCUTANEOUS
Status: DISCONTINUED | OUTPATIENT
Start: 2023-09-01 | End: 2023-09-01 | Stop reason: HOSPADM

## 2023-09-01 RX ORDER — ONDANSETRON 2 MG/ML
4 INJECTION INTRAMUSCULAR; INTRAVENOUS ONCE AS NEEDED
Status: DISCONTINUED | OUTPATIENT
Start: 2023-09-01 | End: 2023-09-01 | Stop reason: HOSPADM

## 2023-09-01 RX ORDER — LIDOCAINE HYDROCHLORIDE 20 MG/ML
INJECTION, SOLUTION EPIDURAL; INFILTRATION; INTRACAUDAL; PERINEURAL AS NEEDED
Status: DISCONTINUED | OUTPATIENT
Start: 2023-09-01 | End: 2023-09-01 | Stop reason: SURG

## 2023-09-01 RX ORDER — PROPOFOL 10 MG/ML
VIAL (ML) INTRAVENOUS AS NEEDED
Status: DISCONTINUED | OUTPATIENT
Start: 2023-09-01 | End: 2023-09-01 | Stop reason: SURG

## 2023-09-01 RX ADMIN — LIDOCAINE HYDROCHLORIDE 50 MG: 20 INJECTION, SOLUTION EPIDURAL; INFILTRATION; INTRACAUDAL; PERINEURAL at 12:08

## 2023-09-01 RX ADMIN — DEXAMETHASONE SODIUM PHOSPHATE 4 MG: 4 INJECTION, SOLUTION INTRAMUSCULAR; INTRAVENOUS at 12:08

## 2023-09-01 RX ADMIN — FENTANYL CITRATE 50 MCG: 50 INJECTION, SOLUTION INTRAMUSCULAR; INTRAVENOUS at 12:12

## 2023-09-01 RX ADMIN — OXYCODONE HYDROCHLORIDE 5 MG: 5 SOLUTION ORAL at 13:53

## 2023-09-01 RX ADMIN — PROPOFOL 150 MG: 10 INJECTION, EMULSION INTRAVENOUS at 12:08

## 2023-09-01 RX ADMIN — ACETAMINOPHEN 1000 MG: 500 TABLET ORAL at 11:44

## 2023-09-01 RX ADMIN — MIDAZOLAM HYDROCHLORIDE 2 MG: 1 INJECTION, SOLUTION INTRAMUSCULAR; INTRAVENOUS at 11:45

## 2023-09-01 RX ADMIN — ONDANSETRON 4 MG: 2 INJECTION INTRAMUSCULAR; INTRAVENOUS at 12:55

## 2023-09-01 RX ADMIN — CEFAZOLIN SODIUM 2 G: 2 INJECTION, SOLUTION INTRAVENOUS at 12:11

## 2023-09-01 RX ADMIN — SODIUM CHLORIDE, POTASSIUM CHLORIDE, SODIUM LACTATE AND CALCIUM CHLORIDE 9 ML/HR: 600; 310; 30; 20 INJECTION, SOLUTION INTRAVENOUS at 11:31

## 2023-09-01 RX ADMIN — HYDROMORPHONE HYDROCHLORIDE 0.25 MG: 1 INJECTION, SOLUTION INTRAMUSCULAR; INTRAVENOUS; SUBCUTANEOUS at 13:54

## 2023-09-01 RX ADMIN — SODIUM CHLORIDE, POTASSIUM CHLORIDE, SODIUM LACTATE AND CALCIUM CHLORIDE 9 ML/HR: 600; 310; 30; 20 INJECTION, SOLUTION INTRAVENOUS at 11:45

## 2023-09-01 RX ADMIN — KETOROLAC TROMETHAMINE 30 MG: 30 INJECTION, SOLUTION INTRAMUSCULAR; INTRAVENOUS at 13:01

## 2023-09-01 RX ADMIN — GLYCOPYRROLATE 0.1 MG: 0.2 INJECTION INTRAMUSCULAR; INTRAVENOUS at 12:08

## 2023-09-01 RX ADMIN — SODIUM CHLORIDE, POTASSIUM CHLORIDE, SODIUM LACTATE AND CALCIUM CHLORIDE: 600; 310; 30; 20 INJECTION, SOLUTION INTRAVENOUS at 12:54

## 2023-09-01 RX ADMIN — FENTANYL CITRATE 50 MCG: 50 INJECTION, SOLUTION INTRAMUSCULAR; INTRAVENOUS at 12:08

## 2023-09-01 RX ADMIN — MEPERIDINE HYDROCHLORIDE 12.5 MG: 25 INJECTION INTRAMUSCULAR; INTRAVENOUS; SUBCUTANEOUS at 13:27

## 2023-09-01 RX ADMIN — PROPOFOL 25 MG: 10 INJECTION, EMULSION INTRAVENOUS at 13:01

## 2023-09-01 NOTE — ANESTHESIA PREPROCEDURE EVALUATION
Anesthesia Evaluation     Patient summary reviewed and Nursing notes reviewed   no history of anesthetic complications:   NPO Solid Status: > 8 hours  NPO Liquid Status: > 2 hours           Airway   Mallampati: II  TM distance: >3 FB  Neck ROM: full  No difficulty expected  Comment: L nare piercing in place  Dental      Pulmonary - normal exam    breath sounds clear to auscultation  (+) asthma,  Cardiovascular - negative cardio ROS and normal exam  Exercise tolerance: good (4-7 METS)    Rhythm: regular  Rate: normal        Neuro/Psych  (+) numbness, psychiatric history Depression  GI/Hepatic/Renal/Endo    (+) obesity, thyroid problem hypothyroidism    Musculoskeletal     (+) back pain  Abdominal    Substance History - negative use     OB/GYN negative ob/gyn ROS         Other - negative ROS       ROS/Med Hx Other: PAT Nursing Notes unavailable.                 Anesthesia Plan    ASA 3     general     (Patient understands anesthesia not responsible for dental damage.)  intravenous induction     Anesthetic plan, risks, benefits, and alternatives have been provided, discussed and informed consent has been obtained with: patient.  Pre-procedure education provided  Plan discussed with CRNA.    CODE STATUS:

## 2023-09-01 NOTE — DISCHARGE INSTRUCTIONS
DISCHARGE INSTRUCTIONS  ORTHOPEDICS      For your surgery you had:  Local anesthesia  Monitored anesthesia care  You may experience dizziness, drowsiness, or light-headedness for several hours following surgery  Do not stay alone today or tonight.  Limit your activity for 24 hours.  Resume your diet slowly.  Follow whatever special dietary instructions you may have been given by the doctor.  You should not drive or operate machinery or drink alcohol for 24 hours or while you are taking pain medication.  You should not sign any legally binding documents.  If you had an axillary or regional block, you will not have control of the involved limb for up to 12 hours.   Do NOT remove dressing.  You may shower or bathe: tomorrow, do not get dressing wet.  Sleep with the injured part elevated on a pillow.  Medications per physician's instructions as indicated on After Visit Summary.  Follow verbal instructions of your doctor.  Sit with the lower leg propped up on a footstool or chair with pillows.  Exercise toes for 10 minutes every hour while awake. Ice bag to injured area for 72 hours.  Apply 20 minutes on - 20 minutes off.  Never place ice directly on skin or cast.  Avoid getting cast or dressing wet.    SPECIAL INSTRUCTIONS:  Please take surgical shoe to follow up appointment with you.      Last dose of pain medication was given at:   Tylenol (1000mg) last at 11:45am. Do not exceed 4000mg of tylenol in a 24 hour period. Oxycodone elixir 5 mg at 1:43 pm      NOTIFY THE PHYSICIAN IF YOU EXPERIENCE:  Numbness of toes.  Inability to move toes.  Extreme coldness, paleness or blue dis-coloration of toes.  Excessive swelling of affected surgical site or swelling that causes the cast to rub or cut into skin.  Pain unrelieved by pain medication  Nausea/vomiting not relieved by prescribed medication  Unable to urinate in 6 hours after surgery  Temperature greater than 101 degree Fahrenheit or chills  If unable to reach your  doctor, please go to the closest emergency room       [facility] Post-Surgical Instructions    Go directly home and try to keep your feet elevated by sitting in the back seat of the car and placing your foot on the seat. Have your prescriptions filled immediately.    Elevate feet above the level of the heart by supporting your feet and legs with pillows. Lying on a couch with 3-4 pillows works well. Elevation helps reduce swelling and should be used whenever you are resting.    Place an ice bag covered with a towel on your ankle area and/or behind your knee for no longer than 20 minutes, then keep ice off of foot for at least 20 minutes. The best way to remember this is 20 minutes on, then 20 minutes off. Continue this for the first week while awake. Ice helps to reduce swelling and inflammation. Do NOT sleep with ice on your foot or leg.    To promote circulation and healing, bend your knee and rotate your foot and ankle for 5 minutes each hour. Dangle your feet down for 1-2 minutes at least once per hour, then continue to elevate.    Keep the bandages or cast clean and dry. Do NOT remove your bandages under any circumstances without consulting Dr. Rivera. You may bathe by hanging your foot outside of the tub, but DO NOT attempt to shower.    Take your pain medication only as directed. Avoid alcoholwhile taking medication. If you experience nausea or lightheadedness, remain lying down and contact the office. You may prefer to use aspirin Tylenol, Motrin or other over the counter pain reliever.    A small amount of bleeding through the bandage may occur and should not cause concern unless it becomes heavy or persists. If this happens, contact the office. Do not become alarmed if you notice a bruised appearance to your feet or toes.    Eat a well-balanced diet high in protein and vitamin C. Take supplemental vitamins and calcium. Drink plenty of fluids and get plenty of rest with your feet  elevated.    Blankets may be kept from irritating the surgical site by using a large cardboard box to cradle the covers over the feet.    Use of crutches, a walker, or a cane can be useful in public areas to protect your feet. Do not attempt to operate a motor vehicle until directed by Dr. Rivera.    Call the office if any of the following occur: bleeding that won't stop, injury to foot, fever, wet bandages, redness with throbbing, and pain unrelieved by medication.

## 2023-09-01 NOTE — ANESTHESIA POSTPROCEDURE EVALUATION
Patient: Em Montanez    Procedure Summary       Date: 09/01/23 Room / Location: [facility] OR 03 / [facility] MAIN OR    Anesthesia Start: 1201 Anesthesia Stop: 1311    Procedures:       LEFT TARSAL TUNNEL RELEASE (Left: Foot)      FASCIECTOMY PLANTAR FASCIA (Left: Foot)      CAST APPLICATION LEG (Left: Foot) Diagnosis:       Tarsal tunnel syndrome, left      Plantar fasciitis of left foot      (Tarsal tunnel syndrome, left [G57.52])    Surgeons: Lucas Rivera DPM Provider: Akhil Oates MD    Anesthesia Type: general ASA Status: 3            Anesthesia Type: general    Vitals  Vitals Value Taken Time   /70 09/01/23 1341   Temp 36.2 øC (97.1 øF) 09/01/23 1341   Pulse 60 09/01/23 1341   Resp 18 09/01/23 1341   SpO2 95 % 09/01/23 1341           Post Anesthesia Care and Evaluation    Patient location during evaluation: bedside  Patient participation: complete - patient participated  Level of consciousness: awake  Pain management: adequate    Airway patency: patent  PONV Status: none  Cardiovascular status: acceptable and stable  Respiratory status: acceptable  Hydration status: acceptable    Comments: An Anesthesiologist personally participated in the most demanding procedures (including induction and emergence if applicable) in the anesthesia plan, monitored the course of anesthesia administration at frequent intervals and remained physically present and available for immediate diagnosis and treatment of emergencies.

## 2023-09-01 NOTE — OP NOTE
Pre-Operative Diagnosis:  - Left tarsal tunnel syndrome  - Plantar fasciitis    Post-Operative Diagnosis:  Same as pre-op diagnosis    Surgeon/Assistants  Nicole    Procedure Performed/Technique    1.  Tarsal tunnel release    2.  Plantar fasciotomy    3.  Below-knee casting    Anesthesia  General    HEMOSTASIS:  Well-padded pneumatic thigh tourniquet at 350 mmHg times 32 minutes.    INJECTABLES:  20 mL of 0.25% Marcaine plain.    SUTURES:  3-0 Nylon    SPECIMENS:  None.    DRAINS:  None.    COMPLICATIONS:  None.    Estimated Blood Loss:  none    PROCEDURE IN DETAIL:  Written consent was obtained from the patient prior to any medication or sedation being administered.    Under mild sedation, the patient was brought to the operating room and placed on the operating table in the supine position.  A well-padded pneumatic thigh tourniquet was placed about the patient's Left mid-thigh.  General anesthesia was administered.  The left foot was then scrubbed, prepped, and draped in the usual aseptic manner.  An Esmarch bandage was utilized to exsanguinate the patient's right foot and lower leg, and the well-padded pneumatic thigh tourniquet was inflated to a pressure of 350 mmHg.    Attention was directed to the medial aspect of the left ankle where anatomical structures were identified as landmarks.  Approximately, a 10 cm linear incision was made immediately posterior to the outline of the anatomic structures indicating the tibial artery and neurovascular structures just posterior and inferior to the medial malleolus.  Incision was deepened through the subcutaneous tissues using sharp and blunt dissection.  Care was taken to identify and retract and vital neurovascular structures.  All bleeders were ligated and cauterized as necessary.    At this time, the incision was carefully deepened, taking great care to protect the tibial artery and posterior tibial nerve and associated neurovascular structures.  The soft tissue  constricture around the neurovascular bundles was evident.  The retinaculum was identified and carefully transected.  The surrounding soft tissue from the surrounding neurovascular structures was carefully freed from the surrounding tissue.    Upon freeing the nerve structures, the superficial and deep branches of the calcaneal nerve were identified and carefully freed from the surrounding soft tissue.  All the areas constricted around the nerve were freed.    Attention was directed to the plantar aspect of the incision, which the cory pedis was followed, and soft tissue dissection was continued.  The plantar fascial ligament was released on the medial one-third of its insertion of the medial calcaneal tubercle.    The wound was flushed with copious amounts of sterile normal saline.  The skin edges were reapproximated and coapted using 3-0 Nylon using a combination of horizontal mattress sutures and simple interrupted sutures.     The tourniquet was deflated with prompt hyperemic response seen to the surgical site in toes one through five on the left foot with a total tourniquet time of 32 minutes.      The surgical sites were dressed with a sterile compressive dressing consisting of Adaptic, 4x4s, Kerlix, and Coban.    Posterior splint cast was applied using cast padding and OrthoGlass casting with an ACE wrap.    The patient tolerated the procedure and anesthesia well.  The patient was transferred to the recovery room with vital signs stable and vascular status intact to all toes of the left foot.  Following a period of postoperative monitoring, the patient will be transferred home having been given written and oral postoperative instructions.      The surgery was performed with Murphy Lares RN, First Assist.  He performed as a first assist throughout the entire case with retracting, fixation, suturing, and postoperative dressing.    The patient is to contact Dr. Rivera for all postoperative follow-up care,  and if any problems should arise.

## 2023-09-06 ENCOUNTER — OFFICE VISIT (OUTPATIENT)
Dept: PODIATRY | Facility: CLINIC | Age: 48
End: 2023-09-06
Payer: COMMERCIAL

## 2023-09-06 VITALS
BODY MASS INDEX: 45.29 KG/M2 | OXYGEN SATURATION: 96 % | SYSTOLIC BLOOD PRESSURE: 162 MMHG | HEART RATE: 78 BPM | HEIGHT: 69 IN | TEMPERATURE: 98 F | DIASTOLIC BLOOD PRESSURE: 86 MMHG

## 2023-09-06 DIAGNOSIS — E11.9 NON-INSULIN DEPENDENT TYPE 2 DIABETES MELLITUS: ICD-10-CM

## 2023-09-06 DIAGNOSIS — M79.671 FOOT PAIN, BILATERAL: ICD-10-CM

## 2023-09-06 DIAGNOSIS — E11.8 DIABETIC FOOT: ICD-10-CM

## 2023-09-06 DIAGNOSIS — G57.52 TARSAL TUNNEL SYNDROME, LEFT: Primary | ICD-10-CM

## 2023-09-06 DIAGNOSIS — M79.672 FOOT PAIN, BILATERAL: ICD-10-CM

## 2023-09-06 DIAGNOSIS — L60.0 ONYCHOCRYPTOSIS: ICD-10-CM

## 2023-09-06 DIAGNOSIS — M79.672 FOOT PAIN, LEFT: ICD-10-CM

## 2023-09-06 DIAGNOSIS — B35.1 ONYCHOMYCOSIS: ICD-10-CM

## 2023-09-06 NOTE — PROGRESS NOTES
[facility] - PODIATRY    Today's Date: 09/06/23    Patient Name: Em Montanez  MRN: [id number]  CSN: [id number]  PCP: Ricky Velasquez Jr., MD, Last PCP Visit: 6 September 2023  Referring Provider: No ref. provider found    SUBJECTIVE     Chief Complaint   Patient presents with    Left Foot - Post-op Follow-up, Follow-up     HPI: Em Montanez, a 48 y.o.female, comes to clinic.    Procedure: Left tarsal tunnel release  Date: 1 September 2023    Patient states they are doing well without complications.  Patient states they are following post-op instructions.  Patient states pain is controlled.      New, Established, New Problem: Established  Location:  Toenails  Duration:   Greater than five years  Onset:  Gradual  Nature:  sore with palpation.  Aggravating factors:  Pain with shoe gear and ambulation.  Previous Treatment:  debridement    Patient denies any fevers, chills, nausea, vomiting, shortness of breath, nor any other constitutional signs nor symptoms.       Past Medical History:   Diagnosis Date    Abnormal Pap smear of cervix     Arthritis     Asthma     NO INHALER USE    Back pain 11/21/2017    Disease of thyroid gland     Fatigue 11/21/2017    H/O Chronic pelvic pain in female 02/23/2022    H/O Foot pain, right     H/O Ovarian cyst     HPV (human papilloma virus) infection     Hypoglycemia     Insomnia 12/11/2017    Pelvic pain 01/12/2022    Polycystic ovarian syndrome 11/21/2017    Seasonal allergies     Uterine pain     Vitamin D deficiency 12/11/2017     Past Surgical History:   Procedure Laterality Date    CAST APPLICATION Right 06/16/2023    Procedure: CAST APPLICATION LEG;  Surgeon: Lucas Rivera DPM;  Location: [facility] MAIN OR;  Service: Podiatry;  Laterality: Right;    CAST APPLICATION Left 9/1/2023    Procedure: CAST APPLICATION LEG;  Surgeon: Lucas Rivera DPM;  Location: [facility] MAIN OR;  Service: Podiatry;  Laterality: Left;    CHOLECYSTECTOMY       COLONOSCOPY N/A 02/16/2022    Procedure: COLONOSCOPY;  Surgeon: Jb Gonzalez MD;  Location: [facility] ENDOSCOPY;  Service: Gastroenterology;  Laterality: N/A;  hemmorroids    ENDOMETRIAL ABLATION      ESSURE TUBAL LIGATION      JOINT REPLACEMENT      bilateral knee replacement    LEEP      PLANTAR FASCIA RELEASE Right 06/16/2023    Procedure: FOOT PLANTAR FASCIECTOMY;  Surgeon: Lucas Rivera DPM;  Location: [facility] MAIN OR;  Service: Podiatry;  Laterality: Right;    PLANTAR FASCIECTOMY Left 9/1/2023    Procedure: FASCIECTOMY PLANTAR FASCIA;  Surgeon: Lucas Rivera DPM;  Location: [facility] MAIN OR;  Service: Podiatry;  Laterality: Left;    TARSAL TUNNEL RELEASE Right 06/16/2023    Procedure: TARSAL TUNNEL RELEASE;  Surgeon: Lucas Rivera DPM;  Location: [facility] MAIN OR;  Service: Podiatry;  Laterality: Right;    TARSAL TUNNEL RELEASE Left 9/1/2023    Procedure: LEFT TARSAL TUNNEL RELEASE;  Surgeon: Lucas Rivera DPM;  Location: [facility] MAIN OR;  Service: Podiatry;  Laterality: Left;    TONSILLECTOMY      TOTAL LAPAROSCOPIC HYSTERECTOMY N/A 04/06/2022    Procedure: TOTAL LAPAROSCOPIC HYSTERECTOMY WITH DAVINCI ROBOT;  Surgeon: Chito Cook MD;  Location: [facility] MAIN OR;  Service: Robotics - DaVinci;  Laterality: N/A;     Family History   Problem Relation Age of Onset    Depression Mother     Heart disease Mother     Arthritis Mother     ADD / ADHD Son     Self-Injurious Behavior  Daughter     Depression Daughter     Anxiety disorder Daughter     Cancer Other     Malig Hyperthermia Neg Hx     Breast cancer Neg Hx     Uterine cancer Neg Hx     Ovarian cancer Neg Hx     Colon cancer Neg Hx     Deep vein thrombosis Neg Hx     Pulmonary embolism Neg Hx      Social History     Socioeconomic History    Marital status:    Tobacco Use    Smoking status: Every Day     Packs/day: 1.00     Years: 30.00     Pack years: 30.00     Types: Cigarettes    Smokeless tobacco: Never     Tobacco comments:     INSTRUCTED NO SMOKING 24 HR PRIOR TO SURGERY    Vaping Use    Vaping Use: Never used   Substance and Sexual Activity    Alcohol use: Yes     Comment: SOCIALLY    Drug use: Never     Comment: patient denies    Sexual activity: Yes     Partners: Male     Birth control/protection: Hysterectomy     Allergies   Allergen Reactions    Prednisone Mental Status Change     Other reaction(s): Mental Status Change    Tramadol GI Intolerance     Nausea and vomiting  Other reaction(s): GI Intolerance, Vomiting  Nausea and vomiting     Current Outpatient Medications   Medication Sig Dispense Refill    Continuous Blood Gluc  (FreeStyle Cherie 14 Day [place]) device 1 Device Every 14 (Fourteen) Days. 6 each 3    Continuous Blood Gluc Sensor (FreeStyle Cherie 2 Sensor) misc Inject 1 each under the skin into the appropriate area as directed Every 14 (Fourteen) Days. 4 each 3    cyanocobalamin 1000 MCG/ML injection INJECT 1 ML INTO THE MUSCLE AS DIRECTED BY PRESCRIBER EVERY 28 DAYS. 10 mL 0    diclofenac (VOLTAREN) 75 MG EC tablet Take 1 tablet by mouth 2 (Two) Times a Day. 60 tablet 1    HYDROcodone-acetaminophen (NORCO) 5-325 MG per tablet Take 1-2 tablets by mouth Every 4 (Four) Hours As Needed (Pain). 30 tablet 0    levothyroxine (Synthroid) 50 MCG tablet Take 1 tablet by mouth Daily. 90 tablet 1    meloxicam (MOBIC) 15 MG tablet Take 1 tablet by mouth Daily.      metFORMIN ER (GLUCOPHAGE-XR) 500 MG 24 hr tablet Take 1 tablet by mouth Every Night. 90 tablet 3    multivitamin with minerals tablet tablet Take 1 tablet by mouth Daily.      promethazine (PHENERGAN) 12.5 MG tablet Take 1 tablet by mouth Every 8 (Eight) Hours As Needed for Nausea or Vomiting. 30 tablet 0    traZODone (DESYREL) 50 MG tablet TAKE ONE TABLET BY MOUTH ONCE NIGHTLY 90 tablet 1    valACYclovir (Valtrex) 1000 MG tablet Take 1 tablet by mouth Daily. 10 tablet 2     No current facility-administered medications for this visit.      Review of Systems   Constitutional: Negative.    Skin:         Painful toenails.   Neurological:         P/O left Tarsal Tunnel Release   All other systems reviewed and are negative.    OBJECTIVE     Vitals:    23 1531   BP: 162/86   Pulse: 78   Temp: 98 °F (36.7 °C)   SpO2: 96%       Patient seen in no apparent distress.      PHYSICAL EXAM:     Foot/Ankle Exam    GENERAL  Appearance:  obese  Orientation:  AAOx3  Affect:  appropriate  Assistance:  knee scooter  Right shoe gear: casual shoe    VASCULAR     Right Foot Vascularity   Dorsalis pedis:  2+  Posterior tibial:  2+  Skin temperature:  warm  Edema gradin+ and pitting  CFT:  < 3 seconds  Pedal hair growth:  Absent  Varicosities:  mild varicosities     Left Foot Vascularity   Dorsalis pedis:  2+  Posterior tibial:  2+  Skin temperature:  warm  Edema gradin+  CFT:  < 3 seconds  Pedal hair growth:  Absent  Varicosities:  mild varicosities     NEUROLOGIC     Right Foot Neurologic   Normal sensation    Light touch sensation: normal  Vibratory sensation: normal  Hot/Cold sensation: normal  Protective Sensation using [place] Monofilament:   Sites intact: 10  Sites tested: 10     Left Foot Neurologic   Normal sensation    Light touch sensation: normal  Vibratory sensation: normal  Hot/Cold sensation:  normal  Protective Sensation using [place] Monofilament:   Sites intact: 10  Sites tested: 10    MUSCLE STRENGTH     Right Foot Muscle Strength   Foot dorsiflexion:  4  Foot plantar flexion:  4  Foot inversion:  4  Foot eversion:  4     Left Foot Muscle Strength   Foot dorsiflexion:  4  Foot plantar flexion:  4  Foot inversion:  4  Foot eversion:  4    RANGE OF MOTION     Right Foot Range of Motion   Foot and ankle ROM within normal limits       Left Foot Range of Motion   Foot and ankle ROM within normal limits      DERMATOLOGIC      Right Foot Dermatologic   Skin  Right foot skin is intact.   Nails  1.  Positive for elongated,  onychomycosis, abnormal thickness, subungual debris and ingrown toenail.  2.  Positive for elongated, onychomycosis, abnormal thickness, subungual debris and ingrown toenail.  3.  Positive for elongated, onychomycosis, abnormal thickness, subungual debris and ingrown toenail.  4.  Positive for elongated, onychomycosis, abnormal thickness, subungual debris and ingrown toenail.  5.  Positive for elongated, onychomycosis, abnormal thickness, subungual debris and ingrown toenail.  Nails comment:  Toenails 1, 2, 3, 4, and 5     Left Foot Dermatologic   Skin  Left foot skin is intact.   Nails comment:  Toenails 1, 2, 3, 4, and 5  Nails  1.  Positive for elongated, onychomycosis, abnormal thickness, subungual debris and ingrown toenail.  2.  Positive for elongated, onychomycosis, abnormal thickness, subungual debris and ingrown toenail.  3.  Positive for elongated, onychomycosis, abnormal thickness, subungual debris and ingrown toenail.  4.  Positive for elongated, onychomycosis, abnormally thick, subungual debris and ingrown toenail.  5.  Positive for elongated, onychomycosis, abnormally thick, subungual debris and ingrown toenail.     Left foot additional comments: Left foot shows posterior splint and dressing are dry and intact without signs of breakthrough.  Tarsal tunnel surgical site shows sutures intact with skin edges well-coapted with no signs of dehiscence.  Healthy surgical skin edges.  No drainage present.  No edema, erythema, calor, lymphangitis, nor signs of infection seen.    Diabetic Foot Exam Performed and Monofilament Test Performed      ASSESSMENT/PLAN     Diagnoses and all orders for this visit:    1. Tarsal tunnel syndrome, left (Primary)    2. Foot pain, left    3. Non-insulin dependent type 2 diabetes mellitus    4. Diabetic foot    5. Onychocryptosis    6. Onychomycosis    7. Foot pain, bilateral        Comprehensive lower extremity examination and evaluation was performed.    Discussed findings and  treatment plan including risks, benefits, and treatment options with patient in detail. Patient agreed with treatment plan.    Toenails 1 through 5 bilaterally were debrided in thickness and length and then smoothed with a Dremel Tool.  Tolerated the procedure well without complications.    Extremity:  Left leg, Short Leg cast.    Patient was placed in fiberglass cast today. The patient tolerated the procedure without any complications., Neurovascular status was evaluated post cast application and was within normal limits. The cast was not causing impingement on neuro-vasculature or vital structures.    Patient is to not weight bear to the affected foot at this time.  Patient states understanding and agreement with this plan.    An After Visit Summary was printed and given to the patient at discharge, including (if requested) any available informative/educational handouts regarding diagnosis, treatment, or medications. All questions were answered to patient/family satisfaction. Should symptoms fail to improve or worsen they agree to call or return to clinic or to go to the Emergency Department. Discussed the importance of following up with any needed screening tests/labs/specialist appointments and any requested follow-up recommended by me today. Importance of maintaining follow-up discussed and patient accepts that missed appointments can delay diagnosis and potentially lead to worsening of conditions.    Return in about 2 weeks (around 9/20/2023) for Post Operative, Cast change, Suture Removal., or sooner if acute issues arise.    This document has been electronically signed by Lucas Rivera DPM on September 6, 2023 16:16 EDT

## 2023-09-12 DIAGNOSIS — E16.2 HYPOGLYCEMIA: ICD-10-CM

## 2023-09-15 ENCOUNTER — TELEPHONE (OUTPATIENT)
Dept: PODIATRY | Facility: CLINIC | Age: 48
End: 2023-09-15
Payer: COMMERCIAL

## 2023-09-15 DIAGNOSIS — G57.52 TARSAL TUNNEL SYNDROME, LEFT: Primary | ICD-10-CM

## 2023-09-15 NOTE — TELEPHONE ENCOUNTER
Per Dr. Earl, cast needs to remain on.  She still has stitches in and is to be not weightbearing.  His recommendation is to use crutches, walker, wheel chair or a new knee scooter if it can't be fixed.      Pt understood cast needs to stay on, needs to be non-weightbearing.  She is only 2 weeks out from post op.  Options given for crutches, walker, wheelchair.  She needs to determine which is best for her. She has a follow up next Wednesday with Dr. Rivera for sutural removal and cast change.

## 2023-09-20 ENCOUNTER — OFFICE VISIT (OUTPATIENT)
Dept: PODIATRY | Facility: CLINIC | Age: 48
End: 2023-09-20
Payer: COMMERCIAL

## 2023-09-20 VITALS
BODY MASS INDEX: 45.29 KG/M2 | TEMPERATURE: 96.4 F | OXYGEN SATURATION: 97 % | SYSTOLIC BLOOD PRESSURE: 131 MMHG | DIASTOLIC BLOOD PRESSURE: 82 MMHG | HEIGHT: 69 IN | HEART RATE: 81 BPM

## 2023-09-20 DIAGNOSIS — G57.52 TARSAL TUNNEL SYNDROME, LEFT: Primary | ICD-10-CM

## 2023-09-20 DIAGNOSIS — E11.8 DIABETIC FOOT: ICD-10-CM

## 2023-09-20 DIAGNOSIS — E11.9 NON-INSULIN DEPENDENT TYPE 2 DIABETES MELLITUS: ICD-10-CM

## 2023-09-20 DIAGNOSIS — M79.672 FOOT PAIN, LEFT: ICD-10-CM

## 2023-09-20 DIAGNOSIS — Z91.199 NONCOMPLIANCE: ICD-10-CM

## 2023-09-20 NOTE — PROGRESS NOTES
[facility] - PODIATRY    Today's Date: 09/20/23    Patient Name: Em Montanez  MRN: [id number]  CSN: [id number]  PCP: Ricky Velasquez Jr., MD, Last PCP Visit: 6 September 2023  Referring Provider: No ref. provider found    SUBJECTIVE     Chief Complaint   Patient presents with    Left Foot - Post-op     9/1/23  Left Tarsal Tunnel Release - Left   Fasciectomy Plantar Fascia - Left    Patient unable to use scooter due to knee pain Has been weight bearing       HPI: Em Montanez, a 48 y.o.female, comes to clinic.    Procedure: Left tarsal tunnel release  Date: 1 September 2023    Patient states she is having knee pain and stopped using the crutches and has been walking on her cast.  The patient states she does not want another cast nor a cam walker despite that was the previously discussed postop regimen.  She states she just wants to walk in her regular shoe.  She was advised that changing from the original postop plan she may not have satisfactory postop results.  She states understanding A solitary mass which is soft and fluid filled to palpation.  Positive tender to palpation.  No signs of edema, erythema, lymphangitis, nor signs of infection.  But states she does not want a cast nor a cam walker and wants to wear her shoe.    Patient denies any fevers, chills, nausea, vomiting, shortness of breath, nor any other constitutional signs nor symptoms.       Past Medical History:   Diagnosis Date    Abnormal Pap smear of cervix     Arthritis     Asthma     NO INHALER USE    Back pain 11/21/2017    Disease of thyroid gland     Fatigue 11/21/2017    H/O Chronic pelvic pain in female 02/23/2022    H/O Foot pain, right     H/O Ovarian cyst     HPV (human papilloma virus) infection     Hypoglycemia     Insomnia 12/11/2017    Pelvic pain 01/12/2022    Polycystic ovarian syndrome 11/21/2017    Seasonal allergies     Uterine pain     Vitamin D deficiency 12/11/2017     Past Surgical History:    Procedure Laterality Date    CAST APPLICATION Right 06/16/2023    Procedure: CAST APPLICATION LEG;  Surgeon: Lucas Rivera DPM;  Location: [facility] MAIN OR;  Service: Podiatry;  Laterality: Right;    CAST APPLICATION Left 9/1/2023    Procedure: CAST APPLICATION LEG;  Surgeon: Lucas Rivera DPM;  Location: [facility] MAIN OR;  Service: Podiatry;  Laterality: Left;    CHOLECYSTECTOMY      COLONOSCOPY N/A 02/16/2022    Procedure: COLONOSCOPY;  Surgeon: Jb Gonzalez MD;  Location: [facility] ENDOSCOPY;  Service: Gastroenterology;  Laterality: N/A;  hemmorroids    ENDOMETRIAL ABLATION      ESSURE TUBAL LIGATION      JOINT REPLACEMENT      bilateral knee replacement    LEEP      PLANTAR FASCIA RELEASE Right 06/16/2023    Procedure: FOOT PLANTAR FASCIECTOMY;  Surgeon: Lucas Rivera DPM;  Location: [facility] MAIN OR;  Service: Podiatry;  Laterality: Right;    PLANTAR FASCIECTOMY Left 9/1/2023    Procedure: FASCIECTOMY PLANTAR FASCIA;  Surgeon: Lucas Rivera DPM;  Location: [facility] MAIN OR;  Service: Podiatry;  Laterality: Left;    TARSAL TUNNEL RELEASE Right 06/16/2023    Procedure: TARSAL TUNNEL RELEASE;  Surgeon: Lucas Rivera DPM;  Location: [facility] MAIN OR;  Service: Podiatry;  Laterality: Right;    TARSAL TUNNEL RELEASE Left 9/1/2023    Procedure: LEFT TARSAL TUNNEL RELEASE;  Surgeon: Lucas Rivera DPM;  Location: [facility] MAIN OR;  Service: Podiatry;  Laterality: Left;    TONSILLECTOMY      TOTAL LAPAROSCOPIC HYSTERECTOMY N/A 04/06/2022    Procedure: TOTAL LAPAROSCOPIC HYSTERECTOMY WITH DAVINCI ROBOT;  Surgeon: Chito Cook MD;  Location: [facility] MAIN OR;  Service: Robotics - DaVinci;  Laterality: N/A;     Family History   Problem Relation Age of Onset    Depression Mother     Heart disease Mother     Arthritis Mother     ADD / ADHD Son     Self-Injurious Behavior  Daughter     Depression Daughter     Anxiety disorder Daughter     Cancer Other     Malig  Hyperthermia Neg Hx     Breast cancer Neg Hx     Uterine cancer Neg Hx     Ovarian cancer Neg Hx     Colon cancer Neg Hx     Deep vein thrombosis Neg Hx     Pulmonary embolism Neg Hx      Social History     Socioeconomic History    Marital status:    Tobacco Use    Smoking status: Every Day     Packs/day: 1.00     Years: 30.00     Pack years: 30.00     Types: Cigarettes    Smokeless tobacco: Never    Tobacco comments:     INSTRUCTED NO SMOKING 24 HR PRIOR TO SURGERY    Vaping Use    Vaping Use: Never used   Substance and Sexual Activity    Alcohol use: Yes     Comment: SOCIALLY    Drug use: Never     Comment: patient denies    Sexual activity: Yes     Partners: Male     Birth control/protection: Hysterectomy     Allergies   Allergen Reactions    Prednisone Mental Status Change     Other reaction(s): Mental Status Change    Tramadol GI Intolerance     Nausea and vomiting  Other reaction(s): GI Intolerance, Vomiting  Nausea and vomiting    Diclofenac Rash     Current Outpatient Medications   Medication Sig Dispense Refill    Continuous Blood Gluc  (FreeStyle Cherie 14 Day [place]) device 1 Device Every 14 (Fourteen) Days. 6 each 3    Continuous Blood Gluc Sensor (FreeStyle [place]rie 2 Sensor) misc Inject 1 each under the skin into the appropriate area as directed Every 14 (Fourteen) Days. 4 each 3    cyanocobalamin 1000 MCG/ML injection INJECT 1 ML INTO THE MUSCLE AS DIRECTED BY PRESCRIBER EVERY 28 DAYS. 10 mL 0    levothyroxine (Synthroid) 50 MCG tablet Take 1 tablet by mouth Daily. 90 tablet 1    meloxicam (MOBIC) 15 MG tablet Take 1 tablet by mouth Daily.      metFORMIN ER (GLUCOPHAGE-XR) 500 MG 24 hr tablet Take 1 tablet by mouth Every Night. 90 tablet 3    multivitamin with minerals tablet tablet Take 1 tablet by mouth Daily.      promethazine (PHENERGAN) 12.5 MG tablet Take 1 tablet by mouth Every 8 (Eight) Hours As Needed for Nausea or Vomiting. 30 tablet 0    traZODone (DESYREL) 50 MG tablet TAKE ONE  TABLET BY MOUTH ONCE NIGHTLY 90 tablet 1    valACYclovir (Valtrex) 1000 MG tablet Take 1 tablet by mouth Daily. 10 tablet 2    diclofenac (VOLTAREN) 75 MG EC tablet Take 1 tablet by mouth 2 (Two) Times a Day. (Patient not taking: Reported on 2023) 60 tablet 1     No current facility-administered medications for this visit.     Review of Systems   Constitutional: Negative.    Neurological:         P/O left Tarsal Tunnel Release   All other systems reviewed and are negative.    OBJECTIVE     Vitals:    23 1251   BP: 131/82   Pulse: 81   Temp: 96.4 °F (35.8 °C)   SpO2: 97%       Patient seen in no apparent distress.      PHYSICAL EXAM:     Foot/Ankle Exam    GENERAL  Appearance:  obese  Orientation:  AAOx3  Affect:  appropriate  Assistance:  knee scooter  Right shoe gear: casual shoe    VASCULAR     Right Foot Vascularity   Dorsalis pedis:  2+  Posterior tibial:  2+  Skin temperature:  warm  Edema gradin+ and pitting  CFT:  < 3 seconds  Pedal hair growth:  Absent  Varicosities:  mild varicosities     Left Foot Vascularity   Dorsalis pedis:  2+  Posterior tibial:  2+  Skin temperature:  warm  Edema gradin+  CFT:  < 3 seconds  Pedal hair growth:  Absent  Varicosities:  mild varicosities     NEUROLOGIC     Right Foot Neurologic   Normal sensation    Light touch sensation: normal  Vibratory sensation: normal  Hot/Cold sensation: normal  Protective Sensation using [place] Monofilament:   Sites intact: 10  Sites tested: 10     Left Foot Neurologic   Normal sensation    Light touch sensation: normal  Vibratory sensation: normal  Hot/Cold sensation:  normal  Protective Sensation using [place] Monofilament:   Sites intact: 10  Sites tested: 10    MUSCLE STRENGTH     Right Foot Muscle Strength   Foot dorsiflexion:  4  Foot plantar flexion:  4  Foot inversion:  4  Foot eversion:  4     Left Foot Muscle Strength   Foot dorsiflexion:  4  Foot plantar flexion:  4  Foot inversion:  4  Foot  eversion:  4    RANGE OF MOTION     Right Foot Range of Motion   Foot and ankle ROM within normal limits       Left Foot Range of Motion   Foot and ankle ROM within normal limits      DERMATOLOGIC      Right Foot Dermatologic   Skin  Right foot skin is intact.   Nails comment:  Toenails 1, 2, 3, 4, and 5     Left Foot Dermatologic   Skin  Left foot skin is intact.   Nails comment:  Toenails 1, 2, 3, 4, and 5     Left foot additional comments: Left foot shows no knee fiberglass cast and dressing are dry and intact without signs of breakthrough.  Tarsal tunnel surgical site shows sutures intact with skin edges well-coapted with no signs of dehiscence.  Healthy surgical skin edges.  No drainage present.  No edema, erythema, calor, lymphangitis, nor signs of infection seen.    Upon removal of sutures, skin edges remain well-coapted with no signs of dehiscence.    Diabetic Foot Exam Performed and Monofilament Test Performed      ASSESSMENT/PLAN     Diagnoses and all orders for this visit:    1. Tarsal tunnel syndrome, left (Primary)  -     Ambulatory Referral to Physical Therapy    2. Foot pain, left    3. Non-insulin dependent type 2 diabetes mellitus    4. Diabetic foot    5. Noncompliance        Comprehensive lower extremity examination and evaluation was performed.    Discussed findings and treatment plan including risks, benefits, and treatment options with patient in detail. Patient agreed with treatment plan.    Patient may begin to weight bear as tolerated in supportive shoes.  No impact activities for one month.  After that time, the patient may increase activities as tolerated.  Patient states understanding and agreement with this plan.    Rice Therapy: It is important to treat any injury as soon as possible to help control swelling and increase recovery time. The recognized regimen for immediate treatment of sport injuries includes rest, ice (cold application), compression, and elevation (RICE). Remove the  injured athlete from play, apply ice to the affected area, wrap or compress the injured area with an elastic bandage when appropriate, and elevate the injured area above heart level to reduce swelling.  The patient is to not use ice for longer than 20 minutes at a time, with at least 20 minutes of no ice usage between applications.  The patient states understanding and agreement with this plan.    An After Visit Summary was printed and given to the patient at discharge, including (if requested) any available informative/educational handouts regarding diagnosis, treatment, or medications. All questions were answered to patient/family satisfaction. Should symptoms fail to improve or worsen they agree to call or return to clinic or to go to the Emergency Department. Discussed the importance of following up with any needed screening tests/labs/specialist appointments and any requested follow-up recommended by me today. Importance of maintaining follow-up discussed and patient accepts that missed appointments can delay diagnosis and potentially lead to worsening of conditions.    Return in about 1 month (around 10/20/2023) for Post Operative., or sooner if acute issues arise.    This document has been electronically signed by Lucas Rivera DPM on September 20, 2023 13:22 EDT

## 2023-09-28 DIAGNOSIS — F32.A DEPRESSION, UNSPECIFIED DEPRESSION TYPE: ICD-10-CM

## 2023-09-28 DIAGNOSIS — F41.9 ANXIETY: ICD-10-CM

## 2023-09-29 RX ORDER — TRAZODONE HYDROCHLORIDE 50 MG/1
TABLET ORAL
Qty: 90 TABLET | Refills: 1 | Status: SHIPPED | OUTPATIENT
Start: 2023-09-29

## 2023-10-16 ENCOUNTER — DOCUMENTATION (OUTPATIENT)
Dept: PHYSICAL THERAPY | Facility: CLINIC | Age: 48
End: 2023-10-16
Payer: COMMERCIAL

## 2023-10-16 NOTE — PROGRESS NOTES
Pt self discharged.  See initial evaluation for objective measurements and progress towards goals.

## 2023-10-18 ENCOUNTER — OFFICE VISIT (OUTPATIENT)
Dept: PODIATRY | Facility: CLINIC | Age: 48
End: 2023-10-18
Payer: COMMERCIAL

## 2023-10-18 VITALS
HEART RATE: 83 BPM | TEMPERATURE: 98 F | WEIGHT: 293 LBS | BODY MASS INDEX: 45.48 KG/M2 | DIASTOLIC BLOOD PRESSURE: 86 MMHG | SYSTOLIC BLOOD PRESSURE: 137 MMHG | OXYGEN SATURATION: 93 %

## 2023-10-18 DIAGNOSIS — M79.672 FOOT PAIN, LEFT: ICD-10-CM

## 2023-10-18 DIAGNOSIS — E11.9 NON-INSULIN DEPENDENT TYPE 2 DIABETES MELLITUS: ICD-10-CM

## 2023-10-18 DIAGNOSIS — E11.8 DIABETIC FOOT: ICD-10-CM

## 2023-10-18 DIAGNOSIS — G57.52 TARSAL TUNNEL SYNDROME, LEFT: Primary | ICD-10-CM

## 2023-10-30 DIAGNOSIS — R79.89 ABNORMAL THYROID BLOOD TEST: ICD-10-CM

## 2023-10-30 RX ORDER — LEVOTHYROXINE SODIUM 0.05 MG/1
50 TABLET ORAL DAILY
Qty: 90 TABLET | Refills: 1 | Status: SHIPPED | OUTPATIENT
Start: 2023-10-30

## 2023-11-08 ENCOUNTER — OFFICE VISIT (OUTPATIENT)
Dept: PODIATRY | Facility: CLINIC | Age: 48
End: 2023-11-08
Payer: COMMERCIAL

## 2023-11-08 VITALS
BODY MASS INDEX: 45.19 KG/M2 | TEMPERATURE: 98 F | SYSTOLIC BLOOD PRESSURE: 147 MMHG | HEART RATE: 79 BPM | DIASTOLIC BLOOD PRESSURE: 83 MMHG | WEIGHT: 293 LBS | OXYGEN SATURATION: 97 %

## 2023-11-08 DIAGNOSIS — L60.0 ONYCHOCRYPTOSIS: ICD-10-CM

## 2023-11-08 DIAGNOSIS — E11.9 NON-INSULIN DEPENDENT TYPE 2 DIABETES MELLITUS: Primary | ICD-10-CM

## 2023-11-08 DIAGNOSIS — M79.672 FOOT PAIN, BILATERAL: ICD-10-CM

## 2023-11-08 DIAGNOSIS — E11.8 DIABETIC FOOT: ICD-10-CM

## 2023-11-08 DIAGNOSIS — M79.671 FOOT PAIN, BILATERAL: ICD-10-CM

## 2023-11-08 DIAGNOSIS — B35.1 ONYCHOMYCOSIS: ICD-10-CM

## 2023-11-08 NOTE — PROGRESS NOTES
[facility] - PODIATRY    Today's Date: 11/08/23    Patient Name: Em Montanez  MRN: [id number]  CSN: [id number]  PCP: Ricky Velasquez Jr., MD, Last PCP Visit: 7 August 2023  Referring Provider: No ref. provider found    SUBJECTIVE     Chief Complaint   Patient presents with    Left Foot - Follow-up, Nail Problem    Right Foot - Follow-up, Nail Problem     HPI: Em Montanez, a 48 y.o.female, comes to clinic.    New, Established, New Problem:  est  Location:  Toenails  Duration:   Greater than five years  Onset:  Gradual  Nature:  sore with palpation.  Stable, worsening, improving:   stable  Aggravating factors:  Pain with shoe gear and ambulation.  Previous Treatment: debridement    Patient denies any fevers, chills, nausea, vomiting, shortness of breath, nor any other constitutional signs nor symptoms.       Patient states their most recent blood glucose reading was  151.    Medical changes:  no changes.    Past Medical History:   Diagnosis Date    Abnormal Pap smear of cervix     Arthritis     Asthma     NO INHALER USE    Back pain 11/21/2017    Disease of thyroid gland     Fatigue 11/21/2017    H/O Chronic pelvic pain in female 02/23/2022    H/O Foot pain, right     H/O Ovarian cyst     HPV (human papilloma virus) infection     Hypoglycemia     Insomnia 12/11/2017    Pelvic pain 01/12/2022    Polycystic ovarian syndrome 11/21/2017    Seasonal allergies     Uterine pain     Vitamin D deficiency 12/11/2017     Past Surgical History:   Procedure Laterality Date    CAST APPLICATION Right 06/16/2023    Procedure: CAST APPLICATION LEG;  Surgeon: Lucas Rivera DPM;  Location: [facility] MAIN OR;  Service: Podiatry;  Laterality: Right;    CAST APPLICATION Left 9/1/2023    Procedure: CAST APPLICATION LEG;  Surgeon: Lucas Rivera DPM;  Location: [facility] MAIN OR;  Service: Podiatry;  Laterality: Left;    CHOLECYSTECTOMY      COLONOSCOPY N/A 02/16/2022    Procedure:  COLONOSCOPY;  Surgeon: Jb Gonzalez MD;  Location: [facility] ENDOSCOPY;  Service: Gastroenterology;  Laterality: N/A;  hemmorroids    ENDOMETRIAL ABLATION      ESSURE TUBAL LIGATION      JOINT REPLACEMENT      bilateral knee replacement    LEEP      PLANTAR FASCIA RELEASE Right 06/16/2023    Procedure: FOOT PLANTAR FASCIECTOMY;  Surgeon: Lucas Rivera DPM;  Location: [facility] MAIN OR;  Service: Podiatry;  Laterality: Right;    PLANTAR FASCIECTOMY Left 9/1/2023    Procedure: FASCIECTOMY PLANTAR FASCIA;  Surgeon: Lucas Rivera DPM;  Location: [facility] MAIN OR;  Service: Podiatry;  Laterality: Left;    TARSAL TUNNEL RELEASE Right 06/16/2023    Procedure: TARSAL TUNNEL RELEASE;  Surgeon: Lucas Rivera DPM;  Location: [facility] MAIN OR;  Service: Podiatry;  Laterality: Right;    TARSAL TUNNEL RELEASE Left 9/1/2023    Procedure: LEFT TARSAL TUNNEL RELEASE;  Surgeon: Lucas Rivera DPM;  Location: [facility] MAIN OR;  Service: Podiatry;  Laterality: Left;    TONSILLECTOMY      TOTAL LAPAROSCOPIC HYSTERECTOMY N/A 04/06/2022    Procedure: TOTAL LAPAROSCOPIC HYSTERECTOMY WITH DAVINCI ROBOT;  Surgeon: Chito Cook MD;  Location: [facility] MAIN OR;  Service: Robotics - DaVinci;  Laterality: N/A;     Family History   Problem Relation Age of Onset    Depression Mother     Heart disease Mother     Arthritis Mother     ADD / ADHD Son     Self-Injurious Behavior  Daughter     Depression Daughter     Anxiety disorder Daughter     Cancer Other     Malig Hyperthermia Neg Hx     Breast cancer Neg Hx     Uterine cancer Neg Hx     Ovarian cancer Neg Hx     Colon cancer Neg Hx     Deep vein thrombosis Neg Hx     Pulmonary embolism Neg Hx      Social History     Socioeconomic History    Marital status:    Tobacco Use    Smoking status: Every Day     Packs/day: 1.00     Years: 30.00     Additional pack years: 0.00     Total pack years: 30.00     Types: Cigarettes    Smokeless tobacco: Never     Tobacco comments:     INSTRUCTED NO SMOKING 24 HR PRIOR TO SURGERY    Vaping Use    Vaping Use: Never used   Substance and Sexual Activity    Alcohol use: Yes     Comment: SOCIALLY    Drug use: Never     Comment: patient denies    Sexual activity: Yes     Partners: Male     Birth control/protection: Hysterectomy     Allergies   Allergen Reactions    Prednisone Mental Status Change     Other reaction(s): Mental Status Change    Tramadol GI Intolerance     Nausea and vomiting  Other reaction(s): GI Intolerance, Vomiting  Nausea and vomiting    Diclofenac Rash     Current Outpatient Medications   Medication Sig Dispense Refill    Continuous Blood Gluc  (FreeStyle Cherie 14 Day [place]) device 1 Device Every 14 (Fourteen) Days. 6 each 3    Continuous Blood Gluc Sensor (FreeStyle Cherie 2 Sensor) misc Inject 1 each under the skin into the appropriate area as directed Every 14 (Fourteen) Days. 4 each 3    cyanocobalamin 1000 MCG/ML injection INJECT 1 ML INTO THE MUSCLE AS DIRECTED BY PRESCRIBER EVERY 28 DAYS. 10 mL 0    levothyroxine (SYNTHROID, LEVOTHROID) 50 MCG tablet TAKE ONE TABLET BY MOUTH DAILY 90 tablet 1    meloxicam (MOBIC) 15 MG tablet Take 1 tablet by mouth Daily.      metFORMIN ER (GLUCOPHAGE-XR) 500 MG 24 hr tablet Take 1 tablet by mouth Every Night. 90 tablet 3    multivitamin with minerals tablet tablet Take 1 tablet by mouth Daily.      traZODone (DESYREL) 50 MG tablet TAKE ONE TABLET BY MOUTH ONCE NIGHTLY 90 tablet 1    valACYclovir (Valtrex) 1000 MG tablet Take 1 tablet by mouth Daily. 10 tablet 2    diclofenac (VOLTAREN) 75 MG EC tablet Take 1 tablet by mouth 2 (Two) Times a Day. (Patient not taking: Reported on 9/20/2023) 60 tablet 1    promethazine (PHENERGAN) 12.5 MG tablet Take 1 tablet by mouth Every 8 (Eight) Hours As Needed for Nausea or Vomiting. (Patient not taking: Reported on 10/18/2023) 30 tablet 0     No current facility-administered medications for this visit.     Review of Systems    Constitutional: Negative.    Skin:         Painful toenails.   All other systems reviewed and are negative.      OBJECTIVE     Vitals:    23 1455   BP: 147/83   Pulse: 79   Temp: 98 °F (36.7 °C)   SpO2: 97%         Patient seen in no apparent distress.      PHYSICAL EXAM:     Foot/Ankle Exam    GENERAL  Diabetic foot exam performed    Appearance:  obese  Orientation:  AAOx3  Affect:  appropriate  Gait:  unimpaired  Assistance:  independent  Right shoe gear: casual shoe  Left shoe gear: casual shoe    VASCULAR     Right Foot Vascularity   Dorsalis pedis:  2+  Posterior tibial:  2+  Skin temperature:  warm  Edema gradin+ and pitting  CFT:  < 3 seconds  Pedal hair growth:  Absent  Varicosities:  mild varicosities     Left Foot Vascularity   Dorsalis pedis:  2+  Posterior tibial:  2+  Skin temperature:  warm  Edema gradin+ and pitting  CFT:  < 3 seconds  Pedal hair growth:  Absent  Varicosities:  mild varicosities     NEUROLOGIC     Right Foot Neurologic   Normal sensation    Light touch sensation: normal  Vibratory sensation: normal  Hot/Cold sensation: normal  Protective Sensation using Earth-Harpal Monofilament:   Sites intact: 10  Sites tested: 10     Left Foot Neurologic   Normal sensation    Light touch sensation: normal  Vibratory sensation: normal  Hot/Cold sensation:  normal  Protective Sensation using Earth-Harpal Monofilament:   Sites intact: 10  Sites tested: 10    MUSCLE STRENGTH     Right Foot Muscle Strength   Foot dorsiflexion:  4  Foot plantar flexion:  4  Foot inversion:  4  Foot eversion:  4     Left Foot Muscle Strength   Foot dorsiflexion:  4  Foot plantar flexion:  4  Foot inversion:  4  Foot eversion:  4    RANGE OF MOTION     Right Foot Range of Motion   Foot and ankle ROM within normal limits       Left Foot Range of Motion   Foot and ankle ROM within normal limits      DERMATOLOGIC      Right Foot Dermatologic   Skin  Right foot skin is intact.   Nails  1.  Positive  for onychomycosis, abnormal thickness, subungual debris, dystrophic nail and ingrown toenail.  4.  Positive for elongated, onychomycosis, abnormal thickness, subungual debris, dystrophic nail and ingrown toenail.  5.  Positive for elongated, onychomycosis, abnormal thickness, subungual debris, dystrophic nail and ingrown toenail.     Left Foot Dermatologic   Skin  Left foot skin is intact.   Nails  1.  Positive for elongated, onychomycosis, abnormal thickness, subungual debris, dystrophic nail and ingrown toenail.  2.  Positive for elongated, onychomycosis, abnormal thickness, subungual debris, dystrophic nail and ingrown toenail.  5.  Positive for elongated, onychomycosis, abnormally thick, subungual debris, dystrophic nail and ingrown toenail.    Diabetic Foot Exam Performed and Monofilament Test Performed    ASSESSMENT/PLAN     Diagnoses and all orders for this visit:    1. Non-insulin dependent type 2 diabetes mellitus (Primary)    2. Diabetic foot    3. Onychocryptosis    4. Onychomycosis    5. Foot pain, bilateral    Comprehensive lower extremity examination and evaluation was performed.    Discussed findings and treatment plan including risks, benefits, and treatment options with patient in detail. Patient agreed with treatment plan.    Toenails 1, 4, 5 on Right and 1, 2, 5 on Left were debrided with nail nippers then filed with a Dremel nail serena.  Patient tolerated procedure well without complications.    Diabetic foot exam performed and documented this date, compliant with CQM required standards. Detail of findings as noted in physical exam.  Lower extremity Neurologic exam for diabetic patient performed and documented this date, compliant with PQRS required standards. Detail of findings as noted in physical exam.  Advised patient importance of good routine lower extremity hygiene. Advised patient importance of evaluating for intact skin and pain free nail borders.  Advised patient to use mirror to  evaluate plantar/ soles of feet for better visualization. Advised patient monitor and phone office to be seen if any cracking to skin, open lesions, painful nail borders or if nails become elongated prior to next visit. Advised patient importance of daily cleansing of lower extremities, followed by good skin cream to maintain normal hydration of skin. Also advised patient importance of close daily monitoring of blood sugar. Advised to regulate diet and medications to maintain control of blood sugar in optimal range. Contact primary care provider if difficulties maintaining blood sugar levels.  Advised Patient of presence of Diabetes Mellitus condition.  Advised Patient risk of progression and worsening or improvement, then return of condition.  Will monitor condition for any change in future. Treat with most appropriate treatment pending status of condition.  Counseled and advised patient extensively on nature and ramifications of diabetes. Standard instructions given to patient for good diabetic foot care and maintenance. Advised importance of careful monitoring to avoid break down and complications secondary to diabetes. Advised patient importance of strict maintenance of blood sugar control. Advised patient of possible ominous results from neglect of condition, i.e.: amputation/ loss of digits, feet and legs, or even death.  Patient states understands counseling, will monitor closely, continue good hygiene and routine diabetic foot care. Patient will contact office is questions or problems.      Patient is to monitor for recurrence and any new symptoms and to contact Dr. Rivera's office for a follow-up appointment.      The patient states understanding and agreement with this plan.    An After Visit Summary was printed and given to the patient at discharge, including (if requested) any available informative/educational handouts regarding diagnosis, treatment, or medications. All questions were answered to  patient/family satisfaction. Should symptoms fail to improve or worsen they agree to call or return to clinic or to go to the Emergency Department. Discussed the importance of following up with any needed screening tests/labs/specialist appointments and any requested follow-up recommended by me today. Importance of maintaining follow-up discussed and patient accepts that missed appointments can delay diagnosis and potentially lead to worsening of conditions.    Return in about 9 weeks (around 1/10/2024) for Toenail Care., or sooner if acute issues arise.    This document has been electronically signed by Lucas Rivera DPM on November 8, 2023 15:08 EST

## 2023-11-17 ENCOUNTER — OFFICE VISIT (OUTPATIENT)
Dept: INTERNAL MEDICINE | Facility: CLINIC | Age: 48
End: 2023-11-17
Payer: COMMERCIAL

## 2023-11-17 VITALS
DIASTOLIC BLOOD PRESSURE: 86 MMHG | BODY MASS INDEX: 43.4 KG/M2 | HEIGHT: 69 IN | OXYGEN SATURATION: 96 % | WEIGHT: 293 LBS | TEMPERATURE: 97 F | HEART RATE: 79 BPM | SYSTOLIC BLOOD PRESSURE: 146 MMHG

## 2023-11-17 DIAGNOSIS — Z23 NEED FOR COVID-19 VACCINE: Primary | ICD-10-CM

## 2023-11-17 DIAGNOSIS — H66.001 NON-RECURRENT ACUTE SUPPURATIVE OTITIS MEDIA OF RIGHT EAR WITHOUT SPONTANEOUS RUPTURE OF TYMPANIC MEMBRANE: ICD-10-CM

## 2023-11-17 DIAGNOSIS — L30.9 DERMATITIS: ICD-10-CM

## 2023-11-17 RX ORDER — AMOXICILLIN 875 MG/1
875 TABLET, COATED ORAL 2 TIMES DAILY
Qty: 20 TABLET | Refills: 0 | Status: SHIPPED | OUTPATIENT
Start: 2023-11-17 | End: 2023-11-27

## 2023-11-17 RX ORDER — METHYLPREDNISOLONE SODIUM SUCCINATE 125 MG/2ML
125 INJECTION, POWDER, LYOPHILIZED, FOR SOLUTION INTRAMUSCULAR; INTRAVENOUS ONCE
Status: COMPLETED | OUTPATIENT
Start: 2023-11-17 | End: 2023-11-17

## 2023-11-17 RX ADMIN — METHYLPREDNISOLONE SODIUM SUCCINATE 125 MG: 125 INJECTION, POWDER, LYOPHILIZED, FOR SOLUTION INTRAMUSCULAR; INTRAVENOUS at 09:44

## 2023-11-17 NOTE — PROGRESS NOTES
Chief Complaint  Earache (Right ear) and Rash (Concerns for possible eczema )    Subjective          Em Montanez presents to [facility]  Earache   There is pain in the right ear. This is a new problem. The current episode started yesterday. The problem occurs constantly. The problem has been gradually worsening. There has been no fever. Associated symptoms include neck pain (radiating to eustachian tube) and a rash. Pertinent negatives include no abdominal pain, coughing, diarrhea, ear discharge, headaches, hearing loss, rhinorrhea, sore throat or vomiting. She has tried nothing for the symptoms. The treatment provided no relief.   Rash  This is a new problem. The current episode started in the past 7 days. The problem is unchanged. Location: bilateral arms. The rash is characterized by itchiness. It is unknown if there was an exposure to a precipitant. Pertinent negatives include no anorexia, congestion, cough, diarrhea, eye pain, facial edema, fatigue, fever, joint pain, nail changes, rhinorrhea, shortness of breath, sore throat or vomiting. Past treatments include nothing. The treatment provided no relief.         Current Outpatient Medications   Medication Instructions    amoxicillin (AMOXIL) 875 mg, Oral, 2 Times Daily    Continuous Blood Gluc  (FreeStyle Cherie 14 Day [place]) device 1 Device, Does not apply, Every 14 Days    Continuous Blood Gluc Sensor (FreeStyle Cherie 2 Sensor) misc 1 each, Subcutaneous, Every 14 Days    cyanocobalamin 1000 MCG/ML injection INJECT 1 ML INTO THE MUSCLE AS DIRECTED BY PRESCRIBER EVERY 28 DAYS.    levothyroxine (SYNTHROID, LEVOTHROID) 50 mcg, Oral, Daily    meloxicam (MOBIC) 15 mg, Daily    metFORMIN ER (GLUCOPHAGE-XR) 500 mg, Oral, Nightly    multivitamin with minerals tablet tablet 1 tablet, Oral, Daily    promethazine (PHENERGAN) 12.5 mg, Oral, Every 8 Hours PRN    traZODone (DESYREL) 50 MG tablet TAKE ONE TABLET  "BY MOUTH ONCE NIGHTLY    valACYclovir (VALTREX) 1,000 mg, Oral, Daily       The following portions of the patient's history were reviewed and updated as appropriate: allergies, current medications, past family history, past medical history, past social history, past surgical history, and problem list.    Objective   Vital Signs:   /86 (BP Location: Left arm, Patient Position: Sitting, Cuff Size: Large Adult)   Pulse 79   Temp 97 °F (36.1 °C) (Temporal)   Ht 175.3 cm (69\")   Wt (!) 138 kg (304 lb)   SpO2 96%   BMI 44.89 kg/m²     BP Readings from Last 3 Encounters:   11/17/23 146/86   11/08/23 147/83   10/18/23 137/86     Wt Readings from Last 3 Encounters:   11/17/23 (!) 138 kg (304 lb)   11/08/23 (!) 139 kg (306 lb)   10/18/23 (!) 140 kg (308 lb)           Physical Exam  Vitals and nursing note reviewed.   HENT:      Right Ear: Tenderness present. There is mastoid tenderness. Tympanic membrane is erythematous.      Left Ear: Tympanic membrane, ear canal and external ear normal.   Pulmonary:      Effort: Pulmonary effort is normal.   Skin:     Capillary Refill: Capillary refill takes less than 2 seconds.      Findings: Rash (erythematous dry rash with visible borders to bilateral arms) present.   Neurological:      General: No focal deficit present.      Mental Status: She is oriented to person, place, and time. Mental status is at baseline.   Psychiatric:         Mood and Affect: Mood normal.         Behavior: Behavior normal.         Thought Content: Thought content normal.         Judgment: Judgment normal.              Result Review :   The following data was reviewed by: JOSEMANUEL Delgado on 11/17/2023:  Common labs          5/2/2023    12:20 8/7/2023    12:16   Common Labs   Glucose 86  84    BUN 17  14    Creatinine 0.78  0.87    Sodium 140  140    Potassium 4.5  4.5    Chloride 103  100    Calcium 9.6  9.8    Albumin 4.0  4.3    Total Bilirubin 0.3  0.3    Alkaline Phosphatase 88  90  "   AST (SGOT) 17  22    ALT (SGPT) 11  16    WBC 9.84  9.31    Hemoglobin 15.1  14.8    Hematocrit 43.5  43.3    Platelets 276  261    Total Cholesterol 176  175    Triglycerides 121  203    HDL Cholesterol 58  51    LDL Cholesterol  97  90    Hemoglobin A1C 5.80  5.60    Microalbumin, Urine <1.2  <1.2             Lab Results   Component Value Date    COVID19 NOT DETECTED 03/31/2021    INR 0.86 (L) 03/30/2021    BILIRUBINUR Negative 05/02/2023       Procedures        Assessment and Plan    Diagnoses and all orders for this visit:    1. Need for COVID-19 vaccine (Primary)  -     Fluzone (or Fluarix & Flulaval for VFC) >6mos    2. Non-recurrent acute suppurative otitis media of right ear without spontaneous rupture of tympanic membrane  -     amoxicillin (AMOXIL) 875 MG tablet; Take 1 tablet by mouth 2 (Two) Times a Day for 10 days.  Dispense: 20 tablet; Refill: 0    3. Dermatitis  -     methylPREDNISolone sodium succinate (SOLU-Medrol) injection 125 mg      - tylenol/ibuprofen PRN       Medications Discontinued During This Encounter   Medication Reason    diclofenac (VOLTAREN) 75 MG EC tablet *Therapy completed          Follow Up   Return if symptoms worsen or fail to improve.  Patient was given instructions and counseling regarding her condition or for health maintenance advice. Please see specific information pulled into the AVS if appropriate.       Cristine Guthrie, APRN  11/17/23  08:59 EST

## 2023-11-27 RX ORDER — VALACYCLOVIR HYDROCHLORIDE 1 G/1
1000 TABLET, FILM COATED ORAL DAILY
Qty: 10 TABLET | Refills: 2 | Status: SHIPPED | OUTPATIENT
Start: 2023-11-27

## 2023-11-29 NOTE — PROGRESS NOTES
[facility] - PODIATRY    Today's Date: 10/18/23    Patient Name: Em Montanez  MRN: [id number]  CSN: [id number]  PCP: Ricky Velasquez Jr., MD, Last PCP Visit: 6 September 2023  Referring Provider: No ref. provider found    SUBJECTIVE     Chief Complaint   Patient presents with    Left Foot - Post-op     Surgery 9/1/23     Tarsal tunnel syndrome     HPI: Em Montanez, a 48 y.o.female, comes to clinic.    Procedure: Left tarsal tunnel release  Date: 1 September 2023    Pt states is healing well without complications.  Pt states being pleased with post-op results.    Patient denies any fevers, chills, nausea, vomiting, shortness of breath, nor any other constitutional signs nor symptoms.       Past Medical History:   Diagnosis Date    Abnormal Pap smear of cervix     Arthritis     Asthma     NO INHALER USE    Back pain 11/21/2017    Disease of thyroid gland     Fatigue 11/21/2017    H/O Chronic pelvic pain in female 02/23/2022    H/O Foot pain, right     H/O Ovarian cyst     HPV (human papilloma virus) infection     Hypoglycemia     Insomnia 12/11/2017    Pelvic pain 01/12/2022    Polycystic ovarian syndrome 11/21/2017    Seasonal allergies     Uterine pain     Vitamin D deficiency 12/11/2017     Past Surgical History:   Procedure Laterality Date    CAST APPLICATION Right 06/16/2023    Procedure: CAST APPLICATION LEG;  Surgeon: Lucas Rivera DPM;  Location: [facility] MAIN OR;  Service: Podiatry;  Laterality: Right;    CAST APPLICATION Left 9/1/2023    Procedure: CAST APPLICATION LEG;  Surgeon: Lucas Rivera DPM;  Location: [facility] MAIN OR;  Service: Podiatry;  Laterality: Left;    CHOLECYSTECTOMY      COLONOSCOPY N/A 02/16/2022    Procedure: COLONOSCOPY;  Surgeon: Jb Gonzalez MD;  Location: [facility] ENDOSCOPY;  Service: Gastroenterology;  Laterality: N/A;  hemmorroids    ENDOMETRIAL ABLATION      ESSURE TUBAL LIGATION      JOINT REPLACEMENT      bilateral  knee replacement    LEEP      PLANTAR FASCIA RELEASE Right 06/16/2023    Procedure: FOOT PLANTAR FASCIECTOMY;  Surgeon: Lucas Rivera DPM;  Location: [facility] MAIN OR;  Service: Podiatry;  Laterality: Right;    PLANTAR FASCIECTOMY Left 9/1/2023    Procedure: FASCIECTOMY PLANTAR FASCIA;  Surgeon: Lucas Rivera DPM;  Location: [facility] MAIN OR;  Service: Podiatry;  Laterality: Left;    TARSAL TUNNEL RELEASE Right 06/16/2023    Procedure: TARSAL TUNNEL RELEASE;  Surgeon: Lucas Rivera DPM;  Location: [facility] MAIN OR;  Service: Podiatry;  Laterality: Right;    TARSAL TUNNEL RELEASE Left 9/1/2023    Procedure: LEFT TARSAL TUNNEL RELEASE;  Surgeon: Lucas Rivera DPM;  Location: [facility] MAIN OR;  Service: Podiatry;  Laterality: Left;    TONSILLECTOMY      TOTAL LAPAROSCOPIC HYSTERECTOMY N/A 04/06/2022    Procedure: TOTAL LAPAROSCOPIC HYSTERECTOMY WITH DAVINCI ROBOT;  Surgeon: Chito Cook MD;  Location: [facility] MAIN OR;  Service: Robotics - DaVinci;  Laterality: N/A;     Family History   Problem Relation Age of Onset    Depression Mother     Heart disease Mother     Arthritis Mother     ADD / ADHD Son     Self-Injurious Behavior  Daughter     Depression Daughter     Anxiety disorder Daughter     Cancer Other     Malig Hyperthermia Neg Hx     Breast cancer Neg Hx     Uterine cancer Neg Hx     Ovarian cancer Neg Hx     Colon cancer Neg Hx     Deep vein thrombosis Neg Hx     Pulmonary embolism Neg Hx      Social History     Socioeconomic History    Marital status:    Tobacco Use    Smoking status: Every Day     Packs/day: 1.00     Years: 30.00     Additional pack years: 0.00     Total pack years: 30.00     Types: Cigarettes    Smokeless tobacco: Never    Tobacco comments:     INSTRUCTED NO SMOKING 24 HR PRIOR TO SURGERY    Vaping Use    Vaping Use: Never used   Substance and Sexual Activity    Alcohol use: Yes     Comment: SOCIALLY    Drug use: Never     Comment: patient denies     Sexual activity: Yes     Partners: Male     Birth control/protection: Hysterectomy     Allergies   Allergen Reactions    Prednisone Mental Status Change     Other reaction(s): Mental Status Change    Tramadol GI Intolerance     Nausea and vomiting  Other reaction(s): GI Intolerance, Vomiting  Nausea and vomiting    Diclofenac Rash     Current Outpatient Medications   Medication Sig Dispense Refill    Continuous Blood Gluc  (FreeStyle Cherie 14 Day [place]) device 1 Device Every 14 (Fourteen) Days. 6 each 3    Continuous Blood Gluc Sensor (FreeStyle Cherie 2 Sensor) misc Inject 1 each under the skin into the appropriate area as directed Every 14 (Fourteen) Days. 4 each 3    cyanocobalamin 1000 MCG/ML injection INJECT 1 ML INTO THE MUSCLE AS DIRECTED BY PRESCRIBER EVERY 28 DAYS. 10 mL 0    levothyroxine (Synthroid) 50 MCG tablet Take 1 tablet by mouth Daily. 90 tablet 1    meloxicam (MOBIC) 15 MG tablet Take 1 tablet by mouth Daily.      metFORMIN ER (GLUCOPHAGE-XR) 500 MG 24 hr tablet Take 1 tablet by mouth Every Night. 90 tablet 3    multivitamin with minerals tablet tablet Take 1 tablet by mouth Daily.      traZODone (DESYREL) 50 MG tablet TAKE ONE TABLET BY MOUTH ONCE NIGHTLY 90 tablet 1    valACYclovir (Valtrex) 1000 MG tablet Take 1 tablet by mouth Daily. 10 tablet 2    diclofenac (VOLTAREN) 75 MG EC tablet Take 1 tablet by mouth 2 (Two) Times a Day. (Patient not taking: Reported on 9/20/2023) 60 tablet 1    promethazine (PHENERGAN) 12.5 MG tablet Take 1 tablet by mouth Every 8 (Eight) Hours As Needed for Nausea or Vomiting. (Patient not taking: Reported on 10/18/2023) 30 tablet 0     No current facility-administered medications for this visit.     Review of Systems   Constitutional: Negative.    Neurological:         P/O left Tarsal Tunnel Release   All other systems reviewed and are negative.      OBJECTIVE     Vitals:    10/18/23 1038   BP: 137/86   Pulse: 83   Temp: 98 °F (36.7 °C)   SpO2: 93%          Patient seen in no apparent distress.      PHYSICAL EXAM:     Foot/Ankle Exam    GENERAL  Diabetic foot exam performed    Appearance:  obese  Orientation:  AAOx3  Affect:  appropriate  Assistance:  knee scooter  Right shoe gear: casual shoe  Left shoe gear: casual shoe (Walking on her fiberglass below-knee posterior splint cast)    VASCULAR     Right Foot Vascularity   Dorsalis pedis:  2+  Posterior tibial:  2+  Skin temperature:  warm  Edema gradin+ and pitting  CFT:  < 3 seconds  Pedal hair growth:  Absent  Varicosities:  mild varicosities     Left Foot Vascularity   Dorsalis pedis:  2+  Posterior tibial:  2+  Skin temperature:  warm  Edema gradin+  CFT:  < 3 seconds  Pedal hair growth:  Absent  Varicosities:  mild varicosities     NEUROLOGIC     Right Foot Neurologic   Normal sensation    Light touch sensation: normal  Vibratory sensation: normal  Hot/Cold sensation: normal  Protective Sensation using Watkins-Harpal Monofilament:   Sites intact: 10  Sites tested: 10     Left Foot Neurologic   Normal sensation    Light touch sensation: normal  Vibratory sensation: normal  Hot/Cold sensation:  normal  Protective Sensation using Watkins-Harpal Monofilament:   Sites intact: 10  Sites tested: 10    MUSCLE STRENGTH     Right Foot Muscle Strength   Foot dorsiflexion:  4  Foot plantar flexion:  4  Foot inversion:  4  Foot eversion:  4     Left Foot Muscle Strength   Foot dorsiflexion:  4  Foot plantar flexion:  4  Foot inversion:  4  Foot eversion:  4    RANGE OF MOTION     Right Foot Range of Motion   Foot and ankle ROM within normal limits       Left Foot Range of Motion   Foot and ankle ROM within normal limits      DERMATOLOGIC      Right Foot Dermatologic   Skin  Right foot skin is intact.   Nails comment:  Toenails 1, 2, 3, 4, and 5     Left Foot Dermatologic   Skin  Left foot skin is intact.   Nails comment:  Toenails 1, 2, 3, 4, and 5     Left foot additional comments: Left foot Tarsal  tunnel surgical site is healing without complications.  No hypertrophic scar formation seen.  Healthy surgical skin edges.  No drainage present.  No edema, erythema, calor, lymphangitis, nor signs of infection seen.        Diabetic Foot Exam Performed and Monofilament Test Performed      ASSESSMENT/PLAN     Diagnoses and all orders for this visit:    1. Tarsal tunnel syndrome, left (Primary)    2. Foot pain, left    3. Non-insulin dependent type 2 diabetes mellitus    4. Diabetic foot        Comprehensive lower extremity examination and evaluation was performed.    Discussed findings and treatment plan including risks, benefits, and treatment options with patient in detail. Patient agreed with treatment plan.    Patient is to monitor for recurrence and any new symptoms and to contact Dr. Rivera's office for a follow-up appointment.      The patient states understanding and agreement with this plan.      Rice Therapy: It is important to treat any injury as soon as possible to help control swelling and increase recovery time. The recognized regimen for immediate treatment of sport injuries includes rest, ice (cold application), compression, and elevation (RICE). Remove the injured athlete from play, apply ice to the affected area, wrap or compress the injured area with an elastic bandage when appropriate, and elevate the injured area above heart level to reduce swelling.  The patient is to not use ice for longer than 20 minutes at a time, with at least 20 minutes of no ice usage between applications.  The patient states understanding and agreement with this plan.    The patient is discharged from this surgery.  Pt states understanding and agreement with this plan.    An After Visit Summary was printed and given to the patient at discharge, including (if requested) any available informative/educational handouts regarding diagnosis, treatment, or medications. All questions were answered to patient/family satisfaction.  Should symptoms fail to improve or worsen they agree to call or return to clinic or to go to the Emergency Department. Discussed the importance of following up with any needed screening tests/labs/specialist appointments and any requested follow-up recommended by me today. Importance of maintaining follow-up discussed and patient accepts that missed appointments can delay diagnosis and potentially lead to worsening of conditions.    Return if symptoms worsen or fail to improve., or sooner if acute issues arise.    This document has been electronically signed by Lucas Rivera DPM on October 18, 2023 11:00 EDT        [facility]

## 2023-12-07 ENCOUNTER — OFFICE VISIT (OUTPATIENT)
Dept: INTERNAL MEDICINE | Facility: CLINIC | Age: 48
End: 2023-12-07
Payer: COMMERCIAL

## 2023-12-07 VITALS
DIASTOLIC BLOOD PRESSURE: 80 MMHG | OXYGEN SATURATION: 97 % | TEMPERATURE: 97.3 F | BODY MASS INDEX: 43.4 KG/M2 | HEIGHT: 69 IN | SYSTOLIC BLOOD PRESSURE: 117 MMHG | HEART RATE: 78 BPM | WEIGHT: 293 LBS

## 2023-12-07 DIAGNOSIS — R73.02 IMPAIRED GLUCOSE TOLERANCE: ICD-10-CM

## 2023-12-07 DIAGNOSIS — M25.562 CHRONIC PAIN OF LEFT KNEE: Primary | ICD-10-CM

## 2023-12-07 DIAGNOSIS — G89.29 CHRONIC PAIN OF LEFT KNEE: Primary | ICD-10-CM

## 2023-12-07 DIAGNOSIS — L98.9 SKIN LESIONS: ICD-10-CM

## 2023-12-07 DIAGNOSIS — R20.2 PARESTHESIA OF BOTH LOWER EXTREMITIES: ICD-10-CM

## 2023-12-07 PROBLEM — E78.2 MIXED HYPERLIPIDEMIA: Status: ACTIVE | Noted: 2023-12-07

## 2023-12-07 PROCEDURE — 3044F HG A1C LEVEL LT 7.0%: CPT | Performed by: INTERNAL MEDICINE

## 2023-12-07 PROCEDURE — 99214 OFFICE O/P EST MOD 30 MIN: CPT | Performed by: INTERNAL MEDICINE

## 2023-12-07 RX ORDER — METFORMIN HYDROCHLORIDE 750 MG/1
750 TABLET, EXTENDED RELEASE ORAL
Qty: 90 TABLET | Refills: 1 | Status: SHIPPED | OUTPATIENT
Start: 2023-12-07

## 2023-12-07 NOTE — PROGRESS NOTES
Chief Complaint  Polycystic ovaries (Follow up ) and Insomnia (Follow up )    Subjective          Em Montanez presents to [facility] INTERNAL MEDICINE & PEDIATRICS  History of Present Illness  Patient reports feeling fatigued a lot. Patient reports difficulty with sleeping. Patient reports trazodone helps when she takes it. Patient reports her son is keeping him up at night.   Patient reports ongoing back pain. Patient previously seen by neurology, but provider has left. Patient would like repeat evaluation by neurology.   Patient reports ongoing knee discomfort. Patient has evaluated by orthopedics and is being told it looks normal. Patient does report having a trauma and a fall.   Patient reports having nose lesion that is not healing. She picks at it intermittently. Patient amenable t seeing dermatology.   Impaired glucose tolerance- patient weight is downtrending. Patient is on metformin.     Current Outpatient Medications   Medication Instructions    Continuous Blood Gluc  (FreeStyle Cherie 14 Day [place]) device 1 Device, Does not apply, Every 14 Days    Continuous Blood Gluc Sensor (FreeStyle Cherie 2 Sensor) misc 1 each, Subcutaneous, Every 14 Days    cyanocobalamin 1000 MCG/ML injection INJECT 1 ML INTO THE MUSCLE AS DIRECTED BY PRESCRIBER EVERY 28 DAYS.    levothyroxine (SYNTHROID, LEVOTHROID) 50 mcg, Oral, Daily    metFORMIN ER (GLUCOPHAGE-XR) 750 mg, Oral, Daily With Dinner    multivitamin with minerals tablet tablet 1 tablet, Oral, Daily    traZODone (DESYREL) 50 MG tablet TAKE ONE TABLET BY MOUTH ONCE NIGHTLY    valACYclovir (VALTREX) 1,000 mg, Oral, Daily       The following portions of the patient's history were reviewed and updated as appropriate: allergies, current medications, past family history, past medical history, past social history, past surgical history, and problem list.      Objective   Vital Signs:   /80 (BP Location: Left arm)   Pulse 78   Temp  "97.3 °F (36.3 °C) (Temporal)   Ht 175.3 cm (69\")   Wt (!) 138 kg (304 lb 3.2 oz)   SpO2 97%   BMI 44.92 kg/m²     BP Readings from Last 3 Encounters:   12/07/23 117/80   11/17/23 146/86   11/08/23 147/83     Wt Readings from Last 3 Encounters:   12/07/23 (!) 138 kg (304 lb 3.2 oz)   11/17/23 (!) 138 kg (304 lb)   11/08/23 (!) 139 kg (306 lb)         Physical Exam   Appearance: No acute distress, well-nourished  Head: normocephalic, atraumatic  Eyes: extraocular movements intact, no scleral icterus, no conjunctival injection  Ears, Nose, and Throat: external ears normal, nares patent, moist mucous membranes  Cardiovascular: regular rate and rhythm. no murmurs, rubs, or gallops. no edema  Respiratory: breathing comfortably, symmetric chest rise, clear to auscultation bilaterally. No wheezes, rales, or rhonchi.  Neuro: alert and oriented to time, place, and person. Normal gait  Psych: normal mood and affect       Result Review :   The following data was reviewed by: Ricky Velasquez Jr, MD on 12/07/2023:  Common labs          5/2/2023    12:20 8/7/2023    12:16   Common Labs   Glucose 86  84    BUN 17  14    Creatinine 0.78  0.87    Sodium 140  140    Potassium 4.5  4.5    Chloride 103  100    Calcium 9.6  9.8    Albumin 4.0  4.3    Total Bilirubin 0.3  0.3    Alkaline Phosphatase 88  90    AST (SGOT) 17  22    ALT (SGPT) 11  16    WBC 9.84  9.31    Hemoglobin 15.1  14.8    Hematocrit 43.5  43.3    Platelets 276  261    Total Cholesterol 176  175    Triglycerides 121  203    HDL Cholesterol 58  51    LDL Cholesterol  97  90    Hemoglobin A1C 5.80  5.60    Microalbumin, Urine <1.2  <1.2          Lab Results   Component Value Date    COVID19 NOT DETECTED 03/31/2021    INR 0.86 (L) 03/30/2021    BILIRUBINUR Negative 05/02/2023          Assessment and Plan    Diagnoses and all orders for this visit:    1. Chronic pain of left knee (Primary)  Comments:  will obtain MRI to further eval. prior x-ray " neg.  Orders:  -     MRI Knee Left Without Contrast; Future    2. Skin lesions  Comments:  non healing. refer to derm for further eval.  Orders:  -     Ambulatory Referral to Dermatology    3. Impaired glucose tolerance  Comments:  inc metformin to help with weight loss efforts.  Orders:  -     metFORMIN ER (GLUCOPHAGE-XR) 750 MG 24 hr tablet; Take 1 tablet by mouth Daily With Dinner.  Dispense: 90 tablet; Refill: 1    4. Paresthesia of both lower extremities  Comments:  a/w back pain. refer to neuro for further eval.  Orders:  -     Ambulatory Referral to Neurology          Medications Discontinued During This Encounter   Medication Reason    meloxicam (MOBIC) 15 MG tablet *Therapy completed    promethazine (PHENERGAN) 12.5 MG tablet *Therapy completed    metFORMIN ER (GLUCOPHAGE-XR) 500 MG 24 hr tablet           Follow Up   Return in about 3 months (around 3/7/2024) for Recheck.  Patient was given instructions and counseling regarding her condition or for health maintenance advice. Please see specific information pulled into the AVS if appropriate.       Ricky Velasquez Jr, MD  12/07/23  13:00 EST

## 2024-01-16 ENCOUNTER — HOSPITAL ENCOUNTER (OUTPATIENT)
Dept: MRI IMAGING | Facility: HOSPITAL | Age: 49
Discharge: HOME | End: 2024-01-16
Payer: COMMERCIAL

## 2024-01-16 DIAGNOSIS — G89.29 CHRONIC PAIN OF LEFT KNEE: ICD-10-CM

## 2024-01-16 DIAGNOSIS — G89.29 CHRONIC PAIN OF LEFT KNEE: Primary | ICD-10-CM

## 2024-01-16 DIAGNOSIS — M25.562 CHRONIC PAIN OF LEFT KNEE: Primary | ICD-10-CM

## 2024-01-16 DIAGNOSIS — M25.562 CHRONIC PAIN OF LEFT KNEE: ICD-10-CM

## 2024-02-03 DIAGNOSIS — R73.02 IMPAIRED GLUCOSE TOLERANCE: ICD-10-CM

## 2024-02-03 DIAGNOSIS — E16.2 HYPOGLYCEMIA: ICD-10-CM

## 2024-02-05 ENCOUNTER — HOSPITAL ENCOUNTER (OUTPATIENT)
Dept: CT IMAGING | Facility: HOSPITAL | Age: 49
Discharge: HOME | End: 2024-02-05
Admitting: INTERNAL MEDICINE
Payer: COMMERCIAL

## 2024-02-05 DIAGNOSIS — M25.562 CHRONIC PAIN OF LEFT KNEE: ICD-10-CM

## 2024-02-05 DIAGNOSIS — G89.29 CHRONIC PAIN OF LEFT KNEE: ICD-10-CM

## 2024-02-05 PROCEDURE — 73700 CT LOWER EXTREMITY W/O DYE: CPT

## 2024-02-05 RX ORDER — METFORMIN HYDROCHLORIDE 750 MG/1
750 TABLET, EXTENDED RELEASE ORAL
Qty: 90 TABLET | Refills: 1 | Status: SHIPPED | OUTPATIENT
Start: 2024-02-05

## 2024-02-05 RX ORDER — METFORMIN HYDROCHLORIDE 500 MG/1
500 TABLET, EXTENDED RELEASE ORAL NIGHTLY
Qty: 90 TABLET | Refills: 3 | OUTPATIENT
Start: 2024-02-05

## 2024-02-13 ENCOUNTER — OFFICE VISIT (OUTPATIENT)
Dept: PODIATRY | Facility: CLINIC | Age: 49
End: 2024-02-13
Payer: COMMERCIAL

## 2024-02-13 VITALS
HEART RATE: 76 BPM | BODY MASS INDEX: 44.3 KG/M2 | TEMPERATURE: 98.6 F | SYSTOLIC BLOOD PRESSURE: 123 MMHG | OXYGEN SATURATION: 93 % | DIASTOLIC BLOOD PRESSURE: 88 MMHG | WEIGHT: 293 LBS

## 2024-02-13 DIAGNOSIS — L60.0 ONYCHOCRYPTOSIS: ICD-10-CM

## 2024-02-13 DIAGNOSIS — E11.9 NON-INSULIN DEPENDENT TYPE 2 DIABETES MELLITUS: Primary | ICD-10-CM

## 2024-02-13 DIAGNOSIS — B35.1 ONYCHOMYCOSIS: ICD-10-CM

## 2024-02-13 DIAGNOSIS — M79.672 FOOT PAIN, BILATERAL: ICD-10-CM

## 2024-02-13 DIAGNOSIS — M79.671 FOOT PAIN, BILATERAL: ICD-10-CM

## 2024-02-13 DIAGNOSIS — E11.8 DIABETIC FOOT: ICD-10-CM

## 2024-03-07 ENCOUNTER — OFFICE VISIT (OUTPATIENT)
Dept: NEUROLOGY | Facility: CLINIC | Age: 49
End: 2024-03-07
Payer: COMMERCIAL

## 2024-03-07 ENCOUNTER — PATIENT ROUNDING (BHMG ONLY) (OUTPATIENT)
Dept: NEUROLOGY | Facility: CLINIC | Age: 49
End: 2024-03-07
Payer: COMMERCIAL

## 2024-03-07 VITALS
DIASTOLIC BLOOD PRESSURE: 82 MMHG | HEIGHT: 69 IN | BODY MASS INDEX: 43.4 KG/M2 | SYSTOLIC BLOOD PRESSURE: 117 MMHG | WEIGHT: 293 LBS | HEART RATE: 74 BPM

## 2024-03-07 DIAGNOSIS — G89.29 CHRONIC RIGHT-SIDED LOW BACK PAIN WITHOUT SCIATICA: Primary | ICD-10-CM

## 2024-03-07 DIAGNOSIS — M54.50 CHRONIC RIGHT-SIDED LOW BACK PAIN WITHOUT SCIATICA: Primary | ICD-10-CM

## 2024-03-07 NOTE — PROGRESS NOTES
"Chief Complaint  Nerve Study (BLE ) and Numbness (Numbness & tingling in BLE.)    Subjective          Em Montanez is a 48 y.o. female who presents to [facility] NEUROLOGY & NEUROSURGERY  History of Present Illness  48-year-old woman evaluated for nerve conduction study.  She is complaining of numbness in her toes as well as the soles of her feet which I saw her in 2020 and diagnosed her to have tarsal tunnel syndrome.  She underwent tarsal tunnel surgery which relieved the cramping in her toes however she continues to have numbness in the bottom of her feet.  She thought she was here for her back.  She has chronic back pain and saw neurosurgery in the past and her back pain is predominantly in the left lumbar area extending to the right side of her waist as well as the left buttock area.  She does not have any radicular symptoms.  MRI in 2020 did not show any evidence of spinal stenosis or neuroforaminal stenosis.  Objective   Vital Signs:   /82   Pulse 74   Ht 175.3 cm (69.02\")   Wt 136 kg (300 lb)   BMI 44.28 kg/m²     Physical Exam   Reflexes are normoactive and symmetric in the patellar's and ankles.  There is no weakness on individual muscle testing.  There is pain in her left sacroiliac joint area and left lumbar area.        Assessment and Plan  Diagnoses and all orders for this visit:    1. Chronic right-sided low back pain without sciatica (Primary)  Assessment & Plan:  Nerve conduction study and limited EMG of the right lower extremity is normal.  I will refer her to neurosurgery for further evaluation and recommendation.  She has seen neurosurgery in the past and was undergoing pain management about a year ago and she states that epidural injections were helpful but not as effective as in the past.  I will order an MRI of the bar spine as well.  I discussed with her that other options may be to try left sacroiliac joint injections.  Thank for letting me participate in " her care.    Orders:  -     MRI Lumbar Spine Without Contrast; Future  -     Ambulatory Referral to Neurosurgery         Nerve Conduction Study:  6 nerves     EMG:  Limited    Total time spent with the patient and coordinating patient care was 25 minutes.    Follow Up  No follow-ups on file.  Patient was given instructions and counseling regarding her condition or for health maintenance advice. Please see specific information pulled into the AVS if appropriate.

## 2024-03-07 NOTE — ASSESSMENT & PLAN NOTE
Nerve conduction study and limited EMG of the right lower extremity is normal.  I will refer her to neurosurgery for further evaluation and recommendation.  She has seen neurosurgery in the past and was undergoing pain management about a year ago and she states that epidural injections were helpful but not as effective as in the past.  I will order an MRI of the bar spine as well.  I discussed with her that other options may be to try left sacroiliac joint injections.  Thank for letting me participate in her care.

## 2024-03-11 ENCOUNTER — OFFICE VISIT (OUTPATIENT)
Dept: INTERNAL MEDICINE | Facility: CLINIC | Age: 49
End: 2024-03-11
Payer: COMMERCIAL

## 2024-03-11 VITALS
HEIGHT: 69 IN | DIASTOLIC BLOOD PRESSURE: 80 MMHG | OXYGEN SATURATION: 96 % | WEIGHT: 293 LBS | BODY MASS INDEX: 43.4 KG/M2 | TEMPERATURE: 97.1 F | HEART RATE: 75 BPM | SYSTOLIC BLOOD PRESSURE: 124 MMHG

## 2024-03-11 DIAGNOSIS — M54.50 CHRONIC RIGHT-SIDED LOW BACK PAIN WITHOUT SCIATICA: ICD-10-CM

## 2024-03-11 DIAGNOSIS — M25.562 CHRONIC PAIN OF LEFT KNEE: ICD-10-CM

## 2024-03-11 DIAGNOSIS — R73.02 IMPAIRED GLUCOSE TOLERANCE: Primary | ICD-10-CM

## 2024-03-11 DIAGNOSIS — E55.9 VITAMIN D DEFICIENCY: ICD-10-CM

## 2024-03-11 DIAGNOSIS — Z23 NEED FOR COVID-19 VACCINE: ICD-10-CM

## 2024-03-11 DIAGNOSIS — G89.29 CHRONIC RIGHT-SIDED LOW BACK PAIN WITHOUT SCIATICA: ICD-10-CM

## 2024-03-11 DIAGNOSIS — E78.2 MIXED HYPERLIPIDEMIA: ICD-10-CM

## 2024-03-11 DIAGNOSIS — R79.89 ABNORMAL THYROID BLOOD TEST: ICD-10-CM

## 2024-03-11 DIAGNOSIS — G89.29 CHRONIC PAIN OF LEFT KNEE: ICD-10-CM

## 2024-03-11 DIAGNOSIS — F51.01 PRIMARY INSOMNIA: ICD-10-CM

## 2024-03-11 LAB
25(OH)D3 SERPL-MCNC: 25.2 NG/ML (ref 30–100)
ALBUMIN SERPL-MCNC: 3.9 G/DL (ref 3.5–5.2)
ALBUMIN/GLOB SERPL: 1.4 G/DL
ALP SERPL-CCNC: 86 U/L (ref 39–117)
ALT SERPL W P-5'-P-CCNC: 15 U/L (ref 1–33)
ANION GAP SERPL CALCULATED.3IONS-SCNC: 12.4 MMOL/L (ref 5–15)
AST SERPL-CCNC: 19 U/L (ref 1–32)
BASOPHILS # BLD AUTO: 0.05 10*3/MM3 (ref 0–0.2)
BASOPHILS NFR BLD AUTO: 0.6 % (ref 0–1.5)
BILIRUB SERPL-MCNC: 0.4 MG/DL (ref 0–1.2)
BUN SERPL-MCNC: 14 MG/DL (ref 6–20)
BUN/CREAT SERPL: 16.5 (ref 7–25)
CALCIUM SPEC-SCNC: 9.3 MG/DL (ref 8.6–10.5)
CHLORIDE SERPL-SCNC: 103 MMOL/L (ref 98–107)
CHOLEST SERPL-MCNC: 160 MG/DL (ref 0–200)
CO2 SERPL-SCNC: 25.6 MMOL/L (ref 22–29)
CREAT SERPL-MCNC: 0.85 MG/DL (ref 0.57–1)
DEPRECATED RDW RBC AUTO: 42.9 FL (ref 37–54)
EGFRCR SERPLBLD CKD-EPI 2021: 83.6 ML/MIN/1.73
EOSINOPHIL # BLD AUTO: 0.14 10*3/MM3 (ref 0–0.4)
EOSINOPHIL NFR BLD AUTO: 1.7 % (ref 0.3–6.2)
ERYTHROCYTE [DISTWIDTH] IN BLOOD BY AUTOMATED COUNT: 12.6 % (ref 12.3–15.4)
GLOBULIN UR ELPH-MCNC: 2.7 GM/DL
GLUCOSE SERPL-MCNC: 135 MG/DL (ref 65–99)
HBA1C MFR BLD: 5.5 % (ref 4.8–5.6)
HCT VFR BLD AUTO: 42 % (ref 34–46.6)
HDLC SERPL-MCNC: 39 MG/DL (ref 40–60)
HGB BLD-MCNC: 14.3 G/DL (ref 12–15.9)
IMM GRANULOCYTES # BLD AUTO: 0.02 10*3/MM3 (ref 0–0.05)
IMM GRANULOCYTES NFR BLD AUTO: 0.2 % (ref 0–0.5)
LDLC SERPL CALC-MCNC: 84 MG/DL (ref 0–100)
LDLC/HDLC SERPL: 1.96 {RATIO}
LYMPHOCYTES # BLD AUTO: 2.68 10*3/MM3 (ref 0.7–3.1)
LYMPHOCYTES NFR BLD AUTO: 33 % (ref 19.6–45.3)
MCH RBC QN AUTO: 31.4 PG (ref 26.6–33)
MCHC RBC AUTO-ENTMCNC: 34 G/DL (ref 31.5–35.7)
MCV RBC AUTO: 92.1 FL (ref 79–97)
MONOCYTES # BLD AUTO: 0.32 10*3/MM3 (ref 0.1–0.9)
MONOCYTES NFR BLD AUTO: 3.9 % (ref 5–12)
NEUTROPHILS NFR BLD AUTO: 4.92 10*3/MM3 (ref 1.7–7)
NEUTROPHILS NFR BLD AUTO: 60.6 % (ref 42.7–76)
NRBC BLD AUTO-RTO: 0 /100 WBC (ref 0–0.2)
PLATELET # BLD AUTO: 241 10*3/MM3 (ref 140–450)
PMV BLD AUTO: 10.9 FL (ref 6–12)
POTASSIUM SERPL-SCNC: 3.9 MMOL/L (ref 3.5–5.2)
PROT SERPL-MCNC: 6.6 G/DL (ref 6–8.5)
RBC # BLD AUTO: 4.56 10*6/MM3 (ref 3.77–5.28)
SODIUM SERPL-SCNC: 141 MMOL/L (ref 136–145)
TRIGL SERPL-MCNC: 223 MG/DL (ref 0–150)
TSH SERPL DL<=0.05 MIU/L-ACNC: 1.53 UIU/ML (ref 0.27–4.2)
VLDLC SERPL-MCNC: 37 MG/DL (ref 5–40)
WBC NRBC COR # BLD AUTO: 8.13 10*3/MM3 (ref 3.4–10.8)

## 2024-03-11 PROCEDURE — 84443 ASSAY THYROID STIM HORMONE: CPT | Performed by: INTERNAL MEDICINE

## 2024-03-11 PROCEDURE — 99214 OFFICE O/P EST MOD 30 MIN: CPT | Performed by: INTERNAL MEDICINE

## 2024-03-11 PROCEDURE — 82306 VITAMIN D 25 HYDROXY: CPT | Performed by: INTERNAL MEDICINE

## 2024-03-11 PROCEDURE — 85025 COMPLETE CBC W/AUTO DIFF WBC: CPT | Performed by: INTERNAL MEDICINE

## 2024-03-11 PROCEDURE — 83036 HEMOGLOBIN GLYCOSYLATED A1C: CPT | Performed by: INTERNAL MEDICINE

## 2024-03-11 PROCEDURE — 80061 LIPID PANEL: CPT | Performed by: INTERNAL MEDICINE

## 2024-03-11 PROCEDURE — 80053 COMPREHEN METABOLIC PANEL: CPT | Performed by: INTERNAL MEDICINE

## 2024-03-11 NOTE — PROGRESS NOTES
Chief Complaint  Polycystic ovaries  (Follow up ) and Insomnia (Follow up )    Subjective          Em Montanez presents to [facility] INTERNAL MEDICINE & PEDIATRICS  History of Present Illness  Patient reports feeling fatigued a lot. Patient reports difficulty with sleeping. Patient reports trazodone helps when she takes it. Patient reports her son is keeping him up at night. 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   Patient reports ongoing back pain. Patient previously seen by neurology, and EMG. Patient has MRI lumbar spine setup and follow-up with neurosurgical team.   Patient reports ongoing knee discomfort. Patient has evaluated by orthopedics and is being told it looks normal. Patient does report having a trauma and a fall.   Impaired glucose tolerance- patient weight is downtrending. Patient is going to the gym more often. Patient is on metformin. Patient is considering lap band surgery in 2 months. Patient does report having some low blood glucose during the middle of the night.   Abnormal thyroid blood test - patient is due for recheck.     Current Outpatient Medications   Medication Instructions    Continuous Blood Gluc  (FreeStyle Cherie 14 Day [place]) device 1 Device, Does not apply, Every 14 Days    Continuous Blood Gluc Sensor (FreeStyle Cherie 2 Sensor) misc 1 each, Subcutaneous, Every 14 Days    cyanocobalamin 1000 MCG/ML injection INJECT 1 ML INTO THE MUSCLE AS DIRECTED BY PRESCRIBER EVERY 28 DAYS.    levothyroxine (SYNTHROID, LEVOTHROID) 50 mcg, Oral, Daily    metFORMIN ER (GLUCOPHAGE-XR) 750 mg, Oral, Daily With Dinner    multivitamin with minerals tablet tablet 1 tablet, Oral, Daily    traZODone (DESYREL) 50 MG tablet TAKE ONE TABLET BY MOUTH ONCE NIGHTLY    valACYclovir (VALTREX) 1,000 mg, Oral, Daily       The following portions of the patient's history were reviewed and updated as appropriate: allergies, current medications,  "past family history, past medical history, past social history, past surgical history, and problem list.      Objective   Vital Signs:   /80 (BP Location: Left arm, Patient Position: Sitting, Cuff Size: Large Adult)   Pulse 75   Temp 97.1 °F (36.2 °C) (Temporal)   Ht 175.3 cm (69\")   Wt 136 kg (300 lb)   SpO2 96%   BMI 44.30 kg/m²     BP Readings from Last 3 Encounters:   03/11/24 124/80   03/07/24 117/82   02/13/24 123/88     Wt Readings from Last 3 Encounters:   03/11/24 136 kg (300 lb)   03/07/24 136 kg (300 lb)   02/13/24 136 kg (300 lb)         Physical Exam   Appearance: No acute distress, well-nourished  Head: normocephalic, atraumatic  Eyes: extraocular movements intact, no scleral icterus, no conjunctival injection  Ears, Nose, and Throat: external ears normal, nares patent, moist mucous membranes  Cardiovascular: regular rate and rhythm. no murmurs, rubs, or gallops. no edema  Respiratory: breathing comfortably, symmetric chest rise, clear to auscultation bilaterally. No wheezes, rales, or rhonchi.  Neuro: alert and oriented to time, place, and person. Normal gait  Psych: normal mood and affect       Result Review :   The following data was reviewed by: Ricky Velasquez Jr, MD on 12/07/2023:  Common labs          5/2/2023    12:20 8/7/2023    12:16   Common Labs   Glucose 86  84    BUN 17  14    Creatinine 0.78  0.87    Sodium 140  140    Potassium 4.5  4.5    Chloride 103  100    Calcium 9.6  9.8    Albumin 4.0  4.3    Total Bilirubin 0.3  0.3    Alkaline Phosphatase 88  90    AST (SGOT) 17  22    ALT (SGPT) 11  16    WBC 9.84  9.31    Hemoglobin 15.1  14.8    Hematocrit 43.5  43.3    Platelets 276  261    Total Cholesterol 176  175    Triglycerides 121  203    HDL Cholesterol 58  51    LDL Cholesterol  97  90    Hemoglobin A1C 5.80  5.60    Microalbumin, Urine <1.2  <1.2          Lab Results   Component Value Date    COVID19 NOT DETECTED 03/31/2021    INR 0.86 (L) 03/30/2021    " BILIRUBINUR Negative 05/02/2023          Assessment and Plan    Diagnoses and all orders for this visit:    1. Impaired glucose tolerance (Primary)  Comments:  weight downtrending. recheck HgbA1c.  Orders:  -     CBC & Differential  -     Hemoglobin A1c    2. Mixed hyperlipidemia  -     Comprehensive Metabolic Panel  -     Lipid Panel    3. Abnormal thyroid blood test  -     TSH    4. Vitamin D deficiency  -     Vitamin D,25-Hydroxy    5. Chronic right-sided low back pain without sciatica  Comments:  uptcoming lumbar MRI and f/u with [facility].    6. Primary insomnia  Comments:  cont trazadone.    7. Chronic pain of left knee  Comments:  CT scan completed and reassuring. refer to ortho for further management.  Orders:  -     Ambulatory Referral to Orthopedic Surgery    8. Need for COVID-19 vaccine  Comments:  pt defers today        There are no discontinued medications.       Follow Up   Return in about 6 months (around 9/11/2024) for Annual physical.  Patient was given instructions and counseling regarding her condition or for health maintenance advice. Please see specific information pulled into the AVS if appropriate.       Ricky Velasquez Jr, MD  03/11/24  14:02 EDT

## 2024-03-18 ENCOUNTER — TELEPHONE (OUTPATIENT)
Dept: INTERNAL MEDICINE | Facility: CLINIC | Age: 49
End: 2024-03-18
Payer: COMMERCIAL

## 2024-03-18 DIAGNOSIS — L98.9 SKIN LESION: Primary | ICD-10-CM

## 2024-03-18 NOTE — TELEPHONE ENCOUNTER
Caller: Lisseth Em Rodrígueze    Relationship: Self    Best call back number: [phone]     What is the medical concern/diagnosis: RASH ON HANDS AND UP ARMS TO ELBOW. LITTLE RED DOTS AND BUMPY TO THE TOUCH. SHOWS UP MORE ON LEFT ARM THAN THE RIGHT    What specialty or service is being requested: DERMATOLOGY    What is the provider, practice or medical service name: [facility]    What is the office location: CLOSEST TO [place]    Any additional details: PATIENT WAS IN THE OFFICE ON 03.11.2024 WITH DR. VAZQUEZ. PATIENT FORGOT TO MENTION IT TO HIM. PATIENT REQUESTING REFERRAL BUT NOT SURE IF SHE NEEDS TO BE SEEN AGAIN BY DR. VAZQUEZ FIRST. PATIENT STATES THAT THIS HAS BEEN AN ONGOING ISSUE. PATIENT SEEN BY ANAHI WRIGHT ON 11.17.2024 FOR THE ISSUE AND RECEIVED A STEROID SHOT BUT IT MADE HER BLOOD SUGAR SPIKE.

## 2024-03-19 ENCOUNTER — OFFICE VISIT (OUTPATIENT)
Dept: ORTHOPEDIC SURGERY | Facility: CLINIC | Age: 49
End: 2024-03-19
Payer: COMMERCIAL

## 2024-03-19 VITALS
WEIGHT: 293 LBS | OXYGEN SATURATION: 98 % | BODY MASS INDEX: 43.4 KG/M2 | HEIGHT: 69 IN | DIASTOLIC BLOOD PRESSURE: 83 MMHG | SYSTOLIC BLOOD PRESSURE: 118 MMHG | HEART RATE: 79 BPM

## 2024-03-19 DIAGNOSIS — M25.562 LEFT KNEE PAIN, UNSPECIFIED CHRONICITY: ICD-10-CM

## 2024-03-19 DIAGNOSIS — Z96.652 HISTORY OF LEFT KNEE REPLACEMENT: Primary | ICD-10-CM

## 2024-03-19 PROCEDURE — 99213 OFFICE O/P EST LOW 20 MIN: CPT | Performed by: ORTHOPAEDIC SURGERY

## 2024-03-19 RX ORDER — MELOXICAM 15 MG/1
15 TABLET ORAL DAILY
Qty: 30 TABLET | Refills: 2 | Status: SHIPPED | OUTPATIENT
Start: 2024-03-19

## 2024-03-19 NOTE — PROGRESS NOTES
"Chief Complaint  Follow-up of the Left Knee     Subjective      Em Montanez presents to [facility] for follow up of the left knee. She had a left total knee arthroplasty performed on 04/05/2021.   She is in a constant ache when she sits, walk and goes to the gym.  She has pain in the front of her lower leg.  She has pain with rotating her knee.  She does have pain in the left hip she says.  She notes it is a constant discomfort.  She had a CT scan and is here to review. She still notes some numbness.  She uses Tylenol.      Allergies   Allergen Reactions    Prednisone Mental Status Change     Other reaction(s): Mental Status Change    Tramadol GI Intolerance     Nausea and vomiting  Other reaction(s): GI Intolerance, Vomiting  Nausea and vomiting    Diclofenac Rash        Social History     Socioeconomic History    Marital status:    Tobacco Use    Smoking status: Every Day     Current packs/day: 1.00     Average packs/day: 1 pack/day for 30.0 years (30.0 ttl pk-yrs)     Types: Cigarettes     Passive exposure: Current    Smokeless tobacco: Never    Tobacco comments:     INSTRUCTED NO SMOKING 24 HR PRIOR TO SURGERY    Vaping Use    Vaping status: Never Used   Substance and Sexual Activity    Alcohol use: Yes     Comment: SOCIALLY    Drug use: Never     Comment: patient denies    Sexual activity: Yes     Partners: Male     Birth control/protection: Hysterectomy        I reviewed the patient's chief complaint, history of present illness, review of systems, past medical history, surgical history, family history, social history, medications, and allergy list.     Review of Systems     Constitutional: Denies fevers, chills, weight loss  Cardiovascular: Denies chest pain, shortness of breath  Skin: Denies rashes, acute skin changes  Neurologic: Denies headache, loss of consciousness        Vital Signs:   /83   Pulse 79   Ht 175.3 cm (69\")   Wt (!) 139 kg (306 lb 6.4 oz) "   SpO2 98%   BMI 45.25 kg/m²          Physical Exam  General: Alert. No acute distress    Ortho Exam        LEFT KNEE No evidence of wound dehiscence, surrounding erythema, warmth, or drainage. Flexion 110. Extension 0. Stable to varus/valgus stress. Stable to anterior/posterior drawer. Neurovascularly intact. Negative Queta. Negative Lachman. Positive EHL, FHL, HS and TA. Sensation intact to light touch all 5 nerves of the foot. Ambulates with Antalgic gait. Patella is well tracking. Calf supple, non-tender. Negative tenderness to the medial joint line. Negative tenderness to the lateral joint line. Negative Crepitus. Good strength to hamstrings, quadriceps, dorsiflexors, and plantar flexors.  Knee Extensor Mechanism intact       Procedures      Imaging Results (Most Recent)       None             Result Review :     2/5/24 CT of lower extremity left    Narrative & Impression   PROCEDURE:CT LOWER EXTREMITY LEFT WO CONTRAST     COMPARISON:None     INDICATIONS:  Knee pain, chronic, positive xray.  LEFT KNEE STIFFNESS, GENERAL PAIN X 6 MONTHS.    LEFT KNEE RPLACED 2021     TECHNIQUE:    After obtaining the patient's consent, multi-planar CT images of the extremity were   created without non-ionic intravenous contrast.       PROTOCOL:     Standard imaging protocol performed                 RADIATION:      DLP: 155 mGy*cm               MA and/or KV was adjusted to minimize radiation dose.                     FINDINGS:          Postoperative changes are noted status post left knee arthroplasty.  There are no signs of hardware   failure or loosening.  No adjacent osteolysis is identified.  There is no evidence for paco implant   fracture.  No significant malalignment is identified.     There is no evidence for joint effusion.  No edema or abnormal fluid collection is seen in the   surrounding soft tissues.  There is no surrounding hemorrhage or hematoma.     IMPRESSION:                 1. Postoperative changes status  "post left knee arthroplasty.  There are no signs of hardware   failure or loosening.  There is no evidence for paco implant fracture or malalignment.  2. No acute soft tissue abnormalities are identified.           Assessment and Plan     Diagnoses and all orders for this visit:    1. History of left knee replacement (Primary)    2. Left knee pain, unspecified chronicity        Discussed the treatment plan with the patient. I reviewed the CT with the patient .     Prescribed physical therapy. Prescribed anti inflammatory.      Educated on risk of smoking/nicotine. Discussed options for smoking cessation. and Call or return if worsening symptoms.    Follow Up     4-6 weeks assess ROM and if \"discomfort\" is getting better.       Patient was given instructions and counseling regarding her condition or for health maintenance advice. Please see specific information pulled into the AVS if appropriate.     Scribed for Kat Collins MD by Johanny Triana MA.  03/19/24   08:36 EDT    I have personally performed the services described in this document as scribed by the above individual and it is both accurate and complete. Kat Collins MD 03/19/24   "

## 2024-04-01 ENCOUNTER — TREATMENT (OUTPATIENT)
Dept: PHYSICAL THERAPY | Facility: CLINIC | Age: 49
End: 2024-04-01
Payer: COMMERCIAL

## 2024-04-01 DIAGNOSIS — M25.562 CHRONIC PAIN OF LEFT KNEE: Primary | ICD-10-CM

## 2024-04-01 DIAGNOSIS — G89.29 CHRONIC PAIN OF LEFT KNEE: Primary | ICD-10-CM

## 2024-04-01 NOTE — PROGRESS NOTES
Physical Therapy Initial Evaluation and Plan of Care                       Patient: Em Montanez   : 1975  Diagnosis/ICD-10 Code:  Chronic pain of left knee [M25.562, G89.29]  Referring practitioner: Kat Collins MD  Date of Initial Visit: 2024  Today's Date: 2024  Patient seen for 1 sessions           Subjective Questionnaire: LEFS: 67      Subjective Evaluation    History of Present Illness  Mechanism of injury: Patient reports last year falling off her knee scooter after having a tarsal release and having L knee pain since then. She had x-rays at the time which were negative and recent x-ray and CT scans were negative for hardware failure or soft tissue damage. She reports increased pain with prolonged sitting >5 minutes, any weight bearing tasks, and tasks that bend or straighten her knee.       Patient Occupation: billing and coding Pain  Current pain ratin  At best pain ratin  At worst pain ratin  Quality: tight and discomfort  Aggravating factors: stairs, lifting, movement, standing, ambulation, squatting and repetitive movement    Patient Goals  Patient goals for therapy: increased strength, increased motion, decreased pain, return to sport/leisure activities and independence with ADLs/IADLs             Objective          Palpation   Left   Tenderness of the vastus lateralis.     Tenderness   Left Knee   Tenderness in the ITB, patellar tendon and quadriceps tendon.     Active Range of Motion   Left Knee   Flexion: 121 degrees   Extension: 3 degrees     Right Knee   Flexion: 123 degrees   Extension: 3 degrees     Patellar Mobility   Left Knee Patellar tendons within functional limits include the medial, lateral, superior and inferior.     Strength/Myotome Testing     Left Hip   Planes of Motion   Flexion: 4-  Abduction: 3+  External rotation: 4+  Internal rotation: 4+    Left Knee   Flexion: 4  Extension: 4    Tests     Left Knee   Negative anterior drawer, posterior  drawer, valgus stress test at 0 degrees, valgus stress test at 30 degrees, varus stress test at 0 degrees and varus stress test at 30 degrees.     Additional Tests Details  + tretnon compression    Ambulation     Observational Gait   Gait: within functional limits     Functional Assessment   Squat No pain.     Single Leg Stance   Left: 15 seconds      See Exercise, Manual, and Modality Logs for complete treatment.     Assessment & Plan       Assessment  Impairments: abnormal gait, abnormal muscle firing, abnormal or restricted ROM, activity intolerance, impaired balance, impaired physical strength, lacks appropriate home exercise program, pain with function, safety issue and weight-bearing intolerance   Functional limitations: walking, uncomfortable because of pain, moving in bed, standing and stooping   Assessment details:   The patient presents to physical therapy with complaints of left knee pain. Signs and symptoms are consistent with patellar tendinitis. Some deficits from left hip present as well but do not suggest patellofemoral syndrome. She would benefit from skilled physical therapy as described below to address these limitations and allow the patient to return to her prior level of function.   Prognosis: good    Goals  Plan Goals: KNEE PROBLEMS:     1. The patient has limited ROM of the left knee.   LTG 1: 6 weeks:  The patient will demonstrate knee flexion to 125 degrees of ROM for the left knee in order to allow patient to descend stairs.    STATUS:  New       2. The patient has limited strength of the left knee.   LTG 2: 6 weeks: The patient will demonstrate 5 /5 strength for left knee flexion and extension in order to allow patient improved joint stability    STATUS:  New   STG 2a: 4 weeks: The patient will demonstrate 4 /5 strength for left knee flexion and extension    STATUS:  New      3. Mobility: Walking/Moving Around Functional Limitation     LTG 3: 6 weeks:  The patient will demonstrate improved  function by achieving a score of 75 on the LEFS.    STATUS:  New   STG 4 a: 4 weeks:  The patient will demonstrate improved function by achieving a score of 70 on the LEFS.      STATUS:  New     Plan  Therapy options: will be seen for skilled therapy services  Planned modality interventions: cryotherapy, electrical stimulation/Russian stimulation and TENS  Planned therapy interventions: balance/weight-bearing training, ADL retraining, soft tissue mobilization, strengthening, stretching, therapeutic activities, joint mobilization, home exercise program, gait training, functional ROM exercises, flexibility, body mechanics training, postural training, neuromuscular re-education and manual therapy  Frequency: 2x week  Duration in weeks: 6  Treatment plan discussed with: patient        Visit Diagnoses:    ICD-10-CM ICD-9-CM   1. Chronic pain of left knee  M25.562 719.46    G89.29 338.29       History # of Personal Factors and/or Comorbidities: LOW (0)  Examination of Body System(s): # of elements: LOW (1-2)  Clinical Presentation: STABLE   Clinical Decision Making: LOW       Timed:         Manual Therapy:    0     mins  66230;     Therapeutic Exercise:    0     mins  48507;     Neuromuscular Yanna:    0    mins  68927;    Therapeutic Activity:     0     mins  67132;     Gait Trainin     mins  22379;     Ultrasound:     0     mins  10371;    Ionto                               0    mins   34883  Self Care                       0     mins   07196  Canalith Repos    0     mins 48043      Un-Timed:  Electrical Stimulation:    0     mins  13715 ( );  Dry Needling     0     mins self-pay  Traction     0     mins 57370  Low Eval     54     Mins  50380  Mod Eval     0     Mins  05743  High Eval                       0     Mins  86760  Re-Eval                           0    mins  42360    Timed Treatment:   54   mins   Total Treatment:     54   mins    PT SIGNATURE: Jayro Viramontes, PT    Electronically signed  4/1/2024    [place] License: PT - [id number]      Initial Certification  Certification Period: 4/1/2024 thru 6/29/2024  I certify that the therapy services are furnished while this patient is under my care.  The services outlined above are required by this patient, and will be reviewed every 90 days.     PHYSICIAN: Kat Collins MD  NPI: [id number]      DATE:     Please sign and return via fax to [phone]. Thank you, [facility].

## 2024-04-02 DIAGNOSIS — F32.A DEPRESSION, UNSPECIFIED DEPRESSION TYPE: ICD-10-CM

## 2024-04-02 DIAGNOSIS — F41.9 ANXIETY: ICD-10-CM

## 2024-04-02 RX ORDER — TRAZODONE HYDROCHLORIDE 50 MG/1
50 TABLET ORAL NIGHTLY
Qty: 90 TABLET | Refills: 1 | Status: SHIPPED | OUTPATIENT
Start: 2024-04-02

## 2024-04-03 ENCOUNTER — HOSPITAL ENCOUNTER (OUTPATIENT)
Dept: MAMMOGRAPHY | Facility: HOSPITAL | Age: 49
Discharge: HOME | End: 2024-04-03
Admitting: STUDENT IN AN ORGANIZED HEALTH CARE EDUCATION/TRAINING PROGRAM
Payer: COMMERCIAL

## 2024-04-03 DIAGNOSIS — Z01.419 WELL WOMAN EXAM WITH ROUTINE GYNECOLOGICAL EXAM: ICD-10-CM

## 2024-04-03 PROCEDURE — 77067 SCR MAMMO BI INCL CAD: CPT

## 2024-04-03 PROCEDURE — 77063 BREAST TOMOSYNTHESIS BI: CPT

## 2024-04-05 ENCOUNTER — HOSPITAL ENCOUNTER (EMERGENCY)
Facility: HOSPITAL | Age: 49
Discharge: HOME | End: 2024-04-05
Attending: EMERGENCY MEDICINE
Payer: COMMERCIAL

## 2024-04-05 ENCOUNTER — APPOINTMENT (OUTPATIENT)
Dept: GENERAL RADIOLOGY | Facility: HOSPITAL | Age: 49
End: 2024-04-05
Payer: COMMERCIAL

## 2024-04-05 ENCOUNTER — APPOINTMENT (OUTPATIENT)
Dept: CT IMAGING | Facility: HOSPITAL | Age: 49
End: 2024-04-05
Payer: COMMERCIAL

## 2024-04-05 ENCOUNTER — TELEPHONE (OUTPATIENT)
Dept: ORTHOPEDIC SURGERY | Facility: CLINIC | Age: 49
End: 2024-04-05
Payer: COMMERCIAL

## 2024-04-05 VITALS
RESPIRATION RATE: 16 BRPM | HEIGHT: 69 IN | DIASTOLIC BLOOD PRESSURE: 51 MMHG | TEMPERATURE: 98.3 F | HEART RATE: 69 BPM | SYSTOLIC BLOOD PRESSURE: 116 MMHG | WEIGHT: 293 LBS | OXYGEN SATURATION: 99 % | BODY MASS INDEX: 43.4 KG/M2

## 2024-04-05 DIAGNOSIS — S89.90XA KNEE INJURY, UNSPECIFIED LATERALITY, INITIAL ENCOUNTER: Primary | ICD-10-CM

## 2024-04-05 PROCEDURE — 99284 EMERGENCY DEPT VISIT MOD MDM: CPT

## 2024-04-05 PROCEDURE — 73562 X-RAY EXAM OF KNEE 3: CPT

## 2024-04-05 PROCEDURE — 73610 X-RAY EXAM OF ANKLE: CPT

## 2024-04-05 PROCEDURE — 73502 X-RAY EXAM HIP UNI 2-3 VIEWS: CPT

## 2024-04-05 PROCEDURE — 25010000002 MORPHINE PER 10 MG: Performed by: EMERGENCY MEDICINE

## 2024-04-05 PROCEDURE — 96374 THER/PROPH/DIAG INJ IV PUSH: CPT

## 2024-04-05 PROCEDURE — 70450 CT HEAD/BRAIN W/O DYE: CPT

## 2024-04-05 RX ORDER — KETOROLAC TROMETHAMINE 30 MG/ML
30 INJECTION, SOLUTION INTRAMUSCULAR; INTRAVENOUS ONCE
Status: DISCONTINUED | OUTPATIENT
Start: 2024-04-05 | End: 2024-04-05

## 2024-04-05 RX ORDER — OXYCODONE HYDROCHLORIDE AND ACETAMINOPHEN 5; 325 MG/1; MG/1
1 TABLET ORAL EVERY 6 HOURS PRN
Qty: 12 TABLET | Refills: 0 | Status: SHIPPED | OUTPATIENT
Start: 2024-04-05

## 2024-04-05 RX ORDER — OXYCODONE HYDROCHLORIDE AND ACETAMINOPHEN 5; 325 MG/1; MG/1
1 TABLET ORAL ONCE
Status: COMPLETED | OUTPATIENT
Start: 2024-04-05 | End: 2024-04-05

## 2024-04-05 RX ADMIN — MORPHINE SULFATE 4 MG: 4 INJECTION, SOLUTION INTRAMUSCULAR; INTRAVENOUS at 11:13

## 2024-04-05 RX ADMIN — OXYCODONE AND ACETAMINOPHEN 1 TABLET: 5; 325 TABLET ORAL at 10:43

## 2024-04-05 NOTE — TELEPHONE ENCOUNTER
LFProvider: [facility]    Caller: CELSO    Relationship to Patient: SELF    Pharmacy:     Phone Number: [phone]    Reason for Call: PT WENT TO ER TODAY FOR LEFT KNEE INJURY AFTER A FALL, PT EST WITH DR SANON BUT DR DENNISON ON CALL. WHICH PROVIDER DOES PT NEED TO SCHEDULE WITH?

## 2024-04-05 NOTE — ED PROVIDER NOTES
Time: 10:05 AM EDT  Date of encounter:  4/5/2024  Independent Historian/Clinical History and Information was obtained by:   Patient    History is limited by: N/A    Chief Complaint: Fall      History of Present Illness:  Patient is a 48 y.o. year old female who presents to the emergency department for evaluation of a fall that occurred prior to ED arrival.  Patient denies chest pain and shortness of breath.  Patient does report that she hit her head.  Patient denies syncopal episode.  Patient has no subjective neurological doves including numbness or tingling.  Patient currently reports left knee and left ankle pain.    HPI    Patient Care Team  Primary Care Provider: Ricky Velasquez Jr., MD    Past Medical History:     Allergies   Allergen Reactions    Prednisone Mental Status Change     Other reaction(s): Mental Status Change    Tramadol GI Intolerance     Nausea and vomiting  Other reaction(s): GI Intolerance, Vomiting  Nausea and vomiting    Diclofenac Rash     Past Medical History:   Diagnosis Date    Abnormal Pap smear of cervix     Arthritis     Asthma     NO INHALER USE    Back pain 11/21/2017    Disease of thyroid gland     Fatigue 11/21/2017    H/O Chronic pelvic pain in female 02/23/2022    H/O Foot pain, right     H/O Ovarian cyst     HPV (human papilloma virus) infection     Hypoglycemia     Insomnia 12/11/2017    Pelvic pain 01/12/2022    Polycystic ovarian syndrome 11/21/2017    Seasonal allergies     Uterine pain     Vitamin D deficiency 12/11/2017     Past Surgical History:   Procedure Laterality Date    CAST APPLICATION Right 06/16/2023    Procedure: CAST APPLICATION LEG;  Surgeon: Lucas Rivera DPM;  Location: [facility] OR;  Service: Podiatry;  Laterality: Right;    CAST APPLICATION Left 9/1/2023    Procedure: CAST APPLICATION LEG;  Surgeon: Lucas Rivera DPM;  Location: [facility] MAIN OR;  Service: Podiatry;  Laterality: Left;    CHOLECYSTECTOMY      COLONOSCOPY N/A  02/16/2022    Procedure: COLONOSCOPY;  Surgeon: Jb Gonzalez MD;  Location: [facility] ENDOSCOPY;  Service: Gastroenterology;  Laterality: N/A;  hemmorroids    ENDOMETRIAL ABLATION      ESSURE TUBAL LIGATION      JOINT REPLACEMENT      bilateral knee replacement    LEEP      PLANTAR FASCIA RELEASE Right 06/16/2023    Procedure: FOOT PLANTAR FASCIECTOMY;  Surgeon: Lucas Rivera DPM;  Location: [facility] MAIN OR;  Service: Podiatry;  Laterality: Right;    PLANTAR FASCIECTOMY Left 9/1/2023    Procedure: FASCIECTOMY PLANTAR FASCIA;  Surgeon: Lucas Rivera DPM;  Location: [facility] MAIN OR;  Service: Podiatry;  Laterality: Left;    TARSAL TUNNEL RELEASE Right 06/16/2023    Procedure: TARSAL TUNNEL RELEASE;  Surgeon: Lucas Rivera DPM;  Location: [facility] MAIN OR;  Service: Podiatry;  Laterality: Right;    TARSAL TUNNEL RELEASE Left 9/1/2023    Procedure: LEFT TARSAL TUNNEL RELEASE;  Surgeon: Lucas Rivera DPM;  Location: [facility] MAIN OR;  Service: Podiatry;  Laterality: Left;    TONSILLECTOMY      TOTAL LAPAROSCOPIC HYSTERECTOMY N/A 04/06/2022    Procedure: TOTAL LAPAROSCOPIC HYSTERECTOMY WITH DAVINCI ROBOT;  Surgeon: Chito Cook MD;  Location: [facility] MAIN OR;  Service: Robotics - DaVinci;  Laterality: N/A;     Family History   Problem Relation Age of Onset    Depression Mother     Heart disease Mother     Arthritis Mother     ADD / ADHD Son     Self-Injurious Behavior  Daughter     Depression Daughter     Anxiety disorder Daughter     Cancer Other     Malig Hyperthermia Neg Hx     Breast cancer Neg Hx     Uterine cancer Neg Hx     Ovarian cancer Neg Hx     Colon cancer Neg Hx     Deep vein thrombosis Neg Hx     Pulmonary embolism Neg Hx        Home Medications:  Prior to Admission medications    Medication Sig Start Date End Date Taking? Authorizing Provider   Continuous Blood Gluc  (FreeStyle Cherie 14 Day [place]) device 1 Device Every 14 (Fourteen) Days. 5/2/23    Ricky Velasquez Jr., MD   Continuous Blood Gluc Sensor (FreeStyle Cherie 2 Sensor) misc Inject 1 each under the skin into the appropriate area as directed Every 14 (Fourteen) Days. 9/12/23   Ricky Velasquez Jr., MD   cyanocobalamin 1000 MCG/ML injection INJECT 1 ML INTO THE MUSCLE AS DIRECTED BY PRESCRIBER EVERY 28 DAYS. 8/21/23   Ricky Velasquez Jr., MD   levothyroxine (SYNTHROID, LEVOTHROID) 50 MCG tablet TAKE ONE TABLET BY MOUTH DAILY 10/30/23   Ricky Velasquez Jr., MD   meloxicam (Mobic) 15 MG tablet Take 1 tablet by mouth Daily. 3/19/24   Kat Collins MD   metFORMIN ER (GLUCOPHAGE-XR) 750 MG 24 hr tablet Take 1 tablet by mouth Daily With Dinner. 2/5/24   Ricky Velasquez Jr., MD   multivitamin with minerals tablet tablet Take 1 tablet by mouth Daily.    Provider, MD Juan Alberto   traZODone (DESYREL) 50 MG tablet Take 1 tablet by mouth Every Night. 4/2/24   Ricky Velasquez Jr., MD   valACYclovir (Valtrex) 1000 MG tablet Take 1 tablet by mouth Daily. 11/27/23   Ricky Velasquez Jr., MD        Social History:   Social History     Tobacco Use    Smoking status: Every Day     Current packs/day: 1.00     Average packs/day: 1 pack/day for 30.0 years (30.0 ttl pk-yrs)     Types: Cigarettes     Passive exposure: Current    Smokeless tobacco: Never    Tobacco comments:     INSTRUCTED NO SMOKING 24 HR PRIOR TO SURGERY    Vaping Use    Vaping status: Never Used   Substance Use Topics    Alcohol use: Yes     Comment: SOCIALLY    Drug use: Never     Comment: patient denies         Review of Systems:  Review of Systems   Constitutional:  Negative for chills and fever.   HENT:  Negative for congestion, rhinorrhea and sore throat.    Eyes:  Negative for pain and visual disturbance.   Respiratory:  Negative for apnea, cough, chest tightness and shortness of breath.    Cardiovascular:  Negative for chest pain and palpitations.   Gastrointestinal:  Negative for abdominal  "pain, diarrhea, nausea and vomiting.   Genitourinary:  Negative for difficulty urinating and dysuria.   Musculoskeletal:  Negative for joint swelling and myalgias.   Skin:  Negative for color change.   Neurological:  Negative for seizures and headaches.   Psychiatric/Behavioral: Negative.     All other systems reviewed and are negative.       Physical Exam:  /51 (BP Location: Left arm, Patient Position: Lying)   Pulse 69   Temp 98.3 °F (36.8 °C) (Oral)   Resp 16   Ht 175.3 cm (69\")   Wt (!) 143 kg (315 lb 4.1 oz)   LMP  (LMP Unknown)   SpO2 99%   BMI 46.56 kg/m²     Physical Exam  Vitals and nursing note reviewed.   Constitutional:       General: She is not in acute distress.     Appearance: Normal appearance. She is not toxic-appearing.   HENT:      Head: Normocephalic and atraumatic.      Jaw: There is normal jaw occlusion.   Eyes:      General: Lids are normal.      Extraocular Movements: Extraocular movements intact.      Conjunctiva/sclera: Conjunctivae normal.      Pupils: Pupils are equal, round, and reactive to light.   Cardiovascular:      Rate and Rhythm: Normal rate and regular rhythm.      Pulses: Normal pulses.      Heart sounds: Normal heart sounds.   Pulmonary:      Effort: Pulmonary effort is normal. No respiratory distress.      Breath sounds: Normal breath sounds. No wheezing or rhonchi.   Abdominal:      General: Abdomen is flat.      Palpations: Abdomen is soft.      Tenderness: There is no abdominal tenderness. There is no guarding or rebound.   Musculoskeletal:         General: Normal range of motion.      Cervical back: Normal range of motion and neck supple.      Right lower leg: No edema.      Left lower leg: No edema.      Comments: (+) Left knee tenderness   Skin:     General: Skin is warm and dry.   Neurological:      Mental Status: She is alert and oriented to person, place, and time. Mental status is at baseline.   Psychiatric:         Mood and Affect: Mood normal.      "             Procedures:  Procedures      Medical Decision Making:      Comorbidities that affect care:    Knee replacement    External Notes reviewed:    Previous Clinic Note: Patient was seen in clinic for left knee pain.      The following orders were placed and all results were independently analyzed by me:  Orders Placed This Encounter   Procedures    XR Knee 3 View Left    XR Hip With or Without Pelvis 2 - 3 View Left    XR Ankle 3+ View Left    CT Head Without Contrast    Obtain & Apply The Following- Lower extremity       Medications Given in the Emergency Department:  Medications   oxyCODONE-acetaminophen (PERCOCET) 5-325 MG per tablet 1 tablet (1 tablet Oral Given 4/5/24 1043)   morphine injection 4 mg (4 mg Intravenous Given 4/5/24 1113)        ED Course:         Labs:    Lab Results (last 24 hours)       ** No results found for the last 24 hours. **             Imaging:    CT Head Without Contrast    Result Date: 4/5/2024   DATE OF EXAM: 4/5/2024 10:15 AM  PROCEDURE: CT HEAD WO CONTRAST-  INDICATIONS: headache  COMPARISON: None.  TECHNIQUE: Routine transaxial cuts were obtained through the head without the administration of contrast. Automated exposure control and iterative reconstruction methods were used.  FINDINGS: No intracranial hemorrhage. Mild white matter findings most suggestive of chronic microvascular disease. Posterior fossa without acute abnormality. No abnormal extra-axial fluid collection. Globes are intact and symmetric. No retro-orbital abnormality. The mastoid air cells are well-aerated. The calvarium is intact. There is slight rightward deviation of the nasal septum. Hyperostosis frontalis interna.      1. No acute intracranial abnormality. 2. Mild white matter findings which are nonspecific but most suggestive of chronic microvascular disease.  Electronically Signed By-Jordin Encarnacion MD On:4/5/2024 10:39 AM      XR Hip With or Without Pelvis 2 - 3 View Left    Result Date: 4/5/2024  XR  HIP W OR WO PELVIS 2-3 VIEW LEFT-  Date of Exam: 4/5/2024 9:31 AM  Indication: injury.  Comparison Exams: August 29, 2023  Technique: 3 radiographs of the left hip  FINDINGS: No acute fracture is identified. The pelvic ring appears intact. There are mild degenerative changes. The soft tissues are unremarkable.      No acute fracture or traumatic malalignment identified.   Electronically Signed ByDarryl Craft MD On:4/5/2024 9:57 AM      XR Ankle 3+ View Left    Result Date: 4/5/2024  XR ANKLE 3+ VW LEFT-  Date of Exam: 4/5/2024 9:31 AM  Indication: injury  Comparison: None available.  Findings: No acute fracture is identified. No focal osseous abnormality is identified. The mortise is congruent. The talar dome appears intact. There are small enthesophytes along the posterior and inferior calcaneus. The soft tissues are unremarkable.      Impression: No acute fracture or traumatic malalignment identified.   Electronically Signed ByDarryl Craft MD On:4/5/2024 9:50 AM      XR Knee 3 View Left    Result Date: 4/5/2024  XR KNEE 3 VW LEFT-  Date of Exam: 4/5/2024 9:31 AM  Indication: injury  Comparison: None available.  Findings: No acute fracture is identified. A total knee arthroplasty appears intact. There is no suprapatellar joint effusion.      Impression: No acute fracture or traumatic malalignment identified.   Electronically Signed ByDarryl Craft MD On:4/5/2024 9:50 AM         Differential Diagnosis and Discussion:    Trauma:  Differential diagnosis considered but not limited to were subarachnoid hemorrhage, intracranial bleeding, pneumothorax, cardiac contusion, lung contusion, intra-abdominal bleeding, and compartment syndrome of any extremity or other significant traumatic pathology    All X-rays impressions were independently interpreted by me.  CT scan radiology impression was interpreted by me.    MDM     Amount and/or Complexity of Data Reviewed  Tests in the radiology section of CPT®:  reviewed       The patient's symptoms are consistent with a strain vs. sprain.     A muscle strain, also known as a pulled muscle, is an injury that occurs when a muscle is overstretched or torn, often as a result of fatigue, overuse, or improper use. This can result in pain, swelling, and a limited range of motion.    A sprain, on the other hand, is an injury to a ligament, which is the tissue that connects bones to each other. Sprains often occur in joints like the ankle or wrist when they are twisted or impacted in a way that stretches or tears the ligaments. Symptoms of a sprain can include pain, swelling, bruising, and a decreased ability to move the joint.    The patient was counseled to use rest, ice, and elevation and follow-up with their PCP or an orthopedic surgeon.          Patient Care Considerations:    CT EXTREMITY: I considered ordering an extremity CT, however patient has a bounding dorsalis pedis pulse.      Consultants/Shared Management Plan:    None    Social Determinants of Health:    Patient is independent, reliable, and has access to care.       Disposition and Care Coordination:    Discharged: The patient is suitable and stable for discharge with no need for consideration of admission.    I have explained the patient´s condition, diagnoses and treatment plan based on the information available to me at this time. I have answered questions and addressed any concerns. The patient has a good  understanding of the patient´s diagnosis, condition, and treatment plan as can be expected at this point. The vital signs have been stable. The patient´s condition is stable and appropriate for discharge from the emergency department.      The patient will pursue further outpatient evaluation with the primary care physician or other designated or consulting physician as outlined in the discharge instructions. They are agreeable to this plan of care and follow-up instructions have been explained in detail. The  patient has received these instructions in written format and has expressed an understanding of the discharge instructions. The patient is aware that any significant change in condition or worsening of symptoms should prompt an immediate return to this or the closest emergency department or call to 911.  I have explained discharge medications and the need for follow up with the patient/caretakers. This was also printed in the discharge instructions. Patient was discharged with the following medications and follow up:      Medication List        New Prescriptions      oxyCODONE-acetaminophen 5-325 MG per tablet  Commonly known as: PERCOCET  Take 1 tablet by mouth Every 6 (Six) Hours As Needed for Severe Pain.               Where to Get Your Medications        These medications were sent to [id number] - [place], [place] - [address] AT [facility] [address] - [phone]  - [phone] FX  [address], [place] [address]      Phone: [phone]   oxyCODONE-acetaminophen 5-325 MG per tablet      Ricky Velasquez Jr., MD  [address]  [address]  [place] [address]  [phone]    In 2 days         Final diagnoses:   Knee injury, unspecified laterality, initial encounter        ED Disposition       ED Disposition   Discharge    Condition   Stable    Comment   --               This medical record created using voice recognition software.             Luli Pang MD  04/05/24 5978

## 2024-04-08 NOTE — TELEPHONE ENCOUNTER
LEFT VM W/PATIENT STATING SHE WOULD NEED TO BE SEEN THIS THURSDAY OR FRIDAY SINCE SHE HAD THAT KNEE REPLACED    HUB OK TO RELAY AND SCHEDULE IF [facility] HAS AVAILABILITY - IF HE DOESN'T, SEND TO OFFICE FOR OVERBOOKING AUTH

## 2024-04-08 NOTE — TELEPHONE ENCOUNTER
PATIENT [facility]SAM [facility]ILLARREAL'S CALL AND WOULD LIKE TO BE SCHEDULED FRIDAY (HAS CONFLICT THURSDAY)- HUB DOES NOT HAVE AVAILABILITY      UNABLE TO WARM TRANSFER

## 2024-04-12 ENCOUNTER — TREATMENT (OUTPATIENT)
Dept: PHYSICAL THERAPY | Facility: CLINIC | Age: 49
End: 2024-04-12
Payer: COMMERCIAL

## 2024-04-12 DIAGNOSIS — G89.29 CHRONIC PAIN OF LEFT KNEE: Primary | ICD-10-CM

## 2024-04-12 DIAGNOSIS — M25.562 CHRONIC PAIN OF LEFT KNEE: Primary | ICD-10-CM

## 2024-04-12 NOTE — PROGRESS NOTES
"Physical Therapy Daily Treatment Note  [address], [address] [place] [address]        Patient: Em Montanez   : 1975  Diagnosis/ICD-10 Code:  Chronic pain of left knee [M25.562, G89.29]  Referring practitioner: Kat Collins MD  Date of Initial Visit: Type: THERAPY  Noted: 2024  Today's Date: 2024  Patient seen for 2 sessions             Subjective   Em Montanez reports that she had slipped and fallen on \"Wet ramp\" at home last Friday.  She states that she went down landing on her bent knees.  She states that she went all the way down with her left knee bending all the way.  She reports having  a lot of bruising on her right leg.  She states that her family called the ambulance and went to ER.  She states she could not walk on her leg until the next day.  She states the pain was severe, but states it feels much better now.  She rates her left knee pain at 2-3/10 upon arrival today.  She reports having made appointment with Orthopedic on 24.      Objective     Left Knee   Tenderness in the ITB, patellar tendon and quadriceps tendon.      Active Range of Motion   Left Knee   Flexion: 110 degrees (\"Pressure-Burning\" ) at end range  Extension: 3 degrees          See Exercise and Manual Logs for complete treatment.       Assessment/Plan    Pt tolerated therapy session moderately well today with performance of therapeutic exercises, CKC-Functional activities, and Manual therapy. She has improved, but has had a set back after report of recent fall and subsequent ER visit on 24.  She continues to have deficits in Her Left Knee  ROM,  Strength, and Stability; limiting function and ability to perform ADLs without increased pain at this time.  Pt able to perform stationary bike and basic knee exercises today with significant increase in pain reported.  She does report feeling more \"Pressure\" and \"Burning\" in her knee at end range of flexion.  Mild edema noted.    Progress per Plan of Care " - as tolerated           Timed:  Manual Therapy:    8     mins  58184;  Therapeutic Exercise:    22     mins  11260;     Neuromuscular Yanna:    0    mins  71506;    Therapeutic Activity:     10     mins  44703;     Gait Trainin     mins  92991;     Ultrasound:     0     mins  06711;    Electrical Stimulation:    0     mins  15189;  Iontophoresis     0     mins  04909    Untimed:  Electrical Stimulation:    0     mins  54202 ( );  Mechanical Traction:    0     mins  12845;   Fluidotherapy     0     mins  47054  Hot pack     0     mins  91840  Cold pack     0     mins  29510    Timed Treatment:   40   mins   Total Treatment:     40   mins        Denae Mcdaniel PTA  Physical Therapist Assistant

## 2024-04-15 ENCOUNTER — TELEPHONE (OUTPATIENT)
Dept: PHYSICAL THERAPY | Facility: OTHER | Age: 49
End: 2024-04-15
Payer: COMMERCIAL

## 2024-04-15 NOTE — TELEPHONE ENCOUNTER
Caller: Em Montanez    Relationship: Self    What was the call regarding:PATIENT CANCELLED APPT TODAY BECAUSE SHE HAS DR LI TOMORROW WANTS TO WAIT TILL AFTER SHE SEES

## 2024-04-16 ENCOUNTER — OFFICE VISIT (OUTPATIENT)
Dept: ORTHOPEDIC SURGERY | Facility: CLINIC | Age: 49
End: 2024-04-16
Payer: COMMERCIAL

## 2024-04-16 ENCOUNTER — OFFICE VISIT (OUTPATIENT)
Dept: PODIATRY | Facility: CLINIC | Age: 49
End: 2024-04-16
Payer: COMMERCIAL

## 2024-04-16 VITALS
BODY MASS INDEX: 43.4 KG/M2 | SYSTOLIC BLOOD PRESSURE: 137 MMHG | WEIGHT: 293 LBS | OXYGEN SATURATION: 97 % | HEIGHT: 69 IN | HEART RATE: 72 BPM | DIASTOLIC BLOOD PRESSURE: 91 MMHG

## 2024-04-16 VITALS
WEIGHT: 293 LBS | BODY MASS INDEX: 44.3 KG/M2 | DIASTOLIC BLOOD PRESSURE: 83 MMHG | SYSTOLIC BLOOD PRESSURE: 140 MMHG | OXYGEN SATURATION: 96 % | HEART RATE: 75 BPM | TEMPERATURE: 98.5 F

## 2024-04-16 DIAGNOSIS — M25.562 LEFT KNEE PAIN, UNSPECIFIED CHRONICITY: Primary | ICD-10-CM

## 2024-04-16 DIAGNOSIS — B35.1 ONYCHOMYCOSIS: ICD-10-CM

## 2024-04-16 DIAGNOSIS — E11.9 NON-INSULIN DEPENDENT TYPE 2 DIABETES MELLITUS: ICD-10-CM

## 2024-04-16 DIAGNOSIS — E11.8 DIABETIC FOOT: ICD-10-CM

## 2024-04-16 DIAGNOSIS — M79.672 FOOT PAIN, BILATERAL: Primary | ICD-10-CM

## 2024-04-16 DIAGNOSIS — L60.0 ONYCHOCRYPTOSIS: ICD-10-CM

## 2024-04-16 DIAGNOSIS — S89.92XD INJURY OF LEFT KNEE, SUBSEQUENT ENCOUNTER: ICD-10-CM

## 2024-04-16 DIAGNOSIS — M79.671 FOOT PAIN, BILATERAL: Primary | ICD-10-CM

## 2024-04-16 PROCEDURE — 99213 OFFICE O/P EST LOW 20 MIN: CPT | Performed by: ORTHOPAEDIC SURGERY

## 2024-04-16 NOTE — PROGRESS NOTES
[facility] - PODIATRY    Today's Date: 04/16/24    Patient Name: Em Montanez  MRN: [id number]  CSN: [id number]  PCP: Ricky Velasquez Jr., MD, Last PCP Visit: 11 March 2024  Referring Provider: No ref. provider found    SUBJECTIVE     Chief Complaint   Patient presents with    Left Foot - Follow-up, Nail Problem    Right Foot - Follow-up, Nail Problem     HPI: Em Montanez, a 48 y.o.female, comes to clinic.    New, Established, New Problem:  est  Location:  Toenails  Duration:   Greater than five years  Onset:  Gradual  Nature:  sore with palpation.  Stable, worsening, improving:   stable  Aggravating factors:  Pain with shoe gear and ambulation.  Previous Treatment: debridement    Patient denies any fevers, chills, nausea, vomiting, shortness of breath, nor any other constitutional signs nor symptoms.       Patient states their most recent blood glucose reading was:  1113    Medical changes:  no changes    I have reviewed/confirmed previously documented HPI with no changes.     Past Medical History:   Diagnosis Date    Abnormal Pap smear of cervix     Arthritis     Asthma     NO INHALER USE    Back pain 11/21/2017    Disease of thyroid gland     Fatigue 11/21/2017    H/O Chronic pelvic pain in female 02/23/2022    H/O Foot pain, right     H/O Ovarian cyst     HPV (human papilloma virus) infection     Hypoglycemia     Insomnia 12/11/2017    Pelvic pain 01/12/2022    Polycystic ovarian syndrome 11/21/2017    Seasonal allergies     Uterine pain     Vitamin D deficiency 12/11/2017     Past Surgical History:   Procedure Laterality Date    CAST APPLICATION Right 06/16/2023    Procedure: CAST APPLICATION LEG;  Surgeon: Lucas Rviera DPM;  Location: [facility] MAIN OR;  Service: Podiatry;  Laterality: Right;    CAST APPLICATION Left 9/1/2023    Procedure: CAST APPLICATION LEG;  Surgeon: Lucas Rivera DPM;  Location: [facility] MAIN OR;  Service: Podiatry;  Laterality: Left;     CHOLECYSTECTOMY      COLONOSCOPY N/A 02/16/2022    Procedure: COLONOSCOPY;  Surgeon: Jb Gonzalez MD;  Location: [facility] ENDOSCOPY;  Service: Gastroenterology;  Laterality: N/A;  hemmorroids    ENDOMETRIAL ABLATION      ESSURE TUBAL LIGATION      JOINT REPLACEMENT      bilateral knee replacement    LEEP      PLANTAR FASCIA RELEASE Right 06/16/2023    Procedure: FOOT PLANTAR FASCIECTOMY;  Surgeon: Lucas Rivera DPM;  Location: [facility] MAIN OR;  Service: Podiatry;  Laterality: Right;    PLANTAR FASCIECTOMY Left 9/1/2023    Procedure: FASCIECTOMY PLANTAR FASCIA;  Surgeon: Lucas Rivera DPM;  Location: [facility] MAIN OR;  Service: Podiatry;  Laterality: Left;    TARSAL TUNNEL RELEASE Right 06/16/2023    Procedure: TARSAL TUNNEL RELEASE;  Surgeon: Lucas Rivera DPM;  Location: [facility] MAIN OR;  Service: Podiatry;  Laterality: Right;    TARSAL TUNNEL RELEASE Left 9/1/2023    Procedure: LEFT TARSAL TUNNEL RELEASE;  Surgeon: Lucas Rivera DPM;  Location: [facility] MAIN OR;  Service: Podiatry;  Laterality: Left;    TONSILLECTOMY      TOTAL LAPAROSCOPIC HYSTERECTOMY N/A 04/06/2022    Procedure: TOTAL LAPAROSCOPIC HYSTERECTOMY WITH DAVINCI ROBOT;  Surgeon: Chito Cook MD;  Location: [facility] MAIN OR;  Service: Robotics - DaVinci;  Laterality: N/A;     Family History   Problem Relation Age of Onset    Depression Mother     Heart disease Mother     Arthritis Mother     ADD / ADHD Son     Self-Injurious Behavior  Daughter     Depression Daughter     Anxiety disorder Daughter     Cancer Other     Malig Hyperthermia Neg Hx     Breast cancer Neg Hx     Uterine cancer Neg Hx     Ovarian cancer Neg Hx     Colon cancer Neg Hx     Deep vein thrombosis Neg Hx     Pulmonary embolism Neg Hx      Social History     Socioeconomic History    Marital status:    Tobacco Use    Smoking status: Every Day     Current packs/day: 1.00     Average packs/day: 1 pack/day for 30.0 years (30.0 ttl  pk-yrs)     Types: Cigarettes     Passive exposure: Current    Smokeless tobacco: Never    Tobacco comments:     INSTRUCTED NO SMOKING 24 HR PRIOR TO SURGERY    Vaping Use    Vaping status: Never Used   Substance and Sexual Activity    Alcohol use: Yes     Comment: SOCIALLY    Drug use: Never     Comment: patient denies    Sexual activity: Yes     Partners: Male     Birth control/protection: Hysterectomy     Allergies   Allergen Reactions    Prednisone Mental Status Change     Other reaction(s): Mental Status Change    Tramadol GI Intolerance     Nausea and vomiting  Other reaction(s): GI Intolerance, Vomiting  Nausea and vomiting    Diclofenac Rash     Current Outpatient Medications   Medication Sig Dispense Refill    Continuous Blood Gluc  (FreeStyle Cherie 14 Day [place]) device 1 Device Every 14 (Fourteen) Days. 6 each 3    Continuous Blood Gluc Sensor (FreeStyle Cherie 2 Sensor) misc Inject 1 each under the skin into the appropriate area as directed Every 14 (Fourteen) Days. 4 each 3    cyanocobalamin 1000 MCG/ML injection INJECT 1 ML INTO THE MUSCLE AS DIRECTED BY PRESCRIBER EVERY 28 DAYS. 10 mL 0    levothyroxine (SYNTHROID, LEVOTHROID) 50 MCG tablet TAKE ONE TABLET BY MOUTH DAILY 90 tablet 1    meloxicam (Mobic) 15 MG tablet Take 1 tablet by mouth Daily. 30 tablet 2    metFORMIN ER (GLUCOPHAGE-XR) 750 MG 24 hr tablet Take 1 tablet by mouth Daily With Dinner. 90 tablet 1    multivitamin with minerals tablet tablet Take 1 tablet by mouth Daily.      traZODone (DESYREL) 50 MG tablet Take 1 tablet by mouth Every Night. 90 tablet 1    valACYclovir (Valtrex) 1000 MG tablet Take 1 tablet by mouth Daily. 10 tablet 2    oxyCODONE-acetaminophen (PERCOCET) 5-325 MG per tablet Take 1 tablet by mouth Every 6 (Six) Hours As Needed for Severe Pain. (Patient not taking: Reported on 4/16/2024) 12 tablet 0     No current facility-administered medications for this visit.     Review of Systems   Constitutional: Negative.     Skin:         Painful toenails.   All other systems reviewed and are negative.      OBJECTIVE     Vitals:    24 0831   BP: 140/83   Pulse: 75   Temp: 98.5 °F (36.9 °C)   SpO2: 96%         Patient seen in no apparent distress.      PHYSICAL EXAM:     Foot/Ankle Exam    GENERAL  Appearance:  obese  Orientation:  AAOx3  Affect:  appropriate  Gait:  unimpaired  Assistance:  independent  Right shoe gear: casual shoe  Left shoe gear: casual shoe    VASCULAR     Right Foot Vascularity   Dorsalis pedis:  2+  Posterior tibial:  2+  Skin temperature:  warm  Edema gradin+ and pitting  CFT:  < 3 seconds  Pedal hair growth:  Absent  Varicosities:  mild varicosities     Left Foot Vascularity   Dorsalis pedis:  2+  Posterior tibial:  2+  Skin temperature:  warm  Edema gradin+ and pitting  CFT:  < 3 seconds  Pedal hair growth:  Absent  Varicosities:  mild varicosities     NEUROLOGIC     Right Foot Neurologic   Normal sensation    Light touch sensation: normal  Vibratory sensation: normal  Hot/Cold sensation: normal  Protective Sensation using [place] Monofilament:   Sites intact: 10  Sites tested: 10     Left Foot Neurologic   Normal sensation    Light touch sensation: normal  Vibratory sensation: normal  Hot/Cold sensation:  normal  Protective Sensation using [place] Monofilament:   Sites intact: 10  Sites tested: 10    MUSCLE STRENGTH     Right Foot Muscle Strength   Foot dorsiflexion:  4  Foot plantar flexion:  4  Foot inversion:  4  Foot eversion:  4     Left Foot Muscle Strength   Foot dorsiflexion:  4  Foot plantar flexion:  4  Foot inversion:  4  Foot eversion:  4    RANGE OF MOTION     Right Foot Range of Motion   Foot and ankle ROM within normal limits       Left Foot Range of Motion   Foot and ankle ROM within normal limits      DERMATOLOGIC      Right Foot Dermatologic   Skin  Right foot skin is intact.   Nails  1.  Positive for onychomycosis, abnormal thickness, subungual debris,  dystrophic nail and ingrown toenail.  4.  Positive for elongated, onychomycosis, abnormal thickness, subungual debris, dystrophic nail and ingrown toenail.  5.  Positive for elongated, onychomycosis, abnormal thickness, subungual debris, dystrophic nail and ingrown toenail.     Left Foot Dermatologic   Skin  Left foot skin is intact.   Nails  1.  Positive for elongated, onychomycosis, abnormal thickness, subungual debris, dystrophic nail and ingrown toenail.  2.  Positive for elongated, onychomycosis, abnormal thickness, subungual debris, dystrophic nail and ingrown toenail.  5.  Positive for elongated, onychomycosis, abnormally thick, subungual debris, dystrophic nail and ingrown toenail.    I have reexamined the patient the results are consistent with the previously documented exam.        ASSESSMENT/PLAN     Diagnoses and all orders for this visit:    1. Foot pain, bilateral (Primary)    2. Non-insulin dependent type 2 diabetes mellitus    3. Diabetic foot    4. Onychocryptosis    5. Onychomycosis    Comprehensive lower extremity examination and evaluation was performed.    Discussed findings and treatment plan including risks, benefits, and treatment options with patient in detail. Patient agreed with treatment plan.    Toenails 1, 4, 5 on Right and 1, 2, 5 on Left were debrided with nail nippers then filed with a Dremel nail serena.  Patient tolerated procedure well without complications.    Patient is to monitor for recurrence and any new symptoms and to contact Dr. Rivera's office for a follow-up appointment.      The patient states understanding and agreement with this plan.    An After Visit Summary was printed and given to the patient at discharge, including (if requested) any available informative/educational handouts regarding diagnosis, treatment, or medications. All questions were answered to patient/family satisfaction. Should symptoms fail to improve or worsen they agree to call or return to clinic  or to go to the Emergency Department. Discussed the importance of following up with any needed screening tests/labs/specialist appointments and any requested follow-up recommended by me today. Importance of maintaining follow-up discussed and patient accepts that missed appointments can delay diagnosis and potentially lead to worsening of conditions.    Return in about 9 weeks (around 6/18/2024) for Toenail Care., or sooner if acute issues arise.    I have reviewed the assessment and plan and verified the accuracy of it. No changes to assessment and plan since the information was documented. Lucas Rivera DPM 04/16/24     I have dictated this note utilizing Dragon Dictation.  Please note that portions of this note were completed with a voice recognition program.  Part of this note may be an electronic transcription/translation of spoken language to printed text using the Dragon Dictation System.        This document has been electronically signed by Lucas Rivera DPM on April 16, 2024 08:57 EDT

## 2024-04-16 NOTE — PROGRESS NOTES
"Chief Complaint  Follow-up and Pain of the Left Knee     Subjective      Em Montanez presents to [facility] for follow up of the left knee.  She had a left total knee arthroplasty performed on 04/05/2021.   She had a fall a couple of weeks ago on her left knee.  She went to the ER on 4/5/24.  She has been in physical therapy but having a hard time.  She has pain in and around the knee and pain in the left hip.     Allergies   Allergen Reactions    Prednisone Mental Status Change     Other reaction(s): Mental Status Change    Tramadol GI Intolerance     Nausea and vomiting  Other reaction(s): GI Intolerance, Vomiting  Nausea and vomiting    Diclofenac Rash        Social History     Socioeconomic History    Marital status:    Tobacco Use    Smoking status: Every Day     Current packs/day: 1.00     Average packs/day: 1 pack/day for 30.0 years (30.0 ttl pk-yrs)     Types: Cigarettes     Passive exposure: Current    Smokeless tobacco: Never    Tobacco comments:     INSTRUCTED NO SMOKING 24 HR PRIOR TO SURGERY    Vaping Use    Vaping status: Never Used   Substance and Sexual Activity    Alcohol use: Yes     Comment: SOCIALLY    Drug use: Never     Comment: patient denies    Sexual activity: Yes     Partners: Male     Birth control/protection: Hysterectomy        I reviewed the patient's chief complaint, history of present illness, review of systems, past medical history, surgical history, family history, social history, medications, and allergy list.     Review of Systems     Constitutional: Denies fevers, chills, weight loss  Cardiovascular: Denies chest pain, shortness of breath  Skin: Denies rashes, acute skin changes  Neurologic: Denies headache, loss of consciousness      Vital Signs:   /91   Pulse 72   Ht 175.3 cm (69\")   Wt 136 kg (300 lb)   SpO2 97%   BMI 44.30 kg/m²          Physical Exam  General: Alert. No acute distress    Ortho Exam        LEFT KNEE No " evidence of wound dehiscence, surrounding erythema, warmth, or drainage. Flexion 110. Extension 0. Stable to varus/valgus stress. Stable to anterior/posterior drawer. Neurovascularly intact. Negative Queta. Negative Lachman. Positive EHL, FHL, HS and TA. Sensation intact to light touch all 5 nerves of the foot. Ambulates with Antalgic gait. Patella is well tracking. Calf supple, non-tender. Negative tenderness to the medial joint line. Negative tenderness to the lateral joint line. Negative Crepitus. Good strength to hamstrings, quadriceps, dorsiflexors, and plantar flexors.  Knee Extensor Mechanism intact     Procedures      Imaging Results (Most Recent)       None             Result Review :       CT Head Without Contrast    Result Date: 4/5/2024  Narrative:  DATE OF EXAM: 4/5/2024 10:15 AM  PROCEDURE: CT HEAD WO CONTRAST-  INDICATIONS: headache  COMPARISON: None.  TECHNIQUE: Routine transaxial cuts were obtained through the head without the administration of contrast. Automated exposure control and iterative reconstruction methods were used.  FINDINGS: No intracranial hemorrhage. Mild white matter findings most suggestive of chronic microvascular disease. Posterior fossa without acute abnormality. No abnormal extra-axial fluid collection. Globes are intact and symmetric. No retro-orbital abnormality. The mastoid air cells are well-aerated. The calvarium is intact. There is slight rightward deviation of the nasal septum. Hyperostosis frontalis interna.      Impression: 1. No acute intracranial abnormality. 2. Mild white matter findings which are nonspecific but most suggestive of chronic microvascular disease.  Electronically Signed By-Jordin Encarnacion MD On:4/5/2024 10:39 AM      XR Hip With or Without Pelvis 2 - 3 View Left    Result Date: 4/5/2024  Narrative: XR HIP W OR WO PELVIS 2-3 VIEW LEFT-  Date of Exam: 4/5/2024 9:31 AM  Indication: injury.  Comparison Exams: August 29, 2023  Technique: 3 radiographs of  the left hip  FINDINGS: No acute fracture is identified. The pelvic ring appears intact. There are mild degenerative changes. The soft tissues are unremarkable.      Impression: No acute fracture or traumatic malalignment identified.   Electronically Signed ByDarryl Craft MD On:4/5/2024 9:57 AM      XR Ankle 3+ View Left    Result Date: 4/5/2024  Narrative: XR ANKLE 3+ VW LEFT-  Date of Exam: 4/5/2024 9:31 AM  Indication: injury  Comparison: None available.  Findings: No acute fracture is identified. No focal osseous abnormality is identified. The mortise is congruent. The talar dome appears intact. There are small enthesophytes along the posterior and inferior calcaneus. The soft tissues are unremarkable.      Impression: Impression: No acute fracture or traumatic malalignment identified.   Electronically Signed By-Donato Craft MD On:4/5/2024 9:50 AM      XR Knee 3 View Left    Result Date: 4/5/2024  Narrative: XR KNEE 3 VW LEFT-  Date of Exam: 4/5/2024 9:31 AM  Indication: injury  Comparison: None available.  Findings: No acute fracture is identified. A total knee arthroplasty appears intact. There is no suprapatellar joint effusion.      Impression: Impression: No acute fracture or traumatic malalignment identified.   Electronically Signed ByDarryl Craft MD On:4/5/2024 9:50 AM      Mammo Screening Digital Tomosynthesis Bilateral With CAD    Result Date: 4/3/2024  Narrative: MAMMO SCREENING DIGITAL TOMOSYNTHESIS BILATERAL W CAD-  Date of Exam: 4/3/2024 8:31 AM  Indication: Screening mammogram; Z01.419-Encounter for gynecological examination (general) (routine) without abnormal findings  Comparison: Prior screening and/or diagnostic mammograms for comparison: 2/28/2023, 1/5/2022, 3/1/2021, 8/28/2020.  Technique:  Screening mammogram was performed utilizing tomosynthesis.   These mammographic images were interpreted with the assistance of a computer aided detection system.  FINDINGS:  No suspicious  microcalcifications, dominant masses or areas of architectural distortion in either breast.        Impression: Benign mammogram. Recommend routine bilateral breast screening.  TISSUE DENSITY:  The breasts are almost entirely fatty.  BI-RADS ASSESSMENT: BI-RADS 1. Negative.   The patient's information is entered into a computerized reminder system with a targeted due date for the next mammogram.  Note:  It has been reported that there is approximately a 15% false negative in mammography.  Therefore, management of a palpable abnormality should not be deferred because of a negative mammogram.           Electronically Signed By-RADU TANG MD On:4/3/2024 9:17 AM              Assessment and Plan     Diagnoses and all orders for this visit:    1. Left knee pain, unspecified chronicity (Primary)    2. Injury of left knee, subsequent encounter        Discussed the treatment plan with the patient. I reviewed the X-rays that were obtained 4/5/24 with the patient.     Continue physical therapy.     Continue Meloxicam.        Educated on risk of smoking/nicotine. Discussed options for smoking cessation. and Call or return if worsening symptoms.    Follow Up     4-6 weeks to assess ROM and pain.       Patient was given instructions and counseling regarding her condition or for health maintenance advice. Please see specific information pulled into the AVS if appropriate.     Scribed for Kat Collins MD by Johanny Triana MA.  04/16/24   14:18 EDT    I have personally performed the services described in this document as scribed by the above individual and it is both accurate and complete. Kat Collins MD 04/16/24

## 2024-04-17 ENCOUNTER — TREATMENT (OUTPATIENT)
Dept: PHYSICAL THERAPY | Facility: CLINIC | Age: 49
End: 2024-04-17
Payer: COMMERCIAL

## 2024-04-17 DIAGNOSIS — G89.29 CHRONIC PAIN OF LEFT KNEE: Primary | ICD-10-CM

## 2024-04-17 DIAGNOSIS — M25.562 CHRONIC PAIN OF LEFT KNEE: Primary | ICD-10-CM

## 2024-04-17 NOTE — PROGRESS NOTES
Physical Therapy Daily Treatment Note  [address], [address] [place] [address]        Patient: Em Montanez   : 1975  Diagnosis/ICD-10 Code:  Chronic pain of left knee [M25.562, G89.29]  Referring practitioner: Kat Collins MD  Date of Initial Visit: Type: THERAPY  Noted: 2024  Today's Date: 2024  Patient seen for 3 sessions             Subjective   Em Montanez denies having any left knee pain upon arrival today.  She reports that she had follow-up with orthopedic yesterday and he felt everything was ok with her left knee after recent fall.  She reports that he wanted her to continue with Meloxicam and with physical therapy.    Objective     + Left Hip pain with SLR performance and PROM of Left Hip  + Pain - Mild tenderness to palpation Left lateral knee.    See Exercise and Manual Logs for complete treatment.       Assessment/Plan    Pt tolerated therapy session moderately well - with progression of therapeutic exercises, CKC-Functional activities, and Manual therapy. She has improved, but continues to have deficits in Her Left Hip/Knee ROM,  Strength, and Stability; limiting functional mobility  and ability to perform ADLs without increased pain and difficulty at this time.    Progress per Plan of Care           Timed:  Manual Therapy:    8     mins  68113;  Therapeutic Exercise:    22     mins  73717;     Neuromuscular Yanna:    0    mins  95978;    Therapeutic Activity:     10     mins  14392;     Gait Trainin     mins  69083;     Ultrasound:     0     mins  69414;    Electrical Stimulation:    0     mins  08088;  Iontophoresis     00     mins  91306    Untimed:  Electrical Stimulation:    0     mins  71754 ( );  Mechanical Traction:    0     mins  65994;   Fluidotherapy     0     mins  63115  Hot pack     0     mins  26345  Cold pack     0     mins  63390    Timed Treatment:   40   mins   Total Treatment:     40   mins        Denae Mcdaniel PTA  Physical Therapist  Assistant

## 2024-04-21 ENCOUNTER — TELEMEDICINE (OUTPATIENT)
Dept: FAMILY MEDICINE CLINIC | Facility: TELEHEALTH | Age: 49
End: 2024-04-21
Payer: COMMERCIAL

## 2024-04-21 DIAGNOSIS — J30.9 ALLERGIC RHINITIS, UNSPECIFIED SEASONALITY, UNSPECIFIED TRIGGER: ICD-10-CM

## 2024-04-21 DIAGNOSIS — J06.9 UPPER RESPIRATORY TRACT INFECTION, UNSPECIFIED TYPE: Primary | ICD-10-CM

## 2024-04-21 RX ORDER — LORATADINE 10 MG/1
10 TABLET ORAL DAILY
Qty: 30 TABLET | Refills: 0 | Status: SHIPPED | OUTPATIENT
Start: 2024-04-21

## 2024-04-21 RX ORDER — BROMPHENIRAMINE MALEATE, PSEUDOEPHEDRINE HYDROCHLORIDE, AND DEXTROMETHORPHAN HYDROBROMIDE 2; 30; 10 MG/5ML; MG/5ML; MG/5ML
10 SYRUP ORAL 4 TIMES DAILY PRN
Qty: 200 ML | Refills: 0 | Status: SHIPPED | OUTPATIENT
Start: 2024-04-21 | End: 2024-04-21

## 2024-04-21 RX ORDER — FLUTICASONE PROPIONATE 50 MCG
2 SPRAY, SUSPENSION (ML) NASAL DAILY
Qty: 18 ML | Refills: 0 | Status: SHIPPED | OUTPATIENT
Start: 2024-04-21

## 2024-04-21 RX ORDER — DEXTROMETHORPHAN HYDROBROMIDE AND PROMETHAZINE HYDROCHLORIDE 15; 6.25 MG/5ML; MG/5ML
5 SYRUP ORAL 4 TIMES DAILY PRN
Qty: 150 ML | Refills: 0 | Status: SHIPPED | OUTPATIENT
Start: 2024-04-21

## 2024-04-21 NOTE — PROGRESS NOTES
You have chosen to receive care through a telehealth visit.  Do you consent to use a video/audio connection for your medical care today? Yes     CHIEF COMPLAINT  No chief complaint on file.        HPI  Em Montanez is a 48 y.o. female  presents with complaint of 2 day history of cough, nasal congestion with clear discharge, PND, hoarseness, persistent cough.  Denies fever, wheezing, shortness of breath.     Review of Systems  See HPI    Past Medical History:   Diagnosis Date    Abnormal Pap smear of cervix     Arthritis     Asthma     NO INHALER USE    Back pain 11/21/2017    Disease of thyroid gland     Fatigue 11/21/2017    H/O Chronic pelvic pain in female 02/23/2022    H/O Foot pain, right     H/O Ovarian cyst     HPV (human papilloma virus) infection     Hypoglycemia     Insomnia 12/11/2017    Pelvic pain 01/12/2022    Polycystic ovarian syndrome 11/21/2017    Seasonal allergies     Uterine pain     Vitamin D deficiency 12/11/2017       Family History   Problem Relation Age of Onset    Depression Mother     Heart disease Mother     Arthritis Mother     ADD / ADHD Son     Self-Injurious Behavior  Daughter     Depression Daughter     Anxiety disorder Daughter     Cancer Other     Malig Hyperthermia Neg Hx     Breast cancer Neg Hx     Uterine cancer Neg Hx     Ovarian cancer Neg Hx     Colon cancer Neg Hx     Deep vein thrombosis Neg Hx     Pulmonary embolism Neg Hx        Social History     Socioeconomic History    Marital status:    Tobacco Use    Smoking status: Every Day     Current packs/day: 1.00     Average packs/day: 1 pack/day for 30.0 years (30.0 ttl pk-yrs)     Types: Cigarettes     Passive exposure: Current    Smokeless tobacco: Never    Tobacco comments:     INSTRUCTED NO SMOKING 24 HR PRIOR TO SURGERY    Vaping Use    Vaping status: Never Used   Substance and Sexual Activity    Alcohol use: Yes     Comment: SOCIALLY    Drug use: Never     Comment: patient denies    Sexual activity: Yes      Partners: Male     Birth control/protection: Hysterectomy       Em Montanez  reports that she has been smoking cigarettes. She has a 30 pack-year smoking history. She has been exposed to tobacco smoke. She has never used smokeless tobacco.               LMP  (LMP Unknown)     PHYSICAL EXAM  Physical Exam   Constitutional: She is oriented to person, place, and time. She appears well-developed and well-nourished. She does not have a sickly appearance. She does not appear ill.   HENT:   Head: Normocephalic and atraumatic.   Pulmonary/Chest: Effort normal.  No respiratory distress (persistent cough during visit).  Neurological: She is alert and oriented to person, place, and time.         Diagnoses and all orders for this visit:    1. Upper respiratory tract infection, unspecified type (Primary)  -     brompheniramine-pseudoephedrine-DM 30-2-10 MG/5ML syrup; Take 10 mL by mouth 4 (Four) Times a Day As Needed for Congestion, Cough or Allergies.  Dispense: 200 mL; Refill: 0    2. Allergic rhinitis, unspecified seasonality, unspecified trigger  -     brompheniramine-pseudoephedrine-DM 30-2-10 MG/5ML syrup; Take 10 mL by mouth 4 (Four) Times a Day As Needed for Congestion, Cough or Allergies.  Dispense: 200 mL; Refill: 0  -     loratadine (Claritin) 10 MG tablet; Take 1 tablet by mouth Daily.  Dispense: 30 tablet; Refill: 0  -     fluticasone (FLONASE) 50 MCG/ACT nasal spray; 2 sprays into the nostril(s) as directed by provider Daily.  Dispense: 18 mL; Refill: 0    --take medications as prescribed  --increase fluids, rest as needed, tylenol or ibuprofen for pain  --f/u in 5-7 days if no improvement        FOLLOW-UP  As discussed during visit with PCP/[facility] if no improvement or Urgent Care/Emergency Department if worsening of symptoms    Patient verbalizes understanding of medication dosage, comfort measures, instructions for treatment and follow-up.    Alexa Dalal, JOSEMANUEL  04/21/2024  10:38 EDT    The use  of a video visit has been reviewed with the patient and verbal informed consent has been obtained. Myself and Em Montanez participated in this visit. The patient is located in [address] [place] [address].    I am located in [place], [place]. Mychart and Twilio were utilized. I spent 8 minutes in the patient's chart for this visit.      Note Disclaimer: At [facility], we believe that sharing information builds trust and better   relationships. You are receiving this note because you recently visited [facility]. It is possible you   will see health information before a provider has talked with you about it. This kind of information can   be easy to misunderstand. To help you fully understand what it means for your health, we urge you to   discuss this note with your provider.

## 2024-04-21 NOTE — PATIENT INSTRUCTIONS
Upper Respiratory Infection, Adult  An upper respiratory infection (URI) is a common viral infection of the nose, throat, and upper air passages that lead to the lungs. The most common type of URI is the common cold. URIs usually get better on their own, without medical treatment.  What are the causes?  A URI is caused by a virus. You may catch a virus by:  Breathing in droplets from an infected person's cough or sneeze.  Touching something that has been exposed to the virus (is contaminated) and then touching your mouth, nose, or eyes.  What increases the risk?  You are more likely to get a URI if:  You are very young or very old.  You have close contact with others, such as at work, school, or a health care facility.  You smoke.  You have long-term (chronic) heart or lung disease.  You have a weakened disease-fighting system (immune system).  You have nasal allergies or asthma.  You are experiencing a lot of stress.  You have poor nutrition.  What are the signs or symptoms?  A URI usually involves some of the following symptoms:  Runny or stuffy (congested) nose.  Cough.  Sneezing.  Sore throat.  Headache.  Fatigue.  Fever.  Loss of appetite.  Pain in your forehead, behind your eyes, and over your cheekbones (sinus pain).  Muscle aches.  Redness or irritation of the eyes.  Pressure in the ears or face.  How is this diagnosed?  This condition may be diagnosed based on your medical history and symptoms, and a physical exam. Your health care provider may use a swab to take a mucus sample from your nose (nasal swab). This sample can be tested to determine what virus is causing the illness.  How is this treated?  URIs usually get better on their own within 7-10 days. Medicines cannot cure URIs, but your health care provider may recommend certain medicines to help relieve symptoms, such as:  Over-the-counter cold medicines.  Cough suppressants. Coughing is a type of defense against infection that helps to clear the  respiratory system, so take these medicines only as recommended by your health care provider.  Fever-reducing medicines.  Follow these instructions at home:  Activity  Rest as needed.  If you have a fever, stay home from work or school until your fever is gone or until your health care provider says your URI cannot spread to other people (is no longer contagious). Your health care provider may have you wear a face mask to prevent your infection from spreading.  Relieving symptoms  Gargle with a mixture of salt and water 3-4 times a day or as needed. To make salt water, completely dissolve ½-1 tsp (3-6 g) of salt in 1 cup (237 mL) of warm water.  Use a cool-mist humidifier to add moisture to the air. This can help you breathe more easily.  Eating and drinking    Drink enough fluid to keep your urine pale yellow.  Eat soups and other clear broths.  General instructions    Take over-the-counter and prescription medicines only as told by your health care provider. These include cold medicines, fever reducers, and cough suppressants.  Do not use any products that contain nicotine or tobacco. These products include cigarettes, chewing tobacco, and vaping devices, such as e-cigarettes. If you need help quitting, ask your health care provider.  Stay away from secondhand smoke.  Stay up to date on all immunizations, including the yearly (annual) flu vaccine.  Keep all follow-up visits. This is important.  How to prevent the spread of infection to others  URIs can be contagious. To prevent the infection from spreading:  Wash your hands with soap and water for at least 20 seconds. If soap and water are not available, use hand .  Avoid touching your mouth, face, eyes, or nose.  Cough or sneeze into a tissue or your sleeve or elbow instead of into your hand or into the air.    Contact a health care provider if:  You are getting worse instead of better.  You have a fever or chills.  Your mucus is brown or red.  You have  yellow or brown discharge coming from your nose.  You have pain in your face, especially when you bend forward.  You have swollen neck glands.  You have pain while swallowing.  You have white areas in the back of your throat.  Get help right away if:  You have shortness of breath that gets worse.  You have severe or persistent:  Headache.  Ear pain.  Sinus pain.  Chest pain.  You have chronic lung disease along with any of the following:  Making high-pitched whistling sounds when you breathe, most often when you breathe out (wheezing).  Prolonged cough (more than 14 days).  Coughing up blood.  A change in your usual mucus.  You have a stiff neck.  You have changes in your:  Vision.  Hearing.  Thinking.  Mood.  These symptoms may be an emergency. Get help right away. Call 911.  Do not wait to see if the symptoms will go away.  Do not drive yourself to the hospital.  Summary  An upper respiratory infection (URI) is a common infection of the nose, throat, and upper air passages that lead to the lungs.  A URI is caused by a virus.  URIs usually get better on their own within 7-10 days.  Medicines cannot cure URIs, but your health care provider may recommend certain medicines to help relieve symptoms.  This information is not intended to replace advice given to you by your health care provider. Make sure you discuss any questions you have with your health care provider.  Document Revised: 07/20/2022 Document Reviewed: 07/20/2022  [employer] Patient Education © 2024 [employer]  Allergic Rhinitis, Adult    Allergic rhinitis is an allergic reaction that affects the mucous membrane inside the nose. The mucous membrane is the tissue that produces mucus.  There are two types of allergic rhinitis:  Seasonal. This type is also called hay fever and happens only during certain seasons.  Perennial. This type can happen at any time of the year.  Allergic rhinitis cannot be spread from person to person. This condition can be mild,  bad, or very bad. It can develop at any age and may be outgrown.  What are the causes?  This condition is caused by allergens. These are things that can cause an allergic reaction. Allergens may differ for seasonal allergic rhinitis and perennial allergic rhinitis.  Seasonal allergic rhinitis is caused by pollen. Pollen can come from grasses, trees, and weeds.  Perennial allergic rhinitis may be caused by:  Dust mites.  Proteins in a pet's pee (urine), saliva, or dander. Dander is dead skin cells from a pet.  Smoke, mold, or car fumes.  Remains of or waste from insects such as cockroaches.  What increases the risk?  You are more likely to develop this condition if you have a family history of allergies or other conditions related to allergies, including:  Allergic conjunctivitis. This is irritation and swelling of parts of the eyes and eyelids.  Asthma. This condition affects the lungs and makes it hard to breathe.  Atopic dermatitis or eczema. This is long term (chronic) irritation and swelling of the skin.  Food allergies.  What are the signs or symptoms?  Symptoms of this condition include:  Sneezing or coughing.  A stuffy nose (nasal congestion), itchy nose, or nasal discharge.  Itchy eyes and tearing of the eyes.  A feeling of mucus dripping down the back of your throat (postnasal drip). This may cause a sore throat.  Trouble sleeping.  Tiredness.  Headache.  How is this diagnosed?  This condition may be diagnosed with your symptoms, your medical history, and a physical exam. Your health care provider may check for related conditions, such as:  Asthma.  Pink eye. This is eye swelling and irritation caused by infection (conjunctivitis).  Ear infection.  Upper respiratory infection. This is an infection in the nose, throat, or upper airways.  You may also have tests to find out which allergens cause your symptoms. These may include skin tests or blood tests.  How is this treated?  There is no cure for this  condition, but treatment can help control symptoms. Treatment may include:  Taking medicines that block allergy symptoms, such as corticosteroids (anti-inflammatories) and antihistamines. Medicine may be given as a shot, nasal spray, or pill.  Avoiding any allergens.  Being exposed again and again to tiny amounts of allergens to help you build a defense against allergens (allergenimmunotherapy). This is done if other treatments have not helped. It may include:  Allergy shots. These are injected medicines that have small amounts of an allergen in them.  Sublingual immunotherapy. This involves taking small doses of a medicine with an allergen in it under your tongue.  If these treatments do not work, your provider may prescribe newer, stronger medicines.  Follow these instructions at home:  Avoiding allergens  Find out what you are allergic to and avoid those allergens. These are some things you can do to help avoid allergens:  If you have perennial allergies:  Replace carpet with wood, tile, or vinyl lakshmi. Carpet can trap dander and dust.  Do not smoke. Do not allow smoking in your home  Change your heating and air conditioning filters at least once a month.  If you have seasonal allergies, take these steps during allergy season:  Keep windows closed as much as possible.  Plan outdoor activities when pollen counts are lowest. Check pollen counts before you plan outdoor activities  When coming indoors, change clothing and shower before sitting on furniture or bedding.  If you have a pet in the house that produces allergens:  Keep the pet out of the bedroom.  Vacuum, sweep, and dust regularly.  General instructions  Take over-the-counter and prescription medicines only as told by your provider.  Drink enough fluid to keep your pee pale yellow.  Where to find more information  American Academy of Allergy, Asthma & Immunology: aaaai.org  Contact a health care provider if:  You have a fever.  You develop a cough that  does not go away.  You make high-pitched whistling sounds when you breathe, most often when you breathe out (wheeze).  Your symptoms slow you down or stop you from doing your normal activities each day.  Get help right away if:  You have shortness of breath.  This symptom may be an emergency. Get help right away. Call 911.  Do not wait to see if the symptoms will go away.  Do not drive yourself to the hospital.  This information is not intended to replace advice given to you by your health care provider. Make sure you discuss any questions you have with your health care provider.  Document Revised: 08/28/2023 Document Reviewed: 08/28/2023  Elsevier Patient Education © 2024 [employer] Inc.

## 2024-04-23 ENCOUNTER — OFFICE VISIT (OUTPATIENT)
Dept: INTERNAL MEDICINE | Facility: CLINIC | Age: 49
End: 2024-04-23
Payer: COMMERCIAL

## 2024-04-23 VITALS
HEIGHT: 69 IN | WEIGHT: 293 LBS | BODY MASS INDEX: 43.4 KG/M2 | SYSTOLIC BLOOD PRESSURE: 111 MMHG | DIASTOLIC BLOOD PRESSURE: 65 MMHG | OXYGEN SATURATION: 97 % | HEART RATE: 86 BPM | TEMPERATURE: 98.5 F

## 2024-04-23 DIAGNOSIS — J30.9 ALLERGIC RHINITIS, UNSPECIFIED SEASONALITY, UNSPECIFIED TRIGGER: Primary | ICD-10-CM

## 2024-04-23 PROCEDURE — 1159F MED LIST DOCD IN RCRD: CPT | Performed by: NURSE PRACTITIONER

## 2024-04-23 PROCEDURE — 99213 OFFICE O/P EST LOW 20 MIN: CPT | Performed by: NURSE PRACTITIONER

## 2024-04-23 PROCEDURE — 1160F RVW MEDS BY RX/DR IN RCRD: CPT | Performed by: NURSE PRACTITIONER

## 2024-04-23 PROCEDURE — 3044F HG A1C LEVEL LT 7.0%: CPT | Performed by: NURSE PRACTITIONER

## 2024-04-23 RX ORDER — AZELASTINE 1 MG/ML
2 SPRAY, METERED NASAL DAILY
Qty: 30 ML | Refills: 0 | COMMUNITY
Start: 2024-04-23

## 2024-04-23 NOTE — PROGRESS NOTES
Chief Complaint  Nasal Congestion (Pt states she thinks it is allergies.), Cough, Insomnia, and Rash (Rash on forearms)    Subjective      Em Montanez is a 48 year old female that presents to [facility] with c/o sinus congestion, cough and difficulty sleeping. Symptoms started on 4 days ago and are still present. She was evaluated via telemedicine on Saturday where she was prescribed stahist, phenergan-dm, flonase and claritin. She states symptoms have not improved much.     History of Present Illness    Current Outpatient Medications   Medication Instructions    Chlorcyclizine-Pseudoephed 25-60 MG tablet 1 tablet, Oral, 3 Times Daily PRN    Continuous Blood Gluc  (FreeStyle Cherie 14 Day [place]) device 1 Device, Does not apply, Every 14 Days    Continuous Blood Gluc Sensor (FreeStyle Cherie 2 Sensor) misc 1 each, Subcutaneous, Every 14 Days    cyanocobalamin 1000 MCG/ML injection INJECT 1 ML INTO THE MUSCLE AS DIRECTED BY PRESCRIBER EVERY 28 DAYS.    fluticasone (FLONASE) 50 MCG/ACT nasal spray 2 sprays, Nasal, Daily    levothyroxine (SYNTHROID, LEVOTHROID) 50 mcg, Oral, Daily    loratadine (CLARITIN) 10 mg, Oral, Daily    meloxicam (MOBIC) 15 mg, Oral, Daily    metFORMIN ER (GLUCOPHAGE-XR) 750 mg, Oral, Daily With Dinner    multivitamin with minerals tablet tablet 1 tablet, Oral, Daily    oxyCODONE-acetaminophen (PERCOCET) 5-325 MG per tablet 1 tablet, Oral, Every 6 Hours PRN    promethazine-dextromethorphan (PROMETHAZINE-DM) 6.25-15 MG/5ML syrup 5 mL, Oral, 4 Times Daily PRN    traZODone (DESYREL) 50 mg, Oral, Nightly    valACYclovir (VALTREX) 1,000 mg, Oral, Daily       The following portions of the patient's history were reviewed and updated as appropriate: allergies, current medications, past family history, past medical history, past social history, past surgical history, and problem list.    Objective   Vital Signs:   /65 (BP Location: Left  "arm, Patient Position: Sitting, Cuff Size: Large Adult)   Pulse 86   Temp 98.5 °F (36.9 °C) (Temporal)   Ht 175.3 cm (69\")   Wt 133 kg (294 lb)   SpO2 97%   BMI 43.42 kg/m²     Wt Readings from Last 3 Encounters:   04/23/24 133 kg (294 lb)   04/16/24 136 kg (300 lb)   04/16/24 136 kg (300 lb)     BP Readings from Last 3 Encounters:   04/23/24 111/65   04/16/24 137/91   04/16/24 140/83     Physical Exam  Vitals and nursing note reviewed.   Constitutional:       Appearance: Normal appearance. She is not ill-appearing.   HENT:      Head: Normocephalic and atraumatic.      Nose: Nose normal. Congestion present.      Mouth/Throat:      Mouth: Mucous membranes are moist.      Pharynx: Posterior oropharyngeal erythema (mild) present.   Eyes:      Extraocular Movements: Extraocular movements intact.      Conjunctiva/sclera: Conjunctivae normal.      Pupils: Pupils are equal, round, and reactive to light.   Cardiovascular:      Rate and Rhythm: Normal rate and regular rhythm.      Heart sounds: Normal heart sounds.   Pulmonary:      Effort: Pulmonary effort is normal.      Breath sounds: Normal breath sounds.   Skin:     General: Skin is warm and dry.   Neurological:      Mental Status: She is alert and oriented to person, place, and time.   Psychiatric:         Mood and Affect: Mood normal.         Behavior: Behavior normal.          Result Review :  The following data was reviewed by: JOSEMANUEL Hoffman on 04/23/2024:      Common labs          5/2/2023    12:20 8/7/2023    12:16 3/11/2024    14:31   Common Labs   Glucose 86  84  135    BUN 17  14  14    Creatinine 0.78  0.87  0.85    Sodium 140  140  141    Potassium 4.5  4.5  3.9    Chloride 103  100  103    Calcium 9.6  9.8  9.3    Albumin 4.0  4.3  3.9    Total Bilirubin 0.3  0.3  0.4    Alkaline Phosphatase 88  90  86    AST (SGOT) 17  22  19    ALT (SGPT) 11  16  15    WBC 9.84  9.31  8.13    Hemoglobin 15.1  14.8  14.3    Hematocrit 43.5  43.3  42.0  "   Platelets 276  261  241    Total Cholesterol 176  175  160    Triglycerides 121  203  223    HDL Cholesterol 58  51  39    LDL Cholesterol  97  90  84    Hemoglobin A1C 5.80  5.60  5.50    Microalbumin, Urine <1.2  <1.2         Lab Results (last 72 hours)       ** No results found for the last 72 hours. **               Lab Results   Component Value Date    COVID19 NOT DETECTED 03/31/2021    INR 0.86 (L) 03/30/2021    BILIRUBINUR Negative 05/02/2023    POCGLU 95 10/12/2022       Procedures        Assessment and Plan   Diagnoses and all orders for this visit:    1. Allergic rhinitis, unspecified seasonality, unspecified trigger (Primary)      Advised to avoid stahist as it is likely the cause of her difficulty sleeping.   Declined POC testing in office today.    There are no discontinued medications.       Follow Up   No follow-ups on file.  Patient was given instructions and counseling regarding her condition or for health maintenance advice. Please see specific information pulled into the AVS if appropriate.   Supportive care with plenty of fluids, rest and medications as prescribed. You can also use a sinus rinse with distilled water and keep a cool mist humidifier beside your bed.     Senait Rodriguez, APRN  04/23/24  13:51 EDT

## 2024-04-24 DIAGNOSIS — R79.89 ABNORMAL THYROID BLOOD TEST: ICD-10-CM

## 2024-04-24 RX ORDER — LEVOTHYROXINE SODIUM 0.05 MG/1
50 TABLET ORAL DAILY
Qty: 90 TABLET | Refills: 1 | Status: SHIPPED | OUTPATIENT
Start: 2024-04-24

## 2024-04-26 RX ORDER — CYANOCOBALAMIN 1000 UG/ML
INJECTION, SOLUTION INTRAMUSCULAR; SUBCUTANEOUS
Qty: 3 ML | Refills: 1 | Status: SHIPPED | OUTPATIENT
Start: 2024-04-26

## 2024-04-29 ENCOUNTER — HOSPITAL ENCOUNTER (OUTPATIENT)
Dept: MRI IMAGING | Facility: HOSPITAL | Age: 49
Discharge: HOME | End: 2024-04-29
Admitting: PSYCHIATRY & NEUROLOGY
Payer: COMMERCIAL

## 2024-04-29 DIAGNOSIS — G89.29 CHRONIC RIGHT-SIDED LOW BACK PAIN WITHOUT SCIATICA: ICD-10-CM

## 2024-04-29 DIAGNOSIS — M54.50 CHRONIC RIGHT-SIDED LOW BACK PAIN WITHOUT SCIATICA: ICD-10-CM

## 2024-04-29 PROCEDURE — 72148 MRI LUMBAR SPINE W/O DYE: CPT

## 2024-04-30 ENCOUNTER — OFFICE VISIT (OUTPATIENT)
Dept: NEUROSURGERY | Facility: CLINIC | Age: 49
End: 2024-04-30
Payer: COMMERCIAL

## 2024-04-30 VITALS
HEART RATE: 77 BPM | SYSTOLIC BLOOD PRESSURE: 115 MMHG | WEIGHT: 293 LBS | DIASTOLIC BLOOD PRESSURE: 63 MMHG | HEIGHT: 69 IN | BODY MASS INDEX: 43.4 KG/M2

## 2024-04-30 DIAGNOSIS — M47.816 LUMBAR SPONDYLOSIS: Primary | ICD-10-CM

## 2024-04-30 DIAGNOSIS — G89.29 CHRONIC LEFT-SIDED LOW BACK PAIN WITHOUT SCIATICA: ICD-10-CM

## 2024-04-30 DIAGNOSIS — M54.50 CHRONIC LEFT-SIDED LOW BACK PAIN WITHOUT SCIATICA: ICD-10-CM

## 2024-04-30 PROCEDURE — 1160F RVW MEDS BY RX/DR IN RCRD: CPT | Performed by: PHYSICIAN ASSISTANT

## 2024-04-30 PROCEDURE — 1159F MED LIST DOCD IN RCRD: CPT | Performed by: PHYSICIAN ASSISTANT

## 2024-04-30 PROCEDURE — 99214 OFFICE O/P EST MOD 30 MIN: CPT | Performed by: PHYSICIAN ASSISTANT

## 2024-04-30 NOTE — PROGRESS NOTES
"Chief Complaint  Back Pain (Left side )    Subjective          Em Montanez who is a 48 y.o. year old female who presents to [facility] for Evaluation of the Spine.     The patient complains of pain located in the Lumbar Spine. The pain is in the left lower back. Patients states the pain has been present for since 2020.  The pain came on gradually.  The pain scaled level is 3.  The pain  does not radiate .  The pain is constant and waxing/waning and described as sharp and aching.  The pain is worse  towards the end of the day . Patient states NSAIDs and Tylenol, taking breaks from work, changing positions makes the pain better.  Patient states Prolonged Standing and laying on her back makes the pain worse.    Associated Symptoms Include: Numbness in all the toes. Denies weakness or loss of bowel or bladder control.  Conservative Interventions Include: Epidural Steroids that were ineffective. and RFA, and TPI were ineffective.    Was this the result of an injury or accident? : No    History of Previous Spinal Surgery?: No     reports that she has been smoking cigarettes. She has a 30 pack-year smoking history. She has been exposed to tobacco smoke. She has never used smokeless tobacco.    Review of Systems   Musculoskeletal:  Positive for back pain.        Objective   Vital Signs:   /63 (BP Location: Left arm, Patient Position: Sitting, Cuff Size: Large Adult)   Pulse 77   Ht 175.3 cm (69\")   Wt 133 kg (294 lb)   BMI 43.42 kg/m²       Physical Exam  Constitutional:       Appearance: Normal appearance. She is obese.   Pulmonary:      Effort: Pulmonary effort is normal.   Musculoskeletal:         General: Tenderness (left lumbar and SI joint, midline lumbar) present.      Comments: SLR negative bilaterally   Neurological:      General: No focal deficit present.      Mental Status: She is alert and oriented to person, place, and time.      Sensory: No sensory " deficit.      Motor: No weakness.      Deep Tendon Reflexes: Reflexes normal.   Psychiatric:         Mood and Affect: Mood normal.         Behavior: Behavior normal.        Neurologic Exam     Mental Status   Oriented to person, place, and time.        Result Review     I have personally reviewed the MRI of the lumbar spine from 4/29/2024 which shows spondylosis at L2-L3 and L4-L5 without significant stenosis.     Assessment and Plan    Diagnoses and all orders for this visit:    1. Lumbar spondylosis (Primary)    2. Chronic left-sided low back pain without sciatica    Her pain is in the back.    We discussed no surgery to help back pain.    She has no major stenosis in the lumbar spine.    I expect her pain is related to spondylosis in the spine.    She has tried and failed LESI, MBBs/RFA and TPI in the spine.    She may be a candidate for Intracept procedure. She may be candidate for SI joint injections on the left. She may be a candidate SCS. I would have her discuss these potential options with pain management.    The patient was counseled on basic recommendations for the reduction and prevention of back, neck, or spine pain in association with spinal disorders, including: cessation/avoidance of nicotine use, maintenance of a healthy BMI and weight, focusing on building/maintaining core strength through core exercise, and avoidance of activities which worsen the pain. The patient will monitor for changes in symptoms and notify our clinic of these changes as needed.    She will follow-up here PRN.    Follow Up   Return if symptoms worsen or fail to improve.  Patient was given instructions and counseling regarding her condition or for health maintenance advice. Please see specific information pulled into the AVS if appropriate.

## 2024-05-01 ENCOUNTER — PATIENT ROUNDING (BHMG ONLY) (OUTPATIENT)
Dept: NEUROSURGERY | Facility: CLINIC | Age: 49
End: 2024-05-01
Payer: COMMERCIAL

## 2024-06-07 ENCOUNTER — PRIOR AUTHORIZATION (OUTPATIENT)
Dept: INTERNAL MEDICINE | Facility: CLINIC | Age: 49
End: 2024-06-07
Payer: COMMERCIAL

## 2024-06-07 NOTE — TELEPHONE ENCOUNTER
PA RENEWAL REQUIRED FOR FOLLOWING MEDICATION:    Continuous Blood Gluc Sensor (FreeStyle Cherie 2 Sensor) misc (09/12/2023)     INDEXED VIA ONBASE

## 2024-06-10 NOTE — TELEPHONE ENCOUNTER
PA DENIED.     PATIENT MUST HAVE TYPE 1 OR TYPE 2 DIABETES FOR APPROVAL OF CONTINUOUS GLUCOSE MONITORING SYSTEM     INDEXED DENIAL LETTER VIA ONBASE

## 2024-06-18 ENCOUNTER — OFFICE VISIT (OUTPATIENT)
Dept: PODIATRY | Facility: CLINIC | Age: 49
End: 2024-06-18
Payer: COMMERCIAL

## 2024-06-18 VITALS
OXYGEN SATURATION: 95 % | TEMPERATURE: 96 F | DIASTOLIC BLOOD PRESSURE: 73 MMHG | WEIGHT: 275 LBS | BODY MASS INDEX: 40.73 KG/M2 | HEART RATE: 81 BPM | HEIGHT: 69 IN | SYSTOLIC BLOOD PRESSURE: 124 MMHG

## 2024-06-18 DIAGNOSIS — L60.0 ONYCHOCRYPTOSIS: Primary | ICD-10-CM

## 2024-06-18 DIAGNOSIS — M79.671 FOOT PAIN, BILATERAL: ICD-10-CM

## 2024-06-18 DIAGNOSIS — B35.1 ONYCHOMYCOSIS: ICD-10-CM

## 2024-06-18 DIAGNOSIS — E11.9 NON-INSULIN DEPENDENT TYPE 2 DIABETES MELLITUS: ICD-10-CM

## 2024-06-18 DIAGNOSIS — E11.8 DIABETIC FOOT: ICD-10-CM

## 2024-06-18 DIAGNOSIS — M79.672 FOOT PAIN, BILATERAL: ICD-10-CM

## 2024-06-18 PROCEDURE — 1160F RVW MEDS BY RX/DR IN RCRD: CPT | Performed by: PODIATRIST

## 2024-06-18 PROCEDURE — 11721 DEBRIDE NAIL 6 OR MORE: CPT | Performed by: PODIATRIST

## 2024-06-18 PROCEDURE — 1159F MED LIST DOCD IN RCRD: CPT | Performed by: PODIATRIST

## 2024-06-18 NOTE — PROGRESS NOTES
[facility] - P[facility]ATRY    Today's Date: 06/18/24    Patient Name: Em Montanez  MRN: [id number]  CSN: [id number]  PCP: Ricky Velasquez Jr., MD, Last PCP Visit: 7 June 2024  Referring Provider: No ref. provider found    SUBJECTIVE     Chief Complaint   Patient presents with    Left Foot - Follow-up, Nail Problem    Right Foot - Follow-up, Nail Problem     HPI: Em Montanez, a 49 y.o.female, comes to clinic.    New, Established, New Problem:  est  Location:  Toenails  Duration:   Greater than five years  Onset:  Gradual  Nature:  sore with palpation.  Stable, worsening, improving:   stable  Aggravating factors:  Pain with shoe gear and ambulation.  Previous Treatment: debridement    Patient denies any fevers, chills, nausea, vomiting, shortness of breath, nor any other constitutional signs nor symptoms.       Patient states their most recent blood glucose reading was:  115    Medical changes: Patient surgery in early May 2024    Past Medical History:   Diagnosis Date    Abnormal Pap smear of cervix     Arthritis     Asthma     NO INHALER USE    Back pain 11/21/2017    Disease of thyroid gland     Fatigue 11/21/2017    H/O Chronic pelvic pain in female 02/23/2022    H/O Foot pain, right     H/O Ovarian cyst     HPV (human papilloma virus) infection     Hypoglycemia     Insomnia 12/11/2017    Pelvic pain 01/12/2022    Polycystic ovarian syndrome 11/21/2017    Seasonal allergies     Uterine pain     Vitamin D deficiency 12/11/2017     Past Surgical History:   Procedure Laterality Date    CAST APPLICATION Right 06/16/2023    Procedure: CAST APPLICATION LEG;  Surgeon: Lucas Rivera DPM;  Location: [facility] MAIN OR;  Service: Podiatry;  Laterality: Right;    CAST APPLICATION Left 9/1/2023    Procedure: CAST APPLICATION LEG;  Surgeon: Lucas Rivera DPM;  Location: [facility] MAIN OR;  Service: Podiatry;  Laterality: Left;    CHOLECYSTECTOMY      COLONOSCOPY N/A 02/16/2022     Procedure: COLONOSCOPY;  Surgeon: Jb Gonzalez MD;  Location: [facility] ENDOSCOPY;  Service: Gastroenterology;  Laterality: N/A;  hemmorroids    ENDOMETRIAL ABLATION      ESSURE TUBAL LIGATION      JOINT REPLACEMENT      bilateral knee replacement    LEEP      PLANTAR FASCIA RELEASE Right 06/16/2023    Procedure: FOOT PLANTAR FASCIECTOMY;  Surgeon: Lucas Rivera DPM;  Location: [facility] MAIN OR;  Service: Podiatry;  Laterality: Right;    PLANTAR FASCIECTOMY Left 9/1/2023    Procedure: FASCIECTOMY PLANTAR FASCIA;  Surgeon: Lucas Rivera DPM;  Location: [facility] MAIN OR;  Service: Podiatry;  Laterality: Left;    TARSAL TUNNEL RELEASE Right 06/16/2023    Procedure: TARSAL TUNNEL RELEASE;  Surgeon: Lucas Rivera DPM;  Location: [facility] MAIN OR;  Service: Podiatry;  Laterality: Right;    TARSAL TUNNEL RELEASE Left 9/1/2023    Procedure: LEFT TARSAL TUNNEL RELEASE;  Surgeon: Lucas Rivera DPM;  Location: [facility] MAIN OR;  Service: Podiatry;  Laterality: Left;    TONSILLECTOMY      TOTAL LAPAROSCOPIC HYSTERECTOMY N/A 04/06/2022    Procedure: TOTAL LAPAROSCOPIC HYSTERECTOMY WITH DAVINCI ROBOT;  Surgeon: Chito Cook MD;  Location: [facility] MAIN OR;  Service: Robotics - DaVinci;  Laterality: N/A;     Family History   Problem Relation Age of Onset    Depression Mother     Heart disease Mother     Arthritis Mother     ADD / ADHD Son     Self-Injurious Behavior  Daughter     Depression Daughter     Anxiety disorder Daughter     Cancer Other     Malig Hyperthermia Neg Hx     Breast cancer Neg Hx     Uterine cancer Neg Hx     Ovarian cancer Neg Hx     Colon cancer Neg Hx     Deep vein thrombosis Neg Hx     Pulmonary embolism Neg Hx      Social History     Socioeconomic History    Marital status:    Tobacco Use    Smoking status: Every Day     Current packs/day: 1.00     Average packs/day: 1 pack/day for 30.0 years (30.0 ttl pk-yrs)     Types: Cigarettes     Passive exposure:  Current    Smokeless tobacco: Never    Tobacco comments:     INSTRUCTED NO SMOKING 24 HR PRIOR TO SURGERY    Vaping Use    Vaping status: Never Used   Substance and Sexual Activity    Alcohol use: Yes     Comment: SOCIALLY    Drug use: Never     Comment: patient denies    Sexual activity: Yes     Partners: Male     Birth control/protection: Hysterectomy     Allergies   Allergen Reactions    Prednisone Mental Status Change     Other reaction(s): Mental Status Change    Tramadol GI Intolerance     Nausea and vomiting  Other reaction(s): GI Intolerance, Vomiting  Nausea and vomiting    Diclofenac Rash     Current Outpatient Medications   Medication Sig Dispense Refill    azelastine (ASTELIN) 0.1 % nasal spray 2 sprays into the nostril(s) as directed by provider Daily. Use in each nostril as directed 30 mL 0    Chlorcyclizine-Pseudoephed 25-60 MG tablet Take 1 tablet by mouth 3 (Three) Times a Day As Needed (nasal congestion). 42 tablet 0    Continuous Blood Gluc  (FreeStyle Cherie 14 Day Leisenring) device 1 Device Every 14 (Fourteen) Days. 6 each 3    Continuous Blood Gluc Sensor (FreeStyle Cherie 2 Sensor) misc Inject 1 each under the skin into the appropriate area as directed Every 14 (Fourteen) Days. 4 each 3    cyanocobalamin 1000 MCG/ML injection INJECT ONE MILLILITER INTRAMUSCULARLY EVERY 28 DAYS AS DIRECTED 3 mL 1    fluticasone (FLONASE) 50 MCG/ACT nasal spray 2 sprays into the nostril(s) as directed by provider Daily. 18 mL 0    levothyroxine (SYNTHROID, LEVOTHROID) 50 MCG tablet TAKE 1 TABLET BY MOUTH DAILY 90 tablet 1    loratadine (Claritin) 10 MG tablet Take 1 tablet by mouth Daily. 30 tablet 0    meloxicam (Mobic) 15 MG tablet Take 1 tablet by mouth Daily. 30 tablet 2    metFORMIN ER (GLUCOPHAGE-XR) 750 MG 24 hr tablet Take 1 tablet by mouth Daily With Dinner. 90 tablet 1    multivitamin with minerals tablet tablet Take 1 tablet by mouth Daily.      promethazine-dextromethorphan (PROMETHAZINE-DM)  6.25-15 MG/5ML syrup Take 5 mL by mouth 4 (Four) Times a Day As Needed for Cough. 150 mL 0    traZODone (DESYREL) 50 MG tablet Take 1 tablet by mouth Every Night. 90 tablet 1    valACYclovir (Valtrex) 1000 MG tablet Take 1 tablet by mouth Daily. 10 tablet 2     No current facility-administered medications for this visit.     Review of Systems   Constitutional: Negative.    Skin:         Painful toenails.   All other systems reviewed and are negative.      OBJECTIVE     Vitals:    24 0829   BP: 124/73   Pulse: 81   Temp: 96 °F (35.6 °C)   SpO2: 95%         Patient seen in no apparent distress.      PHYSICAL EXAM:     Foot/Ankle Exam    GENERAL  Appearance:  obese  Orientation:  AAOx3  Affect:  appropriate  Gait:  unimpaired  Assistance:  independent  Right shoe gear: casual shoe  Left shoe gear: casual shoe    VASCULAR     Right Foot Vascularity   Dorsalis pedis:  2+  Posterior tibial:  2+  Skin temperature:  warm  Edema gradin+ and pitting  CFT:  < 3 seconds  Pedal hair growth:  Absent  Varicosities:  mild varicosities     Left Foot Vascularity   Dorsalis pedis:  2+  Posterior tibial:  2+  Skin temperature:  warm  Edema gradin+ and pitting  CFT:  < 3 seconds  Pedal hair growth:  Absent  Varicosities:  mild varicosities     NEUROLOGIC     Right Foot Neurologic   Normal sensation    Light touch sensation: normal  Vibratory sensation: normal  Hot/Cold sensation: normal  Protective Sensation using [place]-Harpal Monofilament:   Sites intact: 10  Sites tested: 10     Left Foot Neurologic   Normal sensation    Light touch sensation: normal  Vibratory sensation: normal  Hot/Cold sensation:  normal  Protective Sensation using [place]-Harpal Monofilament:   Sites intact: 10  Sites tested: 10    MUSCLE STRENGTH     Right Foot Muscle Strength   Foot dorsiflexion:  4  Foot plantar flexion:  4  Foot inversion:  4  Foot eversion:  4     Left Foot Muscle Strength   Foot dorsiflexion:  4  Foot plantar flexion:   4  Foot inversion:  4  Foot eversion:  4    RANGE OF MOTION     Right Foot Range of Motion   Foot and ankle ROM within normal limits       Left Foot Range of Motion   Foot and ankle ROM within normal limits      DERMATOLOGIC      Right Foot Dermatologic   Skin  Right foot skin is intact.   Nails  1.  Positive for onychomycosis, abnormal thickness, subungual debris, dystrophic nail and ingrown toenail.  4.  Positive for elongated, onychomycosis, abnormal thickness, subungual debris, dystrophic nail and ingrown toenail.  5.  Positive for elongated, onychomycosis, abnormal thickness, subungual debris, dystrophic nail and ingrown toenail.     Left Foot Dermatologic   Skin  Left foot skin is intact.   Nails  1.  Positive for elongated, onychomycosis, abnormal thickness, subungual debris, dystrophic nail and ingrown toenail.  2.  Positive for elongated, onychomycosis, abnormal thickness, subungual debris, dystrophic nail and ingrown toenail.  5.  Positive for elongated, onychomycosis, abnormally thick, subungual debris, dystrophic nail and ingrown toenail.    I have reexamined the patient the results are consistent with the previously documented exam.    ASSESSMENT/PLAN     Diagnoses and all orders for this visit:    1. Onychocryptosis (Primary)    2. Diabetic foot    3. Non-insulin dependent type 2 diabetes mellitus    4. Foot pain, bilateral    5. Onychomycosis    Comprehensive lower extremity examination and evaluation was performed.    Discussed findings and treatment plan including risks, benefits, and treatment options with patient in detail. Patient agreed with treatment plan.    Toenails 1, 4, 5 on Right and 1, 2, 5 on Left were debrided with nail nippers then filed with a Bryanmel nail serena.  Patient tolerated procedure well without complications.    Patient is to monitor for recurrence and any new symptoms and to contact Dr. Rivera's office for a follow-up appointment.      The patient states understanding and  agreement with this plan.    An After Visit Summary was printed and given to the patient at discharge, including (if requested) any available informative/educational handouts regarding diagnosis, treatment, or medications. All questions were answered to patient/family satisfaction. Should symptoms fail to improve or worsen they agree to call or return to clinic or to go to the Emergency Department. Discussed the importance of following up with any needed screening tests/labs/specialist appointments and any requested follow-up recommended by me today. Importance of maintaining follow-up discussed and patient accepts that missed appointments can delay diagnosis and potentially lead to worsening of conditions.    Return in about 9 weeks (around 8/20/2024) for Toenail Care., or sooner if acute issues arise.    I have reviewed the assessment and plan and verified the accuracy of it. No changes to assessment and plan since the information was documented. Lucas Rivera DPM 06/18/24     I have dictated this note utilizing Dragon Dictation.  Please note that portions of this note were completed with a voice recognition program.  Part of this note may be an electronic transcription/translation of spoken language to printed text using the Dragon Dictation System.        This document has been electronically signed by Lucsa Rivera DPM on June 18, 2024 08:44 EDT

## 2024-06-25 DIAGNOSIS — Z96.652 HISTORY OF LEFT KNEE REPLACEMENT: ICD-10-CM

## 2024-06-26 RX ORDER — MELOXICAM 15 MG/1
15 TABLET ORAL DAILY
Qty: 90 TABLET | Refills: 1 | Status: SHIPPED | OUTPATIENT
Start: 2024-06-26

## 2024-07-12 ENCOUNTER — OFFICE VISIT (OUTPATIENT)
Dept: INTERNAL MEDICINE | Facility: CLINIC | Age: 49
End: 2024-07-12
Payer: COMMERCIAL

## 2024-07-12 VITALS
BODY MASS INDEX: 40.14 KG/M2 | HEART RATE: 77 BPM | SYSTOLIC BLOOD PRESSURE: 109 MMHG | DIASTOLIC BLOOD PRESSURE: 77 MMHG | OXYGEN SATURATION: 95 % | WEIGHT: 271 LBS | TEMPERATURE: 97.4 F | HEIGHT: 69 IN

## 2024-07-12 DIAGNOSIS — R74.8 ELEVATED LIVER ENZYMES: ICD-10-CM

## 2024-07-12 DIAGNOSIS — Z23 NEED FOR VACCINATION: Primary | ICD-10-CM

## 2024-07-12 DIAGNOSIS — L30.9 DERMATITIS: ICD-10-CM

## 2024-07-12 DIAGNOSIS — Z12.2 SCREENING FOR LUNG CANCER: ICD-10-CM

## 2024-07-12 DIAGNOSIS — Z87.891 PERSONAL HISTORY OF TOBACCO USE, PRESENTING HAZARDS TO HEALTH: ICD-10-CM

## 2024-07-12 LAB
ALBUMIN SERPL-MCNC: 4.2 G/DL (ref 3.5–5.2)
ALBUMIN/GLOB SERPL: 1.6 G/DL
ALP SERPL-CCNC: 81 U/L (ref 39–117)
ALT SERPL W P-5'-P-CCNC: 34 U/L (ref 1–33)
ALT SERPL-CCNC: 45 U/L (ref 6–29)
ANA SER QL: NEGATIVE
ANION GAP SERPL CALCULATED.3IONS-SCNC: 9.5 MMOL/L (ref 5–15)
AST SERPL-CCNC: 34 U/L (ref 1–32)
AST SERPL-CCNC: 59 U/L (ref 10–35)
BILIRUB SERPL-MCNC: 0.3 MG/DL (ref 0–1.2)
BUN SERPL-MCNC: 19 MG/DL (ref 6–20)
BUN/CREAT SERPL: 19.8 (ref 7–25)
CALCIUM SPEC-SCNC: 10.4 MG/DL (ref 8.6–10.5)
CHLORIDE SERPL-SCNC: 103 MMOL/L (ref 98–107)
CO2 SERPL-SCNC: 25.5 MMOL/L (ref 22–29)
CREAT SERPL-MCNC: 0.96 MG/DL (ref 0.57–1)
EGFRCR SERPLBLD CKD-EPI 2021: 71.8 ML/MIN/1.73
ENA SM AB SER-ACNC: NEGATIVE
GLOBULIN UR ELPH-MCNC: 2.7 GM/DL
GLUCOSE SERPL-MCNC: 103 MG/DL (ref 65–99)
POTASSIUM SERPL-SCNC: 4.4 MMOL/L (ref 3.5–5.2)
PROT SERPL-MCNC: 6.9 G/DL (ref 6–8.5)
RHEUMATOID FACT SERPL-ACNC: NORMAL [IU]/ML
SJOGREN'S ANTI-SS-A: NEGATIVE
SODIUM SERPL-SCNC: 138 MMOL/L (ref 136–145)

## 2024-07-12 PROCEDURE — 80053 COMPREHEN METABOLIC PANEL: CPT | Performed by: NURSE PRACTITIONER

## 2024-07-12 PROCEDURE — 1126F AMNT PAIN NOTED NONE PRSNT: CPT | Performed by: NURSE PRACTITIONER

## 2024-07-12 PROCEDURE — 1159F MED LIST DOCD IN RCRD: CPT | Performed by: NURSE PRACTITIONER

## 2024-07-12 PROCEDURE — 90471 IMMUNIZATION ADMIN: CPT | Performed by: NURSE PRACTITIONER

## 2024-07-12 PROCEDURE — 90750 HZV VACC RECOMBINANT IM: CPT | Performed by: NURSE PRACTITIONER

## 2024-07-12 PROCEDURE — 3044F HG A1C LEVEL LT 7.0%: CPT | Performed by: NURSE PRACTITIONER

## 2024-07-12 PROCEDURE — 1160F RVW MEDS BY RX/DR IN RCRD: CPT | Performed by: NURSE PRACTITIONER

## 2024-07-12 PROCEDURE — 99213 OFFICE O/P EST LOW 20 MIN: CPT | Performed by: NURSE PRACTITIONER

## 2024-07-12 RX ORDER — TRIAMCINOLONE ACETONIDE 1 MG/G
1 CREAM TOPICAL 4 TIMES DAILY
COMMUNITY
Start: 2024-06-05

## 2024-07-12 NOTE — LETTER
[facility]  Vaccine Consent Form    Patient Name:  Em Montanez  Patient :  1975     Vaccine(s) Ordered    Shingrix Vaccine        Screening Checklist  The following questions should be completed prior to vaccination. If you answer “yes” to any question, it does not necessarily mean you should not be vaccinated. It just means we may need to clarify or ask more questions. If a question is unclear, please ask your healthcare provider to explain it.    Yes No   Any fever or moderate to severe illness today (mild illness and/or antibiotic treatment are not contraindications)?     Do you have a history of a serious reaction to any previous vaccinations, such as anaphylaxis, encephalopathy within 7 days, Guillain-[place] syndrome within 6 weeks, seizure?     Have you received any live vaccine(s) (e.g MMR, BECCA) or any other vaccines in the last month (to ensure duplicate doses aren't given)?     Do you have an anaphylactic allergy to latex (DTaP, DTaP-IPV, Hep A, Hep B, MenB, RV, Td, Tdap), baker’s yeast (Hep B, HPV), polysorbates (RSV, nirsevimab, PCV 20, Rotavirrus, Tdap, Shingrix), or gelatin (BECCA, MMR)?     Do you have an anaphylactic allergy to neomycin (Rabies, BECCA, MMR, IPV, Hep A), polymyxin B (IPV), or streptomycin (IPV)?      Any cancer, leukemia, AIDS, or other immune system disorder? (BECCA, MMR, RV)     Do you have a parent, brother, or sister with an immune system problem (if immune competence of vaccine recipient clinically verified, can proceed)? (MMR, BECCA)     Any recent steroid treatments for >2 weeks, chemotherapy, or radiation treatment? (BECCA, MMR)     Have you received antibody-containing blood transfusions or IVIG in the past 11 months (recommended interval is dependent on product)? (MMR, BECCA)     Have you taken antiviral drugs (acyclovir, famciclovir, valacyclovir for BECCA) in the last 24 or 48 hours, respectively?      Are you pregnant or planning to become pregnant within 1 month? (BECCA,  "MMR, HPV, IPV, MenB, Abrexvy; For Hep B- refer to Engerix-B; For RSV - Abrysvo is indicated for 32-36 weeks of pregnancy from September to January)     For infants, have you ever been told your child has had intussusception or a medical emergency involving obstruction of the intestine (Rotavirus)? If not for an infant, can skip this question.         *Ordering Physicians/APC should be consulted if \"yes\" is checked by the patient or guardian above.  I have received, read, and understand the Vaccine Information Statement (VIS) for each vaccine ordered.  I have considered my or my child's health status as well as the health status of my close contacts.  I have taken the opportunity to discuss my vaccine questions with my or my child's health care provider.   I have requested that the ordered vaccine(s) be given to me or my child.  I understand the benefits and risks of the vaccines.  I understand that I should remain in the clinic for 15 minutes after receiving the vaccine(s).  _________________________________________________________  Signature of Patient or Parent/Legal Guardian ____________________  Date     "

## 2024-07-12 NOTE — LETTER
[facility]  Vaccine Consent Form    Patient Name:  Em Montanez  Patient :  1975     Vaccine(s) Ordered    Shingrix Vaccine        Screening Checklist  The following questions should be completed prior to vaccination. If you answer “yes” to any question, it does not necessarily mean you should not be vaccinated. It just means we may need to clarify or ask more questions. If a question is unclear, please ask your healthcare provider to explain it.    Yes No   Any fever or moderate to severe illness today (mild illness and/or antibiotic treatment are not contraindications)?     Do you have a history of a serious reaction to any previous vaccinations, such as anaphylaxis, encephalopathy within 7 days, Guillain-[place] syndrome within 6 weeks, seizure?     Have you received any live vaccine(s) (e.g MMR, BECCA) or any other vaccines in the last month (to ensure duplicate doses aren't given)?     Do you have an anaphylactic allergy to latex (DTaP, DTaP-IPV, Hep A, Hep B, MenB, RV, Td, Tdap), baker’s yeast (Hep B, HPV), polysorbates (RSV, nirsevimab, PCV 20, Rotavirrus, Tdap, Shingrix), or gelatin (BECCA, MMR)?     Do you have an anaphylactic allergy to neomycin (Rabies, BECCA, MMR, IPV, Hep A), polymyxin B (IPV), or streptomycin (IPV)?      Any cancer, leukemia, AIDS, or other immune system disorder? (BECCA, MMR, RV)     Do you have a parent, brother, or sister with an immune system problem (if immune competence of vaccine recipient clinically verified, can proceed)? (MMR, BECCA)     Any recent steroid treatments for >2 weeks, chemotherapy, or radiation treatment? (BECCA, MMR)     Have you received antibody-containing blood transfusions or IVIG in the past 11 months (recommended interval is dependent on product)? (MMR, BECCA)     Have you taken antiviral drugs (acyclovir, famciclovir, valacyclovir for BECCA) in the last 24 or 48 hours, respectively?      Are you pregnant or planning to become pregnant within 1 month? (BECCA,  "MMR, HPV, IPV, MenB, Abrexvy; For Hep B- refer to Engerix-B; For RSV - Abrysvo is indicated for 32-36 weeks of pregnancy from September to January)     For infants, have you ever been told your child has had intussusception or a medical emergency involving obstruction of the intestine (Rotavirus)? If not for an infant, can skip this question.         *Ordering Physicians/APC should be consulted if \"yes\" is checked by the patient or guardian above.  I have received, read, and understand the Vaccine Information Statement (VIS) for each vaccine ordered.  I have considered my or my child's health status as well as the health status of my close contacts.  I have taken the opportunity to discuss my vaccine questions with my or my child's health care provider.   I have requested that the ordered vaccine(s) be given to me or my child.  I understand the benefits and risks of the vaccines.  I understand that I should remain in the clinic for 15 minutes after receiving the vaccine(s).  _________________________________________________________  Signature of Patient or Parent/Legal Guardian ____________________  Date     "

## 2024-07-12 NOTE — PROGRESS NOTES
Chief Complaint  Labs Only (Patient is wanting to discuss labs that dermatology roderick. )    Subjective          Em Montanez presents to [facility]  History of Present Illness    Seeing dermatology, Vikash Irizarry, and is getting recurrent rash and they told her they think she has lupus. They gave her triamcinolone which did not get rid of the rash. Patient states that rash comes on bilateral arms and is itchy. No rash on her face.  Wanted to review labs from derm.   Autoimmune workup completed all with reassuring results.   LFTs were mildly elevated. Compared last 3 CMP's from our clinic in which they were normal.       Current Outpatient Medications   Medication Instructions    azelastine (ASTELIN) 0.1 % nasal spray 2 sprays, Nasal, Daily, Use in each nostril as directed    Chlorcyclizine-Pseudoephed 25-60 MG tablet 1 tablet, Oral, 3 Times Daily PRN    Continuous Blood Gluc  (FreeStyle Cherie 14 Day [place]) device 1 Device, Does not apply, Every 14 Days    Continuous Blood Gluc Sensor (FreeStyle Cherie 2 Sensor) misc 1 each, Subcutaneous, Every 14 Days    cyanocobalamin 1000 MCG/ML injection INJECT ONE MILLILITER INTRAMUSCULARLY EVERY 28 DAYS AS DIRECTED    fluticasone (FLONASE) 50 MCG/ACT nasal spray 2 sprays, Nasal, Daily    levothyroxine (SYNTHROID, LEVOTHROID) 50 mcg, Oral, Daily    loratadine (CLARITIN) 10 mg, Oral, Daily    meloxicam (MOBIC) 15 mg, Oral, Daily    metFORMIN ER (GLUCOPHAGE-XR) 750 mg, Oral, Daily With Dinner    multivitamin with minerals tablet tablet 1 tablet, Oral, Daily    promethazine-dextromethorphan (PROMETHAZINE-DM) 6.25-15 MG/5ML syrup 5 mL, Oral, 4 Times Daily PRN    traZODone (DESYREL) 50 mg, Oral, Nightly    triamcinolone (KENALOG) 0.1 % cream 1 Application, Topical, 4 Times Daily    valACYclovir (VALTREX) 1,000 mg, Oral, Daily       The following portions of the patient's history were reviewed and updated as appropriate:  "allergies, current medications, past family history, past medical history, past social history, past surgical history, and problem list.    Objective   Vital Signs:   /77 (BP Location: Left arm, Patient Position: Sitting, Cuff Size: Large Adult)   Pulse 77   Temp 97.4 °F (36.3 °C) (Temporal)   Ht 175.3 cm (69\")   Wt 123 kg (271 lb)   SpO2 95%   BMI 40.02 kg/m²     BP Readings from Last 3 Encounters:   07/12/24 109/77   06/18/24 124/73   04/30/24 115/63     Wt Readings from Last 3 Encounters:   07/12/24 123 kg (271 lb)   06/18/24 125 kg (275 lb)   04/30/24 133 kg (294 lb)           Physical Exam     Appearance: No acute distress, well-nourished  Head: normocephalic, atraumatic  Eyes: extraocular movements intact, no scleral icterus, no conjunctival injection  Ears, Nose, and Throat: external ears normal, nares patent, moist mucous membranes  Cardiovascular: regular rate and rhythm. no murmurs, rubs, or gallops. no edema  Respiratory: breathing comfortably, symmetric chest rise, clear to auscultation bilaterally. No wheezes, rales, or rhonchi.  Neuro: alert and oriented to time, place, and person. Normal gait  Psych: normal mood and affect     Result Review :   The following data was reviewed by: JOSEMANUEL Delgado on 07/12/2024:  Common labs          8/7/2023    12:16 3/11/2024    14:31 6/3/2024    00:00   Common Labs   Glucose 84  135     BUN 14  14     Creatinine 0.87  0.85     Sodium 140  141     Potassium 4.5  3.9     Chloride 100  103     Calcium 9.8  9.3     Albumin 4.3  3.9     Total Bilirubin 0.3  0.4     Alkaline Phosphatase 90  86     AST (SGOT) 22  19  59       ALT (SGPT) 16  15  45       WBC 9.31  8.13     Hemoglobin 14.8  14.3     Hematocrit 43.3  42.0     Platelets 261  241     Total Cholesterol 175  160     Triglycerides 203  223     HDL Cholesterol 51  39     LDL Cholesterol  90  84     Hemoglobin A1C 5.60  5.50     Microalbumin, Urine <1.2         Details          This result is " from an external source.                    Lab Results   Component Value Date    COVID19 NOT DETECTED 03/31/2021    INR 0.86 (L) 03/30/2021    BILIRUBINUR Negative 05/02/2023       Procedures        Assessment and Plan    Diagnoses and all orders for this visit:    1. Need for vaccination (Primary)  -     Shingrix Vaccine    2. Personal history of tobacco use, presenting hazards to health  -     CT Chest Low Dose Wo; Future    3. Screening for lung cancer  -     CT Chest Low Dose Wo; Future    4. Elevated liver enzymes  -     Comprehensive metabolic panel    5. Dermatitis  -     Ambulatory Referral to Allergy      - will repeat CMP to check LFT's   - explained topical steroid withdrawal as patient has been using daily x6 weeks. Verbalized understanding.     - will send to allergy to rule out allergic dermatitis       There are no discontinued medications.       Follow Up   Return if symptoms worsen or fail to improve.  Patient was given instructions and counseling regarding her condition or for health maintenance advice. Please see specific information pulled into the AVS if appropriate.       Cristine Guthrie, JOSEMANUEL  07/12/24  12:40 EDT

## 2024-08-20 ENCOUNTER — OFFICE VISIT (OUTPATIENT)
Dept: PODIATRY | Facility: CLINIC | Age: 49
End: 2024-08-20
Payer: COMMERCIAL

## 2024-08-20 VITALS
BODY MASS INDEX: 38.51 KG/M2 | TEMPERATURE: 97.1 F | HEIGHT: 69 IN | WEIGHT: 260 LBS | DIASTOLIC BLOOD PRESSURE: 76 MMHG | OXYGEN SATURATION: 96 % | SYSTOLIC BLOOD PRESSURE: 122 MMHG | HEART RATE: 75 BPM

## 2024-08-20 DIAGNOSIS — E11.9 NON-INSULIN DEPENDENT TYPE 2 DIABETES MELLITUS: ICD-10-CM

## 2024-08-20 DIAGNOSIS — M79.672 FOOT PAIN, BILATERAL: ICD-10-CM

## 2024-08-20 DIAGNOSIS — E11.8 DIABETIC FOOT: ICD-10-CM

## 2024-08-20 DIAGNOSIS — M79.671 FOOT PAIN, BILATERAL: ICD-10-CM

## 2024-08-20 DIAGNOSIS — L60.0 ONYCHOCRYPTOSIS: Primary | ICD-10-CM

## 2024-08-20 DIAGNOSIS — B35.1 ONYCHOMYCOSIS: ICD-10-CM

## 2024-08-20 PROCEDURE — 1160F RVW MEDS BY RX/DR IN RCRD: CPT | Performed by: PODIATRIST

## 2024-08-20 PROCEDURE — 11721 DEBRIDE NAIL 6 OR MORE: CPT | Performed by: PODIATRIST

## 2024-08-20 PROCEDURE — 1159F MED LIST DOCD IN RCRD: CPT | Performed by: PODIATRIST

## 2024-08-20 NOTE — PROGRESS NOTES
[facility] - PODIATRY    Today's Date: 08/20/24    Patient Name: Em Montanez  MRN: [id number]  CSN: [id number]  PCP: Ricky Velasquez Jr., MD, Last PCP Visit: 7 June 2024  Referring Provider: No ref. provider found    SUBJECTIVE     Chief Complaint   Patient presents with    Left Foot - Follow-up, Nail Problem    Right Foot - Follow-up, Nail Problem     HPI: Em Montanez, a 49 y.o.female, comes to clinic.    New, Established, New Problem:  est  Location:  Toenails  Duration:   Greater than five years  Onset:  Gradual  Nature:  sore with palpation.  Stable, worsening, improving:   stable  Aggravating factors:  Pain with shoe gear and ambulation.  Previous Treatment: debridement    Patient denies any fevers, chills, nausea, vomiting, shortness of breath, nor any other constitutional signs nor symptoms.       Patient states their most recent blood glucose reading was:  110.    Medical changes: none    I have reviewed/confirmed previously documented HPI with no changes.   Past Medical History:   Diagnosis Date    Abnormal Pap smear of cervix     Arthritis     Asthma     NO INHALER USE    Back pain 11/21/2017    Disease of thyroid gland     Fatigue 11/21/2017    H/O Chronic pelvic pain in female 02/23/2022    H/O Foot pain, right     H/O Ovarian cyst     HPV (human papilloma virus) infection     Hypoglycemia     Insomnia 12/11/2017    Pelvic pain 01/12/2022    Polycystic ovarian syndrome 11/21/2017    Seasonal allergies     Uterine pain     Vitamin D deficiency 12/11/2017     Past Surgical History:   Procedure Laterality Date    CAST APPLICATION Right 06/16/2023    Procedure: CAST APPLICATION LEG;  Surgeon: Lucas Rivera DPM;  Location: [facility] OR;  Service: Podiatry;  Laterality: Right;    CAST APPLICATION Left 9/1/2023    Procedure: CAST APPLICATION LEG;  Surgeon: Lucas Rivera DPM;  Location[facility] MAIN OR;  Service: Podiatry;  Laterality: Left;     CHOLECYSTECTOMY      COLONOSCOPY N/A 02/16/2022    Procedure: COLONOSCOPY;  Surgeon: Jb Gonzalez MD;  Location: [facility] ENDOSCOPY;  Service: Gastroenterology;  Laterality: N/A;  hemmorroids    ENDOMETRIAL ABLATION      ESSURE TUBAL LIGATION      JOINT REPLACEMENT      bilateral knee replacement    LEEP      PLANTAR FASCIA RELEASE Right 06/16/2023    Procedure: FOOT PLANTAR FASCIECTOMY;  Surgeon: Lucas Rivera DPM;  Location: [facility] MAIN OR;  Service: Podiatry;  Laterality: Right;    PLANTAR FASCIECTOMY Left 9/1/2023    Procedure: FASCIECTOMY PLANTAR FASCIA;  Surgeon: Lucas Rivera DPM;  Location: [facility] MAIN OR;  Service: Podiatry;  Laterality: Left;    TARSAL TUNNEL RELEASE Right 06/16/2023    Procedure: TARSAL TUNNEL RELEASE;  Surgeon: Lucas Rivera DPM;  Location: [facility] MAIN OR;  Service: Podiatry;  Laterality: Right;    TARSAL TUNNEL RELEASE Left 9/1/2023    Procedure: LEFT TARSAL TUNNEL RELEASE;  Surgeon: Lucas Rivera DPM;  Location: [facility] MAIN OR;  Service: Podiatry;  Laterality: Left;    TONSILLECTOMY      TOTAL LAPAROSCOPIC HYSTERECTOMY N/A 04/06/2022    Procedure: TOTAL LAPAROSCOPIC HYSTERECTOMY WITH DAVINCI ROBOT;  Surgeon: Chito Cook MD;  Location: [facility] MAIN OR;  Service: Robotics - DaVinci;  Laterality: N/A;     Family History   Problem Relation Age of Onset    Depression Mother     Heart disease Mother     Arthritis Mother     ADD / ADHD Son     Self-Injurious Behavior  Daughter     Depression Daughter     Anxiety disorder Daughter     Cancer Other     Malig Hyperthermia Neg Hx     Breast cancer Neg Hx     Uterine cancer Neg Hx     Ovarian cancer Neg Hx     Colon cancer Neg Hx     Deep vein thrombosis Neg Hx     Pulmonary embolism Neg Hx      Social History     Socioeconomic History    Marital status:    Tobacco Use    Smoking status: Every Day     Current packs/day: 1.00     Average packs/day: 1 pack/day for 30.0 years (30.0 ttl  pk-yrs)     Types: Cigarettes     Passive exposure: Current    Smokeless tobacco: Never    Tobacco comments:     INSTRUCTED NO SMOKING 24 HR PRIOR TO SURGERY    Vaping Use    Vaping status: Never Used   Substance and Sexual Activity    Alcohol use: Yes     Comment: SOCIALLY    Drug use: Never     Comment: patient denies    Sexual activity: Yes     Partners: Male     Birth control/protection: Hysterectomy     Allergies   Allergen Reactions    Prednisone Mental Status Change     Other reaction(s): Mental Status Change    Tramadol GI Intolerance     Nausea and vomiting  Other reaction(s): GI Intolerance, Vomiting  Nausea and vomiting    Diclofenac Rash     Current Outpatient Medications   Medication Sig Dispense Refill    cyanocobalamin 1000 MCG/ML injection INJECT ONE MILLILITER INTRAMUSCULARLY EVERY 28 DAYS AS DIRECTED 3 mL 1    levothyroxine (SYNTHROID, LEVOTHROID) 50 MCG tablet TAKE 1 TABLET BY MOUTH DAILY 90 tablet 1    loratadine (Claritin) 10 MG tablet Take 1 tablet by mouth Daily. 30 tablet 0    meloxicam (MOBIC) 15 MG tablet TAKE 1 TABLET BY MOUTH DAILY 90 tablet 1    metFORMIN ER (GLUCOPHAGE-XR) 750 MG 24 hr tablet Take 1 tablet by mouth Daily With Dinner. 90 tablet 1    multivitamin with minerals tablet tablet Take 1 tablet by mouth Daily.      traZODone (DESYREL) 50 MG tablet Take 1 tablet by mouth Every Night. 90 tablet 1    triamcinolone (KENALOG) 0.1 % cream Apply 1 Application topically to the appropriate area as directed 4 (Four) Times a Day.      valACYclovir (Valtrex) 1000 MG tablet Take 1 tablet by mouth Daily. 10 tablet 2    azelastine (ASTELIN) 0.1 % nasal spray 2 sprays into the nostril(s) as directed by provider Daily. Use in each nostril as directed (Patient not taking: Reported on 7/12/2024) 30 mL 0    Chlorcyclizine-Pseudoephed 25-60 MG tablet Take 1 tablet by mouth 3 (Three) Times a Day As Needed (nasal congestion). (Patient not taking: Reported on 7/12/2024) 42 tablet 0    Continuous Blood  Gluc  ([employer]Style Cherie 14 Day [place]) device 1 Device Every 14 (Fourteen) Days. (Patient not taking: Reported on 2024) 6 each 3    Continuous Blood Gluc Sensor (FreeStyle Cherie 2 Sensor) misc Inject 1 each under the skin into the appropriate area as directed Every 14 (Fourteen) Days. (Patient not taking: Reported on 2024) 4 each 3    fluticasone (FLONASE) 50 MCG/ACT nasal spray 2 sprays into the nostril(s) as directed by provider Daily. (Patient not taking: Reported on 2024) 18 mL 0    promethazine-dextromethorphan (PROMETHAZINE-DM) 6.25-15 MG/5ML syrup Take 5 mL by mouth 4 (Four) Times a Day As Needed for Cough. (Patient not taking: Reported on 2024) 150 mL 0     No current facility-administered medications for this visit.     Review of Systems   Constitutional: Negative.    Skin:         Painful toenails.   All other systems reviewed and are negative.      OBJECTIVE     Vitals:    24 0853   BP: 122/76   Pulse: 75   Temp: 97.1 °F (36.2 °C)   SpO2: 96%         Patient seen in no apparent distress.      PHYSICAL EXAM:     Foot/Ankle Exam    GENERAL  Appearance:  obese  Orientation:  AAOx3  Affect:  appropriate  Gait:  unimpaired  Assistance:  independent  Right shoe gear: casual shoe  Left shoe gear: casual shoe    VASCULAR     Right Foot Vascularity   Dorsalis pedis:  2+  Posterior tibial:  2+  Skin temperature:  warm  Edema gradin+ and pitting  CFT:  < 3 seconds  Pedal hair growth:  Absent  Varicosities:  mild varicosities     Left Foot Vascularity   Dorsalis pedis:  2+  Posterior tibial:  2+  Skin temperature:  warm  Edema gradin+ and pitting  CFT:  < 3 seconds  Pedal hair growth:  Absent  Varicosities:  mild varicosities     NEUROLOGIC     Right Foot Neurologic   Normal sensation    Light touch sensation: normal  Vibratory sensation: normal  Hot/Cold sensation: normal  Protective Sensation using [place] Monofilament:   Sites intact: 10  Sites tested: 10      Left Foot Neurologic   Normal sensation    Light touch sensation: normal  Vibratory sensation: normal  Hot/Cold sensation:  normal  Protective Sensation using [place]arpal Monofilament:   Sites intact: 10  Sites tested: 10    MUSCLE STRENGTH     Right Foot Muscle Strength   Foot dorsiflexion:  4  Foot plantar flexion:  4  Foot inversion:  4  Foot eversion:  4     Left Foot Muscle Strength   Foot dorsiflexion:  4  Foot plantar flexion:  4  Foot inversion:  4  Foot eversion:  4    RANGE OF MOTION     Right Foot Range of Motion   Foot and ankle ROM within normal limits       Left Foot Range of Motion   Foot and ankle ROM within normal limits      DERMATOLOGIC      Right Foot Dermatologic   Skin  Right foot skin is intact.   Nails  1.  Positive for onychomycosis, abnormal thickness, subungual debris, dystrophic nail and ingrown toenail.  4.  Positive for elongated, onychomycosis, abnormal thickness, subungual debris, dystrophic nail and ingrown toenail.  5.  Positive for elongated, onychomycosis, abnormal thickness, subungual debris, dystrophic nail and ingrown toenail.     Left Foot Dermatologic   Skin  Left foot skin is intact.   Nails  1.  Positive for elongated, onychomycosis, abnormal thickness, subungual debris, dystrophic nail and ingrown toenail.  2.  Positive for elongated, onychomycosis, abnormal thickness, subungual debris, dystrophic nail and ingrown toenail.  5.  Positive for elongated, onychomycosis, abnormally thick, subungual debris, dystrophic nail and ingrown toenail.    I have reexamined the patient the results are consistent with the previously documented exam.    ASSESSMENT/PLAN     Diagnoses and all orders for this visit:    1. Onychocryptosis (Primary)    2. Onychomycosis    3. Diabetic foot    4. Non-insulin dependent type 2 diabetes mellitus    5. Foot pain, bilateral    Comprehensive lower extremity examination and evaluation was performed.    Discussed findings and treatment plan including  risks, benefits, and treatment options with patient in detail. Patient agreed with treatment plan.    Toenails 1, 4, 5 on Right and 1, 2, 5 on Left were debrided with nail nippers then filed with a Dremel nail serena.  Patient tolerated procedure well without complications.    Patient is to monitor for recurrence and any new symptoms and to contact Dr. Rivera's office for a follow-up appointment.      The patient states understanding and agreement with this plan.    An After Visit Summary was printed and given to the patient at discharge, including (if requested) any available informative/educational handouts regarding diagnosis, treatment, or medications. All questions were answered to patient/family satisfaction. Should symptoms fail to improve or worsen they agree to call or return to clinic or to go to the Emergency Department. Discussed the importance of following up with any needed screening tests/labs/specialist appointments and any requested follow-up recommended by me today. Importance of maintaining follow-up discussed and patient accepts that missed appointments can delay diagnosis and potentially lead to worsening of conditions.    Return in about 9 weeks (around 10/22/2024) for Toenail Care., or sooner if acute issues arise.    I have reviewed the assessment and plan and verified the accuracy of it. No changes to assessment and plan since the information was documented. Lucas Rivera DPM 08/20/24     I have dictated this note utilizing Dragon Dictation.  Please note that portions of this note were completed with a voice recognition program.  Part of this note may be an electronic transcription/translation of spoken language to printed text using the Dragon Dictation System.      This document has been electronically signed by Lucas Rivera DPM on August 20, 2024 09:18 EDT

## 2024-08-21 NOTE — PROGRESS NOTES
Chief Complaint   Patient presents with    Annual Exam       HPI:   49 y.o. . Presents for well woman exam. s/p HYST, still has one or both ovaries, benign indications  Menses:   none   Pain:  None  Last pap: normal IGP, CtNg, Rfx Aptima HPV ASCU (2021 15:01)  Complaints: Pt has no complaints today.    Patient reports that she is not currently experiencing any symptoms of urinary incontinence.     Past Medical History:   Diagnosis Date    Abnormal Pap smear of cervix     Arthritis     Asthma     NO INHALER USE    Back pain 2017    Disease of thyroid gland     Fatigue 2017    H/O Chronic pelvic pain in female 2022    H/O Foot pain, right     H/O Ovarian cyst     HPV (human papilloma virus) infection     Hypoglycemia     Insomnia 2017    Pelvic pain 2022    Polycystic ovarian syndrome 2017    Seasonal allergies     Uterine pain     Vitamin D deficiency 2017      Past Surgical History:   Procedure Laterality Date    CAST APPLICATION Right 2023    Procedure: CAST APPLICATION LEG;  Surgeon: Lucas Rivera DPM;  Location: [facility] MAIN OR;  Service: Podiatry;  Laterality: Right;    CAST APPLICATION Left 2023    Procedure: CAST APPLICATION LEG;  Surgeon: Lucas Rivera DPM;  Location: [facility] MAIN OR;  Service: Podiatry;  Laterality: Left;    CHOLECYSTECTOMY      COLONOSCOPY N/A 2022    Procedure: COLONOSCOPY;  Surgeon: Jb Gonzalez MD;  Location: [facility] ENDOSCOPY;  Service: Gastroenterology;  Laterality: N/A;  hemmorroids    ENDOMETRIAL ABLATION      ESSURE TUBAL LIGATION      JOINT REPLACEMENT      bilateral knee replacement    LEEP      early     PLANTAR FASCIA RELEASE Right 2023    Procedure: FOOT PLANTAR FASCIECTOMY;  Surgeon: Lucas Rivera DPM;  Location: [facility] MAIN OR;  Service: Podiatry;  Laterality: Right;    PLANTAR FASCIECTOMY Left 2023    Procedure: FASCIECTOMY PLANTAR FASCIA;  Surgeon:  "Lucas Rivera DPM;  Location: [facility] MAIN OR;  Service: Podiatry;  Laterality: Left;    TARSAL TUNNEL RELEASE Right 06/16/2023    Procedure: TARSAL TUNNEL RELEASE;  Surgeon: Lucas Rivera DPM;  Location: [facility] MAIN OR;  Service: Podiatry;  Laterality: Right;    TARSAL TUNNEL RELEASE Left 09/01/2023    Procedure: LEFT TARSAL TUNNEL RELEASE;  Surgeon: Lucas Rivera DPM;  Location: [facility] MAIN OR;  Service: Podiatry;  Laterality: Left;    TONSILLECTOMY      TOTAL LAPAROSCOPIC HYSTERECTOMY N/A 04/06/2022    Procedure: TOTAL LAPAROSCOPIC HYSTERECTOMY WITH DAVINCI ROBOT;  Surgeon: Chito Cook MD;  Location: [facility] MAIN OR;  Service: Robotics - DaVinci;  Laterality: N/A;, secondary to pelvic pain      Family History   Problem Relation Age of Onset    Depression Mother     Heart disease Mother     Arthritis Mother     ADD / ADHD Son     Self-Injurious Behavior  Daughter     Depression Daughter     Anxiety disorder Daughter     Cancer Other     Malig Hyperthermia Neg Hx     Breast cancer Neg Hx     Uterine cancer Neg Hx     Ovarian cancer Neg Hx     Colon cancer Neg Hx     Deep vein thrombosis Neg Hx     Pulmonary embolism Neg Hx      Allergies as of 08/28/2024 - Reviewed 08/28/2024   Allergen Reaction Noted    Prednisone Mental Status Change 07/02/2021    Tramadol GI Intolerance 07/02/2021    Diclofenac Rash 09/20/2023        PCP: does manage PMHx and preventative labs    /73   Pulse 73   Ht 175.3 cm (69\")   Wt 117 kg (257 lb)   LMP  (LMP Unknown)   BMI 37.95 kg/m²     PHYSICAL EXAM: Chaperone present   General- NAD, alert and oriented, appropriate  Psych- Normal mood, good memory  Neck- No masses, no thyroid enlargement  Lymphatic- No palpable neck, axillary, or groin nodes  CV- Regular rhythm, no murmurs  Resp- CTA to bases, no wheezes  Abdomen- Soft, non distended, non tender, no masses  Breast left-  Bilaterally symmetrical, no masses, non tender, no nipple " discharge  Breast right- Bilaterally symmetrical, no masses, non tender, no nipple discharge  External genitalia- Normal female, no lesions  Urethra/meatus- Normal, no masses, non tender, no prolapse  Bladder- Normal, no masses, non tender, no prolapse  Vagina- Normal, no atrophy, no lesions, no discharge, no prolapse  Cvx- Absent  Uterus- Absent  Adnexa- No mass, non tender  Anus/Rectum/Perineum- Not performed  Ext- No edema, no cyanosis    Skin- No lesions, no rashes, no acanthosis nigricans       ASSESSMENT and PLAN:    Diagnoses and all orders for this visit:    1. Well woman exam (Primary)    2. Encounter for screening mammogram for malignant neoplasm of breast  -     Mammo Screening Digital Tomosynthesis Bilateral With CAD; Future      Preventative:   BREAST HEALTH- Monthly self breast exam importance and how to reviewed. MMG and/or MRI (prn) reviewed per society guidelines and her individual history. Screening Imaging: Updated today  CERVICAL CANCER Screening- Reviewed current ASCCP guidelines for screening w and wo cotest HPV, age specific.  Screen: Not medically needed  COLON CANCER Screening- Reviewed current medical society guidelines and options.  Screen:  Already up to date  SEXUAL HEALTH: Declines STD screening,  Safe sex and condoms  BONE HEALTH- Reviewed current medical society guidelines and options for testing, calcium and vit D intake.  Weight bearing exercise.  DEXA: Not medically needed  VACCINATIONS Recommended: Zoster (51yo and older)  Importance discussed, risk being unvaccinated reviewed.  Questions answered  Genetic testing: Does not qualify.  Smoking status- NON SMOKER  Follow up PCP/Specialist PMHx and Labs     She understands the importance of having any ordered tests to be performed in a timely fashion.  The risks of not performing them include, but are not limited to, advanced cancer stages, bone loss from osteoporosis and/or subsequent increase in morbidity and/or mortality.  She is  encouraged to review her results online and/or contact or office if she has questions.       Follow Up:  Return in about 1 year (around 8/28/2025).        Lizette Brooke, APRN  08/28/2024

## 2024-08-26 ENCOUNTER — HOSPITAL ENCOUNTER (OUTPATIENT)
Dept: CT IMAGING | Facility: HOSPITAL | Age: 49
Discharge: HOME | End: 2024-08-26
Admitting: NURSE PRACTITIONER
Payer: COMMERCIAL

## 2024-08-26 DIAGNOSIS — Z87.891 PERSONAL HISTORY OF TOBACCO USE, PRESENTING HAZARDS TO HEALTH: ICD-10-CM

## 2024-08-26 DIAGNOSIS — Z12.2 SCREENING FOR LUNG CANCER: ICD-10-CM

## 2024-08-26 PROCEDURE — 71271 CT THORAX LUNG CANCER SCR C-: CPT

## 2024-08-28 ENCOUNTER — OFFICE VISIT (OUTPATIENT)
Dept: OBSTETRICS AND GYNECOLOGY | Facility: CLINIC | Age: 49
End: 2024-08-28
Payer: COMMERCIAL

## 2024-08-28 VITALS
DIASTOLIC BLOOD PRESSURE: 73 MMHG | BODY MASS INDEX: 38.06 KG/M2 | HEART RATE: 73 BPM | SYSTOLIC BLOOD PRESSURE: 105 MMHG | HEIGHT: 69 IN | WEIGHT: 257 LBS

## 2024-08-28 DIAGNOSIS — Z01.419 WELL WOMAN EXAM: Primary | ICD-10-CM

## 2024-08-28 DIAGNOSIS — Z12.31 ENCOUNTER FOR SCREENING MAMMOGRAM FOR MALIGNANT NEOPLASM OF BREAST: ICD-10-CM

## 2024-08-29 NOTE — PROGRESS NOTES
I saw her for a sick visit and they ordered this for her care gap.   Do you care to take over the follow up? If not, I totally can. It was just a care gap - she saw me for a rash. I just signed the order.

## 2024-08-30 DIAGNOSIS — R91.1 LUNG NODULE: Primary | ICD-10-CM

## 2024-09-11 NOTE — PROGRESS NOTES
"Chief Complaint  Annual Exam and referral to ortho  (Want to see another ortho doctor )    Subjective          Em Montanez presents to [facility]  History of Present Illness  Obesity- patient status post lab band surgery. Patient reports having fatigue. She does report additional stressors. Patient reports sleeping well, but has been told she snores. Patient takes trazodone nightly.   Levothyroxine- due for recheck  Impaired fasting glucose- patient is losing weight. Patient is compliant with metformin.  Left knee pain - patient reports worsening recently due to stepping in pothole.  Patient would like 2nd opinion from another orthopedics.     Current Outpatient Medications   Medication Instructions    cyanocobalamin 1000 MCG/ML injection INJECT ONE MILLILITER INTRAMUSCULARLY EVERY 28 DAYS AS DIRECTED    levothyroxine (SYNTHROID, LEVOTHROID) 50 mcg, Oral, Daily    loratadine (CLARITIN) 10 mg, Oral, Daily    multivitamin with minerals tablet tablet 1 tablet, Oral, Daily    traZODone (DESYREL) 50 mg, Oral, Nightly    triamcinolone (KENALOG) 0.1 % cream 1 Application, Topical, 4 Times Daily    valACYclovir (VALTREX) 1,000 mg, Oral, Daily       The following portions of the patient's history were reviewed and updated as appropriate: allergies, current medications, past family history, past medical history, past social history, past surgical history, and problem list.    Objective   Vital Signs:   /81 (BP Location: Right arm)   Pulse 73   Temp 97.7 °F (36.5 °C) (Temporal)   Ht 175.3 cm (69\")   Wt 117 kg (257 lb)   SpO2 94%   BMI 37.95 kg/m²     BP Readings from Last 3 Encounters:   09/12/24 124/81   08/28/24 105/73   08/20/24 122/76     Wt Readings from Last 3 Encounters:   09/12/24 117 kg (257 lb)   08/28/24 117 kg (257 lb)   08/20/24 118 kg (260 lb)        Physical Exam   Appearance: No acute distress, well-nourished  Head: normocephalic, " atraumatic  Eyes: extraocular movements intact, no scleral icterus, no conjunctival injection  Ears, Nose, and Throat: external ears normal, nares patent, moist mucous membranes  Cardiovascular: regular rate and rhythm. no murmurs, rubs, or gallops. no edema  Respiratory: breathing comfortably, symmetric chest rise, clear to auscultation bilaterally. No wheezes, rales, or rhonchi.  Neuro: alert and oriented to time, place, and person. Normal gait  Psych: normal mood and affect     Result Review :   The following data was reviewed by: Ricky Velasquez Jr, MD on 09/12/2024:  Common labs          3/11/2024    14:31 6/3/2024    00:00 7/12/2024    09:24   Common Labs   Glucose 135   103    BUN 14   19    Creatinine 0.85   0.96    Sodium 141   138    Potassium 3.9   4.4    Chloride 103   103    Calcium 9.3   10.4    Albumin 3.9   4.2    Total Bilirubin 0.4   0.3    Alkaline Phosphatase 86   81    AST (SGOT) 19  59     34    ALT (SGPT) 15  45     34    WBC 8.13      Hemoglobin 14.3      Hematocrit 42.0      Platelets 241      Total Cholesterol 160      Triglycerides 223      HDL Cholesterol 39      LDL Cholesterol  84      Hemoglobin A1C 5.50         Details          This result is from an external source.               Lab Results   Component Value Date    COVID19 NOT DETECTED 03/31/2021    INR 0.86 (L) 03/30/2021    BILIRUBINUR Negative 05/02/2023          Assessment and Plan    Diagnoses and all orders for this visit:    1. Annual physical exam (Primary)  -     CBC & Differential  -     Comprehensive Metabolic Panel    2. Need for influenza vaccination  -     Fluzone >6mos ([id number])    3. Left knee pain, unspecified chronicity  -     Ambulatory Referral to Orthopedic Surgery    4. Impaired glucose tolerance  Comments:  encouraged by weight loss. stop metformin  Orders:  -     Hemoglobin A1c  -     Microalbumin / Creatinine Urine Ratio - Urine, Clean Catch    5. Need for COVID-19 vaccine    6. Primary  insomnia  Comments:  cont trazodone. eval with sleep study    7. Mixed hyperlipidemia  -     Lipid Panel    8. Abnormal thyroid blood test  -     TSH Rfx On Abnormal To Free T4    9. Fatigue, unspecified type  Comments:  workup as ordered. refer to sleep for slepe study as well.  Orders:  -     Ambulatory Referral to Sleep Medicine  -     Vitamin B12 & Folate    10. Vitamin D deficiency  -     Vitamin D,25-Hydroxy        Advised on diet, physical activity, etc    Medications Discontinued During This Encounter   Medication Reason    Continuous Blood Gluc  (FreeStyle Cherie 14 Day [place]) device *Therapy completed    Continuous Blood Gluc Sensor (FreeStyle Cherie 2 Sensor) misc *Therapy completed    meloxicam (MOBIC) 15 MG tablet *Therapy completed    metFORMIN ER (GLUCOPHAGE-XR) 750 MG 24 hr tablet *Therapy completed        Follow Up   Return in about 6 months (around 3/12/2025).  Patient was given instructions and counseling regarding her condition or for health maintenance advice. Please see specific information pulled into the AVS if appropriate.       Ricky Velasquez Jr, MD  09/12/24  09:50 EDT

## 2024-09-12 ENCOUNTER — OFFICE VISIT (OUTPATIENT)
Dept: INTERNAL MEDICINE | Facility: CLINIC | Age: 49
End: 2024-09-12
Payer: COMMERCIAL

## 2024-09-12 VITALS
OXYGEN SATURATION: 94 % | SYSTOLIC BLOOD PRESSURE: 124 MMHG | BODY MASS INDEX: 38.06 KG/M2 | WEIGHT: 257 LBS | HEART RATE: 73 BPM | DIASTOLIC BLOOD PRESSURE: 81 MMHG | TEMPERATURE: 97.7 F | HEIGHT: 69 IN

## 2024-09-12 DIAGNOSIS — F51.01 PRIMARY INSOMNIA: ICD-10-CM

## 2024-09-12 DIAGNOSIS — Z00.00 ANNUAL PHYSICAL EXAM: Primary | ICD-10-CM

## 2024-09-12 DIAGNOSIS — Z23 NEED FOR INFLUENZA VACCINATION: ICD-10-CM

## 2024-09-12 DIAGNOSIS — R73.02 IMPAIRED GLUCOSE TOLERANCE: ICD-10-CM

## 2024-09-12 DIAGNOSIS — E78.2 MIXED HYPERLIPIDEMIA: ICD-10-CM

## 2024-09-12 DIAGNOSIS — M25.562 LEFT KNEE PAIN, UNSPECIFIED CHRONICITY: ICD-10-CM

## 2024-09-12 DIAGNOSIS — E55.9 VITAMIN D DEFICIENCY: ICD-10-CM

## 2024-09-12 DIAGNOSIS — R79.89 ABNORMAL THYROID BLOOD TEST: ICD-10-CM

## 2024-09-12 DIAGNOSIS — Z23 NEED FOR COVID-19 VACCINE: ICD-10-CM

## 2024-09-12 DIAGNOSIS — R53.83 FATIGUE, UNSPECIFIED TYPE: ICD-10-CM

## 2024-09-12 LAB
25(OH)D3 SERPL-MCNC: 39.4 NG/ML (ref 30–100)
ALBUMIN SERPL-MCNC: 4.1 G/DL (ref 3.5–5.2)
ALBUMIN UR-MCNC: <1.2 MG/DL
ALBUMIN/GLOB SERPL: 1.5 G/DL
ALP SERPL-CCNC: 79 U/L (ref 39–117)
ALT SERPL W P-5'-P-CCNC: 23 U/L (ref 1–33)
ANION GAP SERPL CALCULATED.3IONS-SCNC: 11 MMOL/L (ref 5–15)
AST SERPL-CCNC: 31 U/L (ref 1–32)
BASOPHILS # BLD AUTO: 0.07 10*3/MM3 (ref 0–0.2)
BASOPHILS NFR BLD AUTO: 0.7 % (ref 0–1.5)
BILIRUB SERPL-MCNC: 0.4 MG/DL (ref 0–1.2)
BUN SERPL-MCNC: 15 MG/DL (ref 6–20)
BUN/CREAT SERPL: 17.9 (ref 7–25)
CALCIUM SPEC-SCNC: 10.4 MG/DL (ref 8.6–10.5)
CHLORIDE SERPL-SCNC: 99 MMOL/L (ref 98–107)
CHOLEST SERPL-MCNC: 159 MG/DL (ref 0–200)
CO2 SERPL-SCNC: 26 MMOL/L (ref 22–29)
CREAT SERPL-MCNC: 0.84 MG/DL (ref 0.57–1)
CREAT UR-MCNC: 191.2 MG/DL
DEPRECATED RDW RBC AUTO: 43.5 FL (ref 37–54)
EGFRCR SERPLBLD CKD-EPI 2021: 84.3 ML/MIN/1.73
EOSINOPHIL # BLD AUTO: 0.16 10*3/MM3 (ref 0–0.4)
EOSINOPHIL NFR BLD AUTO: 1.6 % (ref 0.3–6.2)
ERYTHROCYTE [DISTWIDTH] IN BLOOD BY AUTOMATED COUNT: 12.8 % (ref 12.3–15.4)
FOLATE SERPL-MCNC: 19.5 NG/ML (ref 4.78–24.2)
GLOBULIN UR ELPH-MCNC: 2.8 GM/DL
GLUCOSE SERPL-MCNC: 68 MG/DL (ref 65–99)
HBA1C MFR BLD: 5.2 % (ref 4.8–5.6)
HCT VFR BLD AUTO: 47.6 % (ref 34–46.6)
HDLC SERPL-MCNC: 44 MG/DL (ref 40–60)
HGB BLD-MCNC: 16.3 G/DL (ref 12–15.9)
IMM GRANULOCYTES # BLD AUTO: 0.02 10*3/MM3 (ref 0–0.05)
IMM GRANULOCYTES NFR BLD AUTO: 0.2 % (ref 0–0.5)
LDLC SERPL CALC-MCNC: 80 MG/DL (ref 0–100)
LDLC/HDLC SERPL: 1.68 {RATIO}
LYMPHOCYTES # BLD AUTO: 2.95 10*3/MM3 (ref 0.7–3.1)
LYMPHOCYTES NFR BLD AUTO: 30.1 % (ref 19.6–45.3)
MCH RBC QN AUTO: 32.2 PG (ref 26.6–33)
MCHC RBC AUTO-ENTMCNC: 34.2 G/DL (ref 31.5–35.7)
MCV RBC AUTO: 94.1 FL (ref 79–97)
MICROALBUMIN/CREAT UR: NORMAL MG/G{CREAT}
MONOCYTES # BLD AUTO: 0.51 10*3/MM3 (ref 0.1–0.9)
MONOCYTES NFR BLD AUTO: 5.2 % (ref 5–12)
NEUTROPHILS NFR BLD AUTO: 6.1 10*3/MM3 (ref 1.7–7)
NEUTROPHILS NFR BLD AUTO: 62.2 % (ref 42.7–76)
NRBC BLD AUTO-RTO: 0 /100 WBC (ref 0–0.2)
PLATELET # BLD AUTO: 250 10*3/MM3 (ref 140–450)
PMV BLD AUTO: 10.6 FL (ref 6–12)
POTASSIUM SERPL-SCNC: 4.5 MMOL/L (ref 3.5–5.2)
PROT SERPL-MCNC: 6.9 G/DL (ref 6–8.5)
RBC # BLD AUTO: 5.06 10*6/MM3 (ref 3.77–5.28)
SODIUM SERPL-SCNC: 136 MMOL/L (ref 136–145)
TRIGL SERPL-MCNC: 206 MG/DL (ref 0–150)
TSH SERPL DL<=0.05 MIU/L-ACNC: 1.86 UIU/ML (ref 0.27–4.2)
VIT B12 BLD-MCNC: 1137 PG/ML (ref 211–946)
VLDLC SERPL-MCNC: 35 MG/DL (ref 5–40)
WBC NRBC COR # BLD AUTO: 9.81 10*3/MM3 (ref 3.4–10.8)

## 2024-09-12 PROCEDURE — 85025 COMPLETE CBC W/AUTO DIFF WBC: CPT | Performed by: INTERNAL MEDICINE

## 2024-09-12 PROCEDURE — 84443 ASSAY THYROID STIM HORMONE: CPT | Performed by: INTERNAL MEDICINE

## 2024-09-12 PROCEDURE — 1126F AMNT PAIN NOTED NONE PRSNT: CPT | Performed by: INTERNAL MEDICINE

## 2024-09-12 PROCEDURE — 90471 IMMUNIZATION ADMIN: CPT | Performed by: INTERNAL MEDICINE

## 2024-09-12 PROCEDURE — 99396 PREV VISIT EST AGE 40-64: CPT | Performed by: INTERNAL MEDICINE

## 2024-09-12 PROCEDURE — 90656 IIV3 VACC NO PRSV 0.5 ML IM: CPT | Performed by: INTERNAL MEDICINE

## 2024-09-12 PROCEDURE — 80053 COMPREHEN METABOLIC PANEL: CPT | Performed by: INTERNAL MEDICINE

## 2024-09-12 PROCEDURE — 82043 UR ALBUMIN QUANTITATIVE: CPT | Performed by: INTERNAL MEDICINE

## 2024-09-12 PROCEDURE — 82607 VITAMIN B-12: CPT | Performed by: INTERNAL MEDICINE

## 2024-09-12 PROCEDURE — 82306 VITAMIN D 25 HYDROXY: CPT | Performed by: INTERNAL MEDICINE

## 2024-09-12 PROCEDURE — 82746 ASSAY OF FOLIC ACID SERUM: CPT | Performed by: INTERNAL MEDICINE

## 2024-09-12 PROCEDURE — 3044F HG A1C LEVEL LT 7.0%: CPT | Performed by: INTERNAL MEDICINE

## 2024-09-12 PROCEDURE — 82570 ASSAY OF URINE CREATININE: CPT | Performed by: INTERNAL MEDICINE

## 2024-09-12 PROCEDURE — 83036 HEMOGLOBIN GLYCOSYLATED A1C: CPT | Performed by: INTERNAL MEDICINE

## 2024-09-12 PROCEDURE — 80061 LIPID PANEL: CPT | Performed by: INTERNAL MEDICINE

## 2024-09-27 ENCOUNTER — OFFICE VISIT (OUTPATIENT)
Dept: SLEEP MEDICINE | Facility: HOSPITAL | Age: 49
End: 2024-09-27
Payer: COMMERCIAL

## 2024-09-27 VITALS
HEIGHT: 69 IN | DIASTOLIC BLOOD PRESSURE: 84 MMHG | WEIGHT: 252 LBS | SYSTOLIC BLOOD PRESSURE: 127 MMHG | BODY MASS INDEX: 37.33 KG/M2 | OXYGEN SATURATION: 96 % | HEART RATE: 77 BPM

## 2024-09-27 DIAGNOSIS — G47.33 OSA (OBSTRUCTIVE SLEEP APNEA): Primary | ICD-10-CM

## 2024-09-27 PROCEDURE — G0463 HOSPITAL OUTPT CLINIC VISIT: HCPCS

## 2024-09-27 RX ORDER — CETIRIZINE HYDROCHLORIDE 10 MG/1
TABLET ORAL
COMMUNITY
Start: 2024-09-08

## 2024-09-30 DIAGNOSIS — F41.9 ANXIETY: ICD-10-CM

## 2024-09-30 DIAGNOSIS — F32.A DEPRESSION, UNSPECIFIED DEPRESSION TYPE: ICD-10-CM

## 2024-09-30 RX ORDER — TRAZODONE HYDROCHLORIDE 50 MG/1
50 TABLET, FILM COATED ORAL NIGHTLY
Qty: 90 TABLET | Refills: 1 | Status: SHIPPED | OUTPATIENT
Start: 2024-09-30

## 2024-10-19 DIAGNOSIS — G47.33 OSA (OBSTRUCTIVE SLEEP APNEA): Primary | ICD-10-CM

## 2024-10-21 DIAGNOSIS — R79.89 ABNORMAL THYROID BLOOD TEST: ICD-10-CM

## 2024-10-21 RX ORDER — LEVOTHYROXINE SODIUM 50 UG/1
50 TABLET ORAL DAILY
Qty: 90 TABLET | Refills: 1 | Status: SHIPPED | OUTPATIENT
Start: 2024-10-21

## 2024-10-23 ENCOUNTER — OFFICE VISIT (OUTPATIENT)
Dept: PODIATRY | Facility: CLINIC | Age: 49
End: 2024-10-23
Payer: COMMERCIAL

## 2024-10-23 VITALS
DIASTOLIC BLOOD PRESSURE: 85 MMHG | HEART RATE: 69 BPM | SYSTOLIC BLOOD PRESSURE: 131 MMHG | OXYGEN SATURATION: 96 % | TEMPERATURE: 98.5 F | WEIGHT: 251 LBS | BODY MASS INDEX: 37.07 KG/M2

## 2024-10-23 DIAGNOSIS — B35.1 ONYCHOMYCOSIS: ICD-10-CM

## 2024-10-23 DIAGNOSIS — L60.0 ONYCHOCRYPTOSIS: Primary | ICD-10-CM

## 2024-10-23 DIAGNOSIS — M79.671 FOOT PAIN, BILATERAL: ICD-10-CM

## 2024-10-23 DIAGNOSIS — E11.8 DM FEET: ICD-10-CM

## 2024-10-23 DIAGNOSIS — M79.672 FOOT PAIN, BILATERAL: ICD-10-CM

## 2024-10-23 DIAGNOSIS — E11.9 NON-INSULIN DEPENDENT TYPE 2 DIABETES MELLITUS: ICD-10-CM

## 2024-10-23 NOTE — PROGRESS NOTES
[facility] - PODIATRY    Today's Date: 10/23/24    Patient Name: Em Montanez  MRN: [id number]  CSN: [id number]  PCP: Ricky Velasquez Jr., MD, Last PCP Visit: 9/12/2024  Referring Provider: No ref. provider found    SUBJECTIVE     Chief Complaint   Patient presents with    Left Foot - Follow-up, Nail Problem    Right Foot - Follow-up, Nail Problem     HPI: Em Montanez, a 49 y.o.female, comes to clinic.    New, Established, New Problem:  est  Location:  Toenails  Duration:   Greater than five years  Onset:  Gradual  Nature:  sore with palpation.  Stable, worsening, improving:   stable  Aggravating factors:  Pain with shoe gear and ambulation.  Previous Treatment: debridement    Patient denies any fevers, chills, nausea, vomiting, shortness of breath, nor any other constitutional signs nor symptoms.       Patient states they are unsure of the most recent blood glucose reading.      Medical changes:  d/c Metformin, Lap band surgery.  Has lost 50 lbs so far.    I have reviewed/confirmed previously documented HPI with no changes.   Past Medical History:   Diagnosis Date    Abnormal Pap smear of cervix     Arthritis     Asthma     NO INHALER USE    Back pain 11/21/2017    Disease of thyroid gland     Fatigue 11/21/2017    H/O Chronic pelvic pain in female 02/23/2022    H/O Foot pain, right     H/O Ovarian cyst     HPV (human papilloma virus) infection     Hypoglycemia     Insomnia 12/11/2017    Pelvic pain 01/12/2022    Polycystic ovarian syndrome 11/21/2017    Seasonal allergies     Uterine pain     Vitamin D deficiency 12/11/2017     Past Surgical History:   Procedure Laterality Date    CAST APPLICATION Right 06/16/2023    Procedure: CAST APPLICATION LEG;  Surgeon: Lucas Rivera DPM;  Location: [facility] MAIN OR;  Service: Podiatry;  Laterality: Right;    CAST APPLICATION Left 09/01/2023    Procedure: CAST APPLICATION LEG;  Surgeon: Lucas Rivera DPM;  Location:   [facility] MAIN OR;  Service: Podiatry;  Laterality: Left;    CHOLECYSTECTOMY      COLONOSCOPY N/A 02/16/2022    Procedure: COLONOSCOPY;  Surgeon: Jb Gonzalez MD;  Location: [facility] ENDOSCOPY;  Service: Gastroenterology;  Laterality: N/A;  hemmorroids    ENDOMETRIAL ABLATION      ESSURE TUBAL LIGATION      JOINT REPLACEMENT      bilateral knee replacement    LEEP      early 2000's    PLANTAR FASCIA RELEASE Right 06/16/2023    Procedure: FOOT PLANTAR FASCIECTOMY;  Surgeon: Lucas Rivera DPM;  Location: [facility] MAIN OR;  Service: Podiatry;  Laterality: Right;    PLANTAR FASCIECTOMY Left 09/01/2023    Procedure: FASCIECTOMY PLANTAR FASCIA;  Surgeon: Lucas Rivera DPM;  Location: [facility] MAIN OR;  Service: Podiatry;  Laterality: Left;    TARSAL TUNNEL RELEASE Right 06/16/2023    Procedure: TARSAL TUNNEL RELEASE;  Surgeon: Lucas Rivera DPM;  Location: [facility] MAIN OR;  Service: Podiatry;  Laterality: Right;    TARSAL TUNNEL RELEASE Left 09/01/2023    Procedure: LEFT TARSAL TUNNEL RELEASE;  Surgeon: Lucas Rivera DPM;  Location: [facility] MAIN OR;  Service: Podiatry;  Laterality: Left;    TONSILLECTOMY      TOTAL LAPAROSCOPIC HYSTERECTOMY N/A 04/06/2022    Procedure: TOTAL LAPAROSCOPIC HYSTERECTOMY WITH DAVINCI ROBOT;  Surgeon: Chito Cook MD;  Location: [facility] MAIN OR;  Service: Robotics - DaVinci;  Laterality: N/A;, secondary to pelvic pain     Family History   Problem Relation Age of Onset    Depression Mother     Heart disease Mother     Arthritis Mother     Sleep apnea Mother     Self-Injurious Behavior  Daughter     Depression Daughter     Anxiety disorder Daughter     ADD / ADHD Son     Cancer Other     Malig Hyperthermia Neg Hx     Breast cancer Neg Hx     Uterine cancer Neg Hx     Ovarian cancer Neg Hx     Colon cancer Neg Hx     Deep vein thrombosis Neg Hx     Pulmonary embolism Neg Hx      Social History     Socioeconomic History    Marital status:    Tobacco  Use    Smoking status: Every Day     Current packs/day: 1.00     Average packs/day: 1 pack/day for 30.0 years (30.0 ttl pk-yrs)     Types: Cigarettes     Passive exposure: Current    Smokeless tobacco: Never    Tobacco comments:     INSTRUCTED NO SMOKING 24 HR PRIOR TO SURGERY    Vaping Use    Vaping status: Never Used   Substance and Sexual Activity    Alcohol use: Yes     Comment: SOCIALLY    Drug use: Never     Comment: patient denies    Sexual activity: Yes     Partners: Male     Birth control/protection: Hysterectomy     Allergies   Allergen Reactions    Prednisone Mental Status Change     Other reaction(s): Mental Status Change    Tramadol GI Intolerance     Nausea and vomiting  Other reaction(s): GI Intolerance, Vomiting  Nausea and vomiting    Diclofenac Rash     Current Outpatient Medications   Medication Sig Dispense Refill    cyanocobalamin 1000 MCG/ML injection INJECT ONE MILLILITER INTRAMUSCULARLY EVERY 28 DAYS AS DIRECTED 3 mL 1    levothyroxine (SYNTHROID, LEVOTHROID) 50 MCG tablet Take 1 tablet by mouth Daily. 90 tablet 1    loratadine (Claritin) 10 MG tablet Take 1 tablet by mouth Daily. 30 tablet 0    multivitamin with minerals tablet tablet Take 1 tablet by mouth Daily.      traZODone (DESYREL) 50 MG tablet Take 1 tablet by mouth Every Night. 90 tablet 1    triamcinolone (KENALOG) 0.1 % cream Apply 1 Application topically to the appropriate area as directed 4 (Four) Times a Day.      valACYclovir (Valtrex) 1000 MG tablet Take 1 tablet by mouth Daily. 10 tablet 2    cetirizine (zyrTEC) 10 MG tablet  (Patient not taking: Reported on 10/23/2024)       No current facility-administered medications for this visit.     Review of Systems   Constitutional: Negative.    Skin:         Painful toenails.   All other systems reviewed and are negative.      OBJECTIVE     Vitals:    10/23/24 0844   BP: 131/85   Pulse: 69   Temp: 98.5 °F (36.9 °C)   SpO2: 96%         Patient seen in no apparent distress.       PHYSICAL EXAM:     Foot/Ankle Exam    GENERAL  Appearance:  obese  Orientation:  AAOx3  Affect:  appropriate  Gait:  unimpaired  Assistance:  independent  Right shoe gear: casual shoe  Left shoe gear: casual shoe    VASCULAR     Right Foot Vascularity   Dorsalis pedis:  2+  Posterior tibial:  2+  Skin temperature:  warm  Edema gradin+ and pitting  CFT:  < 3 seconds  Pedal hair growth:  Absent  Varicosities:  mild varicosities     Left Foot Vascularity   Dorsalis pedis:  2+  Posterior tibial:  2+  Skin temperature:  warm  Edema gradin+ and pitting  CFT:  < 3 seconds  Pedal hair growth:  Absent  Varicosities:  mild varicosities     NEUROLOGIC     Right Foot Neurologic   Normal sensation    Light touch sensation: normal  Vibratory sensation: normal  Hot/Cold sensation: normal  Protective Sensation using [place] Monofilament:   Sites intact: 10  Sites tested: 10     Left Foot Neurologic   Normal sensation    Light touch sensation: normal  Vibratory sensation: normal  Hot/Cold sensation:  normal  Protective Sensation using [place] Monofilament:   Sites intact: 10  Sites tested: 10    MUSCLE STRENGTH     Right Foot Muscle Strength   Foot dorsiflexion:  4  Foot plantar flexion:  4  Foot inversion:  4  Foot eversion:  4     Left Foot Muscle Strength   Foot dorsiflexion:  4  Foot plantar flexion:  4  Foot inversion:  4  Foot eversion:  4    RANGE OF MOTION     Right Foot Range of Motion   Foot and ankle ROM within normal limits       Left Foot Range of Motion   Foot and ankle ROM within normal limits      DERMATOLOGIC      Right Foot Dermatologic   Skin  Right foot skin is intact.   Nails  1.  Positive for onychomycosis, abnormal thickness, subungual debris, dystrophic nail and ingrown toenail.  4.  Positive for elongated, onychomycosis, abnormal thickness, subungual debris, dystrophic nail and ingrown toenail.  5.  Positive for elongated, onychomycosis, abnormal thickness, subungual debris,  dystrophic nail and ingrown toenail.     Left Foot Dermatologic   Skin  Left foot skin is intact.   Nails  1.  Positive for elongated, onychomycosis, abnormal thickness, subungual debris, dystrophic nail and ingrown toenail.  2.  Positive for elongated, onychomycosis, abnormal thickness, subungual debris, dystrophic nail and ingrown toenail.  5.  Positive for elongated, onychomycosis, abnormally thick, subungual debris, dystrophic nail and ingrown toenail.    I have reexamined the patient the results are consistent with the previously documented exam.    ASSESSMENT/PLAN     Diagnoses and all orders for this visit:    1. Onychocryptosis (Primary)    2. Onychomycosis    3. Non-insulin dependent type 2 diabetes mellitus    4. Foot pain, bilateral    5. DM feet    Comprehensive lower extremity examination and evaluation was performed.    Discussed findings and treatment plan including risks, benefits, and treatment options with patient in detail. Patient agreed with treatment plan.    Toenails 1, 4, 5 on Right and 1, 2, 5 on Left were debrided with nail nippers then filed with a Dremel nail serena.  Patient tolerated procedure well without complications.    Patient is to monitor for recurrence and any new symptoms and to contact Dr. Rivera's office for a follow-up appointment.      The patient states understanding and agreement with this plan.    An After Visit Summary was printed and given to the patient at discharge, including (if requested) any available informative/educational handouts regarding diagnosis, treatment, or medications. All questions were answered to patient/family satisfaction. Should symptoms fail to improve or worsen they agree to call or return to clinic or to go to the Emergency Department. Discussed the importance of following up with any needed screening tests/labs/specialist appointments and any requested follow-up recommended by me today. Importance of maintaining follow-up discussed and  patient accepts that missed appointments can delay diagnosis and potentially lead to worsening of conditions.    Return in about 9 weeks (around 12/25/2024) for Toenail Care., or sooner if acute issues arise.    I have reviewed the assessment and plan and verified the accuracy of it. No changes to assessment and plan since the information was documented. Lucas Rivera DPM 10/23/24     I have dictated this note utilizing Dragon Dictation.  Please note that portions of this note were completed with a voice recognition program.  Part of this note may be an electronic transcription/translation of spoken language to printed text using the Dragon Dictation System.      This document has been electronically signed by Lucas Rivera DPM on October 23, 2024 09:06 EDT

## 2024-11-10 DIAGNOSIS — G47.33 OSA (OBSTRUCTIVE SLEEP APNEA): Primary | ICD-10-CM

## 2024-11-10 RX ORDER — ZOLPIDEM TARTRATE 5 MG/1
5 TABLET ORAL
Qty: 2 TABLET | Refills: 0 | Status: SHIPPED | OUTPATIENT
Start: 2024-11-10 | End: 2024-11-10

## 2024-11-11 RX ORDER — CYANOCOBALAMIN 1000 UG/ML
INJECTION, SOLUTION INTRAMUSCULAR; SUBCUTANEOUS
Qty: 3 ML | Refills: 1 | Status: SHIPPED | OUTPATIENT
Start: 2024-11-11

## 2024-12-02 ENCOUNTER — HOSPITAL ENCOUNTER (OUTPATIENT)
Dept: CT IMAGING | Facility: HOSPITAL | Age: 49
Discharge: HOME | End: 2024-12-02
Admitting: INTERNAL MEDICINE
Payer: COMMERCIAL

## 2024-12-02 DIAGNOSIS — R91.1 LUNG NODULE: ICD-10-CM

## 2024-12-02 PROCEDURE — 71260 CT THORAX DX C+: CPT

## 2024-12-02 PROCEDURE — 25510000001 IOPAMIDOL PER 1 ML: Performed by: INTERNAL MEDICINE

## 2024-12-02 RX ORDER — IOPAMIDOL 755 MG/ML
100 INJECTION, SOLUTION INTRAVASCULAR
Status: COMPLETED | OUTPATIENT
Start: 2024-12-02 | End: 2024-12-02

## 2024-12-02 RX ADMIN — IOPAMIDOL 100 ML: 755 INJECTION, SOLUTION INTRAVENOUS at 12:24

## 2024-12-04 DIAGNOSIS — R91.1 LUNG NODULE: Primary | ICD-10-CM

## 2024-12-05 ENCOUNTER — TELEPHONE (OUTPATIENT)
Dept: INTERNAL MEDICINE | Facility: CLINIC | Age: 49
End: 2024-12-05

## 2024-12-05 ENCOUNTER — OFFICE VISIT (OUTPATIENT)
Dept: INTERNAL MEDICINE | Facility: CLINIC | Age: 49
End: 2024-12-05
Payer: COMMERCIAL

## 2024-12-05 VITALS
TEMPERATURE: 97.2 F | BODY MASS INDEX: 36.91 KG/M2 | HEART RATE: 76 BPM | OXYGEN SATURATION: 98 % | WEIGHT: 249.2 LBS | SYSTOLIC BLOOD PRESSURE: 129 MMHG | DIASTOLIC BLOOD PRESSURE: 78 MMHG | HEIGHT: 69 IN

## 2024-12-05 DIAGNOSIS — U07.1 COVID-19 VIRUS INFECTION: Primary | ICD-10-CM

## 2024-12-05 DIAGNOSIS — R05.1 ACUTE COUGH: ICD-10-CM

## 2024-12-05 LAB
EXPIRATION DATE: ABNORMAL
EXPIRATION DATE: NORMAL
FLUAV AG UPPER RESP QL IA.RAPID: NOT DETECTED
FLUBV AG UPPER RESP QL IA.RAPID: NOT DETECTED
INTERNAL CONTROL: ABNORMAL
INTERNAL CONTROL: NORMAL
Lab: ABNORMAL
Lab: NORMAL
S PYO AG THROAT QL: NEGATIVE
SARS-COV-2 AG UPPER RESP QL IA.RAPID: DETECTED

## 2024-12-05 PROCEDURE — 3044F HG A1C LEVEL LT 7.0%: CPT | Performed by: STUDENT IN AN ORGANIZED HEALTH CARE EDUCATION/TRAINING PROGRAM

## 2024-12-05 PROCEDURE — 99213 OFFICE O/P EST LOW 20 MIN: CPT | Performed by: STUDENT IN AN ORGANIZED HEALTH CARE EDUCATION/TRAINING PROGRAM

## 2024-12-05 PROCEDURE — 87428 SARSCOV & INF VIR A&B AG IA: CPT | Performed by: STUDENT IN AN ORGANIZED HEALTH CARE EDUCATION/TRAINING PROGRAM

## 2024-12-05 PROCEDURE — 87081 CULTURE SCREEN ONLY: CPT | Performed by: STUDENT IN AN ORGANIZED HEALTH CARE EDUCATION/TRAINING PROGRAM

## 2024-12-05 PROCEDURE — 1126F AMNT PAIN NOTED NONE PRSNT: CPT | Performed by: STUDENT IN AN ORGANIZED HEALTH CARE EDUCATION/TRAINING PROGRAM

## 2024-12-05 PROCEDURE — 87880 STREP A ASSAY W/OPTIC: CPT | Performed by: STUDENT IN AN ORGANIZED HEALTH CARE EDUCATION/TRAINING PROGRAM

## 2024-12-05 RX ORDER — BENZONATATE 100 MG/1
100 CAPSULE ORAL 3 TIMES DAILY PRN
Qty: 30 CAPSULE | Refills: 0 | Status: SHIPPED | OUTPATIENT
Start: 2024-12-05

## 2024-12-05 NOTE — TELEPHONE ENCOUNTER
Hub staff attempted to follow warm transfer process and was unsuccessful     Caller: Em Montanez    Relationship to patient: Self    Best call back number:[phone]     Patient is needing: CALLER STATED NEED FOR SAME DAY APPOINTMENT FOR COUGH, CONGESTION, RUNNY NOSE

## 2024-12-05 NOTE — PROGRESS NOTES
"Chief Complaint  Cough and Nasal Congestion    Subjective          Em Montanez presents to [facility]  History of Present Illness    Here for a sick visit.  Here with complaints of malaise, mild cough, congestion, sinus pressure.  No fever, no vomiting or diarrhea.  Symptoms started yesterday.    Current Outpatient Medications   Medication Instructions    benzonatate (TESSALON PERLES) 100 mg, Oral, 3 Times Daily PRN    cetirizine (zyrTEC) 10 MG tablet     cyanocobalamin 1000 MCG/ML injection INJECT 1 ML INTO THE MUSCLE ONCE EVERY 28 DAYS    levothyroxine (SYNTHROID, LEVOTHROID) 50 mcg, Oral, Daily    loratadine (CLARITIN) 10 mg, Oral, Daily    multivitamin with minerals tablet tablet 1 tablet, Daily    Nirmatrelvir & Ritonavir, 300mg/100mg, (PAXLOVID) 3 tablets, Oral, 2 Times Daily    traZODone (DESYREL) 50 mg, Oral, Nightly    triamcinolone (KENALOG) 0.1 % cream 1 Application, 4 Times Daily    valACYclovir (VALTREX) 1,000 mg, Oral, Daily       The following portions of the patient's history were reviewed and updated as appropriate: allergies, current medications, past family history, past medical history, past social history, past surgical history, and problem list.    Objective   Vital Signs:   /78 (BP Location: Left arm, Patient Position: Sitting, Cuff Size: Large Adult)   Pulse 76   Temp 97.2 °F (36.2 °C) (Temporal)   Ht 175.3 cm (69\")   Wt 113 kg (249 lb 3.2 oz)   SpO2 98%   BMI 36.80 kg/m²     BP Readings from Last 3 Encounters:   12/05/24 129/78   10/23/24 131/85   09/27/24 127/84     Wt Readings from Last 3 Encounters:   12/05/24 113 kg (249 lb 3.2 oz)   10/23/24 114 kg (251 lb)   09/27/24 114 kg (252 lb)           Physical Exam  Vitals reviewed.   Constitutional:       General: She is not in acute distress.     Appearance: Normal appearance. She is not ill-appearing, toxic-appearing or diaphoretic.   HENT:      Head: Normocephalic and " atraumatic.      Right Ear: External ear normal.      Left Ear: External ear normal.   Eyes:      Conjunctiva/sclera: Conjunctivae normal.   Cardiovascular:      Rate and Rhythm: Normal rate and regular rhythm.      Pulses: Normal pulses.      Heart sounds: Normal heart sounds. No murmur heard.     No friction rub. No gallop.   Pulmonary:      Effort: Pulmonary effort is normal. No respiratory distress.      Breath sounds: Normal breath sounds. No stridor. No wheezing, rhonchi or rales.   Chest:      Chest wall: No tenderness.   Abdominal:      General: Abdomen is flat.      Palpations: Abdomen is soft. There is no mass.      Tenderness: There is no abdominal tenderness.   Musculoskeletal:      Right lower leg: No edema.      Left lower leg: No edema.   Skin:     General: Skin is warm and dry.   Neurological:      General: No focal deficit present.      Mental Status: She is alert. Mental status is at baseline.   Psychiatric:         Mood and Affect: Mood normal.         Behavior: Behavior normal.         Thought Content: Thought content normal.         Judgment: Judgment normal.      Result Review :   The following data was reviewed by: Malvin Jackson MD on 12/05/2024:  Common labs          6/3/2024    00:00 7/12/2024    09:24 9/12/2024    09:36   Common Labs   Glucose  103  68    BUN  19  15    Creatinine  0.96  0.84    Sodium  138  136    Potassium  4.4  4.5    Chloride  103  99    Calcium  10.4  10.4    Albumin  4.2  4.1    Total Bilirubin  0.3  0.4    Alkaline Phosphatase  81  79    AST (SGOT) 59     34  31    ALT (SGPT) 45     34  23    WBC   9.81    Hemoglobin   16.3    Hematocrit   47.6    Platelets   250    Total Cholesterol   159    Triglycerides   206    HDL Cholesterol   44    LDL Cholesterol    80    Hemoglobin A1C   5.20    Microalbumin, Urine   <1.2       Details          This result is from an external source.                    Lab Results   Component Value Date    SARSANTIGEN Detected (A)  12/05/2024    COVID19 NOT DETECTED 03/31/2021    FLUAAG Not Detected 12/05/2024    FLUBAG Not Detected 12/05/2024    RAPSCRN Negative 12/05/2024    INR 0.86 (L) 03/30/2021    BILIRUBINUR Negative 05/02/2023            Assessment and Plan    Diagnoses and all orders for this visit:    1. COVID-19 virus infection (Primary)  -     Nirmatrelvir & Ritonavir, 300mg/100mg, (PAXLOVID); Take 3 tablets by mouth 2 (Two) Times a Day for 5 days.  Dispense: 30 tablet; Refill: 0    2. Acute cough  -     Beta Strep Culture, Throat - Swab, Throat  -     Cancel: COVID-19,CEPHEID/ZANE,COR/RADHA/PAD/HILARIO/LAG/YINA IN-HOUSE,NP SWAB IN TRANSPORT MEDIA 1 HR TAT, RT-PCR - Swab, Nasopharynx  -     POC Rapid Strep A  -     POCT SARS-CoV-2 + Flu Antigen MICHELLE  -     benzonatate (Tessalon Perles) 100 MG capsule; Take 1 capsule by mouth 3 (Three) Times a Day As Needed for Cough.  Dispense: 30 capsule; Refill: 0      COVID:  -paxlovid sent  -renal function reviewed, med rec reviewed  -counseled that per current guidelines can leave quarantine if fever free at least 24 hours and feeling symptomatically improved.  I did recommend wearing a mask around others and in public for the remainder of this 10 day period    There are no discontinued medications.       Follow Up   Return if symptoms worsen or fail to improve.  Patient was given instructions and counseling regarding her condition or for health maintenance advice. Please see specific information pulled into the AVS if appropriate.       Malvin Jackson MD  12/05/24  12:57 EST

## 2024-12-07 LAB — BACTERIA SPEC AEROBE CULT: NORMAL

## 2024-12-31 ENCOUNTER — OFFICE VISIT (OUTPATIENT)
Dept: PODIATRY | Facility: CLINIC | Age: 49
End: 2024-12-31
Payer: COMMERCIAL

## 2024-12-31 VITALS
WEIGHT: 246 LBS | HEART RATE: 66 BPM | DIASTOLIC BLOOD PRESSURE: 72 MMHG | OXYGEN SATURATION: 94 % | TEMPERATURE: 96.2 F | HEIGHT: 69 IN | SYSTOLIC BLOOD PRESSURE: 112 MMHG | BODY MASS INDEX: 36.43 KG/M2

## 2024-12-31 DIAGNOSIS — M79.672 FOOT PAIN, BILATERAL: ICD-10-CM

## 2024-12-31 DIAGNOSIS — M79.671 FOOT PAIN, BILATERAL: ICD-10-CM

## 2024-12-31 DIAGNOSIS — E11.9 NON-INSULIN DEPENDENT TYPE 2 DIABETES MELLITUS: ICD-10-CM

## 2024-12-31 DIAGNOSIS — B35.1 ONYCHOMYCOSIS: ICD-10-CM

## 2024-12-31 DIAGNOSIS — L60.0 ONYCHOCRYPTOSIS: Primary | ICD-10-CM

## 2024-12-31 DIAGNOSIS — E11.8 DM FEET: ICD-10-CM

## 2024-12-31 NOTE — PROGRESS NOTES
[facility] - PODIATRY    Today's Date: 12/31/24    Patient Name: Em Montanez  MRN: [id number]  CSN: [id number]  PCP: Ricky Velasquez Jr., MD, Last PCP Visit: 9/12/2024  Referring Provider: No ref. provider found    SUBJECTIVE     Chief Complaint   Patient presents with    Left Foot - Follow-up, Nail Problem    Right Foot - Follow-up, Nail Problem     HPI: Em Montanez, a 49 y.o.female, comes to clinic.    New, Established, New Problem:  est  Location:  Toenails  Duration:   Greater than five years  Onset:  Gradual  Nature:  sore with palpation.  Stable, worsening, improving:   stable  Aggravating factors:  Pain with shoe gear and ambulation.  Previous Treatment: debridement    Patient denies any fevers, chills, nausea, vomiting, shortness of breath, nor any other constitutional signs nor symptoms.       Not required to check blood sugars at home.    Medical changes: None    I have reviewed/confirmed previously documented HPI with no changes.   Past Medical History:   Diagnosis Date    Abnormal Pap smear of cervix     Arthritis     Asthma     NO INHALER USE    Back pain 11/21/2017    Disease of thyroid gland     Fatigue 11/21/2017    H/O Chronic pelvic pain in female 02/23/2022    H/O Foot pain, right     H/O Ovarian cyst     HPV (human papilloma virus) infection     Hypoglycemia     Insomnia 12/11/2017    Pelvic pain 01/12/2022    Polycystic ovarian syndrome 11/21/2017    Seasonal allergies     Uterine pain     Vitamin D deficiency 12/11/2017     Past Surgical History:   Procedure Laterality Date    CAST APPLICATION Right 06/16/2023    Procedure: CAST APPLICATION LEG;  Surgeon: Lucas Rivera DPM;  Location: [facility] OR;  Service: Podiatry;  Laterality: Right;    CAST APPLICATION Left 09/01/2023    Procedure: CAST APPLICATION LEG;  Surgeon: Lucas Rivera DPM;  Location: [facility] MAIN OR;  Service: Podiatry;  Laterality: Left;    CHOLECYSTECTOMY       COLONOSCOPY N/A 02/16/2022    Procedure: COLONOSCOPY;  Surgeon: Jb Gonzalez MD;  Location: [facility];  Service: Gastroenterology;  Laterality: N/A;  hemmorroids    ENDOMETRIAL ABLATION      ESSURE TUBAL LIGATION      JOINT REPLACEMENT      bilateral knee replacement    LEEP      early 2000's    PLANTAR FASCIA RELEASE Right 06/16/2023    Procedure: FOOT PLANTAR FASCIECTOMY;  Surgeon: Lucas Rivera DPM;  Location: [facility] MA[facility];  Service: Podiatry;  Laterality: Right;    PLANTAR FASCIECTOMY Left 09/01/2023    Procedure: FASCIECTOMY PLANTAR FASCIA;  Surgeon: Lucas Rivera DPM;  Location: [facility];  Service: Podiatry;  Laterality: Left;    TARSAL TUNNEL RELEASE Right 06/16/2023    Procedure: TARSAL TUNNEL RELEASE;  Surgeon: Lucas Rivera DPM;  Location: [facility];  Service: Podiatry;  Laterality: Right;    TARSAL TUNNEL RELEASE Left 09/01/2023    Procedure: LEFT TARSAL TUNNEL RELEASE;  Surgeon: Lucas Rivera DPM;  Location: [facility];  Service: Podiatry;  Laterality: Left;    TONSILLECTOMY      TOTAL LAPAROSCOPIC HYSTERECTOMY N/A 04/06/2022    Procedure: TOTAL LAPAROSCOPIC HYSTERECTOMY WITH DAVINCI ROBOT;  Surgeon: Chito Cook MD;  Location: [facility];  Service: Robotics - DaVinci;  Laterality: N/A;, secondary to pelvic pain     Family History   Problem Relation Age of Onset    Depression Mother     Heart disease Mother     Arthritis Mother     Sleep apnea Mother     Self-Injurious Behavior  Daughter     Depression Daughter     Anxiety disorder Daughter     ADD / ADHD Son     Cancer Other     Malig Hyperthermia Neg Hx     Breast cancer Neg Hx     Uterine cancer Neg Hx     Ovarian cancer Neg Hx     Colon cancer Neg Hx     Deep vein thrombosis Neg Hx     Pulmonary embolism Neg Hx      Social History     Socioeconomic History    Marital status:    Tobacco Use    Smoking status: Every Day     Current packs/day: 1.00     Average  packs/day: 1 pack/day for 30.0 years (30.0 ttl pk-yrs)     Types: Cigarettes     Passive exposure: Current    Smokeless tobacco: Never    Tobacco comments:     INSTRUCTED NO SMOKING 24 HR PRIOR TO SURGERY    Vaping Use    Vaping status: Never Used   Substance and Sexual Activity    Alcohol use: Yes     Comment: SOCIALLY    Drug use: Never     Comment: patient denies    Sexual activity: Yes     Partners: Male     Birth control/protection: Hysterectomy     Allergies   Allergen Reactions    Prednisone Mental Status Change     Other reaction(s): Mental Status Change    Tramadol GI Intolerance     Nausea and vomiting  Other reaction(s): GI Intolerance, Vomiting  Nausea and vomiting    Diclofenac Rash     Current Outpatient Medications   Medication Sig Dispense Refill    cetirizine (zyrTEC) 10 MG tablet       cyanocobalamin 1000 MCG/ML injection INJECT 1 ML INTO THE MUSCLE ONCE EVERY 28 DAYS 3 mL 1    levothyroxine (SYNTHROID, LEVOTHROID) 50 MCG tablet Take 1 tablet by mouth Daily. 90 tablet 1    multivitamin with minerals tablet tablet Take 1 tablet by mouth Daily.      traZODone (DESYREL) 50 MG tablet Take 1 tablet by mouth Every Night. 90 tablet 1    triamcinolone (KENALOG) 0.1 % cream Apply 1 Application topically to the appropriate area as directed 4 (Four) Times a Day.      valACYclovir (Valtrex) 1000 MG tablet Take 1 tablet by mouth Daily. 10 tablet 2    benzonatate (Tessalon Perles) 100 MG capsule Take 1 capsule by mouth 3 (Three) Times a Day As Needed for Cough. (Patient not taking: Reported on 12/31/2024) 30 capsule 0    loratadine (Claritin) 10 MG tablet Take 1 tablet by mouth Daily. (Patient not taking: Reported on 12/31/2024) 30 tablet 0     No current facility-administered medications for this visit.     Review of Systems   Constitutional: Negative.    Skin:         Painful toenails.   All other systems reviewed and are negative.      OBJECTIVE     Vitals:    12/31/24 0834   BP: 112/72   Pulse: 66   Temp:  96.2 °F (35.7 °C)   SpO2: 94%         Patient seen in no apparent distress.      PHYSICAL EXAM:     Foot/Ankle Exam    GENERAL  Appearance:  obese  Orientation:  AAOx3  Affect:  appropriate  Gait:  unimpaired  Assistance:  independent  Right shoe gear: casual shoe  Left shoe gear: casual shoe    VASCULAR     Right Foot Vascularity   Dorsalis pedis:  2+  Posterior tibial:  2+  Skin temperature:  warm  Edema gradin+ and pitting  CFT:  < 3 seconds  Pedal hair growth:  Absent  Varicosities:  mild varicosities     Left Foot Vascularity   Dorsalis pedis:  2+  Posterior tibial:  2+  Skin temperature:  warm  Edema gradin+ and pitting  CFT:  < 3 seconds  Pedal hair growth:  Absent  Varicosities:  mild varicosities     NEUROLOGIC     Right Foot Neurologic   Normal sensation    Light touch sensation: normal  Vibratory sensation: normal  Hot/Cold sensation: normal  Protective Sensation using [place] Monofilament:   Sites intact: 10  Sites tested: 10     Left Foot Neurologic   Normal sensation    Light touch sensation: normal  Vibratory sensation: normal  Hot/Cold sensation:  normal  Protective Sensation using [place]al Monofilament:   Sites intact: 10  Sites tested: 10    MUSCLE STRENGTH     Right Foot Muscle Strength   Foot dorsiflexion:  4  Foot plantar flexion:  4  Foot inversion:  4  Foot eversion:  4     Left Foot Muscle Strength   Foot dorsiflexion:  4  Foot plantar flexion:  4  Foot inversion:  4  Foot eversion:  4    RANGE OF MOTION     Right Foot Range of Motion   Foot and ankle ROM within normal limits       Left Foot Range of Motion   Foot and ankle ROM within normal limits      DERMATOLOGIC      Right Foot Dermatologic   Skin  Right foot skin is intact.   Nails  1.  Positive for onychomycosis, abnormal thickness, subungual debris, dystrophic nail and ingrown toenail.  4.  Positive for elongated, onychomycosis, abnormal thickness, subungual debris, dystrophic nail and ingrown toenail.  5.   Positive for elongated, onychomycosis, abnormal thickness, subungual debris, dystrophic nail and ingrown toenail.     Left Foot Dermatologic   Skin  Left foot skin is intact.   Nails  1.  Positive for elongated, onychomycosis, abnormal thickness, subungual debris, dystrophic nail and ingrown toenail.  2.  Positive for elongated, onychomycosis, abnormal thickness, subungual debris, dystrophic nail and ingrown toenail.  5.  Positive for elongated, onychomycosis, abnormally thick, subungual debris, dystrophic nail and ingrown toenail.    I have reexamined the patient the results are consistent with the previously documented exam.    ASSESSMENT/PLAN     Diagnoses and all orders for this visit:    1. Onychocryptosis (Primary)    2. Onychomycosis    3. Non-insulin dependent type 2 diabetes mellitus    4. Foot pain, bilateral    5. DM feet    Comprehensive lower extremity examination and evaluation was performed.    Discussed findings and treatment plan including risks, benefits, and treatment options with patient in detail. Patient agreed with treatment plan.    Toenails 1, 4, 5 on Right and 1, 2, 5 on Left were debrided with nail nippers then filed with a Dremel nail serena.  Patient tolerated procedure well without complications.    Patient is to monitor for recurrence and any new symptoms and to contact Dr. Rivera's office for a follow-up appointment.      The patient states understanding and agreement with this plan.    An After Visit Summary was printed and given to the patient at discharge, including (if requested) any available informative/educational handouts regarding diagnosis, treatment, or medications. All questions were answered to patient/family satisfaction. Should symptoms fail to improve or worsen they agree to call or return to clinic or to go to the Emergency Department. Discussed the importance of following up with any needed screening tests/labs/specialist appointments and any requested follow-up  recommended by me today. Importance of maintaining follow-up discussed and patient accepts that missed appointments can delay diagnosis and potentially lead to worsening of conditions.    Return in about 9 weeks (around 3/4/2025) for Toenail Care., or sooner if acute issues arise.    I have reviewed the assessment and plan and verified the accuracy of it. No changes to assessment and plan since the information was documented. Lucas Rivera DPM 12/31/24     I have dictated this note utilizing Dragon Dictation.  Please note that portions of this note were completed with a voice recognition program.  Part of this note may be an electronic transcription/translation of spoken language to printed text using the Dragon Dictation System.      This document has been electronically signed by Lucas Rivera DPM on December 31, 2024 08:38 EST

## 2025-01-08 RX ORDER — VALACYCLOVIR HYDROCHLORIDE 1 G/1
1000 TABLET, FILM COATED ORAL DAILY
Qty: 10 TABLET | Refills: 2 | Status: SHIPPED | OUTPATIENT
Start: 2025-01-08

## 2025-01-16 ENCOUNTER — APPOINTMENT (OUTPATIENT)
Dept: GENERAL RADIOLOGY | Facility: HOSPITAL | Age: 50
End: 2025-01-16
Payer: COMMERCIAL

## 2025-01-16 ENCOUNTER — HOSPITAL ENCOUNTER (EMERGENCY)
Facility: HOSPITAL | Age: 50
Discharge: HOME | End: 2025-01-16
Attending: EMERGENCY MEDICINE
Payer: COMMERCIAL

## 2025-01-16 VITALS
TEMPERATURE: 97.8 F | OXYGEN SATURATION: 99 % | HEIGHT: 69 IN | HEART RATE: 59 BPM | DIASTOLIC BLOOD PRESSURE: 88 MMHG | RESPIRATION RATE: 18 BRPM | SYSTOLIC BLOOD PRESSURE: 123 MMHG | BODY MASS INDEX: 37 KG/M2 | WEIGHT: 249.78 LBS

## 2025-01-16 DIAGNOSIS — M54.50 RIGHT LUMBAR PAIN: ICD-10-CM

## 2025-01-16 DIAGNOSIS — S39.012A LUMBAR STRAIN, INITIAL ENCOUNTER: Primary | ICD-10-CM

## 2025-01-16 LAB
BACTERIA UR QL AUTO: ABNORMAL /HPF
BILIRUB UR QL STRIP: NEGATIVE
CLARITY UR: CLEAR
COLOR UR: YELLOW
GLUCOSE UR STRIP-MCNC: NEGATIVE MG/DL
HGB UR QL STRIP.AUTO: ABNORMAL
HYALINE CASTS UR QL AUTO: ABNORMAL /LPF
KETONES UR QL STRIP: NEGATIVE
LEUKOCYTE ESTERASE UR QL STRIP.AUTO: NEGATIVE
NITRITE UR QL STRIP: NEGATIVE
PH UR STRIP.AUTO: 6 [PH] (ref 5–8)
PROT UR QL STRIP: NEGATIVE
RBC # UR STRIP: ABNORMAL /HPF
REF LAB TEST METHOD: ABNORMAL
SP GR UR STRIP: <=1.005 (ref 1–1.03)
SQUAMOUS #/AREA URNS HPF: ABNORMAL /HPF
UROBILINOGEN UR QL STRIP: ABNORMAL
WBC # UR STRIP: ABNORMAL /HPF

## 2025-01-16 PROCEDURE — 81001 URINALYSIS AUTO W/SCOPE: CPT

## 2025-01-16 PROCEDURE — 97140 MANUAL THERAPY 1/> REGIONS: CPT | Performed by: PHYSICAL THERAPIST

## 2025-01-16 PROCEDURE — 96372 THER/PROPH/DIAG INJ SC/IM: CPT

## 2025-01-16 PROCEDURE — 72100 X-RAY EXAM L-S SPINE 2/3 VWS: CPT

## 2025-01-16 PROCEDURE — 99283 EMERGENCY DEPT VISIT LOW MDM: CPT

## 2025-01-16 PROCEDURE — 25010000002 DEXAMETHASONE SODIUM PHOSPHATE 10 MG/ML SOLUTION: Performed by: EMERGENCY MEDICINE

## 2025-01-16 PROCEDURE — 25010000002 ORPHENADRINE CITRATE PER 60 MG: Performed by: EMERGENCY MEDICINE

## 2025-01-16 PROCEDURE — 97161 PT EVAL LOW COMPLEX 20 MIN: CPT | Performed by: PHYSICAL THERAPIST

## 2025-01-16 RX ORDER — ORPHENADRINE CITRATE 30 MG/ML
60 INJECTION INTRAMUSCULAR; INTRAVENOUS ONCE
Status: COMPLETED | OUTPATIENT
Start: 2025-01-16 | End: 2025-01-16

## 2025-01-16 RX ORDER — LIDOCAINE 4 G/G
1 PATCH TOPICAL ONCE
Status: DISCONTINUED | OUTPATIENT
Start: 2025-01-16 | End: 2025-01-16 | Stop reason: HOSPADM

## 2025-01-16 RX ORDER — ORPHENADRINE CITRATE 100 MG/1
100 TABLET ORAL 2 TIMES DAILY
Qty: 14 TABLET | Refills: 0 | Status: SHIPPED | OUTPATIENT
Start: 2025-01-16 | End: 2025-01-23

## 2025-01-16 RX ORDER — OXYCODONE HYDROCHLORIDE 5 MG/1
5 TABLET ORAL ONCE
Status: COMPLETED | OUTPATIENT
Start: 2025-01-16 | End: 2025-01-16

## 2025-01-16 RX ORDER — ACETAMINOPHEN 500 MG
1000 TABLET ORAL ONCE
Status: COMPLETED | OUTPATIENT
Start: 2025-01-16 | End: 2025-01-16

## 2025-01-16 RX ORDER — LIDOCAINE 50 MG/G
1 PATCH TOPICAL EVERY 24 HOURS
Qty: 7 PATCH | Refills: 0 | Status: SHIPPED | OUTPATIENT
Start: 2025-01-16 | End: 2025-01-23

## 2025-01-16 RX ORDER — DEXAMETHASONE SODIUM PHOSPHATE 10 MG/ML
10 INJECTION, SOLUTION INTRAMUSCULAR; INTRAVENOUS ONCE
Status: COMPLETED | OUTPATIENT
Start: 2025-01-16 | End: 2025-01-16

## 2025-01-16 RX ADMIN — LIDOCAINE 1 PATCH: 4 PATCH TOPICAL at 08:31

## 2025-01-16 RX ADMIN — ACETAMINOPHEN 1000 MG: 500 TABLET ORAL at 08:30

## 2025-01-16 RX ADMIN — ORPHENADRINE CITRATE 60 MG: 60 INJECTION INTRAMUSCULAR; INTRAVENOUS at 08:33

## 2025-01-16 RX ADMIN — OXYCODONE 5 MG: 5 TABLET ORAL at 11:54

## 2025-01-16 RX ADMIN — OXYCODONE 5 MG: 5 TABLET ORAL at 08:30

## 2025-01-16 RX ADMIN — DEXAMETHASONE SODIUM PHOSPHATE 10 MG: 10 INJECTION INTRAMUSCULAR; INTRAVENOUS at 08:34

## 2025-01-16 NOTE — Clinical Note
[facility] EMERGENCY ROOM  [address] [place] [address]  Phone: [phone]  Fax: [phone]    Em Montanez was seen and treated in our emergency department on 1/16/2025.  She may return to work on 01/17/2025.         Thank you for choosing [facility].    Lilian Lazaro, LAZ

## 2025-01-16 NOTE — ED PROVIDER NOTES
"Time: 8:31 AM EST  Date of encounter:  1/16/2025  Independent Historian/Clinical History and Information was obtained by:   Patient    History is limited by: N/A    Chief Complaint: Back pain      History of Present Illness:  Patient is a 49 y.o. year old female who presents to the emergency department for evaluation of pain.  Patient reports that she is having \" back spasms from hell\" which started yesterday while she was at work.  Denies injury or abnormal movements.  States that is located the right lower portion of her back.  Went to urgent care yesterday and they gave her Flexeril which she states did not help at all.  She took 2 pills.  Has been trying massage.  Denies urinary problems including dysuria, hematuria.  Denies saddle anesthesia, numbness or tingling, leg weakness, incontinence.      Patient Care Team  Primary Care Provider: Ricky Velasquez Jr., MD    Past Medical History:     Allergies   Allergen Reactions    Prednisone Mental Status Change     Other reaction(s): Mental Status Change    Tramadol GI Intolerance     Nausea and vomiting  Other reaction(s): GI Intolerance, Vomiting  Nausea and vomiting    Diclofenac Rash     Past Medical History:   Diagnosis Date    Abnormal Pap smear of cervix     Arthritis     Asthma     NO INHALER USE    Back pain 11/21/2017    Disease of thyroid gland     Fatigue 11/21/2017    H/O Chronic pelvic pain in female 02/23/2022    H/O Foot pain, right     H/O Ovarian cyst     HPV (human papilloma virus) infection     Hypoglycemia     Insomnia 12/11/2017    Pelvic pain 01/12/2022    Polycystic ovarian syndrome 11/21/2017    Seasonal allergies     Uterine pain     Vitamin D deficiency 12/11/2017     Past Surgical History:   Procedure Laterality Date    CAST APPLICATION Right 06/16/2023    Procedure: CAST APPLICATION LEG;  Surgeon: Lucas Rivera DPM;  Location: [facility] MAIN OR;  Service: Podiatry;  Laterality: Right;    CAST APPLICATION Left 09/01/2023    " Procedure: CAST APPLICATION LEG;  Surgeon: Lucas Rivera DPM;  Location: [facility] MAIN OR;  Service: Podiatry;  Laterality: Left;    CHOLECYSTECTOMY      COLONOSCOPY N/A 02/16/2022    Procedure: COLONOSCOPY;  Surgeon: Jb Gonzalez MD;  Location: [facility] ENDOSCOPY;  Service: Gastroenterology;  Laterality: N/A;  hemmorroids    ENDOMETRIAL ABLATION      ESSURE TUBAL LIGATION      JOINT REPLACEMENT      bilateral knee replacement    LAPAROSCOPIC GASTRIC BANDING      5/2024    LEEP      early 2000's    PLANTAR FASCIA RELEASE Right 06/16/2023    Procedure: FOOT PLANTAR FASCIECTOMY;  Surgeon: Lucas Rivera DPM;  Location: [facility] MAIN OR;  Service: Podiatry;  Laterality: Right;    PLANTAR FASCIECTOMY Left 09/01/2023    Procedure: FASCIECTOMY PLANTAR FASCIA;  Surgeon: Lucas Rivera DPM;  Location: [facility] MAIN OR;  Service: Podiatry;  Laterality: Left;    TARSAL TUNNEL RELEASE Right 06/16/2023    Procedure: TARSAL TUNNEL RELEASE;  Surgeon: Lucas Rivera DPM;  Location: [facility] MAIN OR;  Service: Podiatry;  Laterality: Right;    TARSAL TUNNEL RELEASE Left 09/01/2023    Procedure: LEFT TARSAL TUNNEL RELEASE;  Surgeon: Lucas Rivera DPM;  Location: [facility] MAIN OR;  Service: Podiatry;  Laterality: Left;    TONSILLECTOMY      TOTAL LAPAROSCOPIC HYSTERECTOMY N/A 04/06/2022    Procedure: TOTAL LAPAROSCOPIC HYSTERECTOMY WITH DAVINCI ROBOT;  Surgeon: Chito Cook MD;  Location: [facility] MAIN OR;  Service: Robotics - DaVinci;  Laterality: N/A;, secondary to pelvic pain     Family History   Problem Relation Age of Onset    Depression Mother     Heart disease Mother     Arthritis Mother     Sleep apnea Mother     Self-Injurious Behavior  Daughter     Depression Daughter     Anxiety disorder Daughter     ADD / ADHD Son     Cancer Other     Malig Hyperthermia Neg Hx     Breast cancer Neg Hx     Uterine cancer Neg Hx     Ovarian cancer Neg Hx     Colon cancer Neg Hx     Deep vein  thrombosis Neg Hx     Pulmonary embolism Neg Hx        Home Medications:  Prior to Admission medications    Medication Sig Start Date End Date Taking? Authorizing Provider   cetirizine (zyrTEC) 10 MG tablet  9/8/24   Juan Alberto Jordan MD   cyanocobalamin 1000 MCG/ML injection INJECT 1 ML INTO THE MUSCLE ONCE EVERY 28 DAYS 11/11/24   Ricky Velasquez Jr., MD   cyclobenzaprine (FLEXERIL) 10 MG tablet Take 1 tablet by mouth 3 (Three) Times a Day As Needed for Muscle Spasms. 1/15/25   Marcella Garibay PA-C   levothyroxine (SYNTHROID, LEVOTHROID) 50 MCG tablet Take 1 tablet by mouth Daily. 10/21/24   Ricky Velasquez Jr., MD   multivitamin with minerals tablet tablet Take 1 tablet by mouth Daily.    Juan Alberto Jordan MD   traZODone (DESYREL) 50 MG tablet Take 1 tablet by mouth Every Night. 9/30/24   Ricky Velasquez Jr., MD   triamcinolone (KENALOG) 0.1 % cream Apply 1 Application topically to the appropriate area as directed 4 (Four) Times a Day. 6/5/24   Juan Alberto Jordan MD   valACYclovir (Valtrex) 1000 MG tablet Take 1 tablet by mouth Daily. 1/8/25   Ricky Velasquez Jr., MD        Social History:   Social History     Tobacco Use    Smoking status: Every Day     Current packs/day: 1.00     Average packs/day: 1 pack/day for 30.0 years (30.0 ttl pk-yrs)     Types: Cigarettes     Passive exposure: Current    Smokeless tobacco: Never    Tobacco comments:     INSTRUCTED NO SMOKING 24 HR PRIOR TO SURGERY    Vaping Use    Vaping status: Never Used   Substance Use Topics    Alcohol use: Yes     Comment: SOCIALLY    Drug use: Never     Comment: patient denies         Review of Systems:  Review of Systems   Constitutional:  Negative for activity change, appetite change, chills, fatigue and fever.   HENT:  Negative for congestion, ear pain and sinus pain.    Respiratory:  Negative for cough and shortness of breath.    Cardiovascular:  Negative for chest pain, palpitations and leg swelling.  "  Gastrointestinal:  Negative for abdominal pain, diarrhea, nausea and vomiting.   Genitourinary:  Negative for difficulty urinating, dysuria, flank pain, frequency and urgency.   Musculoskeletal:  Positive for back pain. Negative for gait problem, joint swelling and neck pain.   Skin:  Negative for color change and rash.   Neurological:  Negative for dizziness, numbness and headaches.   Psychiatric/Behavioral:  Negative for agitation and confusion.         Physical Exam:  /97 (BP Location: Right arm, Patient Position: Standing)   Pulse 87   Temp 98.2 °F (36.8 °C) (Oral)   Resp 20   Ht 175.3 cm (69\")   Wt 113 kg (249 lb 12.5 oz)   LMP  (LMP Unknown)   SpO2 97%   BMI 36.89 kg/m²     Physical Exam  Vitals and nursing note reviewed.   Constitutional:       General: She is not in acute distress.     Appearance: Normal appearance. She is not ill-appearing.   HENT:      Head: Normocephalic and atraumatic.      Right Ear: External ear normal.      Left Ear: External ear normal.   Cardiovascular:      Rate and Rhythm: Normal rate and regular rhythm.   Pulmonary:      Effort: Pulmonary effort is normal.      Breath sounds: Normal breath sounds.   Abdominal:      General: There is no distension.      Palpations: Abdomen is soft.      Tenderness: There is no abdominal tenderness.   Musculoskeletal:         General: Tenderness present. Normal range of motion.      Cervical back: Normal range of motion and neck supple.      Comments: Back spasm noted with palpation of right lumbar paraspinal musculature.  No bony tenderness.   Skin:     Comments: No erythema or vesicular lesions   Neurological:      General: No focal deficit present.      Mental Status: She is alert and oriented to person, place, and time.   Psychiatric:         Mood and Affect: Mood normal.         Behavior: Behavior normal.                    Medical Decision Making:      Comorbidities that affect care:    Asthma, Thyroid Disease    External " Notes reviewed:    Previous Clinic Note: Seen in urgent care clinic for back pain yesterday, given Flexeril      The following orders were placed and all results were independently analyzed by me:  Orders Placed This Encounter   Procedures    XR Spine Lumbar 2 or 3 View    Urinalysis With Culture If Indicated - Urine, Clean Catch       Medications Given in the Emergency Department:  Medications   orphenadrine (NORFLEX) injection 60 mg (has no administration in time range)   dexAMETHasone sodium phosphate injection 10 mg (has no administration in time range)   Lidocaine 4 % 1 patch (1 patch Transdermal Medication Applied 1/16/25 0831)   oxyCODONE (ROXICODONE) immediate release tablet 5 mg (5 mg Oral Given 1/16/25 0830)   acetaminophen (TYLENOL) tablet 1,000 mg (1,000 mg Oral Given 1/16/25 0830)        ED Course:         Labs:    Lab Results (last 24 hours)       Procedure Component Value Units Date/Time    POC Urinalysis Dipstick, Multipro (Automated Dipstick) [[id number]]  (Abnormal) Collected: 01/15/25 1905    Specimen: Urine Updated: 01/15/25 1906     Color Yellow     Clarity, UA Clear     Glucose, UA Negative mg/dL      Bilirubin Negative     Ketones, UA Negative     Specific Gravity  1.025     Blood, UA 3+     pH, Urine 6.0     Protein, POC Negative mg/dL      Urobilinogen, UA Normal     Nitrite, UA Negative     Leukocytes Negative             Imaging:    No Radiology Exams Resulted Within Past 24 Hours      Differential Diagnosis and Discussion:    Back Pain: The patient presents with back pain. My differential diagnosis includes but is not limited to acute spinal epidural abscess, acute spinal epidural bleed, cauda equina syndrome, abdominal aortic aneurysm, aortic dissection, kidney stone, pyelonephritis, musculoskeletal back pain, spinal fracture, and osteoarthritis.     PROCEDURES:    X-ray were performed in the emergency department and all X-ray impressions were independently interpreted by me.    No orders  to display       Procedures    MDM     Amount and/or Complexity of Data Reviewed  Clinical lab tests: reviewed and ordered  Tests in the radiology section of CPT®: reviewed and ordered  Independent visualization of images, tracings, or specimens: yes    Risk of Complications, Morbidity, and/or Mortality  Presenting problems: low  Diagnostic procedures: low  Management options: low    Patient Progress  Patient progress: improved                       Patient Care Considerations:    SEPSIS was considered but is NOT present in the emergency department as SIRS criteria is not present.      Consultants/Shared Management Plan:    I discussed this case with Gifty Paez PT who saw and evaluate patient, agrees of back spasm, gave her stretches to perform.    Social Determinants of Health:    Patient is independent, reliable, and has access to care.       Disposition and Care Coordination:    Discharged: The patient is suitable and stable for discharge with no need for consideration of admission.    I have explained the patient´s condition, diagnoses and treatment plan based on the information available to me at this time. I have answered questions and addressed any concerns. The patient has a good  understanding of the patient´s diagnosis, condition, and treatment plan as can be expected at this point. The vital signs have been stable. The patient´s condition is stable and appropriate for discharge from the emergency department.      The patient will pursue further outpatient evaluation with the primary care physician or other designated or consulting physician as outlined in the discharge instructions. They are agreeable to this plan of care and follow-up instructions have been explained in detail. The patient has received these instructions in written format and has expressed an understanding of the discharge instructions. The patient is aware that any significant change in condition or worsening of symptoms should  prompt an immediate return to this or the closest emergency department or call to 911.  I have explained discharge medications and the need for follow up with the patient/caretakers. This was also printed in the discharge instructions. Patient was discharged with the following medications and follow up:      Medication List        New Prescriptions      lidocaine 5 %  Commonly known as: LIDODERM  Place 1 patch on the skin as directed by provider Daily for 7 days. Remove & Discard patch within 12 hours or as directed by MD     orphenadrine 100 MG 12 hr tablet  Commonly known as: NORFLEX  Take 1 tablet by mouth 2 (Two) Times a Day for 7 days.               Where to Get Your Medications        These medications were sent to [id number] - [place], [place] - [address] AT [facility] [address] - [phone]  - [phone]   [address], [place] [address]      Phone: [phone]   lidocaine 5 %  orphenadrine 100 MG 12 hr tablet      No follow-up provider specified.     Final diagnoses:   None        ED Disposition       None            This medical record created using voice recognition software.             Lilian Lazaro PA-C  01/16/25 4893

## 2025-01-16 NOTE — DISCHARGE INSTRUCTIONS
You were evaluated in the emergency department today.  Your x-rays are reassuring.  You improved after medications in the ER.  I have sent muscle relaxers and lidocaine patches to your pharmacy.  You should also alternate Tylenol and ibuprofen or other anti-inflammatory medication.  Perform the stretches that our physical therapist gave you.  Rest and drink plenty of fluids.  Use warm compresses.  Return to the ED with any new or worsening symptoms.

## 2025-01-16 NOTE — Clinical Note
[facility] EMERGENCY ROOM  [address] [place] [address]  Phone: [phone]  Fax: [phone]    Em Montanez was seen and treated in our emergency department on 1/16/2025.  She may return to work on 01/18/2025.         Thank you for choosing [facility].    Lilian Lazaro, LAZ

## 2025-01-16 NOTE — THERAPY EVALUATION
Patient Name: Em Montanez  : 1975    MRN: [id number]                              Today's Date: 2025       Admit Date: 2025    Visit Dx:     ICD-10-CM ICD-9-CM   1. Right lumbar pain  M54.50 724.2     Patient Active Problem List   Diagnosis    Anxiety    Depression    Left knee pain    Allergic rhinitis    Arthritis    Asthma    Insomnia, unspecified    Vitamin D deficiency    Lumbar spondylosis    Myofascial pain    Adrenal insufficiency    Tarsal tunnel syndrome of right side    Tarsal tunnel syndrome, left    Impaired glucose tolerance    Abnormal thyroid blood test    Tobacco use    Mixed hyperlipidemia    Chronic right-sided low back pain without sciatica    Lung nodule     Past Medical History:   Diagnosis Date    Abnormal Pap smear of cervix     Arthritis     Asthma     NO INHALER USE    Back pain 2017    Disease of thyroid gland     Fatigue 2017    H/O Chronic pelvic pain in female 2022    H/O Foot pain, right     H/O Ovarian cyst     HPV (human papilloma virus) infection     Hypoglycemia     Insomnia 2017    Pelvic pain 2022    Polycystic ovarian syndrome 2017    Seasonal allergies     Uterine pain     Vitamin D deficiency 2017     Past Surgical History:   Procedure Laterality Date    CAST APPLICATION Right 2023    Procedure: CAST APPLICATION LEG;  Surgeon: Lucas Rivera DPM;  Location: [facility] MAIN OR;  Service: Podiatry;  Laterality: Right;    CAST APPLICATION Left 2023    Procedure: CAST APPLICATION LEG;  Surgeon: Lucas Rivera DPM;  Location: [facility] MAIN OR;  Service: Podiatry;  Laterality: Left;    CHOLECYSTECTOMY      COLONOSCOPY N/A 2022    Procedure: COLONOSCOPY;  Surgeon: Jb Gonzalez MD;  Location: [facility] ENDOSCOPY;  Service: Gastroenterology;  Laterality: N/A;  hemmorroids    ENDOMETRIAL ABLATION      ESSURE TUBAL LIGATION      JOINT REPLACEMENT      bilateral knee replacement     LAPAROSCOPIC GASTRIC BANDING      5/2024    [facility]      early 2000's    PLANTAR FASCIA RELEASE Right 06/16/2023    Procedure: FOOT PLANTAR FASCIECTOMY;  Surgeon: Lucas Rivera DPM;  Location: [facility] MAIN OR;  Service: Podiatry;  Laterality: Right;    PLANTAR FASCIECTOMY Left 09/01/2023    Procedure: FASCIECTOMY PLANTAR FASCIA;  Surgeon: Lucas Rivear DPM;  Location: [facility] MAIN OR;  Service: Podiatry;  Laterality: Left;    TARSAL TUNNEL RELEASE Right 06/16/2023    Procedure: TARSAL TUNNEL RELEASE;  Surgeon: Lucas Rivera DPM;  Location: [facility] MAIN OR;  Service: Podiatry;  Laterality: Right;    TARSAL TUNNEL RELEASE Left 09/01/2023    Procedure: LEFT TARSAL TUNNEL RELEASE;  Surgeon: Lucas Rivera DPM;  Location: [facility] MAIN OR;  Service: Podiatry;  Laterality: Left;    TONSILLECTOMY      TOTAL LAPAROSCOPIC HYSTERECTOMY N/A 04/06/2022    Procedure: TOTAL LAPAROSCOPIC HYSTERECTOMY WITH DAVINCI ROBOT;  Surgeon: Chito Cook MD;  Location: [facility] MAIN OR;  Service: Robotics - DaVinci;  Laterality: N/A;, secondary to pelvic pain      General Information       Row Name 01/16/25 1018          Physical Therapy Time and Intention    Document Type evaluation  -LR     Mode of Treatment individual therapy  -LR       Row Name 01/16/25 1018          General Information    Patient Profile Reviewed yes  -LR     Prior Level of Function independent:  -LR               User Key  (r) = Recorded By, (t) = Taken By, (c) = Cosigned By      Initials Name Provider Type    LR Gifty Swartz PT Physical Therapist                  History: Patient reports yesterday morning she started experiencing pain and spasms on the right side of her lower back.  She reports she does have a history of degenerative disc disease in her lower back but it is not the same pain as that.  She states any movement is causing her right side to spasm.  She denies radiating pain into her legs.  She did go to urgent care  yesterday and was given Flexeril which did not help her pain.  She also attempted heat at home.  She states she does not remember any injury to her back.  She did start a new job recently and is sitting at a desk.    Objective:    Palpation: Tender to palpation at right quadratus lumborum and lumbar paraspinal    ROM:  Lumbar ROM:  Flexion: 25%  Extension: WNL  L Side bending: WNL  R Side bending: WNL  L Rotation: WNL  R Rotation: WNL    Bilateral hip, knee, and ankle ROM WNL  Slight stretch felt in lumbar region with passive right hip flexion on right  Tightness in right quadratus lumborum     Strength:  L Hip MMT:   R Hip MMT:  Flexion: 5/5  Flexion: 5/5  Abduction: 5/5  Abduction: 5/5  Adduction: 5/5  Adduction: 5/5    L Knee MMT:  R Knee MMT:  Flexion: 5/5  Flexion: 5/5  Extension: 5/5  Extension: 5/5    L Ankle MMT:  R Ankle MMT:  DF: 5/5  DF: 5/5  PF: 5/5   PF: 5/5    Special Tests:  Quadrant Test: Negative  Slump Test: NT  Straight Leg Raise Test: Negative B  Contralateral Straight Leg Raise Test: Negative B  Obers Test: Negative B     Sensation: Bilateral LE sensation intact to light touch    Assessment/Plan:   Pt presents with a diagnosis of R sided low back pain and has tightness and tenderness in right lumbar muscles with decreased flexion range of motion that are limiting her ability to sit, stand, and walk.  The patient performed stretches to help improve mobility through back.  She was educated on multiple stretches to perform at home.  She was provided with HEP handout.    Goals:   LTG 1: The patient will be independent in HEP in order to decrease pain and improve tolerance to functional activities.  STATUS: Met    Interventions:   Manual Therapy: Stretching of quadratus lumborum on right side in left side-lying position with overpressure to pelvis, right LE long axis distraction, passive hip flexion stretch right    Therapeutic Exercises: HEP: Quadratus lumborum stretch in supine (4X 20 seconds on  right), seated rollouts (5X 10 seconds), sidelying quadratus lumborum stretch, SKTC    Modalities: not performed      Outcome Measures       Row Name 01/16/25 1018          Optimal Instrument    Optimal Instrument Optimal - 3  -LR     Moving - lying to sitting 2  -LR     Bending/Stooping 3  -LR     Standing 2  -LR     From the list, choose the 3 activities you would most like to be able to do without any difficulty Bending/stooping;Moving -lying to sitting;Standing  -LR     Total Score Optimal - 3 5  -LR       Row Name 01/16/25 1018          Functional Assessment    Outcome Measure Options Optimal Instrument  -LR               User Key  (r) = Recorded By, (t) = Taken By, (c) = Cosigned By      Initials Name Provider Type    LR Gifty Swartz, PT Physical Therapist                     Time Calculation:   PT Evaluation Complexity  History, PT Evaluation Complexity: 3 or more personal factors and/or comorbidities  Examination of Body Systems (PT Eval Complexity): 1-2 elements  Clinical Presentation (PT Evaluation Complexity): stable  Clinical Decision Making (PT Evaluation Complexity): low complexity  Overall Complexity (PT Evaluation Complexity): low complexity     PT Charges       Row Name 01/16/25 1018             Time Calculation    PT Received On 01/16/25  -LR         Timed Charges    78868 - PT Therapeutic Exercise Minutes 4  -LR      05032 - PT Manual Therapy Minutes 6  -LR         Untimed Charges    PT Eval/Re-eval Minutes 17  -LR         Total Minutes    Timed Charges Total Minutes 10  -LR      Untimed Charges Total Minutes 17  -LR       Total Minutes 27  -LR                User Key  (r) = Recorded By, (t) = Taken By, (c) = Cosigned By      Initials Name Provider Type    LR Gifty Swartz, PT Physical Therapist                  Therapy Charges for Today       Code Description Service Date Service Provider Modifiers Qty    92990363711 HC PT MANUAL THERAPY EA 15 MIN 1/16/2025 Gifty Swartz, PT GP 1     [id number]  PT EVAL LOW COMPLEXITY 2 1/16/2025 Gifty Swartz, PT GP 1            PT G-Codes  Outcome Measure Options: Optimal Instrument       Gifty Swartz, PT  1/16/2025

## 2025-01-27 RX ORDER — VARENICLINE TARTRATE 0.5 (11)-1
1 KIT ORAL DAILY
Qty: 53 EACH | Refills: 0 | Status: SHIPPED | OUTPATIENT
Start: 2025-01-27

## 2025-01-28 RX ORDER — CETIRIZINE HYDROCHLORIDE 10 MG/1
10 TABLET ORAL DAILY
Qty: 90 TABLET | Refills: 0 | Status: SHIPPED | OUTPATIENT
Start: 2025-01-28

## 2025-01-28 RX ORDER — VALACYCLOVIR HYDROCHLORIDE 1 G/1
1000 TABLET, FILM COATED ORAL DAILY
Qty: 10 TABLET | Refills: 2 | Status: SHIPPED | OUTPATIENT
Start: 2025-01-28

## 2025-01-29 ENCOUNTER — HOSPITAL ENCOUNTER (EMERGENCY)
Facility: HOSPITAL | Age: 50
Discharge: HOME | End: 2025-01-29
Attending: EMERGENCY MEDICINE
Payer: COMMERCIAL

## 2025-01-29 VITALS
OXYGEN SATURATION: 99 % | HEART RATE: 83 BPM | TEMPERATURE: 98.8 F | SYSTOLIC BLOOD PRESSURE: 129 MMHG | HEIGHT: 70 IN | WEIGHT: 244 LBS | BODY MASS INDEX: 34.93 KG/M2 | RESPIRATION RATE: 20 BRPM | DIASTOLIC BLOOD PRESSURE: 86 MMHG

## 2025-01-29 DIAGNOSIS — M47.816 LUMBAR SPONDYLOSIS: ICD-10-CM

## 2025-01-29 DIAGNOSIS — S39.012A LUMBOSACRAL STRAIN, INITIAL ENCOUNTER: ICD-10-CM

## 2025-01-29 DIAGNOSIS — M62.830 BACK SPASM: Primary | ICD-10-CM

## 2025-01-29 PROCEDURE — 25010000002 DEXAMETHASONE SODIUM PHOSPHATE 10 MG/ML SOLUTION

## 2025-01-29 PROCEDURE — 99282 EMERGENCY DEPT VISIT SF MDM: CPT

## 2025-01-29 PROCEDURE — 96372 THER/PROPH/DIAG INJ SC/IM: CPT

## 2025-01-29 PROCEDURE — 25010000002 ORPHENADRINE CITRATE PER 60 MG

## 2025-01-29 RX ORDER — DEXAMETHASONE SODIUM PHOSPHATE 10 MG/ML
10 INJECTION, SOLUTION INTRAMUSCULAR; INTRAVENOUS ONCE
Status: COMPLETED | OUTPATIENT
Start: 2025-01-29 | End: 2025-01-29

## 2025-01-29 RX ORDER — ORPHENADRINE CITRATE 30 MG/ML
60 INJECTION INTRAMUSCULAR; INTRAVENOUS ONCE
Status: COMPLETED | OUTPATIENT
Start: 2025-01-29 | End: 2025-01-29

## 2025-01-29 RX ORDER — METHOCARBAMOL 750 MG/1
750 TABLET, FILM COATED ORAL 3 TIMES DAILY PRN
Qty: 15 TABLET | Refills: 0 | Status: SHIPPED | OUTPATIENT
Start: 2025-01-29 | End: 2025-02-03

## 2025-01-29 RX ADMIN — ORPHENADRINE CITRATE 60 MG: 60 INJECTION INTRAMUSCULAR; INTRAVENOUS at 13:58

## 2025-01-29 RX ADMIN — DEXAMETHASONE SODIUM PHOSPHATE 10 MG: 10 INJECTION INTRAMUSCULAR; INTRAVENOUS at 13:58

## 2025-01-29 NOTE — ED PROVIDER NOTES
Time: 1:44 PM EST  Date of encounter:  1/29/2025  Independent Historian/Clinical History and Information was obtained by:   Patient    History is limited by: N/A    Chief Complaint: Back spasms      History of Present Illness:  Patient is a 49 y.o. year old female who presents to the emergency department for evaluation of back spasms and mild low back pain that began 2 weeks ago.  Patient states she was seen in the ED and had an x-ray performed which showed nothing acute per patient.  She states she was given muscle relaxers which temporarily helped but the pain came back yesterday.  Patient denies previous back surgical history.  Patient denies urinary/bowel incontinence.  Patient denies radiculopathy.      Patient Care Team  Primary Care Provider: Ricky Velasquez Jr., MD    Past Medical History:     Allergies   Allergen Reactions    Prednisone Mental Status Change     Other reaction(s): Mental Status Change    Tramadol GI Intolerance     Nausea and vomiting  Other reaction(s): GI Intolerance, Vomiting  Nausea and vomiting    Diclofenac Rash     Past Medical History:   Diagnosis Date    Abnormal Pap smear of cervix     Arthritis     Asthma     NO INHALER USE    Back pain 11/21/2017    Disease of thyroid gland     Fatigue 11/21/2017    H/O Chronic pelvic pain in female 02/23/2022    H/O Foot pain, right     H/O Ovarian cyst     HPV (human papilloma virus) infection     Hypoglycemia     Insomnia 12/11/2017    Pelvic pain 01/12/2022    Polycystic ovarian syndrome 11/21/2017    Seasonal allergies     Uterine pain     Vitamin D deficiency 12/11/2017     Past Surgical History:   Procedure Laterality Date    CAST APPLICATION Right 06/16/2023    Procedure: CAST APPLICATION LEG;  Surgeon: Lucas Rivera DPM;  Location: [facility] OR;  Service: Podiatry;  Laterality: Right;    CAST APPLICATION Left 09/01/2023    Procedure: CAST APPLICATION LEG;  Surgeon: Lucas Rivera DPM;  Location: [facility]  OR;  Service: Podiatry;  Laterality: Left;    CHOLECYSTECTOMY      COLONOSCOPY N/A 02/16/2022    Procedure: COLONOSCOPY;  Surgeon: Jb Gonzalez MD;  Location: [facility] ENDOSCOPY;  Service: Gastroenterology;  Laterality: N/A;  hemmorroids    ENDOMETRIAL ABLATION      ESSURE TUBAL LIGATION      JOINT REPLACEMENT      bilateral knee replacement    LAPAROSCOPIC GASTRIC BANDING      5/2024    LEEP      early 2000's    PLANTAR FASCIA RELEASE Right 06/16/2023    Procedure: FOOT PLANTAR FASCIECTOMY;  Surgeon: Lucas Rivera DPM;  Location: [facility] MAIN OR;  Service: Podiatry;  Laterality: Right;    PLANTAR FASCIECTOMY Left 09/01/2023    Procedure: FASCIECTOMY PLANTAR FASCIA;  Surgeon: Lucas Rivera DPM;  Location: [facility] MAIN OR;  Service: Podiatry;  Laterality: Left;    TARSAL TUNNEL RELEASE Right 06/16/2023    Procedure: TARSAL TUNNEL RELEASE;  Surgeon: Lucas Rivera DPM;  Location: [facility] MAIN OR;  Service: Podiatry;  Laterality: Right;    TARSAL TUNNEL RELEASE Left 09/01/2023    Procedure: LEFT TARSAL TUNNEL RELEASE;  Surgeon: Lucas Rivera DPM;  Location: [facility] MAIN OR;  Service: Podiatry;  Laterality: Left;    TONSILLECTOMY      TOTAL LAPAROSCOPIC HYSTERECTOMY N/A 04/06/2022    Procedure: TOTAL LAPAROSCOPIC HYSTERECTOMY WITH DAVINCI ROBOT;  Surgeon: Chito Cook MD;  Location: [facility] MAIN OR;  Service: Robotics - DaVinci;  Laterality: N/A;, secondary to pelvic pain     Family History   Problem Relation Age of Onset    Depression Mother     Heart disease Mother     Arthritis Mother     Sleep apnea Mother     Self-Injurious Behavior  Daughter     Depression Daughter     Anxiety disorder Daughter     ADD / ADHD Son     Cancer Other     Malig Hyperthermia Neg Hx     Breast cancer Neg Hx     Uterine cancer Neg Hx     Ovarian cancer Neg Hx     Colon cancer Neg Hx     Deep vein thrombosis Neg Hx     Pulmonary embolism Neg Hx        Home Medications:  Prior to Admission  medications    Medication Sig Start Date End Date Taking? Authorizing Provider   cetirizine (zyrTEC) 10 MG tablet Take 1 tablet by mouth Daily. 1/28/25   Ricky Velasquez Jr., MD   cyanocobalamin 1000 MCG/ML injection INJECT 1 ML INTO THE MUSCLE ONCE EVERY 28 DAYS 11/11/24   Ricky Velasquez Jr., MD   cyclobenzaprine (FLEXERIL) 10 MG tablet Take 1 tablet by mouth 3 (Three) Times a Day As Needed for Muscle Spasms. 1/15/25   Marcella Garibay PA-C   levothyroxine (SYNTHROID, LEVOTHROID) 50 MCG tablet Take 1 tablet by mouth Daily. 10/21/24   Ricky Velasquez Jr., MD   multivitamin with minerals tablet tablet Take 1 tablet by mouth Daily.    ProviderJuan Alberto MD   orphenadrine (NORFLEX) 100 MG 12 hr tablet Take 1 tablet by mouth 2 (Two) Times a Day for 7 days. 1/16/25 1/23/25  Lilian Lazaro PA-C   traZODone (DESYREL) 50 MG tablet Take 1 tablet by mouth Every Night. 9/30/24   Ricky Velasquez Jr., MD   triamcinolone (KENALOG) 0.1 % cream Apply 1 Application topically to the appropriate area as directed 4 (Four) Times a Day. 6/5/24   ProviderJuan Alberto MD   valACYclovir (Valtrex) 1000 MG tablet Take 1 tablet by mouth Daily. 1/28/25   Ricky Velasquez Jr., MD   Varenicline Tartrate, Starter, 0.5 MG X 11 & 1 MG X 42 tablet therapy pack Take 1 tablet by mouth Daily. 1/27/25   Ricky Velasquez Jr., MD        Social History:   Social History     Tobacco Use    Smoking status: Every Day     Current packs/day: 1.00     Average packs/day: 1 pack/day for 30.0 years (30.0 ttl pk-yrs)     Types: Cigarettes     Passive exposure: Current    Smokeless tobacco: Never    Tobacco comments:     INSTRUCTED NO SMOKING 24 HR PRIOR TO SURGERY    Vaping Use    Vaping status: Never Used   Substance Use Topics    Alcohol use: Yes     Comment: SOCIALLY    Drug use: Never     Comment: patient denies         Review of Systems:  Review of Systems   Constitutional:  Negative for chills and fever.  "  HENT:  Negative for congestion, rhinorrhea and sore throat.    Eyes:  Negative for pain and visual disturbance.   Respiratory:  Negative for apnea, cough, chest tightness and shortness of breath.    Cardiovascular:  Negative for chest pain and palpitations.   Gastrointestinal:  Negative for abdominal pain, diarrhea, nausea and vomiting.   Genitourinary:  Negative for difficulty urinating and dysuria.   Musculoskeletal:  Positive for back pain. Negative for joint swelling and myalgias.   Skin:  Negative for color change.   Neurological:  Negative for seizures and headaches.   Psychiatric/Behavioral: Negative.     All other systems reviewed and are negative.       Physical Exam:  /86 (BP Location: Right arm, Patient Position: Sitting)   Pulse 83   Temp 98.8 °F (37.1 °C) (Oral)   Resp 20   Ht 177.8 cm (70\")   Wt 111 kg (244 lb)   LMP  (LMP Unknown)   SpO2 99%   BMI 35.01 kg/m²     Physical Exam  Vitals and nursing note reviewed.   Constitutional:       General: She is not in acute distress.     Appearance: Normal appearance. She is not toxic-appearing.   HENT:      Head: Normocephalic and atraumatic.      Jaw: There is normal jaw occlusion.   Eyes:      General: Lids are normal.      Extraocular Movements: Extraocular movements intact.      Conjunctiva/sclera: Conjunctivae normal.      Pupils: Pupils are equal, round, and reactive to light.   Cardiovascular:      Rate and Rhythm: Normal rate and regular rhythm.      Pulses: Normal pulses.      Heart sounds: Normal heart sounds.   Pulmonary:      Effort: Pulmonary effort is normal. No respiratory distress.      Breath sounds: Normal breath sounds. No wheezing or rhonchi.   Abdominal:      General: Abdomen is flat.      Palpations: Abdomen is soft.      Tenderness: There is no abdominal tenderness. There is no guarding or rebound.   Musculoskeletal:         General: Tenderness (Mild lumbar paraspinal tenderness appreciated upon palpation) present. Normal " range of motion.      Cervical back: Normal range of motion and neck supple.      Right lower leg: No edema.      Left lower leg: No edema.   Skin:     General: Skin is warm and dry.   Neurological:      Mental Status: She is alert and oriented to person, place, and time. Mental status is at baseline.   Psychiatric:         Mood and Affect: Mood normal.                    Medical Decision Making:      Comorbidities that affect care:    Asthma    External Notes reviewed:          The following orders were placed and all results were independently analyzed by me:  No orders of the defined types were placed in this encounter.      Medications Given in the Emergency Department:  Medications   dexAMETHasone sodium phosphate injection 10 mg (has no administration in time range)   orphenadrine (NORFLEX) injection 60 mg (has no administration in time range)        ED Course:         Labs:    Lab Results (last 24 hours)       ** No results found for the last 24 hours. **             Imaging:    No Radiology Exams Resulted Within Past 24 Hours      Differential Diagnosis and Discussion:    Back Pain: The patient presents with back pain. My differential diagnosis includes but is not limited to acute spinal epidural abscess, acute spinal epidural bleed, cauda equina syndrome, abdominal aortic aneurysm, aortic dissection, kidney stone, pyelonephritis, musculoskeletal back pain, spinal fracture, and osteoarthritis.     PROCEDURES:        No orders to display       Procedures    MDM     I reviewed patient's back x-ray from 1-16 which shows mild lumbar spondylosis.  I gave patient a Norflex shot and Decadron shot in ED today and will send patient home with muscle relaxers.  I instructed patient to return to ED if she develops any new or worsening symptoms.  Otherwise follow-up PCP.  I will provide ambulatory referral neurosurgery for follow-up.  Patient states she understands and agrees with plan of care.                Patient  Care Considerations:          Consultants/Shared Management Plan:    None    Social Determinants of Health:    Patient is independent, reliable, and has access to care.       Disposition and Care Coordination:    Discharged: The patient is suitable and stable for discharge with no need for consideration of admission.    I have explained the patient´s condition, diagnoses and treatment plan based on the information available to me at this time. I have answered questions and addressed any concerns. The patient has a good  understanding of the patient´s diagnosis, condition, and treatment plan as can be expected at this point. The vital signs have been stable. The patient´s condition is stable and appropriate for discharge from the emergency department.      The patient will pursue further outpatient evaluation with the primary care physician or other designated or consulting physician as outlined in the discharge instructions. They are agreeable to this plan of care and follow-up instructions have been explained in detail. The patient has received these instructions in written format and has expressed an understanding of the discharge instructions. The patient is aware that any significant change in condition or worsening of symptoms should prompt an immediate return to this or the closest emergency department or call to 911.    Final diagnoses:   None        ED Disposition       None            This medical record created using voice recognition software.             Jose Enrique Darling PA-C  01/29/25 [id number]

## 2025-02-03 ENCOUNTER — DOCUMENTATION (OUTPATIENT)
Dept: INTERNAL MEDICINE | Facility: CLINIC | Age: 50
End: 2025-02-03
Payer: COMMERCIAL

## 2025-03-04 ENCOUNTER — OFFICE VISIT (OUTPATIENT)
Dept: PODIATRY | Facility: CLINIC | Age: 50
End: 2025-03-04
Payer: COMMERCIAL

## 2025-03-04 VITALS
HEIGHT: 70 IN | TEMPERATURE: 96.9 F | DIASTOLIC BLOOD PRESSURE: 82 MMHG | OXYGEN SATURATION: 97 % | HEART RATE: 95 BPM | SYSTOLIC BLOOD PRESSURE: 136 MMHG | BODY MASS INDEX: 34.65 KG/M2 | WEIGHT: 242 LBS

## 2025-03-04 DIAGNOSIS — E11.8 DM FEET: Primary | ICD-10-CM

## 2025-03-04 DIAGNOSIS — L60.0 ONYCHOCRYPTOSIS: ICD-10-CM

## 2025-03-04 DIAGNOSIS — B35.1 ONYCHOMYCOSIS: ICD-10-CM

## 2025-03-04 DIAGNOSIS — M79.671 FOOT PAIN, BILATERAL: ICD-10-CM

## 2025-03-04 DIAGNOSIS — E11.9 DIABETES MELLITUS WITHOUT COMPLICATION: ICD-10-CM

## 2025-03-04 DIAGNOSIS — E11.9 NON-INSULIN DEPENDENT TYPE 2 DIABETES MELLITUS: ICD-10-CM

## 2025-03-04 DIAGNOSIS — M79.672 FOOT PAIN, BILATERAL: ICD-10-CM

## 2025-03-04 RX ORDER — OXYCODONE AND ACETAMINOPHEN 7.5; 325 MG/1; MG/1
1-2 TABLET ORAL
COMMUNITY
Start: 2025-02-21 | End: 2025-03-23

## 2025-03-04 NOTE — PROGRESS NOTES
[facility] - PODIATRY    Today's Date: 03/04/25    Patient Name: Em Montanez  MRN: [id number]  CSN: [id number]  PCP: Ricky Velasquez Jr., MD, Last PCP Visit: 3 February 2025  Referring Provider: No ref. provider found    SUBJECTIVE     Chief Complaint   Patient presents with    Left Foot - Follow-up, Nail Problem    Right Foot - Follow-up, Nail Problem     HPI: Em Montanez, a 49 y.o.female, comes to clinic.    New, Established, New Problem:  est  Location:  Toenails  Duration:   Greater than five years  Onset:  Gradual  Nature:  sore with palpation.  Stable, worsening, improving:   stable  Aggravating factors:  Pain with shoe gear and ambulation.  Previous Treatment: debridement    Patient denies any fevers, chills, nausea, vomiting, shortness of breath, nor any other constitutional signs nor symptoms.       Not required to check blood sugars at home.    Medical changes:  Recent left total knee revision last Friday.    I have reviewed/confirmed previously documented HPI with no changes.   Past Medical History:   Diagnosis Date    Abnormal Pap smear of cervix     Arthritis     Asthma     NO INHALER USE    Back pain 11/21/2017    Disease of thyroid gland     Fatigue 11/21/2017    H/O Chronic pelvic pain in female 02/23/2022    H/O Foot pain, right     H/O Ovarian cyst     HPV (human papilloma virus) infection     Hypoglycemia     Insomnia 12/11/2017    Pelvic pain 01/12/2022    Polycystic ovarian syndrome 11/21/2017    Seasonal allergies     Uterine pain     Vitamin D deficiency 12/11/2017     Past Surgical History:   Procedure Laterality Date    CAST APPLICATION Right 06/16/2023    Procedure: CAST APPLICATION LEG;  Surgeon: Lucas Rivera DPM;  Location: [facility] OR;  Service: Podiatry;  Laterality: Right;    CAST APPLICATION Left 09/01/2023    Procedure: CAST APPLICATION LEG;  Surgeon: Lucas Rivera DPM;  Location: [facility] MAIN OR;  Service: Podiatry;   Laterality: Left;    CHOLECYSTECTOMY      COLONOSCOPY N/A 02/16/2022    Procedure: COLONOSCOPY;  Surgeon: Jb Gonzalez MD;  Location: [facility] ENDOSCOPY;  Service: Gastroenterology;  Laterality: N/A;  hemmorroids    ENDOMETRIAL ABLATION      ESSURE TUBAL LIGATION      JOINT REPLACEMENT      bilateral knee replacement    LAPAROSCOPIC GASTRIC BANDING      5/2024    LEEP      early 2000's    PLANTAR FASCIA RELEASE Right 06/16/2023    Procedure: FOOT PLANTAR FASCIECTOMY;  Surgeon: Lucas Rivera DPM;  Location: [facility] MAIN OR;  Service: Podiatry;  Laterality: Right;    PLANTAR FASCIECTOMY Left 09/01/2023    Procedure: FASCIECTOMY PLANTAR FASCIA;  Surgeon: Lucas Rivera DPM;  Location: [facility] MAIN OR;  Service: Podiatry;  Laterality: Left;    TARSAL TUNNEL RELEASE Right 06/16/2023    Procedure: TARSAL TUNNEL RELEASE;  Surgeon: Lucas Rivera DPM;  Location: [facility] MAIN OR;  Service: Podiatry;  Laterality: Right;    TARSAL TUNNEL RELEASE Left 09/01/2023    Procedure: LEFT TARSAL TUNNEL RELEASE;  Surgeon: Lucas Rivera DPM;  Location: [facility] MAIN OR;  Service: Podiatry;  Laterality: Left;    TONSILLECTOMY      TOTAL LAPAROSCOPIC HYSTERECTOMY N/A 04/06/2022    Procedure: TOTAL LAPAROSCOPIC HYSTERECTOMY WITH DAVINCI ROBOT;  Surgeon: Chito Cook MD;  Location: [facility] MAIN OR;  Service: Robotics - DaVinci;  Laterality: N/A;, secondary to pelvic pain     Family History   Problem Relation Age of Onset    Depression Mother     Heart disease Mother     Arthritis Mother     Sleep apnea Mother     Self-Injurious Behavior  Daughter     Depression Daughter     Anxiety disorder Daughter     ADD / ADHD Son     Cancer Other     Malig Hyperthermia Neg Hx     Breast cancer Neg Hx     Uterine cancer Neg Hx     Ovarian cancer Neg Hx     Colon cancer Neg Hx     Deep vein thrombosis Neg Hx     Pulmonary embolism Neg Hx      Social History     Socioeconomic History    Marital status:     Tobacco Use    Smoking status: Every Day     Current packs/day: 1.00     Average packs/day: 1 pack/day for 30.0 years (30.0 ttl pk-yrs)     Types: Cigarettes     Passive exposure: Current    Smokeless tobacco: Never    Tobacco comments:     INSTRUCTED NO SMOKING 24 HR PRIOR TO SURGERY    Vaping Use    Vaping status: Never Used   Substance and Sexual Activity    Alcohol use: Yes     Comment: SOCIALLY    Drug use: Never     Comment: patient denies    Sexual activity: Yes     Partners: Male     Birth control/protection: Hysterectomy     Allergies   Allergen Reactions    Prednisone Mental Status Change     Other reaction(s): Mental Status Change    Tramadol GI Intolerance     Nausea and vomiting  Other reaction(s): GI Intolerance, Vomiting  Nausea and vomiting    Diclofenac Rash     Current Outpatient Medications   Medication Sig Dispense Refill    apixaban (ELIQUIS) 2.5 MG tablet tablet Take 1 tablet by mouth.      cetirizine (zyrTEC) 10 MG tablet Take 1 tablet by mouth Daily. 90 tablet 0    cyanocobalamin 1000 MCG/ML injection INJECT 1 ML INTO THE MUSCLE ONCE EVERY 28 DAYS 3 mL 1    cyclobenzaprine (FLEXERIL) 10 MG tablet Take 1 tablet by mouth 3 (Three) Times a Day As Needed for Muscle Spasms. 15 tablet 0    levothyroxine (SYNTHROID, LEVOTHROID) 50 MCG tablet Take 1 tablet by mouth Daily. 90 tablet 1    multivitamin with minerals tablet tablet Take 1 tablet by mouth Daily.      oxyCODONE-acetaminophen (PERCOCET) 7.5-325 MG per tablet Take 1-2 tablets by mouth.      traZODone (DESYREL) 50 MG tablet Take 1 tablet by mouth Every Night. 90 tablet 1    triamcinolone (KENALOG) 0.1 % cream Apply 1 Application topically to the appropriate area as directed 4 (Four) Times a Day.      valACYclovir (Valtrex) 1000 MG tablet Take 1 tablet by mouth Daily. 10 tablet 2    Varenicline Tartrate, Starter, 0.5 MG X 11 & 1 MG X 42 tablet therapy pack Take 1 tablet by mouth Daily. 53 each 0    orphenadrine (NORFLEX) 100 MG 12 hr tablet  Take 1 tablet by mouth 2 (Two) Times a Day for 7 days. (Patient not taking: Reported on 3/4/2025) 14 tablet 0     No current facility-administered medications for this visit.     Review of Systems   Constitutional: Negative.    Skin:         Painful toenails.   All other systems reviewed and are negative.      OBJECTIVE     Vitals:    25 1019   BP: 136/82   Pulse: 95   Temp: 96.9 °F (36.1 °C)   SpO2: 97%         Patient seen in no apparent distress.      PHYSICAL EXAM:     Foot/Ankle Exam    GENERAL  Appearance:  obese  Orientation:  AAOx3  Affect:  appropriate  Gait:  unimpaired  Assistance:  independent  Right shoe gear: casual shoe  Left shoe gear: casual shoe    VASCULAR     Right Foot Vascularity   Dorsalis pedis:  2+  Posterior tibial:  2+  Skin temperature:  warm  Edema gradin+ and pitting  CFT:  < 3 seconds  Pedal hair growth:  Absent  Varicosities:  mild varicosities     Left Foot Vascularity   Dorsalis pedis:  2+  Posterior tibial:  2+  Skin temperature:  warm  Edema gradin+ and pitting  CFT:  < 3 seconds  Pedal hair growth:  Absent  Varicosities:  mild varicosities     NEUROLOGIC     Right Foot Neurologic   Normal sensation    Light touch sensation: normal  Vibratory sensation: normal  Hot/Cold sensation: normal  Protective Sensation using [place] Monofilament:   Sites intact: 10  Sites tested: 10     Left Foot Neurologic   Normal sensation    Light touch sensation: normal  Vibratory sensation: normal  Hot/Cold sensation:  normal  Protective Sensation using [place] Monofilament:   Sites intact: 10  Sites tested: 10    MUSCLE STRENGTH     Right Foot Muscle Strength   Foot dorsiflexion:  4  Foot plantar flexion:  4  Foot inversion:  4  Foot eversion:  4     Left Foot Muscle Strength   Foot dorsiflexion:  4  Foot plantar flexion:  4  Foot inversion:  4  Foot eversion:  4    RANGE OF MOTION     Right Foot Range of Motion   Foot and ankle ROM within normal limits       Left  Foot Range of Motion   Foot and ankle ROM within normal limits      DERMATOLOGIC      Right Foot Dermatologic   Skin  Right foot skin is intact.   Nails  1.  Positive for onychomycosis, abnormal thickness, subungual debris, dystrophic nail and ingrown toenail.  4.  Positive for elongated, onychomycosis, abnormal thickness, subungual debris, dystrophic nail and ingrown toenail.  5.  Positive for elongated, onychomycosis, abnormal thickness, subungual debris, dystrophic nail and ingrown toenail.     Left Foot Dermatologic   Skin  Left foot skin is intact.   Nails  1.  Positive for elongated, onychomycosis, abnormal thickness, subungual debris, dystrophic nail and ingrown toenail.  2.  Positive for elongated, onychomycosis, abnormal thickness, subungual debris, dystrophic nail and ingrown toenail.  5.  Positive for elongated, onychomycosis, abnormally thick, subungual debris, dystrophic nail and ingrown toenail.    I have reexamined the patient the results are consistent with the previously documented exam.    ASSESSMENT/PLAN     Diagnoses and all orders for this visit:    1. DM feet (Primary)    2. Onychocryptosis    3. Onychomycosis    4. Non-insulin dependent type 2 diabetes mellitus    5. Foot pain, bilateral    6. Diabetes mellitus without complication    Comprehensive lower extremity examination and evaluation was performed.    Discussed findings and treatment plan including risks, benefits, and treatment options with patient in detail. Patient agreed with treatment plan.    Toenails 1, 4, 5 on Right and 1, 2, 5 on Left were debrided with nail nippers then filed with a Dremel nail serena.  Patient tolerated procedure well without complications.    Patient is to monitor for recurrence and any new symptoms and to contact Dr. Rivera's office for a follow-up appointment.      The patient states understanding and agreement with this plan.    An After Visit Summary was printed and given to the patient at discharge,  including (if requested) any available informative/educational handouts regarding diagnosis, treatment, or medications. All questions were answered to patient/family satisfaction. Should symptoms fail to improve or worsen they agree to call or return to clinic or to go to the Emergency Department. Discussed the importance of following up with any needed screening tests/labs/specialist appointments and any requested follow-up recommended by me today. Importance of maintaining follow-up discussed and patient accepts that missed appointments can delay diagnosis and potentially lead to worsening of conditions.    Return in about 9 weeks (around 5/6/2025) for Toenail Care., or sooner if acute issues arise.    I have reviewed the assessment and plan and verified the accuracy of it. No changes to assessment and plan since the information was documented. Lucas Rivera DPM 03/04/25     I have dictated this note utilizing Dragon Dictation.  Please note that portions of this note were completed with a voice recognition program.  Part of this note may be an electronic transcription/translation of spoken language to printed text using the Dragon Dictation System.      This document has been electronically signed by Lucas Rivera DPM on March 4, 2025 10:35 EST

## (undated) DEVICE — SOL IRRG H2O PL/BG 1000ML STRL

## (undated) DEVICE — TIP COVER ACCESSORY

## (undated) DEVICE — Device

## (undated) DEVICE — GAUZE,SPONGE,4"X4",16PLY,STRL,LF,10/TRAY: Brand: MEDLINE

## (undated) DEVICE — Device: Brand: DEFENDO AIR/WATER/SUCTION AND BIOPSY VALVE

## (undated) DEVICE — BNDG GZ SOF-FORM CONFRM 3X75IN LF STRL

## (undated) DEVICE — GLV SURG SENSICARE SLT PF LF 9 STRL

## (undated) DEVICE — SOL IRR NACL 0.9PCT BT 1000ML

## (undated) DEVICE — ARM DRAPE

## (undated) DEVICE — LAPAROSCOPIC SMOKE EVACUATION SYSTEM ACTIVE AND PASSIVE: Brand: VALLEYLAB

## (undated) DEVICE — TOTAL TRAY, 16FR 10ML SIL FOLEY, URN: Brand: MEDLINE

## (undated) DEVICE — BANDAGE,GAUZE,BULKEE II,4.5"X4.1YD,STRL: Brand: MEDLINE

## (undated) DEVICE — APPL CHLORAPREP HI/LITE 26ML ORNG

## (undated) DEVICE — DRSNG WND GZ CURAD OIL EMULSION 3X3IN STRL

## (undated) DEVICE — CANNULA SEAL

## (undated) DEVICE — BNDG ELAS CO-FLEX SLF ADHR 4IN5YD LF STRL

## (undated) DEVICE — EXTREMITY-LF: Brand: MEDLINE INDUSTRIES, INC.

## (undated) DEVICE — TOWEL,OR,DSP,ST,BLUE,STD,4/PK,20PK/CS: Brand: MEDLINE

## (undated) DEVICE — GLV SURG ULTRAFREE MAX PF LTX SZ9

## (undated) DEVICE — GOWN,REINFORCE,POLY,SIRUS,BREATH SLV,XLG: Brand: MEDLINE

## (undated) DEVICE — PENCL E/S SMOKEEVAC W/TELESCP CANN

## (undated) DEVICE — SLV SCD KN/LEN ADJ EXPRSS BLENDED MD 1P/U

## (undated) DEVICE — SYR LL TP 10ML STRL

## (undated) DEVICE — INTENDED FOR TISSUE SEPARATION, AND OTHER PROCEDURES THAT REQUIRE A SHARP SURGICAL BLADE TO PUNCTURE OR CUT.: Brand: BARD-PARKER ® CARBON RIB-BACK BLADES

## (undated) DEVICE — GYN LAPAROSCOPY-LF: Brand: MEDLINE INDUSTRIES, INC.

## (undated) DEVICE — COVER,MAYO STAND,STERILE: Brand: MEDLINE

## (undated) DEVICE — ANTIBACTERIAL VIOLET BRAIDED (POLYGLACTIN 910), SYNTHETIC ABSORBABLE SUTURE: Brand: COATED VICRYL

## (undated) DEVICE — ADHS SKIN SURG TISS VISC PREMIERPRO EXOFIN HI/VISC FAST/DRY

## (undated) DEVICE — BNDG ELAS DELUXE REINF 15CM 5MTR STRL

## (undated) DEVICE — SKIN PREP TRAY W/CHG: Brand: MEDLINE INDUSTRIES, INC.

## (undated) DEVICE — UNDERCAST PADDING: Brand: DEROYAL

## (undated) DEVICE — COLON KIT: Brand: MEDLINE INDUSTRIES, INC.

## (undated) DEVICE — BLADELESS OBTURATOR: Brand: WECK VISTA

## (undated) DEVICE — MANIP UTER RUMI TP 6.7MM 6CM WHT

## (undated) DEVICE — MANIP UTER RUMI 2 KOH EFFICIENT 3.5CM BL

## (undated) DEVICE — GLV SURG SENSICARE PI ORTHO PF SZ7 LF STRL